# Patient Record
Sex: MALE | Race: BLACK OR AFRICAN AMERICAN | NOT HISPANIC OR LATINO | Employment: OTHER | URBAN - METROPOLITAN AREA
[De-identification: names, ages, dates, MRNs, and addresses within clinical notes are randomized per-mention and may not be internally consistent; named-entity substitution may affect disease eponyms.]

---

## 2020-02-14 PROBLEM — F32.A DEPRESSION: Status: ACTIVE | Noted: 2020-02-14

## 2021-04-19 ENCOUNTER — HISTORICAL (OUTPATIENT)
Dept: ADMINISTRATIVE | Facility: HOSPITAL | Age: 36
End: 2021-04-19

## 2021-04-19 LAB
ALBUMIN SERPL-MCNC: 4.3 GM/DL (ref 3.5–5)
ALBUMIN/GLOB SERPL: 1.2 RATIO (ref 1.1–2)
ALP SERPL-CCNC: 107 UNIT/L (ref 40–150)
ALT SERPL-CCNC: 22 UNIT/L (ref 0–55)
AST SERPL-CCNC: 22 UNIT/L (ref 5–34)
BILIRUB SERPL-MCNC: 0.4 MG/DL
BILIRUBIN DIRECT+TOT PNL SERPL-MCNC: 0.2 MG/DL (ref 0–0.5)
BILIRUBIN DIRECT+TOT PNL SERPL-MCNC: 0.2 MG/DL (ref 0–0.8)
BUN SERPL-MCNC: 8 MG/DL (ref 8.9–20.6)
CALCIUM SERPL-MCNC: 9.5 MG/DL (ref 8.4–10.2)
CHLORIDE SERPL-SCNC: 106 MMOL/L (ref 98–107)
CHOLEST SERPL-MCNC: 167 MG/DL
CHOLEST/HDLC SERPL: 4 {RATIO} (ref 0–5)
CO2 SERPL-SCNC: 26 MMOL/L (ref 22–29)
CREAT SERPL-MCNC: 0.88 MG/DL (ref 0.73–1.18)
ERYTHROCYTE [DISTWIDTH] IN BLOOD BY AUTOMATED COUNT: 14.8 % (ref 11.5–17)
GLOBULIN SER-MCNC: 3.5 GM/DL (ref 2.4–3.5)
GLUCOSE SERPL-MCNC: 89 MG/DL (ref 74–100)
HCT VFR BLD AUTO: 52.2 % (ref 42–52)
HDLC SERPL-MCNC: 45 MG/DL (ref 35–60)
HGB BLD-MCNC: 16.3 GM/DL (ref 14–18)
LDLC SERPL CALC-MCNC: 105 MG/DL (ref 50–140)
MCH RBC QN AUTO: 28 PG (ref 27–31)
MCHC RBC AUTO-ENTMCNC: 31.2 GM/DL (ref 33–36)
MCV RBC AUTO: 89.7 FL (ref 80–94)
PLATELET # BLD AUTO: 285 X10(3)/MCL (ref 130–400)
PMV BLD AUTO: 10.9 FL (ref 9.4–12.4)
POTASSIUM SERPL-SCNC: 4 MMOL/L (ref 3.5–5.1)
PROT SERPL-MCNC: 7.8 GM/DL (ref 6.4–8.3)
RBC # BLD AUTO: 5.82 X10(6)/MCL (ref 4.7–6.1)
SODIUM SERPL-SCNC: 144 MMOL/L (ref 136–145)
TRIGL SERPL-MCNC: 84 MG/DL (ref 34–140)
TSH SERPL-ACNC: 1.17 UIU/ML (ref 0.35–4.94)
VLDLC SERPL CALC-MCNC: 17 MG/DL
WBC # SPEC AUTO: 8.6 X10(3)/MCL (ref 4.5–11.5)

## 2021-10-04 ENCOUNTER — HISTORICAL (OUTPATIENT)
Dept: ADMINISTRATIVE | Facility: HOSPITAL | Age: 36
End: 2021-10-04

## 2021-10-04 LAB
AMPHET UR QL SCN: NEGATIVE
APPEARANCE, UA: CLEAR
BACTERIA SPEC CULT: ABNORMAL
BARBITURATE SCN PRESENT UR: NEGATIVE
BENZODIAZ UR QL SCN: NEGATIVE
BILIRUB UR QL STRIP: NEGATIVE
CANNABINOIDS UR QL SCN: NEGATIVE
COCAINE UR QL SCN: NEGATIVE
COLOR UR: YELLOW
GLUCOSE (UA): NEGATIVE
HGB UR QL STRIP: NEGATIVE
KETONES UR QL STRIP: ABNORMAL
LEUKOCYTE ESTERASE UR QL STRIP: NEGATIVE
NITRITE UR QL STRIP: NEGATIVE
OPIATES UR QL SCN: NEGATIVE
PCP UR QL: NEGATIVE
PH UR STRIP.AUTO: 7 [PH] (ref 5–7.5)
PH UR STRIP: 7 [PH] (ref 5–9)
PROT UR QL STRIP: ABNORMAL
RBC #/AREA URNS HPF: ABNORMAL /[HPF]
SP GR FLD REFRACTOMETRY: 1.02 (ref 1–1.03)
SP GR UR STRIP: 1.02 (ref 1–1.03)
SQUAMOUS EPITHELIAL, UA: ABNORMAL
UROBILINOGEN UR STRIP-ACNC: 0.2
WBC #/AREA URNS HPF: ABNORMAL /[HPF]

## 2021-11-12 ENCOUNTER — HISTORICAL (OUTPATIENT)
Dept: ADMINISTRATIVE | Facility: HOSPITAL | Age: 36
End: 2021-11-12

## 2021-11-12 LAB
CHOLEST SERPL-MCNC: 162 MG/DL
CHOLEST/HDLC SERPL: 3 {RATIO} (ref 0–5)
HDLC SERPL-MCNC: 60 MG/DL (ref 35–60)
LDLC SERPL CALC-MCNC: 87 MG/DL (ref 50–140)
TRIGL SERPL-MCNC: 73 MG/DL (ref 34–140)
VLDLC SERPL CALC-MCNC: 15 MG/DL

## 2021-11-17 ENCOUNTER — HISTORICAL (OUTPATIENT)
Dept: ADMINISTRATIVE | Facility: HOSPITAL | Age: 36
End: 2021-11-17

## 2021-11-17 LAB — VALPROATE SERPL-MCNC: 42.9 UG/ML (ref 50–100)

## 2021-11-22 ENCOUNTER — HISTORICAL (OUTPATIENT)
Dept: ADMINISTRATIVE | Facility: HOSPITAL | Age: 36
End: 2021-11-22

## 2021-11-22 LAB — VALPROATE SERPL-MCNC: 54 UG/ML (ref 50–100)

## 2022-06-07 ENCOUNTER — HOSPITAL ENCOUNTER (EMERGENCY)
Facility: HOSPITAL | Age: 37
Discharge: HOME | End: 2022-06-07
Attending: FAMILY MEDICINE
Payer: COMMERCIAL

## 2022-06-07 VITALS
RESPIRATION RATE: 18 BRPM | DIASTOLIC BLOOD PRESSURE: 77 MMHG | TEMPERATURE: 98 F | SYSTOLIC BLOOD PRESSURE: 120 MMHG | OXYGEN SATURATION: 100 % | BODY MASS INDEX: 21.47 KG/M2 | WEIGHT: 150 LBS | HEIGHT: 70 IN | HEART RATE: 92 BPM

## 2022-06-07 DIAGNOSIS — S39.012A LUMBAR STRAIN, INITIAL ENCOUNTER: Primary | ICD-10-CM

## 2022-06-07 PROCEDURE — 63600175 PHARM REV CODE 636 W HCPCS: Performed by: FAMILY MEDICINE

## 2022-06-07 PROCEDURE — 96372 THER/PROPH/DIAG INJ SC/IM: CPT | Performed by: FAMILY MEDICINE

## 2022-06-07 PROCEDURE — 25000003 PHARM REV CODE 250: Performed by: FAMILY MEDICINE

## 2022-06-07 PROCEDURE — 99284 EMERGENCY DEPT VISIT MOD MDM: CPT | Mod: 25

## 2022-06-07 RX ORDER — KETOROLAC TROMETHAMINE 30 MG/ML
60 INJECTION, SOLUTION INTRAMUSCULAR; INTRAVENOUS
Status: COMPLETED | OUTPATIENT
Start: 2022-06-07 | End: 2022-06-07

## 2022-06-07 RX ORDER — METHOCARBAMOL 500 MG/1
1000 TABLET, FILM COATED ORAL 3 TIMES DAILY
Qty: 30 TABLET | Refills: 0 | Status: ON HOLD | OUTPATIENT
Start: 2022-06-07 | End: 2022-06-28 | Stop reason: HOSPADM

## 2022-06-07 RX ORDER — NAPROXEN 500 MG/1
500 TABLET ORAL 2 TIMES DAILY PRN
Qty: 20 TABLET | Refills: 0 | Status: ON HOLD | OUTPATIENT
Start: 2022-06-07 | End: 2022-06-28 | Stop reason: HOSPADM

## 2022-06-07 RX ORDER — METHOCARBAMOL 500 MG/1
1000 TABLET, FILM COATED ORAL
Status: COMPLETED | OUTPATIENT
Start: 2022-06-07 | End: 2022-06-07

## 2022-06-07 RX ADMIN — METHOCARBAMOL 1000 MG: 500 TABLET ORAL at 03:06

## 2022-06-07 RX ADMIN — KETOROLAC TROMETHAMINE 60 MG: 30 INJECTION, SOLUTION INTRAMUSCULAR at 03:06

## 2022-06-07 NOTE — ED PROVIDER NOTES
"Encounter Date: 6/7/2022       History     Chief Complaint   Patient presents with    Back Pain     Pt states hurt his back at approx. 8 pm last night while trying to lift niece. Pt states cannot walk. Pt denies pertinent medical Hx.      36-year-old gentleman arrives by ambulance with complaints of lower back pain.  This occurred acutely after lifting up on his knees this afternoon.  Patient reports he felt a pop and is now concerned that his back is out of place.  Patient reports trouble walking and moving since this occurring.  No urinary incontinence.  No perineal numbness or tingling.  No bowel incontinence.  Patient reports symptoms are moderate.  Better with lying down, worse with movement.        Review of patient's allergies indicates:   Allergen Reactions    Haloperidol Swelling     Muscle stiffness  Has muscle spasms      Paroxetine Swelling, Other (See Comments) and Rash     "i don't remember"  "i don't remember"      Pineapple      Face swells up  Face swells up      Ziprasidone Other (See Comments)     Muscle twitching     Past Medical History:   Diagnosis Date    Anxiety     Depression     Hallucination     History of psychiatric hospitalization     Hx of psychiatric care     Psychiatric problem     Psychosis     Schizoaffective disorder     Sleep difficulties     Suicide attempt     Therapy      History reviewed. No pertinent surgical history.  History reviewed. No pertinent family history.  Social History     Tobacco Use    Smoking status: Current Every Day Smoker     Packs/day: 1.00   Substance Use Topics    Alcohol use: Yes     Alcohol/week: 4.0 standard drinks     Types: 4 Cans of beer per week    Drug use: Never     Review of Systems   Constitutional: Negative for chills, fatigue and fever.   HENT: Negative for ear pain, rhinorrhea and sore throat.    Eyes: Negative for photophobia and pain.   Respiratory: Negative for cough, shortness of breath and wheezing.  "   Cardiovascular: Negative for chest pain.   Gastrointestinal: Negative for abdominal pain, diarrhea, nausea and vomiting.   Genitourinary: Negative for dysuria.   Neurological: Negative for dizziness, weakness and headaches.   All other systems reviewed and are negative.      Physical Exam     Initial Vitals [06/07/22 0130]   BP Pulse Resp Temp SpO2   123/72 97 18 98.2 °F (36.8 °C) 100 %      MAP       --         Physical Exam    Nursing note and vitals reviewed.  Constitutional: He appears well-developed and well-nourished.   HENT:   Head: Normocephalic and atraumatic.   Eyes: EOM are normal. Pupils are equal, round, and reactive to light.   Neck: Neck supple.   Normal range of motion.  Cardiovascular: Normal rate, regular rhythm and normal heart sounds. Exam reveals no gallop and no friction rub.    No murmur heard.  Pulmonary/Chest: Breath sounds normal. No respiratory distress.   Abdominal: Abdomen is soft. Bowel sounds are normal. He exhibits no distension. There is no abdominal tenderness.   Musculoskeletal:         General: Normal range of motion.      Cervical back: Normal range of motion and neck supple.      Comments: Mild paralumbar tenderness to palpation.  No tenderness to palpation over the lumbar spines.  No sacral tenderness to palpation.  Negative straight leg raise bilaterally.  2+ patellar reflexes bilaterally.  2+ Achilles reflexes bilaterally.  No clonus appreciated in lower extremities.  When patient lying on his back, able to lift up each leg individually without difficulty.     Neurological: He is alert and oriented to person, place, and time. He has normal strength.   Skin: Skin is warm and dry.   Psychiatric: He has a normal mood and affect. His behavior is normal. Judgment and thought content normal.         ED Course   Procedures  Labs Reviewed - No data to display       Imaging Results          X-Ray Lumbar Spine Ap And Lateral (Preliminary result)  Result time 06/07/22 03:17:52    ED  Interpretation by Shahbaz Gooden MD (06/07/22 03:17:52, [facility], Emergency Medicine)    No acute abnormality                               Medications   ketorolac injection 60 mg (60 mg Intramuscular Given 6/7/22 0301)   methocarbamoL tablet 1,000 mg (1,000 mg Oral Given 6/7/22 0301)                 ED Course as of 06/07/22 0324 Tue Jun 07, 2022 0322 X-ray of the lumbar spine does not show any acute abnormality.  After patient has received a Toradol injection, about 5 minutes later, reports significant improvement of the pain where he feels he is able to move.  Reassurance given to the patient.  ER precautions for any acute worsening. [MW]      ED Course User Index  [MW] Shahbaz Gooden MD             Clinical Impression:   Final diagnoses:  [S39.012A] Lumbar strain, initial encounter (Primary)          ED Disposition Condition    Discharge Stable        ED Prescriptions     Medication Sig Dispense Start Date End Date Auth. Provider    naproxen (NAPROSYN) 500 MG tablet Take 1 tablet (500 mg total) by mouth 2 (two) times daily as needed (pain). 20 tablet 6/7/2022 6/17/2022 Shahbaz Gooden MD    methocarbamoL (ROBAXIN) 500 MG Tab Take 2 tablets (1,000 mg total) by mouth 3 (three) times daily. for 5 days 30 tablet 6/7/2022 6/12/2022 Shahbaz Gooden MD        Follow-up Information     Follow up With Specialties Details Why Contact Info    [facility] Emergency Medicine  As needed, If symptoms worsen [address] [address] [phone]    Primary Care Physician  In 5 days             Shahbaz Gooden MD  06/07/22 6994

## 2022-06-12 ENCOUNTER — HOSPITAL ENCOUNTER (INPATIENT)
Facility: HOSPITAL | Age: 37
LOS: 15 days | Discharge: HOME HEALTH | DRG: 558 | End: 2022-06-28
Attending: FAMILY MEDICINE | Admitting: INTERNAL MEDICINE
Payer: COMMERCIAL

## 2022-06-12 DIAGNOSIS — F20.9 SCHIZOPHRENIA, CHRONIC CONDITION WITH ACUTE EXACERBATION: ICD-10-CM

## 2022-06-12 DIAGNOSIS — M62.82 NON-TRAUMATIC RHABDOMYOLYSIS: Primary | ICD-10-CM

## 2022-06-12 DIAGNOSIS — R07.9 CHEST PAIN: ICD-10-CM

## 2022-06-12 DIAGNOSIS — R44.3 HALLUCINATION: ICD-10-CM

## 2022-06-12 DIAGNOSIS — R07.9 CHEST PAIN, UNSPECIFIED TYPE: ICD-10-CM

## 2022-06-12 DIAGNOSIS — R60.9 EDEMA: ICD-10-CM

## 2022-06-12 DIAGNOSIS — R22.32 LOCALIZED SWELLING OF LEFT UPPER EXTREMITY: ICD-10-CM

## 2022-06-12 LAB
ALBUMIN SERPL-MCNC: 3.9 GM/DL (ref 3.5–5)
ALBUMIN/GLOB SERPL: 1.1 RATIO (ref 1.1–2)
ALP SERPL-CCNC: 104 UNIT/L (ref 40–150)
ALT SERPL-CCNC: 104 UNIT/L (ref 0–55)
AMPHET UR QL SCN: NEGATIVE
APPEARANCE UR: CLEAR
AST SERPL-CCNC: 400 UNIT/L (ref 5–34)
BACTERIA #/AREA URNS AUTO: ABNORMAL /HPF
BARBITURATE SCN PRESENT UR: NEGATIVE
BASOPHILS # BLD AUTO: 0.05 X10(3)/MCL (ref 0–0.2)
BASOPHILS NFR BLD AUTO: 0.4 %
BENZODIAZ UR QL SCN: NEGATIVE
BILIRUB UR QL STRIP.AUTO: NEGATIVE MG/DL
BILIRUBIN DIRECT+TOT PNL SERPL-MCNC: 0.4 MG/DL
BUN SERPL-MCNC: 8 MG/DL (ref 8.9–20.6)
CALCIUM SERPL-MCNC: 9.6 MG/DL (ref 8.4–10.2)
CANNABINOIDS UR QL SCN: POSITIVE
CHLORIDE SERPL-SCNC: 103 MMOL/L (ref 98–107)
CK SERPL-CCNC: ABNORMAL U/L (ref 30–200)
CO2 SERPL-SCNC: 28 MMOL/L (ref 22–29)
COCAINE UR QL SCN: NEGATIVE
COLOR UR AUTO: YELLOW
CREAT SERPL-MCNC: 0.97 MG/DL (ref 0.73–1.18)
EOSINOPHIL # BLD AUTO: 0.31 X10(3)/MCL (ref 0–0.9)
EOSINOPHIL NFR BLD AUTO: 2.5 %
ERYTHROCYTE [DISTWIDTH] IN BLOOD BY AUTOMATED COUNT: 14.4 % (ref 11.5–17)
ETHANOL SERPL-MCNC: <10 MG/DL
FENTANYL UR QL SCN: NEGATIVE
FLUAV AG UPPER RESP QL IA.RAPID: NOT DETECTED
FLUBV AG UPPER RESP QL IA.RAPID: NOT DETECTED
GLOBULIN SER-MCNC: 3.6 GM/DL (ref 2.4–3.5)
GLUCOSE SERPL-MCNC: 59 MG/DL (ref 74–100)
GLUCOSE UR QL STRIP.AUTO: NORMAL MG/DL
HCT VFR BLD AUTO: 43.9 % (ref 42–52)
HGB BLD-MCNC: 15 GM/DL (ref 14–18)
HYALINE CASTS #/AREA URNS LPF: ABNORMAL /LPF
IMM GRANULOCYTES # BLD AUTO: 0.04 X10(3)/MCL (ref 0–0.02)
IMM GRANULOCYTES NFR BLD AUTO: 0.3 % (ref 0–0.43)
KETONES UR QL STRIP.AUTO: ABNORMAL MG/DL
LEUKOCYTE ESTERASE UR QL STRIP.AUTO: 25 UNIT/L
LYMPHOCYTES # BLD AUTO: 2.59 X10(3)/MCL (ref 0.6–4.6)
LYMPHOCYTES NFR BLD AUTO: 20.8 %
MCH RBC QN AUTO: 29 PG (ref 27–31)
MCHC RBC AUTO-ENTMCNC: 34.2 MG/DL (ref 33–36)
MCV RBC AUTO: 84.7 FL (ref 80–94)
MDMA UR QL SCN: NEGATIVE
MONOCYTES # BLD AUTO: 0.51 X10(3)/MCL (ref 0.1–1.3)
MONOCYTES NFR BLD AUTO: 4.1 %
MUCOUS THREADS URNS QL MICRO: ABNORMAL /LPF
NEUTROPHILS # BLD AUTO: 9 X10(3)/MCL (ref 2.1–9.2)
NEUTROPHILS NFR BLD AUTO: 71.9 %
NITRITE UR QL STRIP.AUTO: NEGATIVE
NRBC BLD AUTO-RTO: 0 %
OPIATES UR QL SCN: NEGATIVE
PCP UR QL: NEGATIVE
PH UR STRIP.AUTO: 7 [PH]
PH UR: 7 [PH] (ref 3–11)
PLATELET # BLD AUTO: 270 X10(3)/MCL (ref 130–400)
PMV BLD AUTO: 10.7 FL (ref 9.4–12.4)
POTASSIUM SERPL-SCNC: 4 MMOL/L (ref 3.5–5.1)
PROT SERPL-MCNC: 7.5 GM/DL (ref 6.4–8.3)
PROT UR QL STRIP.AUTO: ABNORMAL MG/DL
RBC # BLD AUTO: 5.18 X10(6)/MCL (ref 4.7–6.1)
RBC #/AREA URNS AUTO: ABNORMAL /HPF
RBC UR QL AUTO: NEGATIVE UNIT/L
SARS-COV-2 RNA RESP QL NAA+PROBE: NOT DETECTED
SODIUM SERPL-SCNC: 142 MMOL/L (ref 136–145)
SP GR UR STRIP.AUTO: 1.03
SPECIFIC GRAVITY, URINE AUTO (.000) (OHS): 1.03 (ref 1–1.03)
SQUAMOUS #/AREA URNS LPF: ABNORMAL /HPF
UROBILINOGEN UR STRIP-ACNC: ABNORMAL MG/DL
WBC # SPEC AUTO: 12.5 X10(3)/MCL (ref 4.5–11.5)
WBC #/AREA URNS AUTO: ABNORMAL /HPF

## 2022-06-12 PROCEDURE — 87636 SARSCOV2 & INF A&B AMP PRB: CPT | Performed by: FAMILY MEDICINE

## 2022-06-12 PROCEDURE — 96361 HYDRATE IV INFUSION ADD-ON: CPT

## 2022-06-12 PROCEDURE — 82962 GLUCOSE BLOOD TEST: CPT

## 2022-06-12 PROCEDURE — 82077 ASSAY SPEC XCP UR&BREATH IA: CPT | Performed by: FAMILY MEDICINE

## 2022-06-12 PROCEDURE — 36415 COLL VENOUS BLD VENIPUNCTURE: CPT | Performed by: FAMILY MEDICINE

## 2022-06-12 PROCEDURE — 80053 COMPREHEN METABOLIC PANEL: CPT | Performed by: FAMILY MEDICINE

## 2022-06-12 PROCEDURE — 80307 DRUG TEST PRSMV CHEM ANLYZR: CPT | Performed by: FAMILY MEDICINE

## 2022-06-12 PROCEDURE — 82550 ASSAY OF CK (CPK): CPT | Performed by: FAMILY MEDICINE

## 2022-06-12 PROCEDURE — 85025 COMPLETE CBC W/AUTO DIFF WBC: CPT | Performed by: FAMILY MEDICINE

## 2022-06-12 PROCEDURE — 93005 ELECTROCARDIOGRAM TRACING: CPT

## 2022-06-12 PROCEDURE — 81001 URINALYSIS AUTO W/SCOPE: CPT | Performed by: FAMILY MEDICINE

## 2022-06-12 PROCEDURE — 96360 HYDRATION IV INFUSION INIT: CPT

## 2022-06-12 PROCEDURE — 25000003 PHARM REV CODE 250: Performed by: FAMILY MEDICINE

## 2022-06-12 PROCEDURE — 99291 CRITICAL CARE FIRST HOUR: CPT | Mod: 25

## 2022-06-12 RX ADMIN — SODIUM CHLORIDE 1000 ML: 9 INJECTION, SOLUTION INTRAVENOUS at 11:06

## 2022-06-12 RX ADMIN — SODIUM CHLORIDE 1000 ML: 9 INJECTION, SOLUTION INTRAVENOUS at 07:06

## 2022-06-12 NOTE — ED PROVIDER NOTES
Name: Elvis Puentes   Age: 36 y.o.  Sex: male    Chief complaint:   Chief Complaint   Patient presents with    Psychiatric Evaluation     Patient reports hallucinations, reports hearing voices, denies SI/HI, patient calm and cooperative. Left arm with swelling/warmth      Patient arrived with: EMS  History obtained from: Patient    Subjective:   36-year-old male with a past medical history of anxiety, depression, schizoaffective disorder, loosen a shins that presents to the emergency department with auditory hallucinations.  Patient says she has been hearing voices surgery tongue and mean things about himself.  Voices are not telling him to hurt himself or hurt other people.  Patient denies visual hallucinations.  Denies suicidal homicidal ideation.  Patient said he regularly follows up with his psychiatrist and is compliant with all his medications.  Will like to be admitted to the psychiatric hospital for further management.  Denies any other complaints.  I noticed that his left arm was swelling.  Patient said he exercised yesterday and only did bicep curls with his left arm, noticed swelling this morning.  Denies numbness/tingling/weakness.  Denies chest pain or shortness of breath.    Past Medical History:   Diagnosis Date    Anxiety     Depression     Hallucination     History of psychiatric hospitalization     Hx of psychiatric care     Psychiatric problem     Psychosis     Schizoaffective disorder     Sleep difficulties     Suicide attempt     Therapy      No past surgical history on file.  Social History     Socioeconomic History    Marital status: Single    Number of children: 0   Tobacco Use    Smoking status: Current Every Day Smoker     Packs/day: 1.00   Substance and Sexual Activity    Alcohol use: Yes     Alcohol/week: 4.0 standard drinks     Types: 4 Cans of beer per week    Drug use: Never    Sexual activity: Not Currently     Partners: Female     Birth control/protection:  "Abstinence   Other Topics Concern    Patient feels they ought to cut down on drinking/drug use No    Patient annoyed by others criticizing their drinking/drug use No    Patient has felt bad or guilty about drinking/drug use No    Patient has had a drink/used drugs as an eye opener in the AM No     Review of patient's allergies indicates:   Allergen Reactions    Haloperidol Swelling     Muscle stiffness  Has muscle spasms      Paroxetine Swelling, Other (See Comments) and Rash     "i don't remember"  "i don't remember"      Pineapple      Face swells up  Face swells up      Ziprasidone Other (See Comments)     Muscle twitching        Review of Systems   Constitutional: Negative for diaphoresis and fever.   HENT: Negative for congestion and sore throat.    Eyes: Negative for pain and discharge.   Respiratory: Negative for cough and shortness of breath.    Cardiovascular: Negative for chest pain and palpitations.   Gastrointestinal: Negative for diarrhea and vomiting.   Genitourinary: Negative for dysuria and hematuria.   Musculoskeletal: Negative for back pain and myalgias.   Skin: Negative for itching and rash.   Neurological: Negative for weakness and headaches.   Psychiatric/Behavioral: Positive for hallucinations. Negative for suicidal ideas.          Objective:     Vitals:    06/12/22 1718   BP: 123/87   Pulse: 104   Resp: 16   Temp: 99 °F (37.2 °C)        Physical Exam  Constitutional:       Appearance: He is not toxic-appearing.   HENT:      Head: Normocephalic and atraumatic.   Cardiovascular:      Rate and Rhythm: Regular rhythm.      Pulses: Normal pulses.   Pulmonary:      Effort: Pulmonary effort is normal.      Breath sounds: Normal breath sounds.   Abdominal:      General: Abdomen is flat. Bowel sounds are normal.      Palpations: Abdomen is soft.   Musculoskeletal:         General: No deformity. Normal range of motion.      Right upper arm: No edema.      Left upper arm: Edema present.      Left " forearm: Edema present.      Cervical back: Normal range of motion and neck supple.   Skin:     General: Skin is warm and dry.   Neurological:      General: No focal deficit present.      Mental Status: He is alert and oriented to person, place, and time.   Psychiatric:         Attention and Perception: He perceives auditory hallucinations. He does not perceive visual hallucinations.         Mood and Affect: Mood normal.         Speech: Speech normal.         Behavior: Behavior normal.         Thought Content: Thought content does not include homicidal or suicidal ideation.         Judgment: Judgment normal.          Records:  Nursing records and triage records reviewed  Prior records reviewed    Medical decision making:   Presents to the emergency department with auditory hallucinations with voices that are telling him mean things about himself.  Patient denies suicidal homicidal ideation.  Compliant with medications.  Otherwise patient is nontoxic appearing.  Has some swelling of his left upper extremity within normal neurovascular exam.  Patient will be pec due to psychosis.  Will get psychiatric labs for evaluation and a left upper extremity Doppler to rule out DVT.  Patient is comfortable and does not need symptomatic management at this time.    ED Course as of 06/12/22 2247   Sun Jun 12, 2022   1844 My interpretation of labs.  CBC has a mild leukocytosis with WBC of 12.5.  No anemia or thrombocytopenia.  No severe electrolyte abnormality.  No hyperglycemia or PK.  Liver enzymes within normal limits.  ETOH negative. [RK]   1851 Patient is hypoglycemic, alert and oriented.  Will receive food. [RK]   1900 CK approximately 70,000. Concerning for rhabdomyolysis.  Will be started on IV fluids. [RK]   2245 UA shows no signs of urinary tract infection.  U tox negative.    Left upper extremity duplex shows no signs of DVT. [RK]      ED Course User Index  [RK] Tomasz Hoffman MD          EKG:  ECG Results           EKG 12-lead (Preliminary result)  Result time 06/12/22 18:00:54    ED Interpretation by Tomasz Hoffman MD (06/12/22 18:00:54, [facility], Emergency Medicine)    Normal sinus rhythm at a rate of 88, no signs of ST elevation or depression, normal axis, normal intervals with a QTC of 416                  In process by Interface, Lab In [facility] (06/12/22 17:41:45)                 Narrative:    Test Reason : R44.3,    Vent. Rate : 088 BPM     Atrial Rate : 088 BPM     P-R Int : 138 ms          QRS Dur : 090 ms      QT Int : 344 ms       P-R-T Axes : 074 015 039 degrees     QTc Int : 416 ms    Normal sinus rhythm  Voltage criteria for left ventricular hypertrophy  Abnormal ECG  No previous ECGs available    Referred By: [email or web address]   SELF           Confirmed By:                                Critical Care    Date/Time: 6/12/2022 10:47 PM  Performed by: Tomasz Hoffman MD  Authorized by: Tomasz Hoffman MD   Total critical care time (exclusive of procedural time) : 35 minutes  Comments: Upon my evaluation, this patient had a high probability of imminent or life-threatening deterioration due to rhabdomyolysis, which required my direct attention, intervention, and personal management.    I have personally provided 35 minutes of critical care time exclusive of time spent on separately billed procedures. Time includes review of chart, history and physical exam of the patient, review of laboratory data, review of radiology results, discussion with consultants, and monitoring for potential decompensation. Interventions were performed as documented above.                Diagnosis:  Final diagnoses:  [R44.3] Hallucination  [R60.9] Edema  [M62.82] Non-traumatic rhabdomyolysis (Primary)          Tomasz Hoffman M.D.  Emergency Medicine Physician     (Please note that this chart was completed via voice to text dictation. There may be typographical errors or substitutions that are  unintentional, or uncorrected. Every attempt was made to proofread the chart prior to completion. If there are any questions, please contact the physician for final clarification).       Tomasz Hoffman MD  06/12/22 4926

## 2022-06-13 PROBLEM — F20.9 SCHIZOPHRENIA, CHRONIC CONDITION WITH ACUTE EXACERBATION: Status: ACTIVE | Noted: 2022-06-13

## 2022-06-13 PROBLEM — M62.82 NON-TRAUMATIC RHABDOMYOLYSIS: Status: ACTIVE | Noted: 2022-06-13

## 2022-06-13 LAB
ALBUMIN SERPL-MCNC: 3 GM/DL (ref 3.5–5)
ALBUMIN SERPL-MCNC: 3.2 GM/DL (ref 3.5–5)
ALBUMIN/GLOB SERPL: 1.1 RATIO (ref 1.1–2)
ALBUMIN/GLOB SERPL: 1.1 RATIO (ref 1.1–2)
ALP SERPL-CCNC: 80 UNIT/L (ref 40–150)
ALP SERPL-CCNC: 83 UNIT/L (ref 40–150)
ALT SERPL-CCNC: 90 UNIT/L (ref 0–55)
ALT SERPL-CCNC: 92 UNIT/L (ref 0–55)
AST SERPL-CCNC: 324 UNIT/L (ref 5–34)
AST SERPL-CCNC: 327 UNIT/L (ref 5–34)
BASOPHILS # BLD AUTO: 0.05 X10(3)/MCL (ref 0–0.2)
BASOPHILS NFR BLD AUTO: 0.6 %
BILIRUBIN DIRECT+TOT PNL SERPL-MCNC: 0.4 MG/DL
BILIRUBIN DIRECT+TOT PNL SERPL-MCNC: 0.5 MG/DL
BUN SERPL-MCNC: 6.2 MG/DL (ref 8.9–20.6)
BUN SERPL-MCNC: 7.1 MG/DL (ref 8.9–20.6)
CALCIUM SERPL-MCNC: 8.9 MG/DL (ref 8.4–10.2)
CALCIUM SERPL-MCNC: 9.1 MG/DL (ref 8.4–10.2)
CHLORIDE SERPL-SCNC: 108 MMOL/L (ref 98–107)
CHLORIDE SERPL-SCNC: 109 MMOL/L (ref 98–107)
CK SERPL-CCNC: ABNORMAL U/L (ref 30–200)
CO2 SERPL-SCNC: 28 MMOL/L (ref 22–29)
CO2 SERPL-SCNC: 30 MMOL/L (ref 22–29)
CREAT SERPL-MCNC: 0.81 MG/DL (ref 0.73–1.18)
CREAT SERPL-MCNC: 0.83 MG/DL (ref 0.73–1.18)
EOSINOPHIL # BLD AUTO: 0.53 X10(3)/MCL (ref 0–0.9)
EOSINOPHIL NFR BLD AUTO: 6.4 %
ERYTHROCYTE [DISTWIDTH] IN BLOOD BY AUTOMATED COUNT: 14.8 % (ref 11.5–17)
GLOBULIN SER-MCNC: 2.7 GM/DL (ref 2.4–3.5)
GLOBULIN SER-MCNC: 2.9 GM/DL (ref 2.4–3.5)
GLUCOSE SERPL-MCNC: 72 MG/DL (ref 74–100)
GLUCOSE SERPL-MCNC: 92 MG/DL (ref 74–100)
HCT VFR BLD AUTO: 42 % (ref 42–52)
HGB BLD-MCNC: 13.5 GM/DL (ref 14–18)
IMM GRANULOCYTES # BLD AUTO: 0.02 X10(3)/MCL (ref 0–0.02)
IMM GRANULOCYTES NFR BLD AUTO: 0.2 % (ref 0–0.43)
LACTATE SERPL-SCNC: 0.6 MMOL/L (ref 0.5–2.2)
LYMPHOCYTES # BLD AUTO: 3.3 X10(3)/MCL (ref 0.6–4.6)
LYMPHOCYTES NFR BLD AUTO: 39.8 %
MCH RBC QN AUTO: 28.3 PG (ref 27–31)
MCHC RBC AUTO-ENTMCNC: 32.1 MG/DL (ref 33–36)
MCV RBC AUTO: 88.1 FL (ref 80–94)
MONOCYTES # BLD AUTO: 0.4 X10(3)/MCL (ref 0.1–1.3)
MONOCYTES NFR BLD AUTO: 4.8 %
NEUTROPHILS # BLD AUTO: 4 X10(3)/MCL (ref 2.1–9.2)
NEUTROPHILS NFR BLD AUTO: 48.2 %
NRBC BLD AUTO-RTO: 0 %
PLATELET # BLD AUTO: 254 X10(3)/MCL (ref 130–400)
PMV BLD AUTO: 10.9 FL (ref 9.4–12.4)
POCT GLUCOSE: 86 MG/DL (ref 70–110)
POTASSIUM SERPL-SCNC: 4.4 MMOL/L (ref 3.5–5.1)
POTASSIUM SERPL-SCNC: 4.6 MMOL/L (ref 3.5–5.1)
PROT SERPL-MCNC: 5.7 GM/DL (ref 6.4–8.3)
PROT SERPL-MCNC: 6.1 GM/DL (ref 6.4–8.3)
RBC # BLD AUTO: 4.77 X10(6)/MCL (ref 4.7–6.1)
SODIUM SERPL-SCNC: 142 MMOL/L (ref 136–145)
SODIUM SERPL-SCNC: 142 MMOL/L (ref 136–145)
WBC # SPEC AUTO: 8.3 X10(3)/MCL (ref 4.5–11.5)

## 2022-06-13 PROCEDURE — 80053 COMPREHEN METABOLIC PANEL: CPT | Performed by: STUDENT IN AN ORGANIZED HEALTH CARE EDUCATION/TRAINING PROGRAM

## 2022-06-13 PROCEDURE — 93005 ELECTROCARDIOGRAM TRACING: CPT

## 2022-06-13 PROCEDURE — 99222 PR INITIAL HOSPITAL CARE,LEVL II: ICD-10-PCS | Mod: ,,, | Performed by: STUDENT IN AN ORGANIZED HEALTH CARE EDUCATION/TRAINING PROGRAM

## 2022-06-13 PROCEDURE — 63600175 PHARM REV CODE 636 W HCPCS: Performed by: STUDENT IN AN ORGANIZED HEALTH CARE EDUCATION/TRAINING PROGRAM

## 2022-06-13 PROCEDURE — 25000003 PHARM REV CODE 250: Performed by: STUDENT IN AN ORGANIZED HEALTH CARE EDUCATION/TRAINING PROGRAM

## 2022-06-13 PROCEDURE — 82550 ASSAY OF CK (CPK): CPT | Performed by: STUDENT IN AN ORGANIZED HEALTH CARE EDUCATION/TRAINING PROGRAM

## 2022-06-13 PROCEDURE — S4991 NICOTINE PATCH NONLEGEND: HCPCS | Performed by: STUDENT IN AN ORGANIZED HEALTH CARE EDUCATION/TRAINING PROGRAM

## 2022-06-13 PROCEDURE — 36415 COLL VENOUS BLD VENIPUNCTURE: CPT | Performed by: STUDENT IN AN ORGANIZED HEALTH CARE EDUCATION/TRAINING PROGRAM

## 2022-06-13 PROCEDURE — 83605 ASSAY OF LACTIC ACID: CPT | Performed by: STUDENT IN AN ORGANIZED HEALTH CARE EDUCATION/TRAINING PROGRAM

## 2022-06-13 PROCEDURE — 11000001 HC ACUTE MED/SURG PRIVATE ROOM

## 2022-06-13 PROCEDURE — 85025 COMPLETE CBC W/AUTO DIFF WBC: CPT | Performed by: STUDENT IN AN ORGANIZED HEALTH CARE EDUCATION/TRAINING PROGRAM

## 2022-06-13 PROCEDURE — 25000003 PHARM REV CODE 250: Performed by: FAMILY MEDICINE

## 2022-06-13 PROCEDURE — 99222 1ST HOSP IP/OBS MODERATE 55: CPT | Mod: ,,, | Performed by: STUDENT IN AN ORGANIZED HEALTH CARE EDUCATION/TRAINING PROGRAM

## 2022-06-13 RX ORDER — OLANZAPINE 5 MG/1
5 TABLET ORAL EVERY 8 HOURS
Status: DISCONTINUED | OUTPATIENT
Start: 2022-06-13 | End: 2022-06-13

## 2022-06-13 RX ORDER — RISPERIDONE 1 MG/1
1 TABLET ORAL 2 TIMES DAILY
Status: DISCONTINUED | OUTPATIENT
Start: 2022-06-13 | End: 2022-06-14

## 2022-06-13 RX ORDER — IBUPROFEN 200 MG
1 TABLET ORAL DAILY
Status: DISCONTINUED | OUTPATIENT
Start: 2022-06-13 | End: 2022-06-28 | Stop reason: HOSPADM

## 2022-06-13 RX ORDER — TRAZODONE HYDROCHLORIDE 50 MG/1
50 TABLET ORAL NIGHTLY PRN
Status: DISCONTINUED | OUTPATIENT
Start: 2022-06-13 | End: 2022-06-17

## 2022-06-13 RX ORDER — DIVALPROEX SODIUM 500 MG/1
500 TABLET, FILM COATED, EXTENDED RELEASE ORAL 2 TIMES DAILY
Status: ON HOLD | COMMUNITY
Start: 2022-02-28 | End: 2022-07-08

## 2022-06-13 RX ORDER — SERTRALINE HYDROCHLORIDE 50 MG/1
50 TABLET, FILM COATED ORAL DAILY
Status: DISCONTINUED | OUTPATIENT
Start: 2022-06-13 | End: 2022-06-13

## 2022-06-13 RX ORDER — CITALOPRAM 20 MG/1
20 TABLET, FILM COATED ORAL DAILY
Status: ON HOLD | COMMUNITY
Start: 2022-02-28 | End: 2022-07-08

## 2022-06-13 RX ORDER — OLANZAPINE 10 MG/2ML
5 INJECTION, POWDER, FOR SOLUTION INTRAMUSCULAR ONCE AS NEEDED
Status: DISCONTINUED | OUTPATIENT
Start: 2022-06-13 | End: 2022-06-13

## 2022-06-13 RX ORDER — GLUCAGON 1 MG
1 KIT INJECTION
Status: DISCONTINUED | OUTPATIENT
Start: 2022-06-13 | End: 2022-06-28 | Stop reason: HOSPADM

## 2022-06-13 RX ORDER — NALOXONE HCL 0.4 MG/ML
0.02 VIAL (ML) INJECTION
Status: DISCONTINUED | OUTPATIENT
Start: 2022-06-13 | End: 2022-06-28 | Stop reason: HOSPADM

## 2022-06-13 RX ORDER — RISPERIDONE 1 MG/1
1 TABLET ORAL EVERY 8 HOURS PRN
Status: DISCONTINUED | OUTPATIENT
Start: 2022-06-13 | End: 2022-06-17

## 2022-06-13 RX ORDER — OLANZAPINE 10 MG/2ML
5 INJECTION, POWDER, FOR SOLUTION INTRAMUSCULAR EVERY 8 HOURS PRN
Status: DISCONTINUED | OUTPATIENT
Start: 2022-06-13 | End: 2022-06-17

## 2022-06-13 RX ORDER — IBUPROFEN 200 MG
24 TABLET ORAL
Status: DISCONTINUED | OUTPATIENT
Start: 2022-06-13 | End: 2022-06-28 | Stop reason: HOSPADM

## 2022-06-13 RX ORDER — ENOXAPARIN SODIUM 100 MG/ML
40 INJECTION SUBCUTANEOUS EVERY 24 HOURS
Status: DISCONTINUED | OUTPATIENT
Start: 2022-06-13 | End: 2022-06-28 | Stop reason: HOSPADM

## 2022-06-13 RX ORDER — VENLAFAXINE HYDROCHLORIDE 75 MG/1
150 CAPSULE, EXTENDED RELEASE ORAL DAILY
Status: DISCONTINUED | OUTPATIENT
Start: 2022-06-13 | End: 2022-06-28 | Stop reason: HOSPADM

## 2022-06-13 RX ORDER — SODIUM CHLORIDE, SODIUM LACTATE, POTASSIUM CHLORIDE, CALCIUM CHLORIDE 600; 310; 30; 20 MG/100ML; MG/100ML; MG/100ML; MG/100ML
INJECTION, SOLUTION INTRAVENOUS CONTINUOUS
Status: DISCONTINUED | OUTPATIENT
Start: 2022-06-13 | End: 2022-06-26

## 2022-06-13 RX ORDER — VENLAFAXINE HYDROCHLORIDE 150 MG/1
150 CAPSULE, EXTENDED RELEASE ORAL DAILY
COMMUNITY
Start: 2022-05-04 | End: 2022-07-08

## 2022-06-13 RX ORDER — OXCARBAZEPINE 300 MG/1
300 TABLET, FILM COATED ORAL 2 TIMES DAILY
Status: DISCONTINUED | OUTPATIENT
Start: 2022-06-13 | End: 2022-06-28 | Stop reason: HOSPADM

## 2022-06-13 RX ORDER — LORAZEPAM 2 MG/ML
1 INJECTION INTRAMUSCULAR ONCE
Status: DISCONTINUED | OUTPATIENT
Start: 2022-06-13 | End: 2022-06-13

## 2022-06-13 RX ORDER — OLANZAPINE 20 MG/1
20 TABLET ORAL NIGHTLY
Status: ON HOLD | COMMUNITY
Start: 2022-05-04 | End: 2022-07-08

## 2022-06-13 RX ORDER — OXCARBAZEPINE 150 MG/1
300 TABLET, FILM COATED ORAL 2 TIMES DAILY
Status: ON HOLD | COMMUNITY
Start: 2022-05-04 | End: 2022-07-08

## 2022-06-13 RX ORDER — IBUPROFEN 200 MG
16 TABLET ORAL
Status: DISCONTINUED | OUTPATIENT
Start: 2022-06-13 | End: 2022-06-28 | Stop reason: HOSPADM

## 2022-06-13 RX ADMIN — SODIUM CHLORIDE, POTASSIUM CHLORIDE, SODIUM LACTATE AND CALCIUM CHLORIDE 1000 ML: 600; 310; 30; 20 INJECTION, SOLUTION INTRAVENOUS at 02:06

## 2022-06-13 RX ADMIN — SODIUM CHLORIDE, POTASSIUM CHLORIDE, SODIUM LACTATE AND CALCIUM CHLORIDE 1000 ML: 600; 310; 30; 20 INJECTION, SOLUTION INTRAVENOUS at 03:06

## 2022-06-13 RX ADMIN — SODIUM CHLORIDE, POTASSIUM CHLORIDE, SODIUM LACTATE AND CALCIUM CHLORIDE: 600; 310; 30; 20 INJECTION, SOLUTION INTRAVENOUS at 05:06

## 2022-06-13 RX ADMIN — SODIUM CHLORIDE, POTASSIUM CHLORIDE, SODIUM LACTATE AND CALCIUM CHLORIDE: 600; 310; 30; 20 INJECTION, SOLUTION INTRAVENOUS at 04:06

## 2022-06-13 RX ADMIN — OXCARBAZEPINE 300 MG: 300 TABLET, FILM COATED ORAL at 12:06

## 2022-06-13 RX ADMIN — VENLAFAXINE HYDROCHLORIDE 150 MG: 75 CAPSULE, EXTENDED RELEASE ORAL at 12:06

## 2022-06-13 RX ADMIN — SODIUM CHLORIDE, POTASSIUM CHLORIDE, SODIUM LACTATE AND CALCIUM CHLORIDE: 600; 310; 30; 20 INJECTION, SOLUTION INTRAVENOUS at 11:06

## 2022-06-13 RX ADMIN — SERTRALINE HYDROCHLORIDE 50 MG: 50 TABLET ORAL at 09:06

## 2022-06-13 RX ADMIN — ENOXAPARIN SODIUM 40 MG: 40 INJECTION SUBCUTANEOUS at 05:06

## 2022-06-13 RX ADMIN — OLANZAPINE 5 MG: 5 TABLET, FILM COATED ORAL at 05:06

## 2022-06-13 RX ADMIN — NICOTINE 1 PATCH: 14 PATCH, EXTENDED RELEASE TRANSDERMAL at 02:06

## 2022-06-13 RX ADMIN — SODIUM ZIRCONIUM CYCLOSILICATE 5 G: 5 POWDER, FOR SUSPENSION ORAL at 09:06

## 2022-06-13 RX ADMIN — OXCARBAZEPINE 300 MG: 300 TABLET, FILM COATED ORAL at 09:06

## 2022-06-13 RX ADMIN — RISPERIDONE 1 MG: 1 TABLET ORAL at 09:06

## 2022-06-13 RX ADMIN — SODIUM CHLORIDE, POTASSIUM CHLORIDE, SODIUM LACTATE AND CALCIUM CHLORIDE: 600; 310; 30; 20 INJECTION, SOLUTION INTRAVENOUS at 09:06

## 2022-06-13 RX ADMIN — SODIUM CHLORIDE, POTASSIUM CHLORIDE, SODIUM LACTATE AND CALCIUM CHLORIDE 1000 ML: 600; 310; 30; 20 INJECTION, SOLUTION INTRAVENOUS at 01:06

## 2022-06-13 RX ADMIN — SODIUM ZIRCONIUM CYCLOSILICATE 5 G: 5 POWDER, FOR SUSPENSION ORAL at 05:06

## 2022-06-13 NOTE — SUBJECTIVE & OBJECTIVE
Patient History               Medical as of 6/13/2022       Past Medical History       Diagnosis Date Comments Source    Anxiety -- -- Provider    Depression -- -- Provider    Hallucination -- -- Provider    History of psychiatric hospitalization -- -- Provider    Hx of psychiatric care -- -- Provider    Psychiatric problem -- -- Provider    Psychosis -- -- Provider    Schizoaffective disorder -- -- Provider    Sleep difficulties -- -- Provider    Suicide attempt -- -- Provider    Therapy -- -- Provider              Pertinent Negatives       Diagnosis Date Noted Comments Source    Addiction to drug 02/14/2020 -- Provider    ADHD (attention deficit hyperactivity disorder) 02/14/2020 -- Provider    Adjustment disorder 02/14/2020 -- Provider    Alcohol abuse 02/14/2020 -- Provider    Anorexia nervosa 02/14/2020 -- Provider    Bipolar disorder 02/14/2020 -- Provider    Borderline personality disorder 02/14/2020 -- Provider    Bulimia nervosa 02/14/2020 -- Provider    CHF (congestive heart failure) 02/14/2020 -- Provider    COPD (chronic obstructive pulmonary disease) 02/14/2020 -- Provider    CVA (cerebral vascular accident) 02/14/2020 -- Provider    Dementia 02/14/2020 -- Provider    Dependence on renal dialysis 02/14/2020 -- Provider    Diabetes mellitus 02/14/2020 -- Provider    Fatigue 02/14/2020 -- Provider    Headache 02/14/2020 -- Provider    HIV infection 02/14/2020 -- Provider    Hypertension 02/14/2020 -- Provider    Liver disease 02/14/2020 -- Provider    May 02/14/2020 -- Provider    Neuropathy 02/14/2020 -- Provider    Obsessive-compulsive disorder 02/14/2020 -- Provider    Oppositional defiant disorder 02/14/2020 -- Provider    Panic disorder 02/14/2020 -- Provider    Psychiatric exam requested by authority 02/14/2020 -- Provider    PTSD (post-traumatic stress disorder) 02/14/2020 -- Provider    Renal disorder 02/14/2020 -- Provider    Seizures 02/14/2020 -- Provider    Substance abuse 02/14/2020 --  Provider    Thyroid disease 02/14/2020 -- Provider    Withdrawal symptoms, alcohol 02/14/2020 -- Provider    Withdrawal symptoms, drug or narcotic 02/14/2020 -- Provider                          Surgical as of 6/13/2022    Past Surgical History: Patient provided no pertinent surgical history.               Family as of 6/13/2022    None               Tobacco Use as of 6/13/2022       Smoking Status Smoking Start Date Smoking Quit Date Packs/Day Years Used    Current Every Day Smoker -- -- 1.00 --      Types Comments Smokeless Tobacco Status Smokeless Tobacco Quit Date Source    -- -- Never Used -- Provider                  Alcohol Use as of 6/13/2022       Alcohol Use Drinks/Week Alcohol/Week Comments Source    Yes 4 Cans of beer 4.0 standard drinks -- Provider                  Drug Use as of 6/13/2022       Drug Use Types Frequency Comments Source    Never -- -- -- Provider                  Sexual Activity as of 6/13/2022       Sexually Active Birth Control Partners Comments Source    Not Currently Abstinence Female -- Provider                  Activities of Daily Living as of 6/13/2022       Activities of Daily Living Question Response Comments Source    Patient feels they ought to cut down on drinking/drug use No -- Provider    Patient annoyed by others criticizing their drinking/drug use No -- Provider    Patient has felt bad or guilty about drinking/drug use No -- Provider    Patient has had a drink/used drugs as an eye opener in the AM No -- Provider                  Social Documentation as of 6/13/2022    None               Occupational as of 6/13/2022    None               Socioeconomic as of 6/13/2022       Marital Status Spouse Name Number of Children Years Education Education Level Preferred Language Ethnicity Race Source    Single -- 0 -- -- English Not  or /a Black or  Provider                  Pertinent History       Question Response Comments    Lives with family --     "Place in Birth Order 2nd --    Lives in homeless --    Number of Siblings 4 --    Raised by biological parents --    Legal Involvement civil and criminal litigation --    Childhood Trauma uneventful --    Criminal History of arrest and incarceration --    Financial Status disabled --    Highest Level of Education high school graduation --    Does patient have access to a firearm? No --     Service No --    Primary Leisure Activity time with family --    Spirituality non-practicing --          Past Medical History:   Diagnosis Date    Anxiety     Depression     Hallucination     History of psychiatric hospitalization     Hx of psychiatric care     Psychiatric problem     Psychosis     Schizoaffective disorder     Sleep difficulties     Suicide attempt     Therapy      History reviewed. No pertinent surgical history.  Family History    None       Tobacco Use    Smoking status: Current Every Day Smoker     Packs/day: 1.00    Smokeless tobacco: Never Used   Substance and Sexual Activity    Alcohol use: Yes     Alcohol/week: 4.0 standard drinks     Types: 4 Cans of beer per week    Drug use: Never    Sexual activity: Not Currently     Partners: Female     Birth control/protection: Abstinence     Review of patient's allergies indicates:   Allergen Reactions    Haloperidol Swelling     Muscle stiffness  Has muscle spasms      Paroxetine Swelling, Other (See Comments) and Rash     "i don't remember"  "i don't remember"      Pineapple      Face swells up  Face swells up      Ziprasidone Other (See Comments)     Muscle twitching       No current facility-administered medications on file prior to encounter.     Current Outpatient Medications on File Prior to Encounter   Medication Sig    citalopram (CELEXA) 20 MG tablet Take 20 mg by mouth once daily.    divalproex ER (DEPAKOTE) 500 MG Tb24 Take 500 mg by mouth 2 (two) times daily.    [] methocarbamoL (ROBAXIN) 500 MG Tab Take 2 tablets (1,000 mg total) by mouth " "3 (three) times daily. for 5 days    naproxen (NAPROSYN) 500 MG tablet Take 1 tablet (500 mg total) by mouth 2 (two) times daily as needed (pain).    OLANZapine (ZYPREXA) 20 MG tablet Take 20 mg by mouth nightly.    OXcarbazepine (TRILEPTAL) 150 MG Tab Take 300 mg by mouth 2 (two) times daily.    risperiDONE (RISPERDAL) 1 MG tablet Take 1 tablet (1 mg total) by mouth 2 (two) times daily.    sertraline (ZOLOFT) 50 MG tablet Take 1 tablet (50 mg total) by mouth once daily.    traZODone (DESYREL) 50 MG tablet Take 1 tablet (50 mg total) by mouth nightly as needed for Insomnia.    venlafaxine (EFFEXOR-XR) 150 MG Cp24 Take 150 mg by mouth once daily.     Psychotherapeutics (From admission, onward)                Start     Stop Route Frequency Ordered    06/13/22 1633  OLANZapine injection 5 mg         11/09 0733 IM Once as needed 06/13/22 1536    06/13/22 1545  OLANZapine tablet 5 mg         -- Oral Every 8 hours 06/13/22 1536    06/13/22 1245  venlafaxine 24 hr capsule 150 mg         -- Oral Daily 06/13/22 1136    06/13/22 1233  traZODone tablet 50 mg         -- Oral Nightly PRN 06/13/22 1136    06/13/22 0900  risperiDONE tablet 1 mg         -- Oral 2 times daily 06/13/22 0508          Review of Systems  Strengths and Liabilities: Strength: Patient is expressive/articulate., Strength: Patient is intelligent., Strength: Patient is motivated for change., Strength: Patient has positive support network., Liability: Patient lacks coping skills.    Objective:     Vital Signs (Most Recent):  Temp: 97.6 °F (36.4 °C) (06/13/22 1309)  Pulse: 67 (06/13/22 1309)  Resp: (!) 21 (06/13/22 1309)  BP: 123/73 (06/13/22 1309)  SpO2: 100 % (06/13/22 1309)   Vital Signs (24h Range):  Temp:  [97.5 °F (36.4 °C)-97.6 °F (36.4 °C)] 97.6 °F (36.4 °C)  Pulse:  [54-75] 67  Resp:  [18-21] 21  SpO2:  [99 %-100 %] 100 %  BP: ()/(57-77) 123/73     Height: 5' 11" (180.3 cm)  Weight: 64.4 kg (142 lb)  Body mass index is 19.8 " "kg/m².      Intake/Output Summary (Last 24 hours) at 6/13/2022 1809  Last data filed at 6/13/2022 0432  Gross per 24 hour   Intake --   Output 1001 ml   Net -1001 ml       Physical Exam     Appearance: unremarkable, age appropriate, dressed in hospital gown  Level of Consciousness: awake, alert  Behavior/Cooperation: friendly and cooperative  Psychomotor: unremarkable   Speech: normal tone, normal rate, normal pitch, normal volume  Language: english, fluid  Orientation: grossly intact, person, place, day of week, month of year, year  Attention Span/Concentration: intact to interview and spells "HOUSE" forwards and backwards without error  Memory: Registers 3/3 objects, recalls 2/3 objects at 5 minutes without cuing  Mood: "depressed"  Affect: inappropriately bright at times, mostly euthymic  Thought Process: perseverative  Associations: Logical and appropriate  Thought Content: denies SI/HI/paranoia, no delusional ideation volunteered, denies plan or desire for self harm or harm to others  Fund of Knowledge: Aware of current events  Abstraction: concrete  Insight: fair  Judgment: fair    Significant Labs: All pertinent labs within the past 24 hours have been reviewed.    Significant Imaging: I have reviewed all pertinent imaging results/findings within the past 24 hours.  "

## 2022-06-13 NOTE — ASSESSMENT & PLAN NOTE
ASSESSMENT     Elvis Puentes is a 36 y.o. male with a past psychiatric history of schizophrenia, currently presenting with rhabdomyolysis.Psychiatry was originally consulted to address the patient's symptoms of hallucinations and depression.    IMPRESSION  Schizophrenia, chronic with acute exacerbation  Depression, unspecified  Cannabis use disorder, mild  R/o intellectual impairment    RECOMMENDATION(S)      1. Scheduled Medication(s):  Continue trileptal 300mg bid  Continue effexor XR 150mg daily  Continue risperidone 1mg bid today, uptitrate to 2mg bid tomorrow  Discontinue zyprexa for now, pt voices that risperidone has been more effective for him than zyprexa in the past    2. PRN Medication(s):  Recommend risperidone 1mg PO or zyprexa 5mg IM q8 hrs PRN non redirectable agitation associated with breakthrough psychosis or miguel  Do not given zyprexa IM within 1 hr of ativan  Do not exceed 30mg zyprexa total daily from all sources    3.  Monitor:  EKG 6/13/2022 w/ qtc 423    4. Legal Status/Precaution(s):  Continue PEC, please ensure that 's office is contacted regarding need for CEC evaluation  Continue 1:1 observation    5. Disposition  Recommend to seek IP psychiatric hospitalization when medically cleared

## 2022-06-13 NOTE — CONSULTS
"[facility]  Psychiatry  Consult Note    Patient Name: Elvis Puentes  MRN: [id number]   Code Status: Full Code  Admission Date: 6/12/2022  Hospital Length of Stay: 0 days  Attending Physician: Lexi Veliz MD  Primary Care Provider: Primary Doctor No    Current Legal Status: PEC    Patient information was obtained from patient, past medical records and ER records.   Inpatient consult to Psychiatry  Consult performed by: Sai Charles MD  Consult ordered by: Dilia Cartagena MD        Subjective:     Principal Problem:<principal problem not specified>    Chief Complaint:  "my arm is hurting"     HPI:   Per Primary MD:  "Elvis Puentes is a 36 y.o.  male who with a history of schizophrenia presented to the ED via ambulance on 6/12/2022  From a psych facility in [place]. Pt stated he wanted to check himself in the unit because of feeling lonely and having hallucinations but they noticed his L arm being swollen and sent to ED for further evaluation. Pt states he was repeatedly lifting a 25 lbs weight with his L arm but denies any injuries. He also denies using any IV drugs. He denies pain to the arm, only reports swollen sensation.      In the ED, venous US done, negative for DVT. X-ray also done of the L arm, negative for any osseus abnormalities. Initial labs significant for CK of 70,000, which is now 52,409 after 5 L of bolus in the ED and being on  cc/hr. AST, ALT is 400 and 104 respectively. K+ on admission 4, it is now 4.6 with mild T wave peaks on EKG. Ca2+ 9.6. Cr 0.97. UDS only positive for cannaboids.      Ortho was consulted, as per Dr. Blakely, no surgical intervention needed at this time as there is no suspicion for compartment syndrome or tendon rupture.    "    Per Psychiatry MD:   Elvis Puentes is a 36 y.o. male with a past psychiatric history of schizophrenia, currently presenting with non traumatic rhabdomyolysis.  Psychiatry was consulted to " address the patient's symptoms of hallucinations.     Patient calm and cooperative with interview.  Patient reports that he wanted to check himself into a psychiatric hospital for depression and hallucinations.  It has history schizophrenia.  Reports that he was admitted to medical unit for injury to his arm.  Says that he was working out because I wanted to keep people from dying.  Says he wanted to overcome.  Says I can not take the thought money dies.  Says if I had a gun I would use it, denies that he is in possession a firearm.  Endorses remote suicidal attempt when he was a teenager.  Notes that he has been having worsening hallucinations.  Says that he hear voices say demeaning things.  Says that his mood has been depressed.  Notes some difficulty with sleep and energy.  Notes some hopeless thinking.  Voices that he is not seeing a psychiatrist.  Says that he is taking several different medications to help with his mental health.  Patient cannot remember these medications.  Patient reports that a family member helps him to remember to take his medications.  Patient says his medications are working very well.  Patient reports that he wanted to check himself into a psychiatric hospital to get his medications adjusted.  Voices motivation to continue his treatment after his hospitalization.    SUBJECTIVE   Currently, the patient is endorsing the following:    Medical Review Of Systems:  Constitutional: denies fevers, denies chills, denies recent weight change  Eyes: denies pain in eyes or loss of vision  Ears: denies tinnitis, denies loss of hearing  Mouth/throat: denies difficulty with speaking, denies difficulty with swallowing  Respiratory: denies SOB, denies cough  Gastrointestinal: + abdominal pain, denies nausea/vomiting, denies constipation/diarrhea  Genitourinary: denies urinary frequency, denies burning on urination  Dermatologic: denies rash, denies erythema  Musculoskeletal: + myalgias, +  arthralgias  Hematologic: denies easy bleeding/bruising, denies enlarged lymph nodes  Neurologic: denies seizures, denies headaches, denies loss of sensation, denies weakness  Psychiatric: see HPI    Psychiatric Review Of Systems:  Please see HPI above    Past Medical/Surgical History:  Past Medical History:   Diagnosis Date    Anxiety     Depression     Hallucination     History of psychiatric hospitalization     Hx of psychiatric care     Psychiatric problem     Psychosis     Schizoaffective disorder     Sleep difficulties     Suicide attempt     Therapy       has a past medical history of Anxiety, Depression, Hallucination, History of psychiatric hospitalization, psychiatric care, Psychiatric problem, Psychosis, Schizoaffective disorder, Sleep difficulties, Suicide attempt, and Therapy.  History reviewed. No pertinent surgical history.    Past Psychiatric History:  Previous Medication Trials: yes, currently taking zyprexa, trileptal, and effexor  Previous Psychiatric Hospitalizations: yes, numerous  Previous Suicide Attempts: yes, once as a teenager  History of Violence: denies  Outpatient Psychiatrist: not currently  Outpatient Psychotherapist: not current    Social History:  Marital Status: single  Children: none   Employment Status/Info: not working, SSI  Education: HS graduate  Housing/Lives with: apartment with mother and brother  History of phys/sexual abuse: denies  Access to gun: denies    Substance Abuse History:  Recreational Drugs: cannabis frequently  Use of Alcohol: denies  Rehab History:denies   Tobacco Use:denies    Legal History:  Past Charges/Incarcerations:denies  Pending charges:denies     Family Psychiatric History:   Mother- schizophrenia        Hospital Course: See HPI above.            Patient History               Medical as of 6/13/2022       Past Medical History       Diagnosis Date Comments Source    Anxiety -- -- Provider    Depression -- -- Provider    Hallucination -- --  Provider    History of psychiatric hospitalization -- -- Provider    Hx of psychiatric care -- -- Provider    Psychiatric problem -- -- Provider    Psychosis -- -- Provider    Schizoaffective disorder -- -- Provider    Sleep difficulties -- -- Provider    Suicide attempt -- -- Provider    Therapy -- -- Provider              Pertinent Negatives       Diagnosis Date Noted Comments Source    Addiction to drug 02/14/2020 -- Provider    ADHD (attention deficit hyperactivity disorder) 02/14/2020 -- Provider    Adjustment disorder 02/14/2020 -- Provider    Alcohol abuse 02/14/2020 -- Provider    Anorexia nervosa 02/14/2020 -- Provider    Bipolar disorder 02/14/2020 -- Provider    Borderline personality disorder 02/14/2020 -- Provider    Bulimia nervosa 02/14/2020 -- Provider    CHF (congestive heart failure) 02/14/2020 -- Provider    COPD (chronic obstructive pulmonary disease) 02/14/2020 -- Provider    CVA (cerebral vascular accident) 02/14/2020 -- Provider    Dementia 02/14/2020 -- Provider    Dependence on renal dialysis 02/14/2020 -- Provider    Diabetes mellitus 02/14/2020 -- Provider    Fatigue 02/14/2020 -- Provider    Headache 02/14/2020 -- Provider    HIV infection 02/14/2020 -- Provider    Hypertension 02/14/2020 -- Provider    Liver disease 02/14/2020 -- Provider    May 02/14/2020 -- Provider    Neuropathy 02/14/2020 -- Provider    Obsessive-compulsive disorder 02/14/2020 -- Provider    Oppositional defiant disorder 02/14/2020 -- Provider    Panic disorder 02/14/2020 -- Provider    Psychiatric exam requested by authority 02/14/2020 -- Provider    PTSD (post-traumatic stress disorder) 02/14/2020 -- Provider    Renal disorder 02/14/2020 -- Provider    Seizures 02/14/2020 -- Provider    Substance abuse 02/14/2020 -- Provider    Thyroid disease 02/14/2020 -- Provider    Withdrawal symptoms, alcohol 02/14/2020 -- Provider    Withdrawal symptoms, drug or narcotic 02/14/2020 -- Provider                           Surgical as of 6/13/2022    Past Surgical History: Patient provided no pertinent surgical history.               Family as of 6/13/2022    None               Tobacco Use as of 6/13/2022       Smoking Status Smoking Start Date Smoking Quit Date Packs/Day Years Used    Current Every Day Smoker -- -- 1.00 --      Types Comments Smokeless Tobacco Status Smokeless Tobacco Quit Date Source    -- -- Never Used -- Provider                  Alcohol Use as of 6/13/2022       Alcohol Use Drinks/Week Alcohol/Week Comments Source    Yes 4 Cans of beer 4.0 standard drinks -- Provider                  Drug Use as of 6/13/2022       Drug Use Types Frequency Comments Source    Never -- -- -- Provider                  Sexual Activity as of 6/13/2022       Sexually Active Birth Control Partners Comments Source    Not Currently Abstinence Female -- Provider                  Activities of Daily Living as of 6/13/2022       Activities of Daily Living Question Response Comments Source    Patient feels they ought to cut down on drinking/drug use No -- Provider    Patient annoyed by others criticizing their drinking/drug use No -- Provider    Patient has felt bad or guilty about drinking/drug use No -- Provider    Patient has had a drink/used drugs as an eye opener in the AM No -- Provider                  Social Documentation as of 6/13/2022    None               Occupational as of 6/13/2022    None               Socioeconomic as of 6/13/2022       Marital Status Spouse Name Number of Children Years Education Education Level Preferred Language Ethnicity Race Source    Single -- 0 -- -- English Not  or /a Black or  Provider                  Pertinent History       Question Response Comments    Lives with family --    Place in Birth Order 2nd --    Lives in homeless --    Number of Siblings 4 --    Raised by biological parents --    Legal Involvement civil and criminal litigation --    Childhood Trauma  "uneventful --    Criminal History of arrest and incarceration --    Financial Status disabled --    Highest Level of Education high school graduation --    Does patient have access to a firearm? No --     Service No --    Primary Leisure Activity time with family --    Spirituality non-practicing --          Past Medical History:   Diagnosis Date    Anxiety     Depression     Hallucination     History of psychiatric hospitalization     Hx of psychiatric care     Psychiatric problem     Psychosis     Schizoaffective disorder     Sleep difficulties     Suicide attempt     Therapy      History reviewed. No pertinent surgical history.  Family History    None       Tobacco Use    Smoking status: Current Every Day Smoker     Packs/day: 1.00    Smokeless tobacco: Never Used   Substance and Sexual Activity    Alcohol use: Yes     Alcohol/week: 4.0 standard drinks     Types: 4 Cans of beer per week    Drug use: Never    Sexual activity: Not Currently     Partners: Female     Birth control/protection: Abstinence     Review of patient's allergies indicates:   Allergen Reactions    Haloperidol Swelling     Muscle stiffness  Has muscle spasms      Paroxetine Swelling, Other (See Comments) and Rash     "i don't remember"  "i don't remember"      Pineapple      Face swells up  Face swells up      Ziprasidone Other (See Comments)     Muscle twitching       No current facility-administered medications on file prior to encounter.     Current Outpatient Medications on File Prior to Encounter   Medication Sig    citalopram (CELEXA) 20 MG tablet Take 20 mg by mouth once daily.    divalproex ER (DEPAKOTE) 500 MG Tb24 Take 500 mg by mouth 2 (two) times daily.    [] methocarbamoL (ROBAXIN) 500 MG Tab Take 2 tablets (1,000 mg total) by mouth 3 (three) times daily. for 5 days    naproxen (NAPROSYN) 500 MG tablet Take 1 tablet (500 mg total) by mouth 2 (two) times daily as needed (pain).    OLANZapine " "(ZYPREXA) 20 MG tablet Take 20 mg by mouth nightly.    OXcarbazepine (TRILEPTAL) 150 MG Tab Take 300 mg by mouth 2 (two) times daily.    risperiDONE (RISPERDAL) 1 MG tablet Take 1 tablet (1 mg total) by mouth 2 (two) times daily.    sertraline (ZOLOFT) 50 MG tablet Take 1 tablet (50 mg total) by mouth once daily.    traZODone (DESYREL) 50 MG tablet Take 1 tablet (50 mg total) by mouth nightly as needed for Insomnia.    venlafaxine (EFFEXOR-XR) 150 MG Cp24 Take 150 mg by mouth once daily.     Psychotherapeutics (From admission, onward)                Start     Stop Route Frequency Ordered    06/13/22 1633  OLANZapine injection 5 mg         11/09 0733 IM Once as needed 06/13/22 1536    06/13/22 1545  OLANZapine tablet 5 mg         -- Oral Every 8 hours 06/13/22 1536    06/13/22 1245  venlafaxine 24 hr capsule 150 mg         -- Oral Daily 06/13/22 1136    06/13/22 1233  traZODone tablet 50 mg         -- Oral Nightly PRN 06/13/22 1136    06/13/22 0900  risperiDONE tablet 1 mg         -- Oral 2 times daily 06/13/22 0508          Review of Systems  Strengths and Liabilities: Strength: Patient is expressive/articulate., Strength: Patient is intelligent., Strength: Patient is motivated for change., Strength: Patient has positive support network., Liability: Patient lacks coping skills.    Objective:     Vital Signs (Most Recent):  Temp: 97.6 °F (36.4 °C) (06/13/22 1309)  Pulse: 67 (06/13/22 1309)  Resp: (!) 21 (06/13/22 1309)  BP: 123/73 (06/13/22 1309)  SpO2: 100 % (06/13/22 1309)   Vital Signs (24h Range):  Temp:  [97.5 °F (36.4 °C)-97.6 °F (36.4 °C)] 97.6 °F (36.4 °C)  Pulse:  [54-75] 67  Resp:  [18-21] 21  SpO2:  [99 %-100 %] 100 %  BP: ()/(57-77) 123/73     Height: 5' 11" (180.3 cm)  Weight: 64.4 kg (142 lb)  Body mass index is 19.8 kg/m².      Intake/Output Summary (Last 24 hours) at 6/13/2022 1807  Last data filed at 6/13/2022 0432  Gross per 24 hour   Intake --   Output 1001 ml   Net -1001 ml " "      Physical Exam     Appearance: unremarkable, age appropriate, dressed in hospital gown  Level of Consciousness: awake, alert  Behavior/Cooperation: friendly and cooperative  Psychomotor: unremarkable   Speech: normal tone, normal rate, normal pitch, normal volume  Language: english, fluid  Orientation: grossly intact, person, place, day of week, month of year, year  Attention Span/Concentration: intact to interview and spells "HOUSE" forwards and backwards without error  Memory: Registers 3/3 objects, recalls 2/3 objects at 5 minutes without cuing  Mood: "depressed"  Affect: inappropriately bright at times, mostly euthymic  Thought Process: perseverative  Associations: Logical and appropriate  Thought Content: denies SI/HI/paranoia, no delusional ideation volunteered, denies plan or desire for self harm or harm to others  Fund of Knowledge: Aware of current events  Abstraction: concrete  Insight: fair  Judgment: fair    Significant Labs: All pertinent labs within the past 24 hours have been reviewed.    Significant Imaging: I have reviewed all pertinent imaging results/findings within the past 24 hours.    Assessment/Plan:     Schizophrenia, chronic condition with acute exacerbation  ASSESSMENT     Elvis Puentes is a 36 y.o. male with a past psychiatric history of schizophrenia, currently presenting with rhabdomyolysis.Psychiatry was originally consulted to address the patient's symptoms of hallucinations and depression.    IMPRESSION  Schizophrenia, chronic with acute exacerbation  Depression, unspecified  Cannabis use disorder, mild  R/o intellectual impairment    RECOMMENDATION(S)      1. Scheduled Medication(s):  Continue trileptal 300mg bid  Continue effexor XR 150mg daily  Continue risperidone 1mg bid today, uptitrate to 2mg bid tomorrow  Discontinue zyprexa for now, pt voices that risperidone has been more effective for him than zyprexa in the past    2. PRN Medication(s):  Recommend risperidone 1mg " PO or zyprexa 5mg IM q8 hrs PRN non redirectable agitation associated with breakthrough psychosis or miguel  Do not given zyprexa IM within 1 hr of ativan  Do not exceed 30mg zyprexa total daily from all sources    3.  Monitor:  EKG 6/13/2022 w/ qtc 423    4. Legal Status/Precaution(s):  Continue PEC, please ensure that 's office is contacted regarding need for CEC evaluation  Continue 1:1 observation    5. Disposition  Recommend to seek IP psychiatric hospitalization when medically cleared       Total Time:  60 minutes      Sai Charles MD   Psychiatry  [facility]

## 2022-06-13 NOTE — PROVIDER PROGRESS NOTES - EMERGENCY DEPT.
Encounter Date: 6/12/2022    ED Physician Progress Notes        Physician Note:   At this time pt stable, bedside US performed by reveal no fluid collection for drainage among affected area, adequate blood flow and perfusion. Pt without pain, CRF < 2 sec, Full AROM of hand with limited AROM of elbow due to swelling among antecubital area. Official doppler US without DVT.  Lactic acid normal and CK decreasing. Despite some degree of tension among anterior arm compartment this is less likely compartment syndrome. Will continue observation and will consult orthopedic service for assessment. Will F/U

## 2022-06-13 NOTE — PLAN OF CARE
"HO2 History and Physical Addendum     Subjective: 36M with hx of schizophrenia presented to a local psych facility seeking care. Noted to have swollen left upper extremity. Transported to [facility] ED for medical clearance. Patient reports upper extremity swelling over last 2 days. Denies injury or hx of similar. Reports marijunana use, denies other drug including injectables. In ED, vitals were stable. Labs notable for CPK 94647, UDS + cannabis, BUN/ Cr 8.0/0.97, , , EtOH negative, Urine pH 7.0. [facility] Orthopedics consulted for concern of compartment syndrome of upper extremity, low likelihood for compartment syndrome. [facility] IM consulted for rhabdomyolysis.     Blood pressure 120/76, pulse 70, temperature 97.5 °F (36.4 °C), temperature source Oral, resp. rate 18, height 5' 11" (1.803 m), weight 64.4 kg (142 lb), SpO2 100 %.      Physical Exam  Vitals reviewed.   Constitutional:       Appearance: Normal appearance.   HENT:      Head: Normocephalic.      Mouth/Throat:      Mouth: Mucous membranes are moist.      Pharynx: Oropharynx is clear.   Eyes:      Extraocular Movements: Extraocular movements intact.      Pupils: Pupils are equal, round, and reactive to light.   Cardiovascular:      Rate and Rhythm: Normal rate and regular rhythm.      Heart sounds: Normal heart sounds.   Pulmonary:      Effort: Pulmonary effort is normal. No respiratory distress.      Breath sounds: Normal breath sounds. No stridor. No wheezing or rhonchi.   Abdominal:      General: Abdomen is flat. There is no distension.   Musculoskeletal:      Right upper arm: Normal.      Left upper arm: Swelling present. No deformity, lacerations or tenderness.      Left elbow: Swelling present.      Left forearm: Swelling present.      Cervical back: Normal range of motion.      Comments: Left upper extremity with circumferential 2+ nonpitting edema from shoulder to wrist, no involvement of hand. 4/5 strength in shoulder and elbow. Pulses 1+ in " wrist. Cap refill <2 seconds. No erythema or warmth    Neurological:      Mental Status: He is alert.   Psychiatric:         Speech: Speech is tangential.         Behavior: Behavior is cooperative.         Cognition and Memory: Cognition and memory normal.       Labs as above    Imaging Results          X-Ray Humerus 2 View Left (Final result)  Result time 06/13/22 08:55:07    Final result by Beto Cam MD (06/13/22 08:55:07)                 Impression:      No acute osseous abnormality.      Electronically signed by: Beto Cam  Date:    06/13/2022  Time:    08:55             Narrative:    EXAMINATION:  XR HUMERUS 2 VIEW LEFT    CLINICAL HISTORY:  trauma;Rhabdomyolysis    COMPARISON:  None.    FINDINGS:  No acute displaced fractures or dislocations.    Joint spaces preserved.    No blastic or lytic lesions.    Soft tissues within normal limits.                            US LUE: no evidence of DVT       Assessment and Plan  Rhabdomyolysis   LUE Swelling   Schizophrenia    Admit to med surg   PEC in place, pending CEC   Zyprexa held due to side effects potentially causing rhabdo, continue Effexor and oxcarbazepine   cc/hr  Lovenox dvt ppx     Dilia Cartagena MD  [facility] HO-2

## 2022-06-13 NOTE — HPI
"Per Primary MD:  "Elvis Puentes is a 36 y.o.  male who with a history of schizophrenia presented to the ED via ambulance on 6/12/2022  From a psych facility in [place]. Pt stated he wanted to check himself in the unit because of feeling lonely and having hallucinations but they noticed his L arm being swollen and sent to ED for further evaluation. Pt states he was repeatedly lifting a 25 lbs weight with his L arm but denies any injuries. He also denies using any IV drugs. He denies pain to the arm, only reports swollen sensation.      In the ED, venous US done, negative for DVT. X-ray also done of the L arm, negative for any osseus abnormalities. Initial labs significant for CK of 70,000, which is now 52,409 after 5 L of bolus in the ED and being on  cc/hr. AST, ALT is 400 and 104 respectively. K+ on admission 4, it is now 4.6 with mild T wave peaks on EKG. Ca2+ 9.6. Cr 0.97. UDS only positive for cannaboids.      Ortho was consulted, as per Dr. Blakely, no surgical intervention needed at this time as there is no suspicion for compartment syndrome or tendon rupture.    "    Per Psychiatry MD:   Elvis Puentes is a 36 y.o. male with a past psychiatric history of schizophrenia, currently presenting with non traumatic rhabdomyolysis.  Psychiatry was consulted to address the patient's symptoms of hallucinations.     Patient calm and cooperative with interview.  Patient reports that he wanted to check himself into a psychiatric hospital for depression and hallucinations.  It has history schizophrenia.  Reports that he was admitted to medical unit for injury to his arm.  Says that he was working out because I wanted to keep people from dying.  Says he wanted to overcome.  Says I can not take the thought money dies.  Says if I had a gun I would use it, denies that he is in possession a firearm.  Endorses remote suicidal attempt when he was a teenager.  Notes that he has been having worsening " hallucinations.  Says that he hear voices say demeaning things.  Says that his mood has been depressed.  Notes some difficulty with sleep and energy.  Notes some hopeless thinking.  Voices that he is not seeing a psychiatrist.  Says that he is taking several different medications to help with his mental health.  Patient cannot remember these medications.  Patient reports that a family member helps him to remember to take his medications.  Patient says his medications are working very well.  Patient reports that he wanted to check himself into a psychiatric hospital to get his medications adjusted.  Voices motivation to continue his treatment after his hospitalization.    SUBJECTIVE   Currently, the patient is endorsing the following:    Medical Review Of Systems:  Constitutional: denies fevers, denies chills, denies recent weight change  Eyes: denies pain in eyes or loss of vision  Ears: denies tinnitis, denies loss of hearing  Mouth/throat: denies difficulty with speaking, denies difficulty with swallowing  Respiratory: denies SOB, denies cough  Gastrointestinal: + abdominal pain, denies nausea/vomiting, denies constipation/diarrhea  Genitourinary: denies urinary frequency, denies burning on urination  Dermatologic: denies rash, denies erythema  Musculoskeletal: + myalgias, + arthralgias  Hematologic: denies easy bleeding/bruising, denies enlarged lymph nodes  Neurologic: denies seizures, denies headaches, denies loss of sensation, denies weakness  Psychiatric: see HPI    Psychiatric Review Of Systems:  Please see HPI above    Past Medical/Surgical History:  Past Medical History:   Diagnosis Date    Anxiety     Depression     Hallucination     History of psychiatric hospitalization     Hx of psychiatric care     Psychiatric problem     Psychosis     Schizoaffective disorder     Sleep difficulties     Suicide attempt     Therapy       has a past medical history of Anxiety, Depression, Hallucination, History of  psychiatric hospitalization, psychiatric care, Psychiatric problem, Psychosis, Schizoaffective disorder, Sleep difficulties, Suicide attempt, and Therapy.  History reviewed. No pertinent surgical history.    Past Psychiatric History:  Previous Medication Trials: yes, currently taking zyprexa, trileptal, and effexor  Previous Psychiatric Hospitalizations: yes, numerous  Previous Suicide Attempts: yes, once as a teenager  History of Violence: denies  Outpatient Psychiatrist: not currently  Outpatient Psychotherapist: not current    Social History:  Marital Status: single  Children: none   Employment Status/Info: not working, SSI  Education:  graduate  Housing/Lives with: apartment with mother and brother  History of phys/sexual abuse: denies  Access to gun: denies    Substance Abuse History:  Recreational Drugs: cannabis frequently  Use of Alcohol: denies  Rehab History:denies   Tobacco Use:denies    Legal History:  Past Charges/Incarcerations:denies  Pending charges:denies     Family Psychiatric History:   Mother- schizophrenia

## 2022-06-13 NOTE — PROGRESS NOTES
Mr Puentes is a pleasant 35 yo AAM with PMH of depression, schizophrenia vs schizoaffective disorder presented to the ER today with complaints of auditory visual hallucinations.  Stated that he would want to be checked into a psychiatric facility. He denies SI or HI. On examination, vitals stable.  Lab significant for a CK of 70,000.  He was PEC'd and internal medicine was consulted. Physical exam significant for tightness of LUE. He states he was doing a lot of bicep curls the previous day.  Pulses are palpated, denies any tenderness and full range of motion.  He received 2 L of normal saline.  Discussed findings with Dr. Pérez recommended to give IVF boluese and check repeat CK and lactic acid.  Will monitor in the ER while this is being done and if no improvement in findings, elevation of lactic acid , CK, will likely have to transfer out for orthopedic services.    Fly Arvizu MD  Internal Medicine, PGY-III  [facility]

## 2022-06-13 NOTE — CONSULTS
[facility]  Established Patient Office Visit  06/13/2022       Patient ID: Elvis Puentes  YOB: 1985  MRN: [id number]    Diagnosis:    Left upper extremity swelling.    Chief Complaint: Psychiatric Evaluation (Patient reports hallucinations, reports hearing voices, denies SI/HI, patient calm and cooperative. Left arm with swelling/warmth)      Elvis Puentes is a 36 y.o. male who presents as a new patient for treatment of the above mentioned diagnosis.  Patient reports that he came to the emergency room last night complaining of auditory and visual hallucinations.  The emergency room staff noticed that his left upper extremity was swollen and tense during their interview/physical exam any orthopedic service was consulted to review.    Patient reports that when he 1st started noticing his hallucinations he began lifting weights (biceps) aggressively as a sort of treatment.  He reports that the hallucinations progressed and he came to the emergency room before switching to his right upper extremity.      The patient is not complaining of any pain in the left arm.  He tells me he has a full range of motion and function of the left arm without any restrictions.  His only concern is that the anterior aspect of his arm over his biceps is quite tense.    Hand dominance: right handed    Past Medical History:    Past Medical History:   Diagnosis Date    Anxiety     Depression     Hallucination     History of psychiatric hospitalization     Hx of psychiatric care     Psychiatric problem     Psychosis     Schizoaffective disorder     Sleep difficulties     Suicide attempt     Therapy      No past surgical history on file.  No family history on file.  Social History     Socioeconomic History    Marital status: Single    Number of children: 0   Tobacco Use    Smoking status: Current Every Day Smoker     Packs/day: 1.00   Substance and Sexual Activity    Alcohol use:  "Yes     Alcohol/week: 4.0 standard drinks     Types: 4 Cans of beer per week    Drug use: Never    Sexual activity: Not Currently     Partners: Female     Birth control/protection: Abstinence   Other Topics Concern    Patient feels they ought to cut down on drinking/drug use No    Patient annoyed by others criticizing their drinking/drug use No    Patient has felt bad or guilty about drinking/drug use No    Patient has had a drink/used drugs as an eye opener in the AM No     Medication List with Changes/Refills   Current Medications    NAPROXEN (NAPROSYN) 500 MG TABLET    Take 1 tablet (500 mg total) by mouth 2 (two) times daily as needed (pain).    RISPERIDONE (RISPERDAL) 1 MG TABLET    Take 1 tablet (1 mg total) by mouth 2 (two) times daily.    SERTRALINE (ZOLOFT) 50 MG TABLET    Take 1 tablet (50 mg total) by mouth once daily.    TRAZODONE (DESYREL) 50 MG TABLET    Take 1 tablet (50 mg total) by mouth nightly as needed for Insomnia.     Review of patient's allergies indicates:   Allergen Reactions    Haloperidol Swelling     Muscle stiffness  Has muscle spasms      Paroxetine Swelling, Other (See Comments) and Rash     "i don't remember"  "i don't remember"      Pineapple      Face swells up  Face swells up      Ziprasidone Other (See Comments)     Muscle twitching       ROS:    Body mass index is 19.8 kg/m².  GENERAL: Well appearing, appropriate for stated age, no acute distress.  CARDIOVASCULAR: Pulses regular by peripheral palpation.  PULMONARY: Respirations are even and non-labored.  NEURO: Awake, alert, and oriented x 3.  PSYCH: Mood & affect are appropriate.  HEENT: Head is normocephalic and atraumatic.    Physical Exam:    Left arm:  The patient is neurovascularly intact distally in the left arm.  He has a full and painless range of motion of the left shoulder, elbow wrist and hand.  The anterior compartment of his left arm over his biceps tendon feels somewhat tense.  There is no tenderness with " palpation    Imaging:    No image results found.     Xray pending    Ultrasound of the left upper extremity performed by Dr. Pérez in the emergency room did not reveal any abscess/soft tissue collections.    Assessment and Plan:    Elvis Puentes is a 36 y.o. male seen in the office today for The primary encounter diagnosis was Non-traumatic rhabdomyolysis. Diagnoses of Hallucination and Edema were also pertinent to this visit..      No signs of compartment syndrome of the left upper extremity.  No role for surgical intervention at this time.    I have ordered an x-ray of patient's left humerus to rule out any underlying osseous abnormalities.  If swelling does not resolve consider advanced imaging in the form of CT or MRI to rule out a soft tissue collection.  We will continue to follow the patient for this issue while he is admitted to the hospital.      Orders Placed This Encounter    Critical Care    Drug Screen, Urine    COVID/FLU A&B PCR    Ethanol    Urinalysis    Comprehensive Metabolic Panel    CBC Auto Differential    CK    CBC with Differential    EXTRA TUBES    Light Blue Top Hold    Red Top Hold    Gold Top Hold    CK    Lactic Acid, Plasma    CK    CBC Auto Differential    Comprehensive Metabolic Panel    CK    CBC with Differential    Inpatient consult to Hospitalist    CV Ultrasound doppler venous arm left    EKG 12-lead    POCT glucose    sodium chloride 0.9% bolus 1,000 mL    sodium chloride 0.9% bolus 1,000 mL    lactated ringers bolus 1,000 mL    nicotine 14 mg/24 hr 1 patch    lactated ringers infusion    risperiDONE tablet 1 mg    sertraline tablet 50 mg    PEC/Psych Hold - Physicians Emergency Certificate / 72 Hour Psych Hold

## 2022-06-13 NOTE — H&P
[facility]ards History & Physical Note     Resident Team: [facility] Medicine List 1  Attending Physician: Lexi Veliz MD  Resident: Dr. Cartagena  Intern: Dr. Moreno     Date of Admit: 6/12/2022    Chief Complaint     Psychiatric Evaluation (Patient reports hallucinations, reports hearing voices, denies SI/HI, patient calm and cooperative. Left arm with swelling/warmth)      Subjective:      History of Present Illness:  Elvis Puentes is a 36 y.o.  male who with a history of schizophrenia presented to the ED via ambulance on 6/12/2022  From a psych facility in [place]. Pt stated he wanted to check himself in the unit because of feeling lonely and having hallucinations but they noticed his L arm being swollen and sent to ED for further evaluation. Pt states he was repeatedly lifting a 25 lbs weight with his L arm but denies any injuries. He also denies using any IV drugs. He denies pain to the arm, only reports swollen sensation.     In the ED, venous US done, negative for DVT. X-ray also done of the L arm, negative for any osseus abnormalities. Initial labs significant for CK of 70,000, which is now 52,409 after 5 L of bolus in the ED and being on  cc/hr. AST, ALT is 400 and 104 respectively. K+ on admission 4, it is now 4.6 with mild T wave peaks on EKG. Ca2+ 9.6. Cr 0.97. UDS only positive for cannaboids.     Ortho was consulted, as per Dr. Blakely, no surgical intervention needed at this time as there is no suspicion for compartment syndrome or tendon rupture.     IM service consulted for admission for rhabdomyolysis.       Past Medical History:  Past Medical History:   Diagnosis Date    Anxiety     Depression     Hallucination     History of psychiatric hospitalization     Hx of psychiatric care     Psychiatric problem     Psychosis     Schizoaffective disorder     Sleep difficulties     Suicide attempt     Therapy        Past Surgical History:  History reviewed. No pertinent  "surgical history.    Family History:  History reviewed. No pertinent family history.    Social History:  Social History     Tobacco Use    Smoking status: Current Every Day Smoker     Packs/day: 1.00    Smokeless tobacco: Never Used   Substance Use Topics    Alcohol use: Yes     Alcohol/week: 4.0 standard drinks     Types: 4 Cans of beer per week    Drug use: Never       Allergies:  Review of patient's allergies indicates:   Allergen Reactions    Haloperidol Swelling     Muscle stiffness  Has muscle spasms      Paroxetine Swelling, Other (See Comments) and Rash     "i don't remember"  "i don't remember"      Pineapple      Face swells up  Face swells up      Ziprasidone Other (See Comments)     Muscle twitching       Home Medications:  Prior to Admission medications    Medication Sig Start Date End Date Taking? Authorizing Provider   citalopram (CELEXA) 20 MG tablet Take 20 mg by mouth once daily. 2/28/22   Historical Provider   divalproex ER (DEPAKOTE) 500 MG Tb24 Take 500 mg by mouth 2 (two) times daily. 2/28/22   Historical Provider   methocarbamoL (ROBAXIN) 500 MG Tab Take 2 tablets (1,000 mg total) by mouth 3 (three) times daily. for 5 days 6/7/22 6/12/22  Shahbaz Gooden MD   naproxen (NAPROSYN) 500 MG tablet Take 1 tablet (500 mg total) by mouth 2 (two) times daily as needed (pain). 6/7/22 6/17/22  Shahbaz Gooden MD   OLANZapine (ZYPREXA) 20 MG tablet Take 20 mg by mouth nightly. 5/4/22   Historical Provider   OXcarbazepine (TRILEPTAL) 150 MG Tab Take 300 mg by mouth 2 (two) times daily. 5/4/22   Historical Provider   risperiDONE (RISPERDAL) 1 MG tablet Take 1 tablet (1 mg total) by mouth 2 (two) times daily. 2/18/20 2/17/21  Cruzito Carolina NP   sertraline (ZOLOFT) 50 MG tablet Take 1 tablet (50 mg total) by mouth once daily. 2/19/20 2/18/21  Cruzito Carolina NP   traZODone (DESYREL) 50 MG tablet Take 1 tablet (50 mg total) by mouth nightly as needed for Insomnia. 2/18/20 2/17/21  " "Cruzito Carolina, NP   venlafaxine (EFFEXOR-XR) 150 MG Cp24 Take 150 mg by mouth once daily. 22   Historical Provider         Review of Systems:  Review of Systems   Constitutional: Negative for chills and fever.   Respiratory: Negative for shortness of breath.    Cardiovascular: Negative for chest pain and palpitations.   Gastrointestinal: Negative for diarrhea, nausea and vomiting.   Musculoskeletal:        + swelling of L arm without pain   Skin: Negative for rash.   Psychiatric/Behavioral: Positive for hallucinations ( not recently). Negative for substance abuse and suicidal ideas.          Objective:   Last 24 Hour Vital Signs:  BP  Min: 95/57  Max: 123/73  Temp  Av °F (36.7 °C)  Min: 97.5 °F (36.4 °C)  Max: 99 °F (37.2 °C)  Pulse  Av.3  Min: 54  Max: 104  Resp  Av.2  Min: 16  Max: 21  SpO2  Av.5 %  Min: 98 %  Max: 100 %  Height  Av' 11" (180.3 cm)  Min: 5' 11" (180.3 cm)  Max: 5' 11" (180.3 cm)  Weight  Av.4 kg (142 lb)  Min: 64.4 kg (142 lb)  Max: 64.4 kg (142 lb)  Body mass index is 19.8 kg/m².  I/O last 3 completed shifts:  In: -   Out: 1001 [Urine:1000; Stool:1]    Physical Examination:  Physical Exam  Vitals reviewed.   Constitutional:       General: He is not in acute distress.  Cardiovascular:      Rate and Rhythm: Normal rate and regular rhythm.      Heart sounds: Normal heart sounds.   Pulmonary:      Effort: Pulmonary effort is normal.      Breath sounds: Normal breath sounds.   Musculoskeletal:      Right upper arm: Normal.      Left upper arm: Swelling present. No lacerations or tenderness.      Comments: Swelling involves the entire L arm from shoulder to wrist with associated tension in both forearm and upper arm  ROM is not limited  Strength is decreased at elbow flexion and shoulder flexion to 3/5 when compared to the right arm  Sensation intact throughout the L arm   Neurological:      Mental Status: He is alert.           Laboratory:  Most Recent Data:  CBC: "   Lab Results   Component Value Date    WBC 8.3 06/13/2022    HGB 13.5 (L) 06/13/2022    HCT 42.0 06/13/2022     06/13/2022    MCV 88.1 06/13/2022    RDW 14.8 06/13/2022     WBC Differential:   Recent Labs   Lab 06/12/22  1737 06/13/22  0823   WBC 12.5* 8.3   HGB 15.0 13.5*   HCT 43.9 42.0    254   MCV 84.7 88.1     BMP:   Lab Results   Component Value Date     06/13/2022    K 4.6 06/13/2022    CO2 30 (H) 06/13/2022    BUN 6.2 (L) 06/13/2022    CREATININE 0.83 06/13/2022    CALCIUM 9.1 06/13/2022     LFTs:   Lab Results   Component Value Date    ALBUMIN 3.2 (L) 06/13/2022    BILITOT 0.5 06/13/2022     (H) 06/13/2022    ALKPHOS 83 06/13/2022    ALT 92 (H) 06/13/2022     Coags: No results found for: INR, PROTIME, PTT  FLP:   Lab Results   Component Value Date    CHOL 162 11/12/2021    HDL 60 11/12/2021    TRIG 73 11/12/2021     DM:   Lab Results   Component Value Date    CREATININE 0.83 06/13/2022     Thyroid:   Lab Results   Component Value Date    TSH 1.0932 11/11/2021      Anemia: No results found for: IRON, TIBC, FERRITIN, SATURATEDIRO  No results found for: [id number]  No results found for: FOLATE     Cardiac: No results found for: TROPONINI, CKTOTAL, CKMB, BNP  Urinalysis:   Lab Results   Component Value Date    COLORU YELLOW 02/16/2022    PHUA 7.0 06/12/2022    NITRITE Negative 02/16/2022    KETONESU Negative 02/16/2022    UROBILINOGEN 2+ (A) 06/12/2022    WBCUA 0-5 06/12/2022       Trended Lab Data:  Recent Labs   Lab 06/12/22  1737 06/13/22  0823 06/13/22  1348   WBC 12.5* 8.3  --    HGB 15.0 13.5*  --    HCT 43.9 42.0  --     254  --    MCV 84.7 88.1  --    RDW 14.4 14.8  --     142 142   K 4.0 4.4 4.6   CO2 28 28 30*   BUN 8.0* 7.1* 6.2*   CREATININE 0.97 0.81 0.83   ALBUMIN 3.9 3.0* 3.2*   BILITOT 0.4 0.4 0.5   * 324* 327*   ALKPHOS 104 80 83   * 90* 92*       Trended Cardiac Data:  No results for input(s): TROPONINI, CKTOTAL, CKMB, BNP in the last 168  hours.    Microbiology Data:  Microbiology Results (last 7 days)     ** No results found for the last 168 hours. **           Other Results:  EKG (my interpretation): NSR with 88 bpm, LVH, slightly peaked T waves    Radiology:  Imaging Results          X-Ray Humerus 2 View Left (Final result)  Result time 06/13/22 08:55:07    Final result by Beto Cam MD (06/13/22 08:55:07)                 Impression:      No acute osseous abnormality.      Electronically signed by: Beto Cam  Date:    06/13/2022  Time:    08:55             Narrative:    EXAMINATION:  XR HUMERUS 2 VIEW LEFT    CLINICAL HISTORY:  trauma;Rhabdomyolysis    COMPARISON:  None.    FINDINGS:  No acute displaced fractures or dislocations.    Joint spaces preserved.    No blastic or lytic lesions.    Soft tissues within normal limits.                                   Assessment & Plan:     1. Non-traumatic Rhabdomyolysis  L arm swelling  -likely secondary to repetitive weight lifting prior to presentation, leading to muscle injury  -Initial CK at 70,000; now 52,409 after 5 L of bolus in the ED and being on  cc/hr  -US negative for DVT  -X-ray  negative for any osseus abnormalities  -ortho consulted, no surgical intervention at this time  -will obtain CT of the L arm  -continue IVF with LR at 200 cc/hr  -repeat CK in the a.m.  -K+ 4 -> 4.6, will give Lokelma 5mg TID today and will continue to monitor  -Ca2+ wnl at 9.6  -no evidence of renal involvement, Cr at 0.97    2. Transaminitis  -AST, ALT is 400 and 104 respectively  -likely related to rhabdo  -repeat LFT  327, 92-improving    3. Schizophrenia  -Restart home meds :  -Risperidone 1mg BID, Oxcarbazepine 300 BID and Venlafaxine 150 daily  -Consulted Psych Dr. Charles, who recommended pt can take his Olanzapine but at a lower dose given he is in Rhabdo. We'll give 5mg IV TID, and then 5mg IM PRN for agitation-nurse to call MD prior to giving  -avoid Benzo  -Pt is currently PEC'd      CODE  STATUS: FULL  Access: IV  Antibiotics: none  Diet: regular  DVT Prophylaxis: Lovenox 40  GI Prophylaxis: none  Fluids:  cc/hr      Disposition: Admit to [facility] for aggressive fluid resuscitation, trend CPK, continue to monitor K+ and renal indecies.           Dayanna Moreno MD  [facility] HO-1

## 2022-06-13 NOTE — HOSPITAL COURSE
"6/15/2022  Pt calm and cooperative with interview, sleeping upon entering room.  Reports he was given a "shot" earlier in the afternoon, unsure of why the medication was given.  Says "I was thinking they were laughing at me" (referring to staff and other patients).  Continues to express desire to fight "death."  Not currently experiencing hallucinations, notes hallucinations recently (predominantly AH).  Reports poor sleep, denies difficulty with appetite.  Mood "good."  Denies SI/HI, but says "I'd kill myself if I thought my mom was gonna die, I just can't live like that."  Says "my mom is fine, healthy as a horse."  Feels that he is improving medically, no current complaints.     Per MAR:   Received olanzapine 5mg IM at 1454    6/20/2022  Pt more animated today during interview.  Continues to voice hyperreligious ideation.  Paranoid regarding staff members.  Feels back to baseline physically.  Denies any current hallucinations but notes recent AH.  Denies problems with mood.  Pt denies benefit from uptitration of risperidone or additional seroquel prn for insomnia.  Pt agreeable to continued medication changes, voices frustration with continued hospitalization and wants to receive treatment in IP psychiatric facility.      6/24/2022  Pt calm and cooperative with interview.  Pt highly distractible during interview.  Continues to express hyperreligious ideation.  Remains focused on "defeating death."  Quotes verses from the bible that confirm his intentions.  Suspects that hospital staff are listening to him using devices in his room.  Denies hallucinations currently, endorses recent hallucinations about [employer] figures.  Mood "better."  Sleep improved.  Denies SI/HI.  Continues to voice interest in psych hospitalization.  Of note, pt chewing on nicotine patch during interview.  Immediately counseled pt about not engaging in such behavior in the future, discussed appropriate use of transdermal medications (pt " "voiced understanding).  Per chart review, continues to receive prn olanzapine for agitation.     2022  Pt calm and cooperative with interview.  More easily redirected.  Less intrusive.  Denies recent hallucinations.  Made suicidal comment this morning after pulling out midline.  On my interview, said, "I'd kill myself if my mother ."  Denies any suicidal plan or intention today.  Has no plans for self harm or harm to others after discharge.  Continues to voice hyperreligious ideation during interview, but is easily redirected.  Wants to be discharged to his mother's residence.  Shares an apartment with his mother and his mother's boyfriend.  Wants to follow up with OP MH provider after discharge.  Wants to continue his current medication regimen on discharge.  Feels that he is ready for discharge.  Denies somatic complaints today.   "

## 2022-06-14 LAB
ALBUMIN SERPL-MCNC: 2.8 GM/DL (ref 3.5–5)
ALBUMIN/GLOB SERPL: 1.1 RATIO (ref 1.1–2)
ALP SERPL-CCNC: 70 UNIT/L (ref 40–150)
ALT SERPL-CCNC: 79 UNIT/L (ref 0–55)
AST SERPL-CCNC: 243 UNIT/L (ref 5–34)
BILIRUBIN DIRECT+TOT PNL SERPL-MCNC: 0.5 MG/DL
BUN SERPL-MCNC: 5.3 MG/DL (ref 8.9–20.6)
CALCIUM SERPL-MCNC: 8.6 MG/DL (ref 8.4–10.2)
CHLORIDE SERPL-SCNC: 106 MMOL/L (ref 98–107)
CK SERPL-CCNC: ABNORMAL U/L (ref 30–200)
CO2 SERPL-SCNC: 26 MMOL/L (ref 22–29)
CREAT SERPL-MCNC: 0.75 MG/DL (ref 0.73–1.18)
GLOBULIN SER-MCNC: 2.5 GM/DL (ref 2.4–3.5)
GLUCOSE SERPL-MCNC: 80 MG/DL (ref 74–100)
POTASSIUM SERPL-SCNC: 3.7 MMOL/L (ref 3.5–5.1)
PROT SERPL-MCNC: 5.3 GM/DL (ref 6.4–8.3)
SODIUM SERPL-SCNC: 141 MMOL/L (ref 136–145)

## 2022-06-14 PROCEDURE — 11000001 HC ACUTE MED/SURG PRIVATE ROOM

## 2022-06-14 PROCEDURE — 63600175 PHARM REV CODE 636 W HCPCS: Performed by: STUDENT IN AN ORGANIZED HEALTH CARE EDUCATION/TRAINING PROGRAM

## 2022-06-14 PROCEDURE — 25000003 PHARM REV CODE 250: Performed by: STUDENT IN AN ORGANIZED HEALTH CARE EDUCATION/TRAINING PROGRAM

## 2022-06-14 PROCEDURE — 36415 COLL VENOUS BLD VENIPUNCTURE: CPT | Performed by: STUDENT IN AN ORGANIZED HEALTH CARE EDUCATION/TRAINING PROGRAM

## 2022-06-14 PROCEDURE — S4991 NICOTINE PATCH NONLEGEND: HCPCS | Performed by: STUDENT IN AN ORGANIZED HEALTH CARE EDUCATION/TRAINING PROGRAM

## 2022-06-14 PROCEDURE — 25000003 PHARM REV CODE 250: Performed by: INTERNAL MEDICINE

## 2022-06-14 PROCEDURE — 82550 ASSAY OF CK (CPK): CPT | Performed by: STUDENT IN AN ORGANIZED HEALTH CARE EDUCATION/TRAINING PROGRAM

## 2022-06-14 PROCEDURE — 80053 COMPREHEN METABOLIC PANEL: CPT | Performed by: STUDENT IN AN ORGANIZED HEALTH CARE EDUCATION/TRAINING PROGRAM

## 2022-06-14 RX ORDER — RISPERIDONE 1 MG/1
2 TABLET ORAL 2 TIMES DAILY
Status: DISCONTINUED | OUTPATIENT
Start: 2022-06-14 | End: 2022-06-15

## 2022-06-14 RX ADMIN — OXCARBAZEPINE 300 MG: 300 TABLET, FILM COATED ORAL at 08:06

## 2022-06-14 RX ADMIN — SODIUM CHLORIDE, POTASSIUM CHLORIDE, SODIUM LACTATE AND CALCIUM CHLORIDE: 600; 310; 30; 20 INJECTION, SOLUTION INTRAVENOUS at 03:06

## 2022-06-14 RX ADMIN — NICOTINE 1 PATCH: 14 PATCH, EXTENDED RELEASE TRANSDERMAL at 08:06

## 2022-06-14 RX ADMIN — RISPERIDONE 1 MG: 1 TABLET ORAL at 04:06

## 2022-06-14 RX ADMIN — SODIUM CHLORIDE, POTASSIUM CHLORIDE, SODIUM LACTATE AND CALCIUM CHLORIDE: 600; 310; 30; 20 INJECTION, SOLUTION INTRAVENOUS at 12:06

## 2022-06-14 RX ADMIN — ENOXAPARIN SODIUM 40 MG: 40 INJECTION SUBCUTANEOUS at 05:06

## 2022-06-14 RX ADMIN — VENLAFAXINE HYDROCHLORIDE 150 MG: 75 CAPSULE, EXTENDED RELEASE ORAL at 10:06

## 2022-06-14 RX ADMIN — SODIUM CHLORIDE, POTASSIUM CHLORIDE, SODIUM LACTATE AND CALCIUM CHLORIDE: 600; 310; 30; 20 INJECTION, SOLUTION INTRAVENOUS at 08:06

## 2022-06-14 RX ADMIN — SODIUM ZIRCONIUM CYCLOSILICATE 5 G: 5 POWDER, FOR SUSPENSION ORAL at 08:06

## 2022-06-14 RX ADMIN — RISPERIDONE 1 MG: 1 TABLET ORAL at 08:06

## 2022-06-14 RX ADMIN — RISPERIDONE 2 MG: 1 TABLET ORAL at 08:06

## 2022-06-14 NOTE — PROGRESS NOTES
"[facility] Medicine Wards Progress Note     Resident Team: [facility] Medicine List 1  Attending Physician: Lexi Veliz MD  Resident: Dr. Cartagena  Intern: Dr. Moreno       Subjective:      Brief HPI:  36 y.o.  male who with a history of schizophrenia presented to the ED via ambulance on 2022 from a psych facility in [place]. Pt stated he wanted to check himself in the unit because of feeling lonely and having hallucinations but they noticed his L arm being swollen and sent to ED for further evaluation. Pt states he was repeatedly lifting a 25 lbs weight with his L arm but denies any injuries. He also denies using any IV drugs. He denies pain to the arm, only reports swollen sensation.     In ED labs significant for CK of 70,000 with  and .    Admitted to IM services for management of rhabdo.     Interval History: NAEON. Pt states his arm feels better. Nurse states his arm had to be wrapped because he kept scratching the area. Reports he is voiding well.       Review of Systems:  ROS completed and negative except as indicated above.     Objective:     Last 24 Hour Vital Signs:  BP  Min: 122/70  Max: 124/71  Temp  Av.1 °F (36.7 °C)  Min: 97.6 °F (36.4 °C)  Max: 98.2 °F (36.8 °C)  Pulse  Av  Min: 67  Max: 72  Resp  Av.5  Min: 20  Max: 21  SpO2  Av %  Min: 100 %  Max: 100 %  Height  Av' 10.98" (180.3 cm)  Min: 5' 10.98" (180.3 cm)  Max: 5' 10.98" (180.3 cm)  Weight  Av.4 kg (142 lb)  Min: 64.4 kg (142 lb)  Max: 64.4 kg (142 lb)  I/O last 3 completed shifts:  In: 2620 [P.O.:420; I.V.:2200]  Out: 1001 [Urine:1000; Stool:1]    Physical Examination:  Physical Exam  Vitals reviewed.   Constitutional:       General: He is not in acute distress.  Cardiovascular:      Rate and Rhythm: Normal rate and regular rhythm.      Heart sounds: Normal heart sounds.   Pulmonary:      Effort: Pulmonary effort is normal.      Breath sounds: Normal breath sounds.   Musculoskeletal:      Right " upper arm: Normal.      Left upper arm: Swelling present. No lacerations or tenderness.      Comments: Swelling involves the entire L arm from shoulder to wrist with associated tension in both forearm and upper arm  ROM is not limited  Strength is decreased at elbow flexion and shoulder flexion to 3/5 when compared to the right arm  Sensation intact throughout the L arm   Neurological:      Mental Status: He is alert.     Laboratory:  Most Recent Data:  CBC:   Lab Results   Component Value Date    WBC 8.3 06/13/2022    HGB 13.5 (L) 06/13/2022    HCT 42.0 06/13/2022     06/13/2022    MCV 88.1 06/13/2022    RDW 14.8 06/13/2022     WBC Differential:   Recent Labs   Lab 06/12/22  1737 06/13/22  0823   WBC 12.5* 8.3   HGB 15.0 13.5*   HCT 43.9 42.0    254   MCV 84.7 88.1     BMP:   Lab Results   Component Value Date     06/14/2022    K 3.7 06/14/2022    CO2 26 06/14/2022    BUN 5.3 (L) 06/14/2022    CREATININE 0.75 06/14/2022    CALCIUM 8.6 06/14/2022     LFTs:   Lab Results   Component Value Date    ALBUMIN 2.8 (L) 06/14/2022    BILITOT 0.5 06/14/2022     (H) 06/14/2022    ALKPHOS 70 06/14/2022    ALT 79 (H) 06/14/2022     Coags: No results found for: INR, PROTIME, PTT  FLP:   Lab Results   Component Value Date    CHOL 162 11/12/2021    HDL 60 11/12/2021    TRIG 73 11/12/2021     DM:   Lab Results   Component Value Date    CREATININE 0.75 06/14/2022     Thyroid:   Lab Results   Component Value Date    TSH 1.0932 11/11/2021      Anemia: No results found for: IRON, TIBC, FERRITIN, SATURATEDIRO  No results found for: [id number]  No results found for: FOLATE     Cardiac: No results found for: TROPONINI, CKTOTAL, CKMB, BNP      Microbiology Data:  Microbiology Results (last 7 days)     ** No results found for the last 168 hours. **             Radiology:  Imaging Results          CT Forearm (Radius/Ulna) Without Contrast Left (Final result)  Result time 06/13/22 17:08:33    Final result by Zachariah CARPENTER  MD Edwin (06/13/22 17:08:33)                 Impression:      1. Motion degraded scan.  2. No fracture or dislocation.  3. Nonspecific subcutaneous edema.      Electronically signed by: Zachariah Pineda  Date:    06/13/2022  Time:    17:08             Narrative:    EXAMINATION:  CT FOREARM (RADIUS/ULNA) WITHOUT CONTRAST LEFT; CT ARM (HUMERUS) WITHOUT CONTRAST LEFT    CLINICAL HISTORY:  singificant arm swelling;; significant arm swelling;    TECHNIQUE:  Helical acquisition through the left upper extremity from the shoulder through the proximal phalanges.  Three plane reconstructions were provided for review.  mGycm. Automatic exposure control, adjustment of mA/kV or iterative reconstruction technique was used to reduce radiation.    COMPARISON:  Radiographs same day    FINDINGS:  Motion degraded scan.    Allowing for motion no fracture or dislocation is evident.    There is limited evaluation of the soft tissues without contrast.  There is some nonspecific subcutaneous edema.  No focal drainable fluid collection is seen.  No free air.                               CT Arm (Humerus) Without Contrast Left (Final result)  Result time 06/13/22 17:08:33    Final result by Zachariah Pineda MD (06/13/22 17:08:33)                 Impression:      1. Motion degraded scan.  2. No fracture or dislocation.  3. Nonspecific subcutaneous edema.      Electronically signed by: Zachariah Pineda  Date:    06/13/2022  Time:    17:08             Narrative:    EXAMINATION:  CT FOREARM (RADIUS/ULNA) WITHOUT CONTRAST LEFT; CT ARM (HUMERUS) WITHOUT CONTRAST LEFT    CLINICAL HISTORY:  singificant arm swelling;; significant arm swelling;    TECHNIQUE:  Helical acquisition through the left upper extremity from the shoulder through the proximal phalanges.  Three plane reconstructions were provided for review.  mGycm. Automatic exposure control, adjustment of mA/kV or iterative reconstruction technique was used to reduce  radiation.    COMPARISON:  Radiographs same day    FINDINGS:  Motion degraded scan.    Allowing for motion no fracture or dislocation is evident.    There is limited evaluation of the soft tissues without contrast.  There is some nonspecific subcutaneous edema.  No focal drainable fluid collection is seen.  No free air.                               X-Ray Humerus 2 View Left (Final result)  Result time 06/13/22 08:55:07    Final result by Beto Cam MD (06/13/22 08:55:07)                 Impression:      No acute osseous abnormality.      Electronically signed by: Beto Cam  Date:    06/13/2022  Time:    08:55             Narrative:    EXAMINATION:  XR HUMERUS 2 VIEW LEFT    CLINICAL HISTORY:  trauma;Rhabdomyolysis    COMPARISON:  None.    FINDINGS:  No acute displaced fractures or dislocations.    Joint spaces preserved.    No blastic or lytic lesions.    Soft tissues within normal limits.                                Current Medications:     Infusions:   lactated ringers 250 mL/hr at 06/14/22 1203        Scheduled:   enoxaparin  40 mg Subcutaneous Daily    nicotine  1 patch Transdermal Daily    OXcarbazepine  300 mg Oral BID    risperiDONE  2 mg Oral BID    venlafaxine  150 mg Oral Daily        PRN:  dextrose 10%, dextrose 10%, dextrose 50%, dextrose 50%, glucagon (human recombinant), glucose, glucose, naloxone, OLANZapine, risperiDONE, traZODone      Assessment & Plan:     1. Non-traumatic Rhabdomyolysis-improving  L arm swelling  -likely secondary to repetitive weight lifting prior to presentation, leading to muscle injury  -Initial CK at 70,000;   -received 5L bolus in ED  -CPK this morning is 35,458-downtranding  -was on  cc/hr, will increase to 250 cc/hr today.   -US negative for DVT  -X-ray  negative for any osseus abnormalities  -CT forearm and arm limited by motion but negative for any drainable fluid collection  -ortho consulted, no surgical intervention at this time, reported no  concern for compartment syndrome at this time  -repeat CK q6h  -K+ 4 -> 4.6, pt is s/p  Lokelma 5mg X3, last dose this morning  -K+ this morning is 3.7  -Ca2+ wnl at 9.6  -no evidence of renal involvement, Cr at 0.75 this morning     2. Transaminitis  -AST, ALT is 400, 104 on admission , this morning downtrending  -likely related to rhabdo   -repeat LFT  327, 92-improving     3. Schizophrenia  -Per Dr. Charles recommendations:  -Trileptal 300mg BID, Venlafaxine  daily and uptitrate Risperidone to 2mg BID today  -avoid Zyprexa as scheduled, but can use 5mg IM q8h PRN for agitation  -Pt is currently PEC'd        CODE STATUS: FULL  Access: IV  Antibiotics: none  Diet: regular  DVT Prophylaxis: Lovenox 40 daily  GI Prophylaxis: none  Fluids:  cc/hr      Disposition: Continue with aggressive fluid resuscitation, trend CPK, continue to monitor K+ and renal indecies.            Dayanna Moreno MD  [facility] HO-1

## 2022-06-15 PROBLEM — R22.32 LOCALIZED SWELLING OF LEFT UPPER EXTREMITY: Status: ACTIVE | Noted: 2022-06-15

## 2022-06-15 LAB
ALBUMIN SERPL-MCNC: 2.9 GM/DL (ref 3.5–5)
ALBUMIN/GLOB SERPL: 1.1 RATIO (ref 1.1–2)
ALP SERPL-CCNC: 73 UNIT/L (ref 40–150)
ALT SERPL-CCNC: 78 UNIT/L (ref 0–55)
AST SERPL-CCNC: 205 UNIT/L (ref 5–34)
BASOPHILS # BLD AUTO: 0.02 X10(3)/MCL (ref 0–0.2)
BASOPHILS NFR BLD AUTO: 0.3 %
BILIRUBIN DIRECT+TOT PNL SERPL-MCNC: 0.3 MG/DL
BUN SERPL-MCNC: 6 MG/DL (ref 8.9–20.6)
CALCIUM SERPL-MCNC: 9 MG/DL (ref 8.4–10.2)
CHLORIDE SERPL-SCNC: 108 MMOL/L (ref 98–107)
CK SERPL-CCNC: ABNORMAL U/L (ref 30–200)
CK SERPL-CCNC: ABNORMAL U/L (ref 30–200)
CO2 SERPL-SCNC: 26 MMOL/L (ref 22–29)
CREAT SERPL-MCNC: 0.78 MG/DL (ref 0.73–1.18)
EOSINOPHIL # BLD AUTO: 0.28 X10(3)/MCL (ref 0–0.9)
EOSINOPHIL NFR BLD AUTO: 3.8 %
ERYTHROCYTE [DISTWIDTH] IN BLOOD BY AUTOMATED COUNT: 13.7 % (ref 11.5–17)
GLOBULIN SER-MCNC: 2.7 GM/DL (ref 2.4–3.5)
GLUCOSE SERPL-MCNC: 81 MG/DL (ref 74–100)
HCT VFR BLD AUTO: 36 % (ref 42–52)
HGB BLD-MCNC: 12 GM/DL (ref 14–18)
IMM GRANULOCYTES # BLD AUTO: 0.03 X10(3)/MCL (ref 0–0.02)
IMM GRANULOCYTES NFR BLD AUTO: 0.4 % (ref 0–0.43)
LYMPHOCYTES # BLD AUTO: 2.22 X10(3)/MCL (ref 0.6–4.6)
LYMPHOCYTES NFR BLD AUTO: 29.8 %
MCH RBC QN AUTO: 28.4 PG (ref 27–31)
MCHC RBC AUTO-ENTMCNC: 33.3 MG/DL (ref 33–36)
MCV RBC AUTO: 85.1 FL (ref 80–94)
MONOCYTES # BLD AUTO: 0.36 X10(3)/MCL (ref 0.1–1.3)
MONOCYTES NFR BLD AUTO: 4.8 %
NEUTROPHILS # BLD AUTO: 4.5 X10(3)/MCL (ref 2.1–9.2)
NEUTROPHILS NFR BLD AUTO: 60.9 %
NRBC BLD AUTO-RTO: 0 %
PLATELET # BLD AUTO: 216 X10(3)/MCL (ref 130–400)
PMV BLD AUTO: 11.2 FL (ref 9.4–12.4)
POTASSIUM SERPL-SCNC: 3.6 MMOL/L (ref 3.5–5.1)
PROT SERPL-MCNC: 5.6 GM/DL (ref 6.4–8.3)
RBC # BLD AUTO: 4.23 X10(6)/MCL (ref 4.7–6.1)
SODIUM SERPL-SCNC: 142 MMOL/L (ref 136–145)
WBC # SPEC AUTO: 7.5 X10(3)/MCL (ref 4.5–11.5)

## 2022-06-15 PROCEDURE — 25000003 PHARM REV CODE 250: Performed by: STUDENT IN AN ORGANIZED HEALTH CARE EDUCATION/TRAINING PROGRAM

## 2022-06-15 PROCEDURE — S0166 INJ OLANZAPINE 2.5MG: HCPCS | Performed by: STUDENT IN AN ORGANIZED HEALTH CARE EDUCATION/TRAINING PROGRAM

## 2022-06-15 PROCEDURE — 25000003 PHARM REV CODE 250: Performed by: INTERNAL MEDICINE

## 2022-06-15 PROCEDURE — 99231 PR SUBSEQUENT HOSPITAL CARE,LEVL I: ICD-10-PCS | Mod: ,,, | Performed by: STUDENT IN AN ORGANIZED HEALTH CARE EDUCATION/TRAINING PROGRAM

## 2022-06-15 PROCEDURE — 11000001 HC ACUTE MED/SURG PRIVATE ROOM

## 2022-06-15 PROCEDURE — 82550 ASSAY OF CK (CPK): CPT | Performed by: STUDENT IN AN ORGANIZED HEALTH CARE EDUCATION/TRAINING PROGRAM

## 2022-06-15 PROCEDURE — 63600175 PHARM REV CODE 636 W HCPCS: Performed by: STUDENT IN AN ORGANIZED HEALTH CARE EDUCATION/TRAINING PROGRAM

## 2022-06-15 PROCEDURE — 36415 COLL VENOUS BLD VENIPUNCTURE: CPT | Performed by: STUDENT IN AN ORGANIZED HEALTH CARE EDUCATION/TRAINING PROGRAM

## 2022-06-15 PROCEDURE — 80053 COMPREHEN METABOLIC PANEL: CPT | Performed by: STUDENT IN AN ORGANIZED HEALTH CARE EDUCATION/TRAINING PROGRAM

## 2022-06-15 PROCEDURE — 85025 COMPLETE CBC W/AUTO DIFF WBC: CPT | Performed by: STUDENT IN AN ORGANIZED HEALTH CARE EDUCATION/TRAINING PROGRAM

## 2022-06-15 PROCEDURE — 99231 SBSQ HOSP IP/OBS SF/LOW 25: CPT | Mod: ,,, | Performed by: STUDENT IN AN ORGANIZED HEALTH CARE EDUCATION/TRAINING PROGRAM

## 2022-06-15 RX ORDER — RISPERIDONE 1 MG/1
3 TABLET ORAL 2 TIMES DAILY
Status: DISCONTINUED | OUTPATIENT
Start: 2022-06-15 | End: 2022-06-15

## 2022-06-15 RX ORDER — RISPERIDONE 1 MG/1
2 TABLET ORAL 2 TIMES DAILY
Status: DISCONTINUED | OUTPATIENT
Start: 2022-06-15 | End: 2022-06-17

## 2022-06-15 RX ORDER — LABETALOL HCL 20 MG/4 ML
20 SYRINGE (ML) INTRAVENOUS EVERY 4 HOURS PRN
Status: DISCONTINUED | OUTPATIENT
Start: 2022-06-15 | End: 2022-06-28 | Stop reason: HOSPADM

## 2022-06-15 RX ADMIN — TRAZODONE HYDROCHLORIDE 50 MG: 50 TABLET ORAL at 12:06

## 2022-06-15 RX ADMIN — SODIUM CHLORIDE, POTASSIUM CHLORIDE, SODIUM LACTATE AND CALCIUM CHLORIDE: 600; 310; 30; 20 INJECTION, SOLUTION INTRAVENOUS at 04:06

## 2022-06-15 RX ADMIN — SODIUM CHLORIDE, POTASSIUM CHLORIDE, SODIUM LACTATE AND CALCIUM CHLORIDE: 600; 310; 30; 20 INJECTION, SOLUTION INTRAVENOUS at 08:06

## 2022-06-15 RX ADMIN — RISPERIDONE 2 MG: 1 TABLET ORAL at 08:06

## 2022-06-15 RX ADMIN — ENOXAPARIN SODIUM 40 MG: 40 INJECTION SUBCUTANEOUS at 05:06

## 2022-06-15 RX ADMIN — OLANZAPINE 5 MG: 10 INJECTION, POWDER, LYOPHILIZED, FOR SOLUTION INTRAMUSCULAR at 10:06

## 2022-06-15 RX ADMIN — VENLAFAXINE HYDROCHLORIDE 150 MG: 75 CAPSULE, EXTENDED RELEASE ORAL at 08:06

## 2022-06-15 RX ADMIN — OLANZAPINE 5 MG: 10 INJECTION, POWDER, LYOPHILIZED, FOR SOLUTION INTRAMUSCULAR at 02:06

## 2022-06-15 RX ADMIN — SODIUM CHLORIDE, POTASSIUM CHLORIDE, SODIUM LACTATE AND CALCIUM CHLORIDE: 600; 310; 30; 20 INJECTION, SOLUTION INTRAVENOUS at 12:06

## 2022-06-15 RX ADMIN — OXCARBAZEPINE 300 MG: 300 TABLET, FILM COATED ORAL at 08:06

## 2022-06-15 NOTE — PROGRESS NOTES
[facility] Medicine Wards Progress Note     Resident Team: [facility] Medicine List 1  Attending Physician: Lexi Veliz MD  Resident: Dr. Cartagena  Intern: Dr. Moreno       Subjective:      Brief HPI:  36 y.o.  male who with a history of schizophrenia presented to the ED via ambulance on 2022 from a psych facility in [place]. Pt stated he wanted to check himself in the unit because of feeling lonely and having hallucinations but they noticed his L arm being swollen and sent to ED for further evaluation. Pt states he was repeatedly lifting a 25 lbs weight with his L arm but denies any injuries. He also denies using any IV drugs. He denies pain to the arm, only reports swollen sensation.     In ED labs significant for CK of 70,000 with  and .    Admitted to IM services for management of rhabdo.     Interval History: NAEON. Pt states his arm feels better but that he was tired of getting stuck so many times.       Review of Systems:  ROS completed and negative except as indicated above.     Objective:     Last 24 Hour Vital Signs:  BP  Min: 113/73  Max: 175/72  Temp  Av.2 °F (36.8 °C)  Min: 97.9 °F (36.6 °C)  Max: 99 °F (37.2 °C)  Pulse  Av.3  Min: 61  Max: 70  Resp  Av  Min: 20  Max: 20  SpO2  Av %  Min: 96 %  Max: 100 %  I/O last 3 completed shifts:  In: 1410 [P.O.:210; I.V.:1200]  Out: -     Physical Examination:  Physical Exam  Vitals reviewed.   Constitutional:       General: He is not in acute distress.  Cardiovascular:      Rate and Rhythm: Normal rate and regular rhythm.      Heart sounds: Normal heart sounds.   Pulmonary:      Effort: Pulmonary effort is normal.      Breath sounds: Normal breath sounds.   Musculoskeletal:      Right upper arm: Normal.      Left upper arm: Swelling present. No lacerations or tenderness.      Comments: Swelling involves the entire L arm from shoulder to wrist with associated tension in both forearm and upper arm  ROM is not limited  Strength  is decreased at elbow flexion and shoulder flexion to 3/5 when compared to the right arm  Sensation intact throughout the L arm   Neurological:      Mental Status: He is alert.     Laboratory:  Most Recent Data:  CBC:   Lab Results   Component Value Date    WBC 7.5 06/15/2022    HGB 12.0 (L) 06/15/2022    HCT 36.0 (L) 06/15/2022     06/15/2022    MCV 85.1 06/15/2022    RDW 13.7 06/15/2022     WBC Differential:   Recent Labs   Lab 06/12/22  1737 06/13/22  0823 06/15/22  0444   WBC 12.5* 8.3 7.5   HGB 15.0 13.5* 12.0*   HCT 43.9 42.0 36.0*    254 216   MCV 84.7 88.1 85.1     BMP:   Lab Results   Component Value Date     06/15/2022    K 3.6 06/15/2022    CO2 26 06/15/2022    BUN 6.0 (L) 06/15/2022    CREATININE 0.78 06/15/2022    CALCIUM 9.0 06/15/2022     LFTs:   Lab Results   Component Value Date    ALBUMIN 2.9 (L) 06/15/2022    BILITOT 0.3 06/15/2022     (H) 06/15/2022    ALKPHOS 73 06/15/2022    ALT 78 (H) 06/15/2022     Coags: No results found for: INR, PROTIME, PTT  FLP:   Lab Results   Component Value Date    CHOL 162 11/12/2021    HDL 60 11/12/2021    TRIG 73 11/12/2021     DM:   Lab Results   Component Value Date    CREATININE 0.78 06/15/2022     Thyroid:   Lab Results   Component Value Date    TSH 1.0932 11/11/2021      Anemia: No results found for: IRON, TIBC, FERRITIN, SATURATEDIRO  No results found for: [id number]  No results found for: FOLATE     Cardiac: No results found for: TROPONINI, CKTOTAL, CKMB, BNP      Microbiology Data:  Microbiology Results (last 7 days)     ** No results found for the last 168 hours. **             Radiology:  Imaging Results          CT Forearm (Radius/Ulna) Without Contrast Left (Final result)  Result time 06/13/22 17:08:33    Final result by Zachariah Pineda MD (06/13/22 17:08:33)                 Impression:      1. Motion degraded scan.  2. No fracture or dislocation.  3. Nonspecific subcutaneous edema.      Electronically signed by: Zachariah  Edwin  Date:    06/13/2022  Time:    17:08             Narrative:    EXAMINATION:  CT FOREARM (RADIUS/ULNA) WITHOUT CONTRAST LEFT; CT ARM (HUMERUS) WITHOUT CONTRAST LEFT    CLINICAL HISTORY:  singificant arm swelling;; significant arm swelling;    TECHNIQUE:  Helical acquisition through the left upper extremity from the shoulder through the proximal phalanges.  Three plane reconstructions were provided for review.  mGycm. Automatic exposure control, adjustment of mA/kV or iterative reconstruction technique was used to reduce radiation.    COMPARISON:  Radiographs same day    FINDINGS:  Motion degraded scan.    Allowing for motion no fracture or dislocation is evident.    There is limited evaluation of the soft tissues without contrast.  There is some nonspecific subcutaneous edema.  No focal drainable fluid collection is seen.  No free air.                               CT Arm (Humerus) Without Contrast Left (Final result)  Result time 06/13/22 17:08:33    Final result by Zachariah Pineda MD (06/13/22 17:08:33)                 Impression:      1. Motion degraded scan.  2. No fracture or dislocation.  3. Nonspecific subcutaneous edema.      Electronically signed by: Zachariah Pineda  Date:    06/13/2022  Time:    17:08             Narrative:    EXAMINATION:  CT FOREARM (RADIUS/ULNA) WITHOUT CONTRAST LEFT; CT ARM (HUMERUS) WITHOUT CONTRAST LEFT    CLINICAL HISTORY:  singificant arm swelling;; significant arm swelling;    TECHNIQUE:  Helical acquisition through the left upper extremity from the shoulder through the proximal phalanges.  Three plane reconstructions were provided for review.  mGycm. Automatic exposure control, adjustment of mA/kV or iterative reconstruction technique was used to reduce radiation.    COMPARISON:  Radiographs same day    FINDINGS:  Motion degraded scan.    Allowing for motion no fracture or dislocation is evident.    There is limited evaluation of the soft tissues without  contrast.  There is some nonspecific subcutaneous edema.  No focal drainable fluid collection is seen.  No free air.                               X-Ray Humerus 2 View Left (Final result)  Result time 06/13/22 08:55:07    Final result by Beto Cam MD (06/13/22 08:55:07)                 Impression:      No acute osseous abnormality.      Electronically signed by: Beto Cam  Date:    06/13/2022  Time:    08:55             Narrative:    EXAMINATION:  XR HUMERUS 2 VIEW LEFT    CLINICAL HISTORY:  trauma;Rhabdomyolysis    COMPARISON:  None.    FINDINGS:  No acute displaced fractures or dislocations.    Joint spaces preserved.    No blastic or lytic lesions.    Soft tissues within normal limits.                                Current Medications:     Infusions:   lactated ringers 200 mL/hr at 06/15/22 1442        Scheduled:   enoxaparin  40 mg Subcutaneous Daily    nicotine  1 patch Transdermal Daily    OXcarbazepine  300 mg Oral BID    risperiDONE  2 mg Oral BID    venlafaxine  150 mg Oral Daily        PRN:  dextrose 10%, dextrose 10%, dextrose 50%, dextrose 50%, glucagon (human recombinant), glucose, glucose, labetalol, naloxone, OLANZapine, risperiDONE, traZODone      Assessment & Plan:     1. Non-traumatic Rhabdomyolysis-improving  L arm swelling  -likely secondary to repetitive weight lifting prior to presentation, leading to muscle injury  -Initial CK at 70,000;   -received 5L bolus in ED  -CPK this morning is [id number]  -continue  cc/hr  -US negative for DVT  -X-ray  negative for any osseus abnormalities  -CT forearm and arm limited by motion but negative for any drainable fluid collection  -ortho consulted, no surgical intervention at this time, reported no concern for compartment syndrome at this time  -repeat CK q6h, however pt is refusing blood draws  -K+ 4 -> 4.6, pt is s/p  Lokelma 5mg X3  -K 3.6 this morning  -Ca2+ wnl at 9.0  -no evidence of renal involvement     2.  Transaminitis-improving  -AST, ALT is 400, 104 on admission , this morning downtrending  -likely related to rhabdo   -repeat LFT  327, 92-improving     3. Schizophrenia  -Per Dr. Charles recommendations:  -Trileptal 300mg BID, Venlafaxine  daily and uptitrate Risperidone to 2mg BID today  -avoid Zyprexa as scheduled, but can use 5mg IM q8h PRN for agitation  -Pt is currently CEC'd, continue 1:1 observation  -psych is following        CODE STATUS: FULL  Access: IV  Antibiotics: none  Diet: regular  DVT Prophylaxis: Lovenox 40 daily  GI Prophylaxis: none  Fluids:  cc/hr      Disposition: Continue with aggressive fluid resuscitation, trend CPK, continue to monitor K+ and renal indecies.            Dayanna Moreno MD  [facility] HO-1

## 2022-06-15 NOTE — NURSING
Pt is requesting to see doctor. Did advise that I called and he can not leave AMA. Pt is currently PEC. PT stated that he is tired of getting fluids. He will cut the pump off after bag runs dry. I did advise him of why he is on fluids and that we have to draw his labs to see if his CK levels is better.  He said he doesn't want to get stick anymore and refused blood draw.

## 2022-06-15 NOTE — ASSESSMENT & PLAN NOTE
ASSESSMENT     Elvis Puentes is a 36 y.o. male with a past psychiatric history of schizophrenia, currently presenting with rhabdomyolysis.Psychiatry was originally consulted to address the patient's symptoms of hallucinations and depression.    IMPRESSION  Schizophrenia, chronic with acute exacerbation  Depression, unspecified  Cannabis use disorder, mild  R/o intellectual impairment    RECOMMENDATION(S)      1. Scheduled Medication(s):  Continue trileptal 300mg bid  Continue effexor XR 150mg daily  Increase risperidone to 3mg bid  Discontinue zyprexa for now, pt voices that risperidone has been more effective for him than zyprexa in the past    2. PRN Medication(s):  Recommend risperidone 1mg PO or zyprexa 5mg IM q8 hrs PRN non redirectable agitation associated with breakthrough psychosis or miguel  Do not given zyprexa IM within 1 hr of ativan  Do not exceed 30mg zyprexa total daily from all sources  Agree with trazodone prn sleep    3.  Monitor:  EKG 6/13/2022 w/ qtc 423    4. Legal Status/Precaution(s):  Continue CEC  Continue 1:1 observation    5. Disposition  Recommend to seek IP psychiatric hospitalization when medically cleared

## 2022-06-15 NOTE — SUBJECTIVE & OBJECTIVE
"Interval History: see hospital course.     Family History    None       Tobacco Use    Smoking status: Current Every Day Smoker     Packs/day: 1.00    Smokeless tobacco: Never Used   Substance and Sexual Activity    Alcohol use: Yes     Alcohol/week: 4.0 standard drinks     Types: 4 Cans of beer per week    Drug use: Never    Sexual activity: Not Currently     Partners: Female     Birth control/protection: Abstinence     Psychotherapeutics (From admission, onward)                Start     Stop Route Frequency Ordered    06/15/22 2100  risperiDONE tablet 2 mg         -- Oral 2 times daily 06/15/22 1137 06/13/22 1918  OLANZapine injection 5 mg         -- IM Every 8 hours PRN 06/13/22 1818 06/13/22 1917  risperiDONE tablet 1 mg         -- Oral Every 8 hours PRN 06/13/22 1818 06/13/22 1245  venlafaxine 24 hr capsule 150 mg         -- Oral Daily 06/13/22 1136    06/13/22 1233  traZODone tablet 50 mg         -- Oral Nightly PRN 06/13/22 1136             Review of Systems  All systems reviewed and are negative except as above mentioned in HPI/hospital course.    Objective:     Vital Signs (Most Recent):  Temp: 97.9 °F (36.6 °C) (06/15/22 1059)  Pulse: 61 (06/15/22 1059)  Resp: 20 (06/15/22 1059)  BP: 129/83 (06/15/22 1059)  SpO2: 100 % (06/15/22 1059) Vital Signs (24h Range):  Temp:  [97.9 °F (36.6 °C)-99 °F (37.2 °C)] 97.9 °F (36.6 °C)  Pulse:  [61-70] 61  Resp:  [20] 20  SpO2:  [96 %-100 %] 100 %  BP: (113-175)/(72-88) 129/83     Height: 5' 10.98" (180.3 cm)  Weight: 64.4 kg (142 lb)  Body mass index is 19.81 kg/m².      Intake/Output Summary (Last 24 hours) at 6/15/2022 1700  Last data filed at 6/15/2022 1300  Gross per 24 hour   Intake 370 ml   Output 1 ml   Net 369 ml       Physical Exam     Appearance: unremarkable, age appropriate, dressed in hospital gown  Level of Consciousness: awake, alert  Behavior/Cooperation: calm and cooperative  Psychomotor: unremarkable   Speech: normal tone, normal rate, normal " "pitch, normal volume  Language: english, fluid  Orientation: person, place, situation  Attention Span/Concentration: intact to interview  Memory: Grossly intact  Mood: "good"  Affect: constricted  Thought Process: perseverative  Associations: mostly logical  Thought Content: +delusions of thoughts being perceived by others, +hyperreligious at times, denies SI/HI, no plan or desire for self harm or harm to others  Fund of Knowledge: Intact  Abstraction: concrete  Insight: fair  Judgment: fair    Significant Labs: All pertinent labs within the past 24 hours have been reviewed.    Significant Imaging: I have reviewed all pertinent imaging results/findings within the past 24 hours.  "

## 2022-06-15 NOTE — PROGRESS NOTES
"[facility]  Psychiatry  Progress Note    Patient Name: Elvis Puentes  MRN: [id number]   Code Status: Full Code  Admission Date: 6/12/2022  Hospital Length of Stay: 2 days  Expected Discharge Date:   Attending Physician: Lexi Veliz MD  Primary Care Provider: Primary Doctor No    Current Legal Status: 's Emergency Certificate (CEC)    Patient information was obtained from patient, past medical records and ER records.       Subjective:     Patient is a 36 y.o., male, presents with:    Principal Problem:<principal problem not specified>    Chief Complaint: schizophrenia exacerbation, unclear time frame    HPI:   Per Primary MD:  "Elvis Puentes is a 36 y.o.  male who with a history of schizophrenia presented to the ED via ambulance on 6/12/2022  From a psych facility in [place]. Pt stated he wanted to check himself in the unit because of feeling lonely and having hallucinations but they noticed his L arm being swollen and sent to ED for further evaluation. Pt states he was repeatedly lifting a 25 lbs weight with his L arm but denies any injuries. He also denies using any IV drugs. He denies pain to the arm, only reports swollen sensation.      In the ED, venous US done, negative for DVT. X-ray also done of the L arm, negative for any osseus abnormalities. Initial labs significant for CK of 70,000, which is now 52,409 after 5 L of bolus in the ED and being on  cc/hr. AST, ALT is 400 and 104 respectively. K+ on admission 4, it is now 4.6 with mild T wave peaks on EKG. Ca2+ 9.6. Cr 0.97. UDS only positive for cannaboids.      Ortho was consulted, as per Dr. Blakely, no surgical intervention needed at this time as there is no suspicion for compartment syndrome or tendon rupture.    "    Per Psychiatry MD:   Elvis Puentes is a 36 y.o. male with a past psychiatric history of schizophrenia, currently presenting with non traumatic rhabdomyolysis.  Psychiatry was " consulted to address the patient's symptoms of hallucinations.     Patient calm and cooperative with interview.  Patient reports that he wanted to check himself into a psychiatric hospital for depression and hallucinations.  It has history schizophrenia.  Reports that he was admitted to medical unit for injury to his arm.  Says that he was working out because I wanted to keep people from dying.  Says he wanted to overcome.  Says I can not take the thought money dies.  Says if I had a gun I would use it, denies that he is in possession a firearm.  Endorses remote suicidal attempt when he was a teenager.  Notes that he has been having worsening hallucinations.  Says that he hear voices say demeaning things.  Says that his mood has been depressed.  Notes some difficulty with sleep and energy.  Notes some hopeless thinking.  Voices that he is not seeing a psychiatrist.  Says that he is taking several different medications to help with his mental health.  Patient cannot remember these medications.  Patient reports that a family member helps him to remember to take his medications.  Patient says his medications are working very well.  Patient reports that he wanted to check himself into a psychiatric hospital to get his medications adjusted.  Voices motivation to continue his treatment after his hospitalization.    SUBJECTIVE   Currently, the patient is endorsing the following:    Medical Review Of Systems:  Constitutional: denies fevers, denies chills, denies recent weight change  Eyes: denies pain in eyes or loss of vision  Ears: denies tinnitis, denies loss of hearing  Mouth/throat: denies difficulty with speaking, denies difficulty with swallowing  Respiratory: denies SOB, denies cough  Gastrointestinal: + abdominal pain, denies nausea/vomiting, denies constipation/diarrhea  Genitourinary: denies urinary frequency, denies burning on urination  Dermatologic: denies rash, denies erythema  Musculoskeletal: +  "myalgias, + arthralgias  Hematologic: denies easy bleeding/bruising, denies enlarged lymph nodes  Neurologic: denies seizures, denies headaches, denies loss of sensation, denies weakness  Psychiatric: see HPI    Psychiatric Review Of Systems:  Please see HPI above    Past Medical/Surgical History:  Past Medical History:   Diagnosis Date    Anxiety     Depression     Hallucination     History of psychiatric hospitalization     Hx of psychiatric care     Psychiatric problem     Psychosis     Schizoaffective disorder     Sleep difficulties     Suicide attempt     Therapy       has a past medical history of Anxiety, Depression, Hallucination, History of psychiatric hospitalization, psychiatric care, Psychiatric problem, Psychosis, Schizoaffective disorder, Sleep difficulties, Suicide attempt, and Therapy.  History reviewed. No pertinent surgical history.    Past Psychiatric History:  Previous Medication Trials: yes, currently taking zyprexa, trileptal, and effexor  Previous Psychiatric Hospitalizations: yes, numerous  Previous Suicide Attempts: yes, once as a teenager  History of Violence: denies  Outpatient Psychiatrist: not currently  Outpatient Psychotherapist: not current    Social History:  Marital Status: single  Children: none   Employment Status/Info: not working, SSI  Education:  graduate  Housing/Lives with: apartment with mother and brother  History of phys/sexual abuse: denies  Access to gun: denies    Substance Abuse History:  Recreational Drugs: cannabis frequently  Use of Alcohol: denies  Rehab History:denies   Tobacco Use:denies    Legal History:  Past Charges/Incarcerations:denies  Pending charges:denies     Family Psychiatric History:   Mother- schizophrenia        Hospital Course: 6/15/2022  Pt calm and cooperative with interview, sleeping upon entering room.  Reports he was given a "shot" earlier in the afternoon, unsure of why the medication was given.  Says "I was thinking they were " "laughing at me" (referring to staff and other patients).  Continues to express desire to fight "death."  Not currently experiencing hallucinations, notes hallucinations recently (predominantly AH).  Reports poor sleep, denies difficulty with appetite.  Mood "good."  Denies SI/HI, but says "I'd kill myself if I thought my mom was gonna die, I just can't live like that."  Says "my mom is fine, healthy as a horse."  Feels that he is improving medically, no current complaints.     Per MAR:   Received olanzapine 5mg IM at 1454      Interval History: see hospital course.     Family History    None       Tobacco Use    Smoking status: Current Every Day Smoker     Packs/day: 1.00    Smokeless tobacco: Never Used   Substance and Sexual Activity    Alcohol use: Yes     Alcohol/week: 4.0 standard drinks     Types: 4 Cans of beer per week    Drug use: Never    Sexual activity: Not Currently     Partners: Female     Birth control/protection: Abstinence     Psychotherapeutics (From admission, onward)                Start     Stop Route Frequency Ordered    06/15/22 2100  risperiDONE tablet 2 mg         -- Oral 2 times daily 06/15/22 1137    06/13/22 1918  OLANZapine injection 5 mg         -- IM Every 8 hours PRN 06/13/22 1818    06/13/22 1917  risperiDONE tablet 1 mg         -- Oral Every 8 hours PRN 06/13/22 1818    06/13/22 1245  venlafaxine 24 hr capsule 150 mg         -- Oral Daily 06/13/22 1136    06/13/22 1233  traZODone tablet 50 mg         -- Oral Nightly PRN 06/13/22 1136             Review of Systems  All systems reviewed and are negative except as above mentioned in HPI/hospital course.    Objective:     Vital Signs (Most Recent):  Temp: 97.9 °F (36.6 °C) (06/15/22 1059)  Pulse: 61 (06/15/22 1059)  Resp: 20 (06/15/22 1059)  BP: 129/83 (06/15/22 1059)  SpO2: 100 % (06/15/22 1059) Vital Signs (24h Range):  Temp:  [97.9 °F (36.6 °C)-99 °F (37.2 °C)] 97.9 °F (36.6 °C)  Pulse:  [61-70] 61  Resp:  [20] 20  SpO2:  [96 " "%-100 %] 100 %  BP: (113-175)/(72-88) 129/83     Height: 5' 10.98" (180.3 cm)  Weight: 64.4 kg (142 lb)  Body mass index is 19.81 kg/m².      Intake/Output Summary (Last 24 hours) at 6/15/2022 1700  Last data filed at 6/15/2022 1300  Gross per 24 hour   Intake 370 ml   Output 1 ml   Net 369 ml       Physical Exam     Appearance: unremarkable, age appropriate, dressed in hospital gown  Level of Consciousness: awake, alert  Behavior/Cooperation: calm and cooperative  Psychomotor: unremarkable   Speech: normal tone, normal rate, normal pitch, normal volume  Language: english, fluid  Orientation: person, place, situation  Attention Span/Concentration: intact to interview  Memory: Grossly intact  Mood: "good"  Affect: constricted  Thought Process: perseverative  Associations: mostly logical  Thought Content: +delusions of thoughts being perceived by others, +hyperreligious at times, denies SI/HI, no plan or desire for self harm or harm to others  Fund of Knowledge: Intact  Abstraction: concrete  Insight: fair  Judgment: fair    Significant Labs: All pertinent labs within the past 24 hours have been reviewed.    Significant Imaging: I have reviewed all pertinent imaging results/findings within the past 24 hours.       Scheduled Medications:   enoxaparin  40 mg Subcutaneous Daily    nicotine  1 patch Transdermal Daily    OXcarbazepine  300 mg Oral BID    risperiDONE  2 mg Oral BID    venlafaxine  150 mg Oral Daily       PRN Medications:  dextrose 10%, dextrose 10%, dextrose 50%, dextrose 50%, glucagon (human recombinant), glucose, glucose, labetalol, naloxone, OLANZapine, risperiDONE, traZODone    Review of patient's allergies indicates:   Allergen Reactions    Haloperidol Swelling     Muscle stiffness  Has muscle spasms      Paroxetine Swelling, Other (See Comments) and Rash     "i don't remember"  "i don't remember"      Pineapple      Face swells up  Face swells up      Ziprasidone Other (See Comments)     " Muscle twitching       Assessment/Plan:     Schizophrenia, chronic condition with acute exacerbation  ASSESSMENT     Elvis Puentes is a 36 y.o. male with a past psychiatric history of schizophrenia, currently presenting with rhabdomyolysis.Psychiatry was originally consulted to address the patient's symptoms of hallucinations and depression.    IMPRESSION  Schizophrenia, chronic with acute exacerbation  Depression, unspecified  Cannabis use disorder, mild  R/o intellectual impairment    RECOMMENDATION(S)      1. Scheduled Medication(s):  Continue trileptal 300mg bid  Continue effexor XR 150mg daily  Increase risperidone to 3mg bid  Discontinue zyprexa for now, pt voices that risperidone has been more effective for him than zyprexa in the past    2. PRN Medication(s):  Recommend risperidone 1mg PO or zyprexa 5mg IM q8 hrs PRN non redirectable agitation associated with breakthrough psychosis or miguel  Do not given zyprexa IM within 1 hr of ativan  Do not exceed 30mg zyprexa total daily from all sources  Agree with trazodone prn sleep    3.  Monitor:  EKG 6/13/2022 w/ qtc 423    4. Legal Status/Precaution(s):  Continue CEC  Continue 1:1 observation    5. Disposition  Recommend to seek IP psychiatric hospitalization when medically cleared       Need for Continued Hospitalization:  Requires continued medical stabilization, anticipate future IP psychiatric hospitalization    Anticipated Disposition:  Psychiatric Hospital    Total time:  15 with greater than 50% of this time spent in counseling and/or coordination of care.     Sai Charles MD   Psychiatry  [facility]

## 2022-06-16 LAB
ANION GAP SERPL CALC-SCNC: 9 MEQ/L
BASOPHILS # BLD AUTO: 0.01 X10(3)/MCL (ref 0–0.2)
BASOPHILS NFR BLD AUTO: 0.1 %
BUN SERPL-MCNC: 5.7 MG/DL (ref 8.9–20.6)
CALCIUM SERPL-MCNC: 9.6 MG/DL (ref 8.4–10.2)
CHLORIDE SERPL-SCNC: 105 MMOL/L (ref 98–107)
CK SERPL-CCNC: ABNORMAL U/L (ref 30–200)
CK SERPL-CCNC: ABNORMAL U/L (ref 30–200)
CO2 SERPL-SCNC: 26 MMOL/L (ref 22–29)
CREAT SERPL-MCNC: 0.77 MG/DL (ref 0.73–1.18)
CREAT/UREA NIT SERPL: 7
EOSINOPHIL # BLD AUTO: 0.35 X10(3)/MCL (ref 0–0.9)
EOSINOPHIL NFR BLD AUTO: 4.4 %
ERYTHROCYTE [DISTWIDTH] IN BLOOD BY AUTOMATED COUNT: 14.1 % (ref 11.5–17)
GLUCOSE SERPL-MCNC: 73 MG/DL (ref 74–100)
HCT VFR BLD AUTO: 37.1 % (ref 42–52)
HGB BLD-MCNC: 12.1 GM/DL (ref 14–18)
IMM GRANULOCYTES # BLD AUTO: 0.03 X10(3)/MCL (ref 0–0.02)
IMM GRANULOCYTES NFR BLD AUTO: 0.4 % (ref 0–0.43)
LYMPHOCYTES # BLD AUTO: 2.4 X10(3)/MCL (ref 0.6–4.6)
LYMPHOCYTES NFR BLD AUTO: 30.3 %
MCH RBC QN AUTO: 28.2 PG (ref 27–31)
MCHC RBC AUTO-ENTMCNC: 32.6 MG/DL (ref 33–36)
MCV RBC AUTO: 86.5 FL (ref 80–94)
MONOCYTES # BLD AUTO: 0.5 X10(3)/MCL (ref 0.1–1.3)
MONOCYTES NFR BLD AUTO: 6.3 %
NEUTROPHILS # BLD AUTO: 4.6 X10(3)/MCL (ref 2.1–9.2)
NEUTROPHILS NFR BLD AUTO: 58.5 %
NRBC BLD AUTO-RTO: 0 %
PLATELET # BLD AUTO: 203 X10(3)/MCL (ref 130–400)
PMV BLD AUTO: 10.9 FL (ref 9.4–12.4)
POTASSIUM SERPL-SCNC: 3.5 MMOL/L (ref 3.5–5.1)
RBC # BLD AUTO: 4.29 X10(6)/MCL (ref 4.7–6.1)
SODIUM SERPL-SCNC: 140 MMOL/L (ref 136–145)
WBC # SPEC AUTO: 7.9 X10(3)/MCL (ref 4.5–11.5)

## 2022-06-16 PROCEDURE — 80048 BASIC METABOLIC PNL TOTAL CA: CPT | Performed by: STUDENT IN AN ORGANIZED HEALTH CARE EDUCATION/TRAINING PROGRAM

## 2022-06-16 PROCEDURE — 25000003 PHARM REV CODE 250: Performed by: STUDENT IN AN ORGANIZED HEALTH CARE EDUCATION/TRAINING PROGRAM

## 2022-06-16 PROCEDURE — S0166 INJ OLANZAPINE 2.5MG: HCPCS | Performed by: STUDENT IN AN ORGANIZED HEALTH CARE EDUCATION/TRAINING PROGRAM

## 2022-06-16 PROCEDURE — 36415 COLL VENOUS BLD VENIPUNCTURE: CPT | Performed by: STUDENT IN AN ORGANIZED HEALTH CARE EDUCATION/TRAINING PROGRAM

## 2022-06-16 PROCEDURE — S4991 NICOTINE PATCH NONLEGEND: HCPCS | Performed by: STUDENT IN AN ORGANIZED HEALTH CARE EDUCATION/TRAINING PROGRAM

## 2022-06-16 PROCEDURE — 63600175 PHARM REV CODE 636 W HCPCS: Performed by: STUDENT IN AN ORGANIZED HEALTH CARE EDUCATION/TRAINING PROGRAM

## 2022-06-16 PROCEDURE — 85025 COMPLETE CBC W/AUTO DIFF WBC: CPT | Performed by: STUDENT IN AN ORGANIZED HEALTH CARE EDUCATION/TRAINING PROGRAM

## 2022-06-16 PROCEDURE — 82550 ASSAY OF CK (CPK): CPT | Performed by: STUDENT IN AN ORGANIZED HEALTH CARE EDUCATION/TRAINING PROGRAM

## 2022-06-16 PROCEDURE — 11000001 HC ACUTE MED/SURG PRIVATE ROOM

## 2022-06-16 PROCEDURE — 25000003 PHARM REV CODE 250: Performed by: INTERNAL MEDICINE

## 2022-06-16 RX ADMIN — OXCARBAZEPINE 300 MG: 300 TABLET, FILM COATED ORAL at 09:06

## 2022-06-16 RX ADMIN — TRAZODONE HYDROCHLORIDE 50 MG: 50 TABLET ORAL at 08:06

## 2022-06-16 RX ADMIN — SODIUM CHLORIDE, POTASSIUM CHLORIDE, SODIUM LACTATE AND CALCIUM CHLORIDE: 600; 310; 30; 20 INJECTION, SOLUTION INTRAVENOUS at 06:06

## 2022-06-16 RX ADMIN — OXCARBAZEPINE 300 MG: 300 TABLET, FILM COATED ORAL at 08:06

## 2022-06-16 RX ADMIN — NICOTINE 1 PATCH: 14 PATCH, EXTENDED RELEASE TRANSDERMAL at 09:06

## 2022-06-16 RX ADMIN — ENOXAPARIN SODIUM 40 MG: 40 INJECTION SUBCUTANEOUS at 06:06

## 2022-06-16 RX ADMIN — SODIUM CHLORIDE, POTASSIUM CHLORIDE, SODIUM LACTATE AND CALCIUM CHLORIDE: 600; 310; 30; 20 INJECTION, SOLUTION INTRAVENOUS at 09:06

## 2022-06-16 RX ADMIN — VENLAFAXINE HYDROCHLORIDE 150 MG: 75 CAPSULE, EXTENDED RELEASE ORAL at 09:06

## 2022-06-16 RX ADMIN — RISPERIDONE 2 MG: 1 TABLET ORAL at 09:06

## 2022-06-16 RX ADMIN — OLANZAPINE 5 MG: 10 INJECTION, POWDER, LYOPHILIZED, FOR SOLUTION INTRAMUSCULAR at 05:06

## 2022-06-16 RX ADMIN — SODIUM CHLORIDE, POTASSIUM CHLORIDE, SODIUM LACTATE AND CALCIUM CHLORIDE: 600; 310; 30; 20 INJECTION, SOLUTION INTRAVENOUS at 01:06

## 2022-06-16 RX ADMIN — RISPERIDONE 2 MG: 1 TABLET ORAL at 08:06

## 2022-06-16 NOTE — PROGRESS NOTES
[facility] Medicine Wards Progress Note     Resident Team: [facility] Medicine List 1  Attending Physician: Lexi Veliz MD  Resident: Dr. Cartagena  Intern: Dr. Moreno       Subjective:      Brief HPI:  36 y.o.  male who with a history of schizophrenia presented to the ED via ambulance on 2022 from a psych facility in [place]. Pt stated he wanted to check himself in the unit because of feeling lonely and having hallucinations but they noticed his L arm being swollen and sent to ED for further evaluation. Pt states he was repeatedly lifting a 25 lbs weight with his L arm but denies any injuries. He also denies using any IV drugs. He denies pain to the arm, only reports swollen sensation.     In ED labs significant for CK of 70,000 with  and .    Admitted to IM services for management of rhabdo.     Interval History: NAEON. Pt states his arm feels better but that he was tired of getting stuck so many times. Pt wanted to leave AMA yesterday, but he is still under CEC order.       Review of Systems:  ROS completed and negative except as indicated above.     Objective:     Last 24 Hour Vital Signs:  BP  Min: 127/78  Max: 154/88  Temp  Av.3 °F (36.3 °C)  Min: 96.8 °F (36 °C)  Max: 97.9 °F (36.6 °C)  Pulse  Av.2  Min: 57  Max: 66  Resp  Av.5  Min: 18  Max: 20  SpO2  Av.8 %  Min: 99 %  Max: 100 %  I/O last 3 completed shifts:  In: 850 [P.O.:850]  Out: 1 [Urine:1]    Physical Examination:  Physical Exam  Vitals reviewed.   Constitutional:       General: He is not in acute distress.  Cardiovascular:      Rate and Rhythm: Normal rate and regular rhythm.      Heart sounds: Normal heart sounds.   Pulmonary:      Effort: Pulmonary effort is normal.      Breath sounds: Normal breath sounds.   Musculoskeletal:      Right upper arm: Normal.      Left upper arm: Swelling present. No lacerations or tenderness.      Comments: Swelling has improved, especially at the proximal aspect of arm. There is  associated tension in both forearm and upper arm with some skin cracks in the antecubital fossa  ROM is not limited  Sensation and motor function remains intact throughout the L arm   Neurological:      Mental Status: He is alert.     Laboratory:  Most Recent Data:  CBC:   Lab Results   Component Value Date    WBC 7.9 06/16/2022    HGB 12.1 (L) 06/16/2022    HCT 37.1 (L) 06/16/2022     06/16/2022    MCV 86.5 06/16/2022    RDW 14.1 06/16/2022     WBC Differential:   Recent Labs   Lab 06/12/22  1737 06/13/22  0823 06/15/22  0444 06/16/22  0121   WBC 12.5* 8.3 7.5 7.9   HGB 15.0 13.5* 12.0* 12.1*   HCT 43.9 42.0 36.0* 37.1*    254 216 203   MCV 84.7 88.1 85.1 86.5     BMP:   Lab Results   Component Value Date     06/15/2022    K 3.6 06/15/2022    CO2 26 06/15/2022    BUN 6.0 (L) 06/15/2022    CREATININE 0.78 06/15/2022    CALCIUM 9.0 06/15/2022     LFTs:   Lab Results   Component Value Date    ALBUMIN 2.9 (L) 06/15/2022    BILITOT 0.3 06/15/2022     (H) 06/15/2022    ALKPHOS 73 06/15/2022    ALT 78 (H) 06/15/2022     Coags: No results found for: INR, PROTIME, PTT  FLP:   Lab Results   Component Value Date    CHOL 162 11/12/2021    HDL 60 11/12/2021    TRIG 73 11/12/2021     DM:   Lab Results   Component Value Date    CREATININE 0.78 06/15/2022     Thyroid:   Lab Results   Component Value Date    TSH 1.0932 11/11/2021      Anemia: No results found for: IRON, TIBC, FERRITIN, SATURATEDIRO  No results found for: BESBEWTD96  No results found for: FOLATE     Cardiac: No results found for: TROPONINI, CKTOTAL, CKMB, BNP      Microbiology Data:  Microbiology Results (last 7 days)     ** No results found for the last 168 hours. **             Radiology:  Imaging Results          CT Forearm (Radius/Ulna) Without Contrast Left (Final result)  Result time 06/13/22 17:08:33    Final result by Zachariah Pineda MD (06/13/22 17:08:33)                 Impression:      1. Motion degraded scan.  2. No fracture or  dislocation.  3. Nonspecific subcutaneous edema.      Electronically signed by: Zachariah Pineda  Date:    06/13/2022  Time:    17:08             Narrative:    EXAMINATION:  CT FOREARM (RADIUS/ULNA) WITHOUT CONTRAST LEFT; CT ARM (HUMERUS) WITHOUT CONTRAST LEFT    CLINICAL HISTORY:  singificant arm swelling;; significant arm swelling;    TECHNIQUE:  Helical acquisition through the left upper extremity from the shoulder through the proximal phalanges.  Three plane reconstructions were provided for review.  mGycm. Automatic exposure control, adjustment of mA/kV or iterative reconstruction technique was used to reduce radiation.    COMPARISON:  Radiographs same day    FINDINGS:  Motion degraded scan.    Allowing for motion no fracture or dislocation is evident.    There is limited evaluation of the soft tissues without contrast.  There is some nonspecific subcutaneous edema.  No focal drainable fluid collection is seen.  No free air.                               CT Arm (Humerus) Without Contrast Left (Final result)  Result time 06/13/22 17:08:33    Final result by Zachariah Pineda MD (06/13/22 17:08:33)                 Impression:      1. Motion degraded scan.  2. No fracture or dislocation.  3. Nonspecific subcutaneous edema.      Electronically signed by: Zachariah Pineda  Date:    06/13/2022  Time:    17:08             Narrative:    EXAMINATION:  CT FOREARM (RADIUS/ULNA) WITHOUT CONTRAST LEFT; CT ARM (HUMERUS) WITHOUT CONTRAST LEFT    CLINICAL HISTORY:  singificant arm swelling;; significant arm swelling;    TECHNIQUE:  Helical acquisition through the left upper extremity from the shoulder through the proximal phalanges.  Three plane reconstructions were provided for review.  mGycm. Automatic exposure control, adjustment of mA/kV or iterative reconstruction technique was used to reduce radiation.    COMPARISON:  Radiographs same day    FINDINGS:  Motion degraded scan.    Allowing for motion no fracture or  dislocation is evident.    There is limited evaluation of the soft tissues without contrast.  There is some nonspecific subcutaneous edema.  No focal drainable fluid collection is seen.  No free air.                               X-Ray Humerus 2 View Left (Final result)  Result time 06/13/22 08:55:07    Final result by Beto Cam MD (06/13/22 08:55:07)                 Impression:      No acute osseous abnormality.      Electronically signed by: Beto Cam  Date:    06/13/2022  Time:    08:55             Narrative:    EXAMINATION:  XR HUMERUS 2 VIEW LEFT    CLINICAL HISTORY:  trauma;Rhabdomyolysis    COMPARISON:  None.    FINDINGS:  No acute displaced fractures or dislocations.    Joint spaces preserved.    No blastic or lytic lesions.    Soft tissues within normal limits.                                Current Medications:     Infusions:   lactated ringers 200 mL/hr at 06/16/22 0621        Scheduled:   enoxaparin  40 mg Subcutaneous Daily    nicotine  1 patch Transdermal Daily    OXcarbazepine  300 mg Oral BID    risperiDONE  2 mg Oral BID    venlafaxine  150 mg Oral Daily        PRN:  dextrose 10%, dextrose 10%, dextrose 50%, dextrose 50%, glucagon (human recombinant), glucose, glucose, labetalol, naloxone, OLANZapine, risperiDONE, traZODone      Assessment & Plan:     1. Non-traumatic Rhabdomyolysis-improving  L arm swelling  -likely secondary to repetitive weight lifting prior to presentation, leading to muscle injury  -Initial CK at 70,000;   -received 5L bolus in ED  -CPK this morning is [id number]  -continue  cc/hr  -US negative for DVT  -X-ray  negative for any osseus abnormalities  -CT forearm and arm limited by motion but negative for any drainable fluid collection  -ortho consulted, no surgical intervention at this time, reported no concern for compartment syndrome at this time  -stop trending CPK, next check tomorrow a.m.  -no evidence of renal involvement, no electrolyte  disturbances     2. Transaminitis-improving  -AST, ALT is 400, 104 on admission , this morning downtrending  -likely related to rhabdo   -repeat LFT  205, 78-improving     3. Schizophrenia  -Per Dr. Charles recommendations:  -Trileptal 300mg BID, Venlafaxine  daily and Risperidone to 2mg BID  -avoid Zyprexa as scheduled, but can use 5mg IM q8h PRN for agitation  -Pt is currently CEC'd, continue 1:1 observation  -psych is following        CODE STATUS: FULL  Access: IV  Antibiotics: none  Diet: regular  DVT Prophylaxis: Lovenox 40 daily  GI Prophylaxis: none  Fluids:  cc/hr      Disposition: Continue with fluid resuscitation, CPK check daily only, continue to monitor K+ and renal indecies.            Dayanna Moreno MD  [facility] HO-1

## 2022-06-17 LAB
ALBUMIN SERPL-MCNC: 3.5 GM/DL (ref 3.5–5)
ALBUMIN/GLOB SERPL: 1.1 RATIO (ref 1.1–2)
ALP SERPL-CCNC: 75 UNIT/L (ref 40–150)
ALT SERPL-CCNC: 87 UNIT/L (ref 0–55)
AST SERPL-CCNC: 117 UNIT/L (ref 5–34)
BILIRUBIN DIRECT+TOT PNL SERPL-MCNC: 0.3 MG/DL
BUN SERPL-MCNC: 5.6 MG/DL (ref 8.9–20.6)
CALCIUM SERPL-MCNC: 9.6 MG/DL (ref 8.4–10.2)
CHLORIDE SERPL-SCNC: 107 MMOL/L (ref 98–107)
CK SERPL-CCNC: ABNORMAL U/L (ref 30–200)
CO2 SERPL-SCNC: 22 MMOL/L (ref 22–29)
CREAT SERPL-MCNC: 0.77 MG/DL (ref 0.73–1.18)
GLOBULIN SER-MCNC: 3.1 GM/DL (ref 2.4–3.5)
GLUCOSE SERPL-MCNC: 75 MG/DL (ref 74–100)
POTASSIUM SERPL-SCNC: 3.3 MMOL/L (ref 3.5–5.1)
PROT SERPL-MCNC: 6.6 GM/DL (ref 6.4–8.3)
SODIUM SERPL-SCNC: 142 MMOL/L (ref 136–145)

## 2022-06-17 PROCEDURE — S0166 INJ OLANZAPINE 2.5MG: HCPCS | Performed by: STUDENT IN AN ORGANIZED HEALTH CARE EDUCATION/TRAINING PROGRAM

## 2022-06-17 PROCEDURE — 82550 ASSAY OF CK (CPK): CPT | Performed by: STUDENT IN AN ORGANIZED HEALTH CARE EDUCATION/TRAINING PROGRAM

## 2022-06-17 PROCEDURE — 25000003 PHARM REV CODE 250: Performed by: STUDENT IN AN ORGANIZED HEALTH CARE EDUCATION/TRAINING PROGRAM

## 2022-06-17 PROCEDURE — 93005 ELECTROCARDIOGRAM TRACING: CPT

## 2022-06-17 PROCEDURE — 25000003 PHARM REV CODE 250: Performed by: INTERNAL MEDICINE

## 2022-06-17 PROCEDURE — 80053 COMPREHEN METABOLIC PANEL: CPT | Performed by: STUDENT IN AN ORGANIZED HEALTH CARE EDUCATION/TRAINING PROGRAM

## 2022-06-17 PROCEDURE — 63600175 PHARM REV CODE 636 W HCPCS: Performed by: STUDENT IN AN ORGANIZED HEALTH CARE EDUCATION/TRAINING PROGRAM

## 2022-06-17 PROCEDURE — S4991 NICOTINE PATCH NONLEGEND: HCPCS | Performed by: STUDENT IN AN ORGANIZED HEALTH CARE EDUCATION/TRAINING PROGRAM

## 2022-06-17 PROCEDURE — 36415 COLL VENOUS BLD VENIPUNCTURE: CPT | Performed by: STUDENT IN AN ORGANIZED HEALTH CARE EDUCATION/TRAINING PROGRAM

## 2022-06-17 PROCEDURE — 11000001 HC ACUTE MED/SURG PRIVATE ROOM

## 2022-06-17 RX ORDER — OLANZAPINE 10 MG/2ML
10 INJECTION, POWDER, FOR SOLUTION INTRAMUSCULAR EVERY 8 HOURS PRN
Status: DISCONTINUED | OUTPATIENT
Start: 2022-06-17 | End: 2022-06-25

## 2022-06-17 RX ORDER — RISPERIDONE 1 MG/1
3 TABLET ORAL 2 TIMES DAILY
Status: DISCONTINUED | OUTPATIENT
Start: 2022-06-17 | End: 2022-06-21

## 2022-06-17 RX ORDER — QUETIAPINE FUMARATE 100 MG/1
100 TABLET, FILM COATED ORAL NIGHTLY PRN
Status: DISCONTINUED | OUTPATIENT
Start: 2022-06-17 | End: 2022-06-18

## 2022-06-17 RX ORDER — POTASSIUM CHLORIDE 20 MEQ/1
40 TABLET, EXTENDED RELEASE ORAL ONCE
Status: COMPLETED | OUTPATIENT
Start: 2022-06-17 | End: 2022-06-17

## 2022-06-17 RX ADMIN — SODIUM CHLORIDE, POTASSIUM CHLORIDE, SODIUM LACTATE AND CALCIUM CHLORIDE: 600; 310; 30; 20 INJECTION, SOLUTION INTRAVENOUS at 07:06

## 2022-06-17 RX ADMIN — SODIUM CHLORIDE, POTASSIUM CHLORIDE, SODIUM LACTATE AND CALCIUM CHLORIDE: 600; 310; 30; 20 INJECTION, SOLUTION INTRAVENOUS at 12:06

## 2022-06-17 RX ADMIN — NICOTINE 1 PATCH: 14 PATCH, EXTENDED RELEASE TRANSDERMAL at 08:06

## 2022-06-17 RX ADMIN — OXCARBAZEPINE 300 MG: 300 TABLET, FILM COATED ORAL at 08:06

## 2022-06-17 RX ADMIN — OLANZAPINE 10 MG: 10 INJECTION, POWDER, LYOPHILIZED, FOR SOLUTION INTRAMUSCULAR at 11:06

## 2022-06-17 RX ADMIN — OLANZAPINE 5 MG: 10 INJECTION, POWDER, LYOPHILIZED, FOR SOLUTION INTRAMUSCULAR at 02:06

## 2022-06-17 RX ADMIN — POTASSIUM CHLORIDE 40 MEQ: 1500 TABLET, EXTENDED RELEASE ORAL at 10:06

## 2022-06-17 RX ADMIN — SODIUM CHLORIDE, POTASSIUM CHLORIDE, SODIUM LACTATE AND CALCIUM CHLORIDE: 600; 310; 30; 20 INJECTION, SOLUTION INTRAVENOUS at 04:06

## 2022-06-17 RX ADMIN — RISPERIDONE 1 MG: 1 TABLET ORAL at 05:06

## 2022-06-17 RX ADMIN — ENOXAPARIN SODIUM 40 MG: 40 INJECTION SUBCUTANEOUS at 04:06

## 2022-06-17 RX ADMIN — SODIUM CHLORIDE, POTASSIUM CHLORIDE, SODIUM LACTATE AND CALCIUM CHLORIDE: 600; 310; 30; 20 INJECTION, SOLUTION INTRAVENOUS at 02:06

## 2022-06-17 RX ADMIN — QUETIAPINE FUMARATE 100 MG: 100 TABLET ORAL at 08:06

## 2022-06-17 RX ADMIN — RISPERIDONE 2 MG: 1 TABLET ORAL at 08:06

## 2022-06-17 RX ADMIN — VENLAFAXINE HYDROCHLORIDE 150 MG: 75 CAPSULE, EXTENDED RELEASE ORAL at 08:06

## 2022-06-17 RX ADMIN — RISPERIDONE 3 MG: 1 TABLET ORAL at 04:06

## 2022-06-17 NOTE — NURSING
Was advised by Dr. Howell to give early dose of risperidone due to patient's auditory hallucinations.

## 2022-06-17 NOTE — PROGRESS NOTES
"[facility] Medicine Wards Progress Note     Resident Team: [facility] Medicine List 1  Attending Physician: Lexi Veliz MD  Resident: Dr. Cartagena  Intern: Dr. Moreno       Subjective:      Brief HPI:  36 y.o.  male who with a history of schizophrenia presented to the ED via ambulance on 2022 from a psych facility in [place]. Pt stated he wanted to check himself in the unit because of feeling lonely and having hallucinations but they noticed his L arm being swollen and sent to ED for further evaluation. Pt states he was repeatedly lifting a 25 lbs weight with his L arm but denies any injuries. He also denies using any IV drugs. He denies pain to the arm, only reports swollen sensation.     In ED labs significant for CK of 70,000 with  and .    Admitted to IM services for management of rhabdo.     Interval History: NAEON. Pt states his arm feels better , much less swollen.       Review of Systems:  ROS completed and negative except as indicated above.     Objective:     Last 24 Hour Vital Signs:  BP  Min: 127/70  Max: 158/87  Temp  Av.8 °F (36.6 °C)  Min: 97.6 °F (36.4 °C)  Max: 98.1 °F (36.7 °C)  Pulse  Av.2  Min: 57  Max: 63  Resp  Av.8  Min: 18  Max: 22  SpO2  Av %  Min: 97 %  Max: 100 %  Height  Av' 10.98" (180.3 cm)  Min: 5' 10.98" (180.3 cm)  Max: 5' 10.98" (180.3 cm)  Weight  Av.7 kg (153 lb 10.6 oz)  Min: 69.7 kg (153 lb 10.6 oz)  Max: 69.7 kg (153 lb 10.6 oz)  I/O last 3 completed shifts:  In:  [P.O.:]  Out:  [Urine:; Stool:1]    Physical Examination:  Physical Exam  Vitals reviewed.   Constitutional:       General: He is not in acute distress.  Cardiovascular:      Rate and Rhythm: Normal rate and regular rhythm.      Heart sounds: Normal heart sounds.   Pulmonary:      Effort: Pulmonary effort is normal.      Breath sounds: Normal breath sounds.   Musculoskeletal:      Right upper arm: Normal.      Left upper arm: Swelling present. No " lacerations or tenderness.      Comments: Swelling has improved significantly  ROM is not limited  Sensation and motor function remains intact throughout the L arm   Neurological:      Mental Status: He is alert.     Laboratory:  Most Recent Data:  CBC:   Lab Results   Component Value Date    WBC 7.9 06/16/2022    HGB 12.1 (L) 06/16/2022    HCT 37.1 (L) 06/16/2022     06/16/2022    MCV 86.5 06/16/2022    RDW 14.1 06/16/2022     WBC Differential:   Recent Labs   Lab 06/12/22  1737 06/13/22  0823 06/15/22  0444 06/16/22  0121   WBC 12.5* 8.3 7.5 7.9   HGB 15.0 13.5* 12.0* 12.1*   HCT 43.9 42.0 36.0* 37.1*    254 216 203   MCV 84.7 88.1 85.1 86.5     BMP:   Lab Results   Component Value Date     06/17/2022    K 3.3 (L) 06/17/2022    CO2 22 06/17/2022    BUN 5.6 (L) 06/17/2022    CREATININE 0.77 06/17/2022    CALCIUM 9.6 06/17/2022     LFTs:   Lab Results   Component Value Date    ALBUMIN 3.5 06/17/2022    BILITOT 0.3 06/17/2022     (H) 06/17/2022    ALKPHOS 75 06/17/2022    ALT 87 (H) 06/17/2022     Coags: No results found for: INR, PROTIME, PTT  FLP:   Lab Results   Component Value Date    CHOL 162 11/12/2021    HDL 60 11/12/2021    TRIG 73 11/12/2021     DM:   Lab Results   Component Value Date    CREATININE 0.77 06/17/2022     Thyroid:   Lab Results   Component Value Date    TSH 1.0932 11/11/2021      Anemia: No results found for: IRON, TIBC, FERRITIN, SATURATEDIRO  No results found for: [id number]  No results found for: FOLATE     Cardiac: No results found for: TROPONINI, CKTOTAL, CKMB, BNP      Microbiology Data:  Microbiology Results (last 7 days)     ** No results found for the last 168 hours. **             Radiology:  Imaging Results          CT Forearm (Radius/Ulna) Without Contrast Left (Final result)  Result time 06/13/22 17:08:33    Final result by Zachariah Pineda MD (06/13/22 17:08:33)                 Impression:      1. Motion degraded scan.  2. No fracture or dislocation.  3.  Nonspecific subcutaneous edema.      Electronically signed by: Zachariah Pineda  Date:    06/13/2022  Time:    17:08             Narrative:    EXAMINATION:  CT FOREARM (RADIUS/ULNA) WITHOUT CONTRAST LEFT; CT ARM (HUMERUS) WITHOUT CONTRAST LEFT    CLINICAL HISTORY:  singificant arm swelling;; significant arm swelling;    TECHNIQUE:  Helical acquisition through the left upper extremity from the shoulder through the proximal phalanges.  Three plane reconstructions were provided for review.  mGycm. Automatic exposure control, adjustment of mA/kV or iterative reconstruction technique was used to reduce radiation.    COMPARISON:  Radiographs same day    FINDINGS:  Motion degraded scan.    Allowing for motion no fracture or dislocation is evident.    There is limited evaluation of the soft tissues without contrast.  There is some nonspecific subcutaneous edema.  No focal drainable fluid collection is seen.  No free air.                               CT Arm (Humerus) Without Contrast Left (Final result)  Result time 06/13/22 17:08:33    Final result by Zachariah Pineda MD (06/13/22 17:08:33)                 Impression:      1. Motion degraded scan.  2. No fracture or dislocation.  3. Nonspecific subcutaneous edema.      Electronically signed by: Zachariah Pineda  Date:    06/13/2022  Time:    17:08             Narrative:    EXAMINATION:  CT FOREARM (RADIUS/ULNA) WITHOUT CONTRAST LEFT; CT ARM (HUMERUS) WITHOUT CONTRAST LEFT    CLINICAL HISTORY:  singificant arm swelling;; significant arm swelling;    TECHNIQUE:  Helical acquisition through the left upper extremity from the shoulder through the proximal phalanges.  Three plane reconstructions were provided for review.  mGycm. Automatic exposure control, adjustment of mA/kV or iterative reconstruction technique was used to reduce radiation.    COMPARISON:  Radiographs same day    FINDINGS:  Motion degraded scan.    Allowing for motion no fracture or dislocation is  evident.    There is limited evaluation of the soft tissues without contrast.  There is some nonspecific subcutaneous edema.  No focal drainable fluid collection is seen.  No free air.                               X-Ray Humerus 2 View Left (Final result)  Result time 06/13/22 08:55:07    Final result by Beto Cam MD (06/13/22 08:55:07)                 Impression:      No acute osseous abnormality.      Electronically signed by: Beto Cam  Date:    06/13/2022  Time:    08:55             Narrative:    EXAMINATION:  XR HUMERUS 2 VIEW LEFT    CLINICAL HISTORY:  trauma;Rhabdomyolysis    COMPARISON:  None.    FINDINGS:  No acute displaced fractures or dislocations.    Joint spaces preserved.    No blastic or lytic lesions.    Soft tissues within normal limits.                                Current Medications:     Infusions:   lactated ringers 200 mL/hr at 06/17/22 0734        Scheduled:   enoxaparin  40 mg Subcutaneous Daily    nicotine  1 patch Transdermal Daily    OXcarbazepine  300 mg Oral BID    risperiDONE  2 mg Oral BID    venlafaxine  150 mg Oral Daily        PRN:  dextrose 10%, dextrose 10%, dextrose 50%, dextrose 50%, glucagon (human recombinant), glucose, glucose, labetalol, naloxone, OLANZapine, risperiDONE, traZODone      Assessment & Plan:     1. Non-traumatic Rhabdomyolysis-improving  L arm swelling  -likely secondary to repetitive weight lifting prior to presentation, leading to muscle injury  -Initial CK at 70,000;   -received 5L bolus in ED  -CPK this morning is [id number]. Next cpk check on 6/19 a.m.   -continue  cc/hr  -US negative for DVT  -X-ray  negative for any osseus abnormalities  -CT forearm and arm limited by motion but negative for any drainable fluid collection  -ortho consulted, no surgical intervention at this time, reported no concern for compartment syndrome at this time  -no evidence of renal involvement, no electrolyte disturbances     2.  Transaminitis-improving  -AST, ALT is 400, 104 on admission , this morning downtrending  -likely related to rhabdo   -repeat LFT  205, 78-improving     3. Schizophrenia  -Per Dr. Charles recommendations:  -Trileptal 300mg BID, Venlafaxine  daily and Risperidone to 2mg BID  -avoid Zyprexa as scheduled, but can use 5mg IM q8h PRN for agitation  -Pt is currently CEC'd, continue 1:1 observation  -psych is following        CODE STATUS: FULL  Access: IV  Antibiotics: none  Diet: regular  DVT Prophylaxis: Lovenox 40 daily  GI Prophylaxis: none  Fluids:  cc/hr      Disposition: Continue with fluid resuscitation, next lab check 6/19. Likely d/c 6/20         Dayanna Moreno MD  [facility] HO-1

## 2022-06-17 NOTE — PLAN OF CARE
Psychiatry Plan of Care Note:     Spoke to pt's nurse outside of pt room.  She reported that pt continues to have difficulty sleep, prior to this afternoon had not slept in 24 hrs despite administration of trazodone prn.  Pt has been intermittently agitated and exhibiting psychotic thought process.  Pt has been medication adherent (mostly).  Per MAR, pt received zyprexa IM x2 overnight/this AM (1759, 0220) and risperidone po x1 overnight/this AM (0500).  Pt currently sleeping, elected against waking pt up this afternoon.  Updated recommendations as below:    Recommendations:   - Continue CEC, continue with plan for IP psychiatry when medically cleared   -Continue 1:1 observation  - Continue Effexor XR 150mg daily  - Continue trileptal 300mg bid  - Increase risperidone to 3mg bid; if pt does not demonstrate improvement over the weekend on increased risperidone, will consider switching back to zyprexa vs change to alternative agent  - Recommend seroquel 100mg nightly prn sleep, discontinue PRN trazodone, can uptitrate seroquel PRN as needed/tolerated   - Recommend zyprexa 10mg PO or IM q8 hrs PRN nonredirectable agitation associated with breakthrough psychosis or miguel  - QTc 6/12 416  - CK downtrending- 11.4k this morning, BUN/Cr at presumed baseline  - AST/ALT elevated but downtrending overall  - Defer nonpsychiatric treatment to primary team    Please call or re-consult if questions arise regarding the above recommendations or if additional assistance is needed.  Psychiatry will continue to follow.    Sai Charles MD  [facility]  [facility]  6/17/2022, 1:21 PM

## 2022-06-17 NOTE — PROGRESS NOTES
Patient stated he hear voices telling him to kill himself and asked if there was any meds he could take. Spoke with Dr. Howell and he stated he will check the chart and call back.

## 2022-06-18 PROCEDURE — 25000003 PHARM REV CODE 250: Performed by: STUDENT IN AN ORGANIZED HEALTH CARE EDUCATION/TRAINING PROGRAM

## 2022-06-18 PROCEDURE — 11000001 HC ACUTE MED/SURG PRIVATE ROOM

## 2022-06-18 PROCEDURE — 63600175 PHARM REV CODE 636 W HCPCS: Performed by: STUDENT IN AN ORGANIZED HEALTH CARE EDUCATION/TRAINING PROGRAM

## 2022-06-18 PROCEDURE — 25000003 PHARM REV CODE 250: Performed by: INTERNAL MEDICINE

## 2022-06-18 PROCEDURE — S4991 NICOTINE PATCH NONLEGEND: HCPCS | Performed by: STUDENT IN AN ORGANIZED HEALTH CARE EDUCATION/TRAINING PROGRAM

## 2022-06-18 RX ORDER — QUETIAPINE FUMARATE 100 MG/1
200 TABLET, FILM COATED ORAL NIGHTLY PRN
Status: DISCONTINUED | OUTPATIENT
Start: 2022-06-18 | End: 2022-06-28 | Stop reason: HOSPADM

## 2022-06-18 RX ADMIN — SODIUM CHLORIDE, POTASSIUM CHLORIDE, SODIUM LACTATE AND CALCIUM CHLORIDE: 600; 310; 30; 20 INJECTION, SOLUTION INTRAVENOUS at 10:06

## 2022-06-18 RX ADMIN — RISPERIDONE 3 MG: 1 TABLET ORAL at 08:06

## 2022-06-18 RX ADMIN — VENLAFAXINE HYDROCHLORIDE 150 MG: 75 CAPSULE, EXTENDED RELEASE ORAL at 08:06

## 2022-06-18 RX ADMIN — ENOXAPARIN SODIUM 40 MG: 40 INJECTION SUBCUTANEOUS at 04:06

## 2022-06-18 RX ADMIN — SODIUM CHLORIDE, POTASSIUM CHLORIDE, SODIUM LACTATE AND CALCIUM CHLORIDE: 600; 310; 30; 20 INJECTION, SOLUTION INTRAVENOUS at 12:06

## 2022-06-18 RX ADMIN — OXCARBAZEPINE 300 MG: 300 TABLET, FILM COATED ORAL at 08:06

## 2022-06-18 RX ADMIN — SODIUM CHLORIDE, POTASSIUM CHLORIDE, SODIUM LACTATE AND CALCIUM CHLORIDE: 600; 310; 30; 20 INJECTION, SOLUTION INTRAVENOUS at 08:06

## 2022-06-18 RX ADMIN — NICOTINE 1 PATCH: 14 PATCH, EXTENDED RELEASE TRANSDERMAL at 09:06

## 2022-06-18 RX ADMIN — QUETIAPINE FUMARATE 200 MG: 100 TABLET ORAL at 09:06

## 2022-06-18 RX ADMIN — SODIUM CHLORIDE, POTASSIUM CHLORIDE, SODIUM LACTATE AND CALCIUM CHLORIDE: 600; 310; 30; 20 INJECTION, SOLUTION INTRAVENOUS at 04:06

## 2022-06-18 NOTE — NURSING
Patient ripped his IV out, stating that we were trying to kill him by giving him toilet water. I attempted to give his Zyprexa IM, injected him with the needle, he jerked, and said he wasn't taking it. Causing me to almost stick myself with the needle. He started to get combative so security was called. Was able to give Zyprexa and start another IV.

## 2022-06-18 NOTE — PROGRESS NOTES
[facility] Medicine Wards Progress Note     Resident Team: [facility] Medicine List 1  Attending Physician: Lexi Veliz MD  Resident: Dr. Cartagena  Intern: Dr. Moreno, Dr. Mcclellan covering on 22      Subjective:      Brief HPI:  36 y.o.  male who with a history of schizophrenia presented to the ED via ambulance on 2022 from a psych facility in [place]. Pt stated he wanted to check himself in the unit because of feeling lonely and having hallucinations but they noticed his L arm being swollen and sent to ED for further evaluation. Pt states he was repeatedly lifting a 25 lbs weight with his L arm but denies any injuries. He also denies using any IV drugs. He denies pain to the arm, only reports swollen sensation.     In ED labs significant for CK of 70,392 with  and .    Admitted to IM services for management of rhabdomyolysis.     Interval History: Yesterday evening patient had auditory hallucinations and hearing voices telling patient to kill himself and asked if there is any medications he can take to help. He was given his dose of risperidone earlier. Later on patient start to ripped his IV out and state we were trying to kill him by giving him toilet water. Nurse attempted to give his Zyprexa IM, injected him with the needle, and he jerked, and said he wasn't taking it. Causing nurse to almost stick herself with the needle. He started to get combative so security was called. Nurse eventually was able to give Zyprexa and start another IV.   Patient was also given Seroquel however did not sleep. Patient worries if he fall asleep, then someone will kill him in his sleep.     This morning patient was cooperative. Denies any acute concerns.     Review of Systems:  See HPI     Objective:     Last 24 Hour Vital Signs:  BP  Min: 145/74  Max: 158/89  Temp  Av.3 °F (36.8 °C)  Min: 97.8 °F (36.6 °C)  Max: 98.8 °F (37.1 °C)  Pulse  Av  Min: 54  Max: 89  Resp  Av  Min: 20  Max: 20  SpO2  Avg:  99.4 %  Min: 98 %  Max: 100 %  I/O last 3 completed shifts:  In: 3960 [P.O.:1560; I.V.:2400]  Out: -     Physical Examination:  Physical Exam  Vitals reviewed.   Constitutional:       General: He is not in acute distress.  Cardiovascular:      Rate and Rhythm: Normal rate and regular rhythm.      Heart sounds: Normal heart sounds.   Pulmonary:      Effort: Pulmonary effort is normal.      Breath sounds: Normal breath sounds.   Musculoskeletal:      Right upper arm: Normal.      Left upper arm: Swelling present. No lacerations or tenderness.      Comments: Swelling has improved significantly  ROM is not limited  Sensation and motor function remains intact throughout the L arm   Neurological:      Mental Status: He is alert.     Laboratory:  Most Recent Data:  CBC:   Lab Results   Component Value Date    WBC 7.9 06/16/2022    HGB 12.1 (L) 06/16/2022    HCT 37.1 (L) 06/16/2022     06/16/2022    MCV 86.5 06/16/2022    RDW 14.1 06/16/2022     WBC Differential:   Recent Labs   Lab 06/12/22  1737 06/13/22  0823 06/15/22  0444 06/16/22  0121   WBC 12.5* 8.3 7.5 7.9   HGB 15.0 13.5* 12.0* 12.1*   HCT 43.9 42.0 36.0* 37.1*    254 216 203   MCV 84.7 88.1 85.1 86.5     BMP:   Lab Results   Component Value Date     06/17/2022    K 3.3 (L) 06/17/2022    CO2 22 06/17/2022    BUN 5.6 (L) 06/17/2022    CREATININE 0.77 06/17/2022    CALCIUM 9.6 06/17/2022     LFTs:   Lab Results   Component Value Date    ALBUMIN 3.5 06/17/2022    BILITOT 0.3 06/17/2022     (H) 06/17/2022    ALKPHOS 75 06/17/2022    ALT 87 (H) 06/17/2022     Coags: No results found for: INR, PROTIME, PTT  FLP:   Lab Results   Component Value Date    CHOL 162 11/12/2021    HDL 60 11/12/2021    TRIG 73 11/12/2021     DM:   Lab Results   Component Value Date    CREATININE 0.77 06/17/2022     Thyroid:   Lab Results   Component Value Date    TSH 1.0932 11/11/2021      Anemia: No results found for: IRON, TIBC, FERRITIN, SATURATEDIRO  No results  found for: [id number]  No results found for: FOLATE     Cardiac: No results found for: TROPONINI, CKTOTAL, CKMB, BNP      Microbiology Data:  Microbiology Results (last 7 days)     ** No results found for the last 168 hours. **             Radiology:  Imaging Results          CT Forearm (Radius/Ulna) Without Contrast Left (Final result)  Result time 06/13/22 17:08:33    Final result by Zachariah Pineda MD (06/13/22 17:08:33)                 Impression:      1. Motion degraded scan.  2. No fracture or dislocation.  3. Nonspecific subcutaneous edema.      Electronically signed by: Zachariah Pineda  Date:    06/13/2022  Time:    17:08             Narrative:    EXAMINATION:  CT FOREARM (RADIUS/ULNA) WITHOUT CONTRAST LEFT; CT ARM (HUMERUS) WITHOUT CONTRAST LEFT    CLINICAL HISTORY:  singificant arm swelling;; significant arm swelling;    TECHNIQUE:  Helical acquisition through the left upper extremity from the shoulder through the proximal phalanges.  Three plane reconstructions were provided for review.  mGycm. Automatic exposure control, adjustment of mA/kV or iterative reconstruction technique was used to reduce radiation.    COMPARISON:  Radiographs same day    FINDINGS:  Motion degraded scan.    Allowing for motion no fracture or dislocation is evident.    There is limited evaluation of the soft tissues without contrast.  There is some nonspecific subcutaneous edema.  No focal drainable fluid collection is seen.  No free air.                               CT Arm (Humerus) Without Contrast Left (Final result)  Result time 06/13/22 17:08:33    Final result by Zachariah Pineda MD (06/13/22 17:08:33)                 Impression:      1. Motion degraded scan.  2. No fracture or dislocation.  3. Nonspecific subcutaneous edema.      Electronically signed by: Zachariah Pineda  Date:    06/13/2022  Time:    17:08             Narrative:    EXAMINATION:  CT FOREARM (RADIUS/ULNA) WITHOUT CONTRAST LEFT; CT ARM (HUMERUS) WITHOUT  CONTRAST LEFT    CLINICAL HISTORY:  singificant arm swelling;; significant arm swelling;    TECHNIQUE:  Helical acquisition through the left upper extremity from the shoulder through the proximal phalanges.  Three plane reconstructions were provided for review.  mGycm. Automatic exposure control, adjustment of mA/kV or iterative reconstruction technique was used to reduce radiation.    COMPARISON:  Radiographs same day    FINDINGS:  Motion degraded scan.    Allowing for motion no fracture or dislocation is evident.    There is limited evaluation of the soft tissues without contrast.  There is some nonspecific subcutaneous edema.  No focal drainable fluid collection is seen.  No free air.                               X-Ray Humerus 2 View Left (Final result)  Result time 06/13/22 08:55:07    Final result by Beto Cam MD (06/13/22 08:55:07)                 Impression:      No acute osseous abnormality.      Electronically signed by: Beto Cam  Date:    06/13/2022  Time:    08:55             Narrative:    EXAMINATION:  XR HUMERUS 2 VIEW LEFT    CLINICAL HISTORY:  trauma;Rhabdomyolysis    COMPARISON:  None.    FINDINGS:  No acute displaced fractures or dislocations.    Joint spaces preserved.    No blastic or lytic lesions.    Soft tissues within normal limits.                                Current Medications:     Infusions:   lactated ringers 200 mL/hr at 06/18/22 0825        Scheduled:   enoxaparin  40 mg Subcutaneous Daily    nicotine  1 patch Transdermal Daily    OXcarbazepine  300 mg Oral BID    risperiDONE  3 mg Oral BID    venlafaxine  150 mg Oral Daily        PRN:  dextrose 10%, dextrose 10%, dextrose 50%, dextrose 50%, glucagon (human recombinant), glucose, glucose, labetalol, naloxone, OLANZapine, QUEtiapine      Assessment & Plan:     1. Non-traumatic Rhabdomyolysis-improving  L arm swelling  -likely secondary to repetitive weight lifting prior to presentation, leading to muscle  injury  -Initial CK at 70,392  -received 5L bolus in ED  -CPK downtrending. Next cpk check on 6/19 a.m.   -continue  cc/hr  -US negative for DVT  -X-ray negative for any osseus abnormalities  -CT forearm and arm limited by motion but negative for any drainable fluid collection  -ortho consulted, no surgical intervention at this time, reported no concern for compartment syndrome at this time  -no evidence of renal involvement, no electrolyte disturbances     2. Transaminitis-improving  -AST, ALT is 400, 104 on admission , downtrending  -likely related to rhabdo      3. Schizophrenia  -Per Dr. Charles recommendations:  -Trileptal 300mg BID, Venlafaxine  daily and Risperidone 3 mg BID  -avoid Zyprexa as scheduled, but can use 10 mg IM q8h PRN for non-redirectable agitation   -Seroquel 100 mg nightly PRN insomnia - can uptitrate as needed/tolerated   -increasing Seroquel to 200 mg nightly PRN insomnia - as yesterday's dose was not effective and patient hasn't slept much    -Pt is currently CEC'd, continue 1:1 observation  -psychiatrist is following        CODE STATUS: FULL  Access: IV  Antibiotics: none  Diet: regular  DVT Prophylaxis: Lovenox 40 daily  GI Prophylaxis: none  Fluids:  cc/hr      Disposition: Continue with fluid resuscitation, next lab check 6/19. Likely d/c 6/20         Adelia Mcclellan MD  [facility] HO-3

## 2022-06-19 LAB
ALBUMIN SERPL-MCNC: 4 GM/DL (ref 3.5–5)
ALBUMIN/GLOB SERPL: 1.3 RATIO (ref 1.1–2)
ALP SERPL-CCNC: 77 UNIT/L (ref 40–150)
ALT SERPL-CCNC: 74 UNIT/L (ref 0–55)
AST SERPL-CCNC: 54 UNIT/L (ref 5–34)
BASOPHILS # BLD AUTO: 0.04 X10(3)/MCL (ref 0–0.2)
BASOPHILS NFR BLD AUTO: 0.5 %
BILIRUBIN DIRECT+TOT PNL SERPL-MCNC: 0.5 MG/DL
BUN SERPL-MCNC: 7.4 MG/DL (ref 8.9–20.6)
CALCIUM SERPL-MCNC: 9.9 MG/DL (ref 8.4–10.2)
CHLORIDE SERPL-SCNC: 106 MMOL/L (ref 98–107)
CK SERPL-CCNC: 3046 U/L (ref 30–200)
CO2 SERPL-SCNC: 23 MMOL/L (ref 22–29)
CREAT SERPL-MCNC: 0.87 MG/DL (ref 0.73–1.18)
EOSINOPHIL # BLD AUTO: 0.38 X10(3)/MCL (ref 0–0.9)
EOSINOPHIL NFR BLD AUTO: 4.6 %
ERYTHROCYTE [DISTWIDTH] IN BLOOD BY AUTOMATED COUNT: 14 % (ref 11.5–17)
GLOBULIN SER-MCNC: 3.2 GM/DL (ref 2.4–3.5)
GLUCOSE SERPL-MCNC: 73 MG/DL (ref 74–100)
HCT VFR BLD AUTO: 42.7 % (ref 42–52)
HGB BLD-MCNC: 13.8 GM/DL (ref 14–18)
IMM GRANULOCYTES # BLD AUTO: 0.02 X10(3)/MCL (ref 0–0.02)
IMM GRANULOCYTES NFR BLD AUTO: 0.2 % (ref 0–0.43)
LYMPHOCYTES # BLD AUTO: 2.62 X10(3)/MCL (ref 0.6–4.6)
LYMPHOCYTES NFR BLD AUTO: 32 %
MAGNESIUM SERPL-MCNC: 2 MG/DL (ref 1.6–2.6)
MCH RBC QN AUTO: 27.9 PG (ref 27–31)
MCHC RBC AUTO-ENTMCNC: 32.3 MG/DL (ref 33–36)
MCV RBC AUTO: 86.3 FL (ref 80–94)
MONOCYTES # BLD AUTO: 0.68 X10(3)/MCL (ref 0.1–1.3)
MONOCYTES NFR BLD AUTO: 8.3 %
NEUTROPHILS # BLD AUTO: 4.4 X10(3)/MCL (ref 2.1–9.2)
NEUTROPHILS NFR BLD AUTO: 54.4 %
NRBC BLD AUTO-RTO: 0 %
PLATELET # BLD AUTO: 283 X10(3)/MCL (ref 130–400)
PMV BLD AUTO: 11.3 FL (ref 9.4–12.4)
POTASSIUM SERPL-SCNC: 4 MMOL/L (ref 3.5–5.1)
PROT SERPL-MCNC: 7.2 GM/DL (ref 6.4–8.3)
RBC # BLD AUTO: 4.95 X10(6)/MCL (ref 4.7–6.1)
SODIUM SERPL-SCNC: 142 MMOL/L (ref 136–145)
WBC # SPEC AUTO: 8.2 X10(3)/MCL (ref 4.5–11.5)

## 2022-06-19 PROCEDURE — S4991 NICOTINE PATCH NONLEGEND: HCPCS | Performed by: STUDENT IN AN ORGANIZED HEALTH CARE EDUCATION/TRAINING PROGRAM

## 2022-06-19 PROCEDURE — 85025 COMPLETE CBC W/AUTO DIFF WBC: CPT | Performed by: STUDENT IN AN ORGANIZED HEALTH CARE EDUCATION/TRAINING PROGRAM

## 2022-06-19 PROCEDURE — 36415 COLL VENOUS BLD VENIPUNCTURE: CPT | Performed by: STUDENT IN AN ORGANIZED HEALTH CARE EDUCATION/TRAINING PROGRAM

## 2022-06-19 PROCEDURE — 80053 COMPREHEN METABOLIC PANEL: CPT | Performed by: STUDENT IN AN ORGANIZED HEALTH CARE EDUCATION/TRAINING PROGRAM

## 2022-06-19 PROCEDURE — 63600175 PHARM REV CODE 636 W HCPCS: Performed by: STUDENT IN AN ORGANIZED HEALTH CARE EDUCATION/TRAINING PROGRAM

## 2022-06-19 PROCEDURE — 25000003 PHARM REV CODE 250: Performed by: STUDENT IN AN ORGANIZED HEALTH CARE EDUCATION/TRAINING PROGRAM

## 2022-06-19 PROCEDURE — 11000001 HC ACUTE MED/SURG PRIVATE ROOM

## 2022-06-19 PROCEDURE — 82550 ASSAY OF CK (CPK): CPT | Performed by: STUDENT IN AN ORGANIZED HEALTH CARE EDUCATION/TRAINING PROGRAM

## 2022-06-19 PROCEDURE — 25000003 PHARM REV CODE 250: Performed by: INTERNAL MEDICINE

## 2022-06-19 PROCEDURE — 83735 ASSAY OF MAGNESIUM: CPT | Performed by: STUDENT IN AN ORGANIZED HEALTH CARE EDUCATION/TRAINING PROGRAM

## 2022-06-19 PROCEDURE — S0166 INJ OLANZAPINE 2.5MG: HCPCS | Performed by: STUDENT IN AN ORGANIZED HEALTH CARE EDUCATION/TRAINING PROGRAM

## 2022-06-19 RX ORDER — DIPHENHYDRAMINE HYDROCHLORIDE 50 MG/ML
25 INJECTION INTRAMUSCULAR; INTRAVENOUS ONCE
Status: COMPLETED | OUTPATIENT
Start: 2022-06-19 | End: 2022-06-19

## 2022-06-19 RX ADMIN — ENOXAPARIN SODIUM 40 MG: 40 INJECTION SUBCUTANEOUS at 04:06

## 2022-06-19 RX ADMIN — NICOTINE 1 PATCH: 14 PATCH, EXTENDED RELEASE TRANSDERMAL at 08:06

## 2022-06-19 RX ADMIN — RISPERIDONE 3 MG: 1 TABLET ORAL at 08:06

## 2022-06-19 RX ADMIN — DIPHENHYDRAMINE HYDROCHLORIDE 25 MG: 50 INJECTION, SOLUTION INTRAMUSCULAR; INTRAVENOUS at 12:06

## 2022-06-19 RX ADMIN — RISPERIDONE 3 MG: 1 TABLET ORAL at 09:06

## 2022-06-19 RX ADMIN — SODIUM CHLORIDE, POTASSIUM CHLORIDE, SODIUM LACTATE AND CALCIUM CHLORIDE: 600; 310; 30; 20 INJECTION, SOLUTION INTRAVENOUS at 03:06

## 2022-06-19 RX ADMIN — SODIUM CHLORIDE, POTASSIUM CHLORIDE, SODIUM LACTATE AND CALCIUM CHLORIDE: 600; 310; 30; 20 INJECTION, SOLUTION INTRAVENOUS at 12:06

## 2022-06-19 RX ADMIN — QUETIAPINE FUMARATE 200 MG: 100 TABLET ORAL at 09:06

## 2022-06-19 RX ADMIN — OXCARBAZEPINE 300 MG: 300 TABLET, FILM COATED ORAL at 09:06

## 2022-06-19 RX ADMIN — SODIUM CHLORIDE, POTASSIUM CHLORIDE, SODIUM LACTATE AND CALCIUM CHLORIDE: 600; 310; 30; 20 INJECTION, SOLUTION INTRAVENOUS at 04:06

## 2022-06-19 RX ADMIN — OXCARBAZEPINE 300 MG: 300 TABLET, FILM COATED ORAL at 08:06

## 2022-06-19 RX ADMIN — OLANZAPINE 10 MG: 10 INJECTION, POWDER, LYOPHILIZED, FOR SOLUTION INTRAMUSCULAR at 03:06

## 2022-06-19 RX ADMIN — SODIUM CHLORIDE, POTASSIUM CHLORIDE, SODIUM LACTATE AND CALCIUM CHLORIDE: 600; 310; 30; 20 INJECTION, SOLUTION INTRAVENOUS at 08:06

## 2022-06-19 RX ADMIN — OLANZAPINE 10 MG: 10 INJECTION, POWDER, LYOPHILIZED, FOR SOLUTION INTRAMUSCULAR at 11:06

## 2022-06-19 RX ADMIN — VENLAFAXINE HYDROCHLORIDE 150 MG: 75 CAPSULE, EXTENDED RELEASE ORAL at 08:06

## 2022-06-19 RX ADMIN — SODIUM CHLORIDE, POTASSIUM CHLORIDE, SODIUM LACTATE AND CALCIUM CHLORIDE: 600; 310; 30; 20 INJECTION, SOLUTION INTRAVENOUS at 10:06

## 2022-06-19 NOTE — NURSING
Pt seems agitated ,pt trying to chew on electrical cord of vitals machine, and trying to chew on wall board, pt is redirected easily and is cooperative, but has  slept very little this shift. He did have Benadryl @ 0038 , is it ol to give zyprexa as ordered PRN? Dr Castellon states its ok to give .

## 2022-06-19 NOTE — NURSING
Pt remains awake , talking about the bible and cannot stay focused , does not complete sentences, and begins another about something completely different, talkative but cooperative, encouraged to try and get some rest

## 2022-06-19 NOTE — PROGRESS NOTES
[facility] Medicine Wards Progress Note     Resident Team: [facility] Medicine List 1  Attending Physician: Lexi Veliz MD  Resident: Dr. Cartagena  Intern: Dr. Moreno, Dr. Ramos covering on 22      Subjective:      Brief HPI:  36 y.o.  male who with a history of schizophrenia presented to the ED via ambulance on 2022 from a psych facility in [place]. Pt stated he wanted to check himself in the unit because of feeling lonely and having hallucinations but they noticed his L arm being swollen and sent to ED for further evaluation. Pt states he was repeatedly lifting a 25 lbs weight with his L arm but denies any injuries. He also denies using any IV drugs. He denies pain to the arm, only reports swollen sensation.     In ED labs significant for CK of 70,392 with  and .    Admitted to IM services for management of rhabdomyolysis.     Interval History:  No acute events noted overnight per nursing staff.  Patient continues to have decreased sleep.  He was given a dose of quetiapine and Benadryl.  Quetiapine patient can be redirected easily.  Denies of any suicidal or homicidal symptoms.      Review of Systems:  See HPI     Objective:     Last 24 Hour Vital Signs:  BP  Min: 138/81  Max: 160/85  Temp  Av.3 °F (36.8 °C)  Min: 97.3 °F (36.3 °C)  Max: 98.8 °F (37.1 °C)  Pulse  Av.7  Min: 62  Max: 78  Resp  Av  Min: 18  Max: 18  SpO2  Av.7 %  Min: 99 %  Max: 100 %  I/O last 3 completed shifts:  In: 4020 [P.O.:1620; I.V.:2400]  Out: 700 [Urine:700]    Physical Examination:  Physical Exam  Vitals reviewed.   Constitutional:       General: He is not in acute distress.  Cardiovascular:      Rate and Rhythm: Normal rate and regular rhythm.      Heart sounds: Normal heart sounds.   Pulmonary:      Effort: Pulmonary effort is normal.      Breath sounds: Normal breath sounds.   Musculoskeletal:      Right upper arm: Normal.      Left upper arm: Swelling present. No lacerations or  tenderness.      Comments: Swelling has improved significantly  ROM is not limited  Sensation and motor function remains intact throughout the L arm   Neurological:      Mental Status: He is alert.     Laboratory:  Most Recent Data:  CBC:   Lab Results   Component Value Date    WBC 8.2 06/19/2022    HGB 13.8 (L) 06/19/2022    HCT 42.7 06/19/2022     06/19/2022    MCV 86.3 06/19/2022    RDW 14.0 06/19/2022     WBC Differential:   Recent Labs   Lab 06/12/22  1737 06/13/22  0823 06/15/22  0444 06/16/22  0121 06/19/22  0615   WBC 12.5* 8.3 7.5 7.9 8.2   HGB 15.0 13.5* 12.0* 12.1* 13.8*   HCT 43.9 42.0 36.0* 37.1* 42.7    254 216 203 283   MCV 84.7 88.1 85.1 86.5 86.3     BMP:   Lab Results   Component Value Date     06/19/2022    K 4.0 06/19/2022    CO2 23 06/19/2022    BUN 7.4 (L) 06/19/2022    CREATININE 0.87 06/19/2022    CALCIUM 9.9 06/19/2022    MG 2.00 06/19/2022     LFTs:   Lab Results   Component Value Date    ALBUMIN 4.0 06/19/2022    BILITOT 0.5 06/19/2022    AST 54 (H) 06/19/2022    ALKPHOS 77 06/19/2022    ALT 74 (H) 06/19/2022     Coags: No results found for: INR, PROTIME, PTT  FLP:   Lab Results   Component Value Date    CHOL 162 11/12/2021    HDL 60 11/12/2021    TRIG 73 11/12/2021     DM:   Lab Results   Component Value Date    CREATININE 0.87 06/19/2022     Thyroid:   Lab Results   Component Value Date    TSH 1.0932 11/11/2021      Anemia: No results found for: IRON, TIBC, FERRITIN, SATURATEDIRO  No results found for: EJCZORRE22  No results found for: FOLATE     Cardiac: No results found for: TROPONINI, CKTOTAL, CKMB, BNP      Microbiology Data:  Microbiology Results (last 7 days)     ** No results found for the last 168 hours. **             Radiology:  Imaging Results          CT Forearm (Radius/Ulna) Without Contrast Left (Final result)  Result time 06/13/22 17:08:33    Final result by Zachariah Pineda MD (06/13/22 17:08:33)                 Impression:      1. Motion degraded  scan.  2. No fracture or dislocation.  3. Nonspecific subcutaneous edema.      Electronically signed by: Zachariah Pineda  Date:    06/13/2022  Time:    17:08             Narrative:    EXAMINATION:  CT FOREARM (RADIUS/ULNA) WITHOUT CONTRAST LEFT; CT ARM (HUMERUS) WITHOUT CONTRAST LEFT    CLINICAL HISTORY:  singificant arm swelling;; significant arm swelling;    TECHNIQUE:  Helical acquisition through the left upper extremity from the shoulder through the proximal phalanges.  Three plane reconstructions were provided for review.  mGycm. Automatic exposure control, adjustment of mA/kV or iterative reconstruction technique was used to reduce radiation.    COMPARISON:  Radiographs same day    FINDINGS:  Motion degraded scan.    Allowing for motion no fracture or dislocation is evident.    There is limited evaluation of the soft tissues without contrast.  There is some nonspecific subcutaneous edema.  No focal drainable fluid collection is seen.  No free air.                               CT Arm (Humerus) Without Contrast Left (Final result)  Result time 06/13/22 17:08:33    Final result by Zachariah Pineda MD (06/13/22 17:08:33)                 Impression:      1. Motion degraded scan.  2. No fracture or dislocation.  3. Nonspecific subcutaneous edema.      Electronically signed by: Zachariah Pineda  Date:    06/13/2022  Time:    17:08             Narrative:    EXAMINATION:  CT FOREARM (RADIUS/ULNA) WITHOUT CONTRAST LEFT; CT ARM (HUMERUS) WITHOUT CONTRAST LEFT    CLINICAL HISTORY:  singificant arm swelling;; significant arm swelling;    TECHNIQUE:  Helical acquisition through the left upper extremity from the shoulder through the proximal phalanges.  Three plane reconstructions were provided for review.  mGycm. Automatic exposure control, adjustment of mA/kV or iterative reconstruction technique was used to reduce radiation.    COMPARISON:  Radiographs same day    FINDINGS:  Motion degraded scan.    Allowing for  motion no fracture or dislocation is evident.    There is limited evaluation of the soft tissues without contrast.  There is some nonspecific subcutaneous edema.  No focal drainable fluid collection is seen.  No free air.                               X-Ray Humerus 2 View Left (Final result)  Result time 06/13/22 08:55:07    Final result by Beto Cam MD (06/13/22 08:55:07)                 Impression:      No acute osseous abnormality.      Electronically signed by: Beto Cam  Date:    06/13/2022  Time:    08:55             Narrative:    EXAMINATION:  XR HUMERUS 2 VIEW LEFT    CLINICAL HISTORY:  trauma;Rhabdomyolysis    COMPARISON:  None.    FINDINGS:  No acute displaced fractures or dislocations.    Joint spaces preserved.    No blastic or lytic lesions.    Soft tissues within normal limits.                                Current Medications:     Infusions:   lactated ringers 200 mL/hr at 06/19/22 0808        Scheduled:   enoxaparin  40 mg Subcutaneous Daily    nicotine  1 patch Transdermal Daily    OXcarbazepine  300 mg Oral BID    risperiDONE  3 mg Oral BID    venlafaxine  150 mg Oral Daily        PRN:  dextrose 10%, dextrose 10%, dextrose 50%, dextrose 50%, glucagon (human recombinant), glucose, glucose, labetalol, naloxone, OLANZapine, QUEtiapine      Assessment & Plan:     1. Non-traumatic Rhabdomyolysis-improving  L arm swelling- decreased  -likely secondary to repetitive weight lifting prior to presentation, leading to muscle injury  -Initial CK at 70,392--> 3000 approx today  -continue  cc/hr  -US negative for DVT  -X-ray negative for any osseus abnormalities  -CT forearm and arm limited by motion but negative for any drainable fluid collection  -ortho consulted, no surgical intervention at this time, reported no concern for compartment syndrome at this time  -no evidence of renal involvement, no electrolyte disturbances     2. Transaminitis-improving  -AST, ALT is 400, 104 on  admission , downtrending  -likely related to rhabdo      3. Schizophrenia  -Per Dr. Charles recommendations:  -Trileptal 300mg BID, Venlafaxine  daily and Risperidone 3 mg BID  -avoid Zyprexa as scheduled, but can use 10 mg IM q8h PRN for non-redirectable agitation   -Seroquel 200 mg nightly PRN insomnia   -Pt is currently CEC'd, continue 1:1 observation  -psychiatrist is following        CODE STATUS: FULL  Access: IV  Antibiotics: none  Diet: regular  DVT Prophylaxis: Lovenox 40 daily  GI Prophylaxis: none  Fluids:  cc/hr      Disposition: Continue with fluid resuscitation, next lab check 6/20 for CK. Orders not in, will let morning team because of the lab errors.       Mena Ramos MD  [facility] Medicine -3

## 2022-06-20 LAB
ALBUMIN SERPL-MCNC: 3.7 GM/DL (ref 3.5–5)
ALBUMIN/GLOB SERPL: 1.3 RATIO (ref 1.1–2)
ALP SERPL-CCNC: 68 UNIT/L (ref 40–150)
ALT SERPL-CCNC: 57 UNIT/L (ref 0–55)
AST SERPL-CCNC: 35 UNIT/L (ref 5–34)
BASOPHILS # BLD AUTO: 0.04 X10(3)/MCL (ref 0–0.2)
BASOPHILS NFR BLD AUTO: 0.5 %
BILIRUBIN DIRECT+TOT PNL SERPL-MCNC: 0.4 MG/DL
BUN SERPL-MCNC: 8.3 MG/DL (ref 8.9–20.6)
CALCIUM SERPL-MCNC: 9.6 MG/DL (ref 8.4–10.2)
CHLORIDE SERPL-SCNC: 109 MMOL/L (ref 98–107)
CK SERPL-CCNC: 1529 U/L (ref 30–200)
CO2 SERPL-SCNC: 22 MMOL/L (ref 22–29)
CREAT SERPL-MCNC: 0.84 MG/DL (ref 0.73–1.18)
EOSINOPHIL # BLD AUTO: 0.53 X10(3)/MCL (ref 0–0.9)
EOSINOPHIL NFR BLD AUTO: 6.5 %
ERYTHROCYTE [DISTWIDTH] IN BLOOD BY AUTOMATED COUNT: 13.9 % (ref 11.5–17)
GLOBULIN SER-MCNC: 2.9 GM/DL (ref 2.4–3.5)
GLUCOSE SERPL-MCNC: 84 MG/DL (ref 74–100)
HCT VFR BLD AUTO: 39.9 % (ref 42–52)
HGB BLD-MCNC: 13.2 GM/DL (ref 14–18)
IMM GRANULOCYTES # BLD AUTO: 0.02 X10(3)/MCL (ref 0–0.02)
IMM GRANULOCYTES NFR BLD AUTO: 0.2 % (ref 0–0.43)
LYMPHOCYTES # BLD AUTO: 2.95 X10(3)/MCL (ref 0.6–4.6)
LYMPHOCYTES NFR BLD AUTO: 36 %
MCH RBC QN AUTO: 28.4 PG (ref 27–31)
MCHC RBC AUTO-ENTMCNC: 33.1 MG/DL (ref 33–36)
MCV RBC AUTO: 85.8 FL (ref 80–94)
MONOCYTES # BLD AUTO: 0.66 X10(3)/MCL (ref 0.1–1.3)
MONOCYTES NFR BLD AUTO: 8.1 %
NEUTROPHILS # BLD AUTO: 4 X10(3)/MCL (ref 2.1–9.2)
NEUTROPHILS NFR BLD AUTO: 48.7 %
NRBC BLD AUTO-RTO: 0 %
PLATELET # BLD AUTO: 285 X10(3)/MCL (ref 130–400)
PMV BLD AUTO: 11.2 FL (ref 9.4–12.4)
POTASSIUM SERPL-SCNC: 3.9 MMOL/L (ref 3.5–5.1)
PROT SERPL-MCNC: 6.6 GM/DL (ref 6.4–8.3)
RBC # BLD AUTO: 4.65 X10(6)/MCL (ref 4.7–6.1)
SODIUM SERPL-SCNC: 144 MMOL/L (ref 136–145)
WBC # SPEC AUTO: 8.2 X10(3)/MCL (ref 4.5–11.5)

## 2022-06-20 PROCEDURE — S0166 INJ OLANZAPINE 2.5MG: HCPCS | Performed by: STUDENT IN AN ORGANIZED HEALTH CARE EDUCATION/TRAINING PROGRAM

## 2022-06-20 PROCEDURE — 63600175 PHARM REV CODE 636 W HCPCS: Performed by: STUDENT IN AN ORGANIZED HEALTH CARE EDUCATION/TRAINING PROGRAM

## 2022-06-20 PROCEDURE — 25000003 PHARM REV CODE 250: Performed by: STUDENT IN AN ORGANIZED HEALTH CARE EDUCATION/TRAINING PROGRAM

## 2022-06-20 PROCEDURE — 36415 COLL VENOUS BLD VENIPUNCTURE: CPT | Performed by: STUDENT IN AN ORGANIZED HEALTH CARE EDUCATION/TRAINING PROGRAM

## 2022-06-20 PROCEDURE — S4991 NICOTINE PATCH NONLEGEND: HCPCS | Performed by: STUDENT IN AN ORGANIZED HEALTH CARE EDUCATION/TRAINING PROGRAM

## 2022-06-20 PROCEDURE — 25000003 PHARM REV CODE 250: Performed by: INTERNAL MEDICINE

## 2022-06-20 PROCEDURE — 85025 COMPLETE CBC W/AUTO DIFF WBC: CPT | Performed by: STUDENT IN AN ORGANIZED HEALTH CARE EDUCATION/TRAINING PROGRAM

## 2022-06-20 PROCEDURE — 82550 ASSAY OF CK (CPK): CPT | Performed by: STUDENT IN AN ORGANIZED HEALTH CARE EDUCATION/TRAINING PROGRAM

## 2022-06-20 PROCEDURE — 80053 COMPREHEN METABOLIC PANEL: CPT | Performed by: STUDENT IN AN ORGANIZED HEALTH CARE EDUCATION/TRAINING PROGRAM

## 2022-06-20 PROCEDURE — 11000001 HC ACUTE MED/SURG PRIVATE ROOM

## 2022-06-20 PROCEDURE — 99231 SBSQ HOSP IP/OBS SF/LOW 25: CPT | Mod: ,,, | Performed by: STUDENT IN AN ORGANIZED HEALTH CARE EDUCATION/TRAINING PROGRAM

## 2022-06-20 PROCEDURE — 99231 PR SUBSEQUENT HOSPITAL CARE,LEVL I: ICD-10-PCS | Mod: ,,, | Performed by: STUDENT IN AN ORGANIZED HEALTH CARE EDUCATION/TRAINING PROGRAM

## 2022-06-20 RX ADMIN — RISPERIDONE 3 MG: 1 TABLET ORAL at 08:06

## 2022-06-20 RX ADMIN — QUETIAPINE FUMARATE 200 MG: 100 TABLET ORAL at 11:06

## 2022-06-20 RX ADMIN — OXCARBAZEPINE 300 MG: 300 TABLET, FILM COATED ORAL at 08:06

## 2022-06-20 RX ADMIN — NICOTINE 1 PATCH: 14 PATCH, EXTENDED RELEASE TRANSDERMAL at 08:06

## 2022-06-20 RX ADMIN — OLANZAPINE 10 MG: 10 INJECTION, POWDER, LYOPHILIZED, FOR SOLUTION INTRAMUSCULAR at 11:06

## 2022-06-20 RX ADMIN — SODIUM CHLORIDE, POTASSIUM CHLORIDE, SODIUM LACTATE AND CALCIUM CHLORIDE: 600; 310; 30; 20 INJECTION, SOLUTION INTRAVENOUS at 03:06

## 2022-06-20 RX ADMIN — ENOXAPARIN SODIUM 40 MG: 40 INJECTION SUBCUTANEOUS at 05:06

## 2022-06-20 RX ADMIN — SODIUM CHLORIDE, POTASSIUM CHLORIDE, SODIUM LACTATE AND CALCIUM CHLORIDE: 600; 310; 30; 20 INJECTION, SOLUTION INTRAVENOUS at 08:06

## 2022-06-20 RX ADMIN — OLANZAPINE 10 MG: 10 INJECTION, POWDER, LYOPHILIZED, FOR SOLUTION INTRAMUSCULAR at 08:06

## 2022-06-20 RX ADMIN — VENLAFAXINE HYDROCHLORIDE 150 MG: 75 CAPSULE, EXTENDED RELEASE ORAL at 08:06

## 2022-06-20 NOTE — PROGRESS NOTES
[facility] Medicine Wards Progress Note     Resident Team: [facility] Medicine List 1  Attending Physician: Lexi Veliz MD  Resident: Dr. Stovall      Subjective:      Brief HPI:  36 y.o.  male who with a history of schizophrenia presented to the ED via ambulance on 2022 from a psych facility in [place]. Pt stated he wanted to check himself in the unit because of feeling lonely and having hallucinations but they noticed his L arm being swollen and sent to ED for further evaluation. Pt states he was repeatedly lifting a 25 lbs weight with his L arm but denies any injuries. He also denies using any IV drugs. He denies pain to the arm, only reports swollen sensation.     In ED labs significant for CK of 70,392 with  and .  Admitted to IM services for management of rhabdomyolysis.     Interval History:  NAEO.  Continues to be easily redirectable.  Denies of any suicidal or homicidal symptoms.      Review of Systems:  See HPI     Objective:     Last 24 Hour Vital Signs:  BP  Min: 134/90  Max: 153/77  Temp  Av.5 °F (28.1 °C)  Min: 36.7 °F (2.6 °C)  Max: 98.2 °F (36.8 °C)  Pulse  Av  Min: 78  Max: 95  Resp  Av  Min: 18  Max: 18  SpO2  Av.8 %  Min: 99 %  Max: 100 %  I/O last 3 completed shifts:  In: 6720 [P.O.:1920; I.V.:4800]  Out: 300 [Urine:300]    Physical Examination:  General: appears well, in no acute distress   Eye: no scleral icterus   Respiratory: clear to auscultation bilaterally, nonlabored respirations   Cardiovascular: regular rate and rhythm without murmurs or gallops, no edema in bilateral lower extremities   Gastrointestinal: soft, non-tender, non-distended, bowel sounds present   Genitourinary: no suprapubic tenderness   Musculoskeletal: no gross deformities observed     Laboratory:  Most Recent Data:  CBC:   Lab Results   Component Value Date    WBC 8.2 2022    HGB 13.2 (L) 2022    HCT 39.9 (L) 2022     2022    MCV 85.8 2022     RDW 13.9 06/20/2022     WBC Differential:   Recent Labs   Lab 06/15/22  0444 06/16/22  0121 06/19/22  0615 06/20/22  0445   WBC 7.5 7.9 8.2 8.2   HGB 12.0* 12.1* 13.8* 13.2*   HCT 36.0* 37.1* 42.7 39.9*    203 283 285   MCV 85.1 86.5 86.3 85.8     BMP:   Lab Results   Component Value Date     06/20/2022    K 3.9 06/20/2022    CO2 22 06/20/2022    BUN 8.3 (L) 06/20/2022    CREATININE 0.84 06/20/2022    CALCIUM 9.6 06/20/2022    MG 2.00 06/19/2022     LFTs:   Lab Results   Component Value Date    ALBUMIN 3.7 06/20/2022    BILITOT 0.4 06/20/2022    AST 35 (H) 06/20/2022    ALKPHOS 68 06/20/2022    ALT 57 (H) 06/20/2022       Radiology:  Imaging Results          CT Forearm (Radius/Ulna) Without Contrast Left (Final result)  Result time 06/13/22 17:08:33    Final result by Zachariah Pineda MD (06/13/22 17:08:33)                 Impression:      1. Motion degraded scan.  2. No fracture or dislocation.  3. Nonspecific subcutaneous edema.      Electronically signed by: Zachariah Pineda  Date:    06/13/2022  Time:    17:08             Narrative:    EXAMINATION:  CT FOREARM (RADIUS/ULNA) WITHOUT CONTRAST LEFT; CT ARM (HUMERUS) WITHOUT CONTRAST LEFT    CLINICAL HISTORY:  singificant arm swelling;; significant arm swelling;    TECHNIQUE:  Helical acquisition through the left upper extremity from the shoulder through the proximal phalanges.  Three plane reconstructions were provided for review.  mGycm. Automatic exposure control, adjustment of mA/kV or iterative reconstruction technique was used to reduce radiation.    COMPARISON:  Radiographs same day    FINDINGS:  Motion degraded scan.    Allowing for motion no fracture or dislocation is evident.    There is limited evaluation of the soft tissues without contrast.  There is some nonspecific subcutaneous edema.  No focal drainable fluid collection is seen.  No free air.                               CT Arm (Humerus) Without Contrast Left (Final result)  Result  time 06/13/22 17:08:33    Final result by Zachariah Pineda MD (06/13/22 17:08:33)                 Impression:      1. Motion degraded scan.  2. No fracture or dislocation.  3. Nonspecific subcutaneous edema.      Electronically signed by: Zachariah Pineda  Date:    06/13/2022  Time:    17:08             Narrative:    EXAMINATION:  CT FOREARM (RADIUS/ULNA) WITHOUT CONTRAST LEFT; CT ARM (HUMERUS) WITHOUT CONTRAST LEFT    CLINICAL HISTORY:  singificant arm swelling;; significant arm swelling;    TECHNIQUE:  Helical acquisition through the left upper extremity from the shoulder through the proximal phalanges.  Three plane reconstructions were provided for review.  mGycm. Automatic exposure control, adjustment of mA/kV or iterative reconstruction technique was used to reduce radiation.    COMPARISON:  Radiographs same day    FINDINGS:  Motion degraded scan.    Allowing for motion no fracture or dislocation is evident.    There is limited evaluation of the soft tissues without contrast.  There is some nonspecific subcutaneous edema.  No focal drainable fluid collection is seen.  No free air.                               X-Ray Humerus 2 View Left (Final result)  Result time 06/13/22 08:55:07    Final result by Beto Cam MD (06/13/22 08:55:07)                 Impression:      No acute osseous abnormality.      Electronically signed by: Beto Cam  Date:    06/13/2022  Time:    08:55             Narrative:    EXAMINATION:  XR HUMERUS 2 VIEW LEFT    CLINICAL HISTORY:  trauma;Rhabdomyolysis    COMPARISON:  None.    FINDINGS:  No acute displaced fractures or dislocations.    Joint spaces preserved.    No blastic or lytic lesions.    Soft tissues within normal limits.                                Current Medications:     Infusions:   lactated ringers 250 mL/hr at 06/20/22 1233        Scheduled:   enoxaparin  40 mg Subcutaneous Daily    nicotine  1 patch Transdermal Daily    OXcarbazepine  300 mg Oral BID     risperiDONE  3 mg Oral BID    venlafaxine  150 mg Oral Daily        PRN:  dextrose 10%, dextrose 10%, dextrose 50%, dextrose 50%, glucagon (human recombinant), glucose, glucose, labetalol, naloxone, OLANZapine, QUEtiapine      Assessment & Plan:     Non-traumatic Rhabdomyolysis-improving  L arm swelling- resolved  -likely secondary to repetitive weight lifting prior to presentation, leading to muscle injury  -Initial CK at 70,392--> 1100 approx today  -will give bolus of 2L LR  -continue maintenance IV  cc/hr  -US negative for DVT  -X-ray negative for any osseus abnormalities  -CT forearm and arm limited by motion but negative for any drainable fluid collection  -ortho consulted, no surgical intervention at this time, reported no concern for compartment syndrome at this time  -no evidence of renal involvement, no electrolyte disturbances     Transaminitis - improving  -AST, ALT is 400, 104 on admission, downtrending  -likely related to rhabdo      Schizophrenia  -Per Dr. Charles recommendations:  -Trileptal 300mg BID, Venlafaxine  daily and Risperidone 3 mg BID  -avoid Zyprexa as scheduled, but can use 10 mg IM q8h PRN for non-redirectable agitation   -Seroquel 200 mg nightly PRN insomnia   -Pt is currently CEC'd, continue 1:1 observation  -psychiatrist is following        CODE STATUS: FULL  Access: IV  Antibiotics: none  Diet: regular  DVT Prophylaxis: Lovenox 40 daily  GI Prophylaxis: none  Fluids:  cc/hr      Disposition: Continue with fluid resuscitation,  Repeat CPK in AM.     Whitney Stovall MD  [facility] HO-2

## 2022-06-20 NOTE — SUBJECTIVE & OBJECTIVE
"Interval History: see hospital course.     Family History    None       Tobacco Use    Smoking status: Current Every Day Smoker     Packs/day: 1.00    Smokeless tobacco: Never Used   Substance and Sexual Activity    Alcohol use: Yes     Alcohol/week: 4.0 standard drinks     Types: 4 Cans of beer per week    Drug use: Never    Sexual activity: Not Currently     Partners: Female     Birth control/protection: Abstinence     Psychotherapeutics (From admission, onward)                Start     Stop Route Frequency Ordered    06/18/22 0912  QUEtiapine tablet 200 mg         -- Oral Nightly PRN 06/18/22 0912    06/17/22 2100  risperiDONE tablet 3 mg         -- Oral 2 times daily 06/17/22 1207    06/17/22 1342  OLANZapine injection 10 mg         -- IM Every 8 hours PRN 06/17/22 1342    06/13/22 1245  venlafaxine 24 hr capsule 150 mg         -- Oral Daily 06/13/22 1136             Review of Systems  All systems reviewed and are negative except as above mentioned in HPI/hospital course.    Objective:     Vital Signs (Most Recent):  Temp: 97.7 °F (36.5 °C) (06/20/22 1100)  Pulse: 95 (06/20/22 1100)  Resp: 18 (06/20/22 1100)  BP: (!) 134/90 (06/20/22 1100)  SpO2: 100 % (06/20/22 1100) Vital Signs (24h Range):  Temp:  [36.7 °F (2.6 °C)-98.2 °F (36.8 °C)] 97.7 °F (36.5 °C)  Pulse:  [78-95] 95  Resp:  [18] 18  SpO2:  [99 %-100 %] 100 %  BP: (134-153)/(72-90) 134/90     Height: 5' 10.98" (180.3 cm)  Weight: 69.7 kg (153 lb 10.6 oz)  Body mass index is 21.44 kg/m².      Intake/Output Summary (Last 24 hours) at 6/20/2022 1429  Last data filed at 6/20/2022 0900  Gross per 24 hour   Intake 3365 ml   Output --   Net 3365 ml       Physical Exam     Appearance: unremarkable, age appropriate, dressed in hospital gown  Level of Consciousness: awake, alert  Behavior/Cooperation: calm and cooperative  Psychomotor: psychomotor activated   Speech: normal tone, normal rate, normal pitch, normal volume, spontaeous  Language: english, " "fluid  Orientation: person, place, situation  Attention Span/Concentration: intact to interview  Memory: Grossly intact  Mood: "fine"  Affect: constricted  Thought Process: perseverative  Associations: mostly logical  Thought Content: +paranoia, +hyperreligious, denies SI/HI, no plan or desire for self harm or harm to others  Fund of Knowledge: Intact  Abstraction: concrete  Insight: limited  Judgment: fair    Significant Labs: All pertinent labs within the past 24 hours have been reviewed.    Significant Imaging: I have reviewed all pertinent imaging results/findings within the past 24 hours.  "

## 2022-06-20 NOTE — ASSESSMENT & PLAN NOTE
ASSESSMENT     Elvis Puentes is a 36 y.o. male with a past psychiatric history of schizophrenia, currently presenting with rhabdomyolysis.Psychiatry was originally consulted to address the patient's symptoms of hallucinations and depression.  Symptoms not responding to risperidone, will plan cross titration to zyprexa.      IMPRESSION  Schizophrenia, chronic with acute exacerbation  Depression, unspecified  Cannabis use disorder, mild  R/o intellectual impairment    RECOMMENDATION(S)      1. Scheduled Medication(s):  Continue trileptal 300mg bid  Continue effexor XR 150mg daily  Decrease risperidone to 2mg bid, start zyprexa 10mg nightly    2. PRN Medication(s):  Recommend zyprexa 10mg PO or IM q8 hrs PRN non redirectable agitation associated with breakthrough psychosis or miguel  Do not given zyprexa IM within 1 hr of ativan  Do not exceed 30mg zyprexa total daily from all sources  Continue seroquel prn sleep    3.  Monitor:  EKG 6/17/2022 w/ qtc <400    4. Legal Status/Precaution(s):  Continue CEC  Continue 1:1 observation    5. Disposition  Recommend to seek IP psychiatric hospitalization when medically cleared

## 2022-06-20 NOTE — PROGRESS NOTES
"[facility]  Psychiatry  Progress Note    Patient Name: Elvis Puentes  MRN: [id number]   Code Status: Full Code  Admission Date: 6/12/2022  Hospital Length of Stay: 7 days  Expected Discharge Date:   Attending Physician: Lexi Veliz MD  Primary Care Provider: Primary Doctor No    Current Legal Status: [facility]    Patient information was obtained from patient, past medical records and ER records.     Subjective:     Patient is a 36 y.o., male, presents with:    Principal Problem:Non-traumatic rhabdomyolysis    Chief Complaint: "I got to get ready to fight the devil"    HPI:   Per Primary MD:  "Elvis Puentes is a 36 y.o.  male who with a history of schizophrenia presented to the ED via ambulance on 6/12/2022  From a psych facility in [place]. Pt stated he wanted to check himself in the unit because of feeling lonely and having hallucinations but they noticed his L arm being swollen and sent to ED for further evaluation. Pt states he was repeatedly lifting a 25 lbs weight with his L arm but denies any injuries. He also denies using any IV drugs. He denies pain to the arm, only reports swollen sensation.      In the ED, venous US done, negative for DVT. X-ray also done of the L arm, negative for any osseus abnormalities. Initial labs significant for CK of 70,000, which is now 52,409 after 5 L of bolus in the ED and being on  cc/hr. AST, ALT is 400 and 104 respectively. K+ on admission 4, it is now 4.6 with mild T wave peaks on EKG. Ca2+ 9.6. Cr 0.97. UDS only positive for cannaboids.      Ortho was consulted, as per Dr. Blakely, no surgical intervention needed at this time as there is no suspicion for compartment syndrome or tendon rupture.    "    Per Psychiatry MD:   Elvis Puentes is a 36 y.o. male with a past psychiatric history of schizophrenia, currently presenting with non traumatic rhabdomyolysis.  Psychiatry was consulted to address the patient's symptoms of " hallucinations.     Patient calm and cooperative with interview.  Patient reports that he wanted to check himself into a psychiatric hospital for depression and hallucinations.  It has history schizophrenia.  Reports that he was admitted to medical unit for injury to his arm.  Says that he was working out because I wanted to keep people from dying.  Says he wanted to overcome.  Says I can not take the thought money dies.  Says if I had a gun I would use it, denies that he is in possession a firearm.  Endorses remote suicidal attempt when he was a teenager.  Notes that he has been having worsening hallucinations.  Says that he hear voices say demeaning things.  Says that his mood has been depressed.  Notes some difficulty with sleep and energy.  Notes some hopeless thinking.  Voices that he is not seeing a psychiatrist.  Says that he is taking several different medications to help with his mental health.  Patient cannot remember these medications.  Patient reports that a family member helps him to remember to take his medications.  Patient says his medications are working very well.  Patient reports that he wanted to check himself into a psychiatric hospital to get his medications adjusted.  Voices motivation to continue his treatment after his hospitalization.    SUBJECTIVE   Currently, the patient is endorsing the following:    Medical Review Of Systems:  Constitutional: denies fevers, denies chills, denies recent weight change  Eyes: denies pain in eyes or loss of vision  Ears: denies tinnitis, denies loss of hearing  Mouth/throat: denies difficulty with speaking, denies difficulty with swallowing  Respiratory: denies SOB, denies cough  Gastrointestinal: + abdominal pain, denies nausea/vomiting, denies constipation/diarrhea  Genitourinary: denies urinary frequency, denies burning on urination  Dermatologic: denies rash, denies erythema  Musculoskeletal: + myalgias, + arthralgias  Hematologic: denies easy  "bleeding/bruising, denies enlarged lymph nodes  Neurologic: denies seizures, denies headaches, denies loss of sensation, denies weakness  Psychiatric: see HPI    Psychiatric Review Of Systems:  Please see HPI above    Past Medical/Surgical History:  Past Medical History:   Diagnosis Date    Anxiety     Depression     Hallucination     History of psychiatric hospitalization     Hx of psychiatric care     Psychiatric problem     Psychosis     Schizoaffective disorder     Sleep difficulties     Suicide attempt     Therapy       has a past medical history of Anxiety, Depression, Hallucination, History of psychiatric hospitalization, psychiatric care, Psychiatric problem, Psychosis, Schizoaffective disorder, Sleep difficulties, Suicide attempt, and Therapy.  History reviewed. No pertinent surgical history.    Past Psychiatric History:  Previous Medication Trials: yes, currently taking zyprexa, trileptal, and effexor  Previous Psychiatric Hospitalizations: yes, numerous  Previous Suicide Attempts: yes, once as a teenager  History of Violence: denies  Outpatient Psychiatrist: not currently  Outpatient Psychotherapist: not current    Social History:  Marital Status: single  Children: none   Employment Status/Info: not working, SSI  Education: HS graduate  Housing/Lives with: apartment with mother and brother  History of phys/sexual abuse: denies  Access to gun: denies    Substance Abuse History:  Recreational Drugs: cannabis frequently  Use of Alcohol: denies  Rehab History:denies   Tobacco Use:denies    Legal History:  Past Charges/Incarcerations:denies  Pending charges:denies     Family Psychiatric History:   Mother- schizophrenia        Hospital Course: 6/15/2022  Pt calm and cooperative with interview, sleeping upon entering room.  Reports he was given a "shot" earlier in the afternoon, unsure of why the medication was given.  Says "I was thinking they were laughing at me" (referring to staff and other " "patients).  Continues to express desire to fight "death."  Not currently experiencing hallucinations, notes hallucinations recently (predominantly AH).  Reports poor sleep, denies difficulty with appetite.  Mood "good."  Denies SI/HI, but says "I'd kill myself if I thought my mom was gonna die, I just can't live like that."  Says "my mom is fine, healthy as a horse."  Feels that he is improving medically, no current complaints.     Per MAR:   Received olanzapine 5mg IM at 1454    6/20/2022  Pt more animated today during interview.  Continues to voice hyperreligious ideation.  Paranoid regarding staff members.  Feels back to baseline physically.  Denies any current hallucinations but notes recent AH.  Denies problems with mood.  Pt denies benefit from uptitration of risperidone or additional seroquel prn for insomnia.  Pt agreeable to continued medication changes, voices frustration with continued hospitalization and wants to receive treatment in IP psychiatric facility.        Interval History: see hospital course.     Family History    None       Tobacco Use    Smoking status: Current Every Day Smoker     Packs/day: 1.00    Smokeless tobacco: Never Used   Substance and Sexual Activity    Alcohol use: Yes     Alcohol/week: 4.0 standard drinks     Types: 4 Cans of beer per week    Drug use: Never    Sexual activity: Not Currently     Partners: Female     Birth control/protection: Abstinence     Psychotherapeutics (From admission, onward)                Start     Stop Route Frequency Ordered    06/18/22 0912  QUEtiapine tablet 200 mg         -- Oral Nightly PRN 06/18/22 0912    06/17/22 2100  risperiDONE tablet 3 mg         -- Oral 2 times daily 06/17/22 1207    06/17/22 1342  OLANZapine injection 10 mg         -- IM Every 8 hours PRN 06/17/22 1342    06/13/22 1245  venlafaxine 24 hr capsule 150 mg         -- Oral Daily 06/13/22 1136             Review of Systems  All systems reviewed and are negative except as " "above mentioned in HPI/hospital course.    Objective:     Vital Signs (Most Recent):  Temp: 97.7 °F (36.5 °C) (06/20/22 1100)  Pulse: 95 (06/20/22 1100)  Resp: 18 (06/20/22 1100)  BP: (!) 134/90 (06/20/22 1100)  SpO2: 100 % (06/20/22 1100) Vital Signs (24h Range):  Temp:  [36.7 °F (2.6 °C)-98.2 °F (36.8 °C)] 97.7 °F (36.5 °C)  Pulse:  [78-95] 95  Resp:  [18] 18  SpO2:  [99 %-100 %] 100 %  BP: (134-153)/(72-90) 134/90     Height: 5' 10.98" (180.3 cm)  Weight: 69.7 kg (153 lb 10.6 oz)  Body mass index is 21.44 kg/m².      Intake/Output Summary (Last 24 hours) at 6/20/2022 1429  Last data filed at 6/20/2022 0900  Gross per 24 hour   Intake 3365 ml   Output --   Net 3365 ml       Physical Exam     Appearance: unremarkable, age appropriate, dressed in hospital gown  Level of Consciousness: awake, alert  Behavior/Cooperation: calm and cooperative  Psychomotor: psychomotor activated   Speech: normal tone, normal rate, normal pitch, normal volume, spontaeous  Language: english, fluid  Orientation: person, place, situation  Attention Span/Concentration: intact to interview  Memory: Grossly intact  Mood: "fine"  Affect: constricted  Thought Process: perseverative  Associations: mostly logical  Thought Content: +paranoia, +hyperreligious, denies SI/HI, no plan or desire for self harm or harm to others  Fund of Knowledge: Intact  Abstraction: concrete  Insight: limited  Judgment: fair    Significant Labs: All pertinent labs within the past 24 hours have been reviewed.    Significant Imaging: I have reviewed all pertinent imaging results/findings within the past 24 hours.       Scheduled Medications:   enoxaparin  40 mg Subcutaneous Daily    nicotine  1 patch Transdermal Daily    OXcarbazepine  300 mg Oral BID    risperiDONE  3 mg Oral BID    venlafaxine  150 mg Oral Daily       PRN Medications:  dextrose 10%, dextrose 10%, dextrose 50%, dextrose 50%, glucagon (human recombinant), glucose, glucose, labetalol, naloxone, " "OLANZapine, QUEtiapine    Review of patient's allergies indicates:   Allergen Reactions    Haloperidol Swelling     Muscle stiffness  Has muscle spasms      Paroxetine Swelling, Other (See Comments) and Rash     "i don't remember"  "i don't remember"      Pineapple      Face swells up  Face swells up      Ziprasidone Other (See Comments)     Muscle twitching       Assessment/Plan:     Schizophrenia, chronic condition with acute exacerbation  ASSESSMENT     Elvis Puentes is a 36 y.o. male with a past psychiatric history of schizophrenia, currently presenting with rhabdomyolysis.Psychiatry was originally consulted to address the patient's symptoms of hallucinations and depression.  Symptoms not responding to risperidone, will plan cross titration to zyprexa.      IMPRESSION  Schizophrenia, chronic with acute exacerbation  Depression, unspecified  Cannabis use disorder, mild  R/o intellectual impairment    RECOMMENDATION(S)      1. Scheduled Medication(s):  Continue trileptal 300mg bid  Continue effexor XR 150mg daily  Decrease risperidone to 2mg bid, start zyprexa 10mg nightly    2. PRN Medication(s):  Recommend zyprexa 10mg PO or IM q8 hrs PRN non redirectable agitation associated with breakthrough psychosis or miguel  Do not given zyprexa IM within 1 hr of ativan  Do not exceed 30mg zyprexa total daily from all sources  Continue seroquel prn sleep    3.  Monitor:  EKG 6/17/2022 w/ qtc <400    4. Legal Status/Precaution(s):  Continue CEC  Continue 1:1 observation    5. Disposition  Recommend to seek IP psychiatric hospitalization when medically cleared      Need for Continued Hospitalization:  Awaiting medical clearance from primary team for psychiatric hospitalization    Anticipated Disposition:  Psychiatric Hospital    Total time:  15 with greater than 50% of this time spent in counseling and/or coordination of care.     Sai Charles MD   Psychiatry  [facility]  "

## 2022-06-21 LAB
ALBUMIN SERPL-MCNC: 3.9 GM/DL (ref 3.5–5)
ALBUMIN/GLOB SERPL: 1.4 RATIO (ref 1.1–2)
ALP SERPL-CCNC: 75 UNIT/L (ref 40–150)
ALT SERPL-CCNC: 51 UNIT/L (ref 0–55)
AST SERPL-CCNC: 32 UNIT/L (ref 5–34)
BASOPHILS # BLD AUTO: 0.07 X10(3)/MCL (ref 0–0.2)
BASOPHILS NFR BLD AUTO: 1 %
BILIRUBIN DIRECT+TOT PNL SERPL-MCNC: 0.4 MG/DL
BUN SERPL-MCNC: 6 MG/DL (ref 8.9–20.6)
CALCIUM SERPL-MCNC: 9.8 MG/DL (ref 8.4–10.2)
CHLORIDE SERPL-SCNC: 109 MMOL/L (ref 98–107)
CK SERPL-CCNC: 1105 U/L (ref 30–200)
CO2 SERPL-SCNC: 25 MMOL/L (ref 22–29)
CREAT SERPL-MCNC: 0.77 MG/DL (ref 0.73–1.18)
EOSINOPHIL # BLD AUTO: 0.24 X10(3)/MCL (ref 0–0.9)
EOSINOPHIL NFR BLD AUTO: 3.3 %
ERYTHROCYTE [DISTWIDTH] IN BLOOD BY AUTOMATED COUNT: 14.2 % (ref 11.5–17)
GLOBULIN SER-MCNC: 2.8 GM/DL (ref 2.4–3.5)
GLUCOSE SERPL-MCNC: 77 MG/DL (ref 74–100)
HCT VFR BLD AUTO: 38.7 % (ref 42–52)
HGB BLD-MCNC: 12.8 GM/DL (ref 14–18)
IMM GRANULOCYTES # BLD AUTO: 0.02 X10(3)/MCL (ref 0–0.02)
IMM GRANULOCYTES NFR BLD AUTO: 0.3 % (ref 0–0.43)
LYMPHOCYTES # BLD AUTO: 2.47 X10(3)/MCL (ref 0.6–4.6)
LYMPHOCYTES NFR BLD AUTO: 33.7 %
MCH RBC QN AUTO: 28.6 PG (ref 27–31)
MCHC RBC AUTO-ENTMCNC: 33.1 MG/DL (ref 33–36)
MCV RBC AUTO: 86.6 FL (ref 80–94)
MONOCYTES # BLD AUTO: 0.54 X10(3)/MCL (ref 0.1–1.3)
MONOCYTES NFR BLD AUTO: 7.4 %
NEUTROPHILS # BLD AUTO: 4 X10(3)/MCL (ref 2.1–9.2)
NEUTROPHILS NFR BLD AUTO: 54.3 %
NRBC BLD AUTO-RTO: 0 %
PLATELET # BLD AUTO: 268 X10(3)/MCL (ref 130–400)
PMV BLD AUTO: 11.5 FL (ref 9.4–12.4)
POTASSIUM SERPL-SCNC: 3.8 MMOL/L (ref 3.5–5.1)
PROT SERPL-MCNC: 6.7 GM/DL (ref 6.4–8.3)
RBC # BLD AUTO: 4.47 X10(6)/MCL (ref 4.7–6.1)
SODIUM SERPL-SCNC: 145 MMOL/L (ref 136–145)
WBC # SPEC AUTO: 7.3 X10(3)/MCL (ref 4.5–11.5)

## 2022-06-21 PROCEDURE — S4991 NICOTINE PATCH NONLEGEND: HCPCS | Performed by: STUDENT IN AN ORGANIZED HEALTH CARE EDUCATION/TRAINING PROGRAM

## 2022-06-21 PROCEDURE — 63600175 PHARM REV CODE 636 W HCPCS: Performed by: STUDENT IN AN ORGANIZED HEALTH CARE EDUCATION/TRAINING PROGRAM

## 2022-06-21 PROCEDURE — S0166 INJ OLANZAPINE 2.5MG: HCPCS | Performed by: STUDENT IN AN ORGANIZED HEALTH CARE EDUCATION/TRAINING PROGRAM

## 2022-06-21 PROCEDURE — 25000003 PHARM REV CODE 250: Performed by: INTERNAL MEDICINE

## 2022-06-21 PROCEDURE — 36415 COLL VENOUS BLD VENIPUNCTURE: CPT | Performed by: STUDENT IN AN ORGANIZED HEALTH CARE EDUCATION/TRAINING PROGRAM

## 2022-06-21 PROCEDURE — 85025 COMPLETE CBC W/AUTO DIFF WBC: CPT | Performed by: STUDENT IN AN ORGANIZED HEALTH CARE EDUCATION/TRAINING PROGRAM

## 2022-06-21 PROCEDURE — 25000003 PHARM REV CODE 250: Performed by: STUDENT IN AN ORGANIZED HEALTH CARE EDUCATION/TRAINING PROGRAM

## 2022-06-21 PROCEDURE — 82550 ASSAY OF CK (CPK): CPT | Performed by: STUDENT IN AN ORGANIZED HEALTH CARE EDUCATION/TRAINING PROGRAM

## 2022-06-21 PROCEDURE — 11000001 HC ACUTE MED/SURG PRIVATE ROOM

## 2022-06-21 PROCEDURE — 80053 COMPREHEN METABOLIC PANEL: CPT | Performed by: STUDENT IN AN ORGANIZED HEALTH CARE EDUCATION/TRAINING PROGRAM

## 2022-06-21 PROCEDURE — 63600175 PHARM REV CODE 636 W HCPCS

## 2022-06-21 RX ORDER — CHLORPROMAZINE HYDROCHLORIDE 25 MG/ML
25 INJECTION INTRAMUSCULAR EVERY 6 HOURS PRN
Status: DISCONTINUED | OUTPATIENT
Start: 2022-06-21 | End: 2022-06-25

## 2022-06-21 RX ORDER — OLANZAPINE 10 MG/1
10 TABLET ORAL NIGHTLY
Status: DISCONTINUED | OUTPATIENT
Start: 2022-06-21 | End: 2022-06-23

## 2022-06-21 RX ORDER — RISPERIDONE 1 MG/1
2 TABLET ORAL 2 TIMES DAILY
Status: DISCONTINUED | OUTPATIENT
Start: 2022-06-21 | End: 2022-06-23

## 2022-06-21 RX ADMIN — VENLAFAXINE HYDROCHLORIDE 150 MG: 75 CAPSULE, EXTENDED RELEASE ORAL at 08:06

## 2022-06-21 RX ADMIN — SODIUM CHLORIDE, POTASSIUM CHLORIDE, SODIUM LACTATE AND CALCIUM CHLORIDE 1000 ML: 600; 310; 30; 20 INJECTION, SOLUTION INTRAVENOUS at 01:06

## 2022-06-21 RX ADMIN — SODIUM CHLORIDE, POTASSIUM CHLORIDE, SODIUM LACTATE AND CALCIUM CHLORIDE: 600; 310; 30; 20 INJECTION, SOLUTION INTRAVENOUS at 03:06

## 2022-06-21 RX ADMIN — OXCARBAZEPINE 300 MG: 300 TABLET, FILM COATED ORAL at 08:06

## 2022-06-21 RX ADMIN — RISPERIDONE 2 MG: 1 TABLET ORAL at 09:06

## 2022-06-21 RX ADMIN — ENOXAPARIN SODIUM 40 MG: 40 INJECTION SUBCUTANEOUS at 05:06

## 2022-06-21 RX ADMIN — RISPERIDONE 2 MG: 1 TABLET ORAL at 08:06

## 2022-06-21 RX ADMIN — OLANZAPINE 10 MG: 10 TABLET, FILM COATED ORAL at 09:06

## 2022-06-21 RX ADMIN — QUETIAPINE FUMARATE 200 MG: 100 TABLET ORAL at 09:06

## 2022-06-21 RX ADMIN — CHLORPROMAZINE HYDROCHLORIDE 25 MG: 25 INJECTION INTRAMUSCULAR at 05:06

## 2022-06-21 RX ADMIN — OXCARBAZEPINE 300 MG: 300 TABLET, FILM COATED ORAL at 09:06

## 2022-06-21 RX ADMIN — SODIUM CHLORIDE, POTASSIUM CHLORIDE, SODIUM LACTATE AND CALCIUM CHLORIDE: 600; 310; 30; 20 INJECTION, SOLUTION INTRAVENOUS at 05:06

## 2022-06-21 RX ADMIN — OLANZAPINE 10 MG: 10 INJECTION, POWDER, LYOPHILIZED, FOR SOLUTION INTRAMUSCULAR at 03:06

## 2022-06-21 RX ADMIN — NICOTINE 1 PATCH: 14 PATCH, EXTENDED RELEASE TRANSDERMAL at 09:06

## 2022-06-21 RX ADMIN — OLANZAPINE 10 MG: 10 INJECTION, POWDER, LYOPHILIZED, FOR SOLUTION INTRAMUSCULAR at 07:06

## 2022-06-21 NOTE — PLAN OF CARE
Psychiatry plan of care note:     Received message from primary team member requesting assistance with PRN for behavioral control.  Pt demonstrating more psychotic symptoms recently.  Removed 3 IVs overnight, screaming and observed hallucinating.  Minimal benefit from zyprexa 10mg IM thus far.      Recommendations:   Continue zyprexa 10mg PO or IM q8 hrs PRN for nonredirectable agitation associated with breakthrough psychosis or miguel, use as first option, do not given ativan within 1 hr of administration, do not exceed 30mg total daily from all sources  Recommend chlorpromazine 25mg PO or IM q6hrs PRN for nonredirectable agitation associated with breakthrough psychosis or miguel, use as second option  Recommend to wait 30-60 minutes after administration of zyprexa to give thorazine (if needed)   Recommend to check EKG for qtc prolongation as pt has been receiving multiple PRNs  Recommend to continue working with CM to find placement in  psychiatric hospital  Continue CEC, continue 1:1 sitter    Psychiatry will continue to follow.    Sai Charles MD  [facility] Psychiatry  [facility]  6/21/2022, 12:07 PM

## 2022-06-22 LAB
ALBUMIN SERPL-MCNC: 4.3 GM/DL (ref 3.5–5)
ALBUMIN/GLOB SERPL: 1.2 RATIO (ref 1.1–2)
ALP SERPL-CCNC: 83 UNIT/L (ref 40–150)
ALT SERPL-CCNC: 45 UNIT/L (ref 0–55)
AST SERPL-CCNC: 40 UNIT/L (ref 5–34)
BASOPHILS # BLD AUTO: 0.07 X10(3)/MCL (ref 0–0.2)
BASOPHILS NFR BLD AUTO: 0.7 %
BILIRUBIN DIRECT+TOT PNL SERPL-MCNC: 0.7 MG/DL
BUN SERPL-MCNC: 6 MG/DL (ref 8.9–20.6)
CALCIUM SERPL-MCNC: 10 MG/DL (ref 8.4–10.2)
CHLORIDE SERPL-SCNC: 108 MMOL/L (ref 98–107)
CK SERPL-CCNC: 1391 U/L (ref 30–200)
CO2 SERPL-SCNC: 26 MMOL/L (ref 22–29)
CREAT SERPL-MCNC: 0.8 MG/DL (ref 0.73–1.18)
EOSINOPHIL # BLD AUTO: 0.56 X10(3)/MCL (ref 0–0.9)
EOSINOPHIL NFR BLD AUTO: 5.8 %
ERYTHROCYTE [DISTWIDTH] IN BLOOD BY AUTOMATED COUNT: 14.6 % (ref 11.5–17)
GLOBULIN SER-MCNC: 3.5 GM/DL (ref 2.4–3.5)
GLUCOSE SERPL-MCNC: 70 MG/DL (ref 74–100)
HCT VFR BLD AUTO: 47.8 % (ref 42–52)
HGB BLD-MCNC: 14.7 GM/DL (ref 14–18)
IMM GRANULOCYTES # BLD AUTO: 0.04 X10(3)/MCL (ref 0–0.02)
IMM GRANULOCYTES NFR BLD AUTO: 0.4 % (ref 0–0.43)
LYMPHOCYTES # BLD AUTO: 2.4 X10(3)/MCL (ref 0.6–4.6)
LYMPHOCYTES NFR BLD AUTO: 24.9 %
MCH RBC QN AUTO: 27.7 PG (ref 27–31)
MCHC RBC AUTO-ENTMCNC: 30.8 MG/DL (ref 33–36)
MCV RBC AUTO: 90 FL (ref 80–94)
MONOCYTES # BLD AUTO: 0.83 X10(3)/MCL (ref 0.1–1.3)
MONOCYTES NFR BLD AUTO: 8.6 %
NEUTROPHILS # BLD AUTO: 5.7 X10(3)/MCL (ref 2.1–9.2)
NEUTROPHILS NFR BLD AUTO: 59.6 %
NRBC BLD AUTO-RTO: 0 %
PLATELET # BLD AUTO: 265 X10(3)/MCL (ref 130–400)
PMV BLD AUTO: 11 FL (ref 9.4–12.4)
POTASSIUM SERPL-SCNC: 3.7 MMOL/L (ref 3.5–5.1)
PROT SERPL-MCNC: 7.8 GM/DL (ref 6.4–8.3)
RBC # BLD AUTO: 5.31 X10(6)/MCL (ref 4.7–6.1)
SODIUM SERPL-SCNC: 146 MMOL/L (ref 136–145)
WBC # SPEC AUTO: 9.6 X10(3)/MCL (ref 4.5–11.5)

## 2022-06-22 PROCEDURE — 25000003 PHARM REV CODE 250: Performed by: INTERNAL MEDICINE

## 2022-06-22 PROCEDURE — 63600175 PHARM REV CODE 636 W HCPCS

## 2022-06-22 PROCEDURE — 25000003 PHARM REV CODE 250: Performed by: STUDENT IN AN ORGANIZED HEALTH CARE EDUCATION/TRAINING PROGRAM

## 2022-06-22 PROCEDURE — 36410 VNPNXR 3YR/> PHY/QHP DX/THER: CPT

## 2022-06-22 PROCEDURE — 36415 COLL VENOUS BLD VENIPUNCTURE: CPT | Performed by: STUDENT IN AN ORGANIZED HEALTH CARE EDUCATION/TRAINING PROGRAM

## 2022-06-22 PROCEDURE — 82550 ASSAY OF CK (CPK): CPT | Performed by: STUDENT IN AN ORGANIZED HEALTH CARE EDUCATION/TRAINING PROGRAM

## 2022-06-22 PROCEDURE — C1751 CATH, INF, PER/CENT/MIDLINE: HCPCS

## 2022-06-22 PROCEDURE — 11000001 HC ACUTE MED/SURG PRIVATE ROOM

## 2022-06-22 PROCEDURE — 85025 COMPLETE CBC W/AUTO DIFF WBC: CPT | Performed by: STUDENT IN AN ORGANIZED HEALTH CARE EDUCATION/TRAINING PROGRAM

## 2022-06-22 PROCEDURE — S4991 NICOTINE PATCH NONLEGEND: HCPCS | Performed by: STUDENT IN AN ORGANIZED HEALTH CARE EDUCATION/TRAINING PROGRAM

## 2022-06-22 PROCEDURE — 80053 COMPREHEN METABOLIC PANEL: CPT | Performed by: STUDENT IN AN ORGANIZED HEALTH CARE EDUCATION/TRAINING PROGRAM

## 2022-06-22 PROCEDURE — 63600175 PHARM REV CODE 636 W HCPCS: Performed by: STUDENT IN AN ORGANIZED HEALTH CARE EDUCATION/TRAINING PROGRAM

## 2022-06-22 RX ORDER — SODIUM CHLORIDE 0.9 % (FLUSH) 0.9 %
10 SYRINGE (ML) INJECTION EVERY 6 HOURS
Status: DISCONTINUED | OUTPATIENT
Start: 2022-06-23 | End: 2022-06-28 | Stop reason: HOSPADM

## 2022-06-22 RX ORDER — SODIUM CHLORIDE 0.9 % (FLUSH) 0.9 %
10 SYRINGE (ML) INJECTION
Status: DISCONTINUED | OUTPATIENT
Start: 2022-06-23 | End: 2022-06-28 | Stop reason: HOSPADM

## 2022-06-22 RX ADMIN — SODIUM CHLORIDE, POTASSIUM CHLORIDE, SODIUM LACTATE AND CALCIUM CHLORIDE: 600; 310; 30; 20 INJECTION, SOLUTION INTRAVENOUS at 01:06

## 2022-06-22 RX ADMIN — SODIUM CHLORIDE, POTASSIUM CHLORIDE, SODIUM LACTATE AND CALCIUM CHLORIDE: 600; 310; 30; 20 INJECTION, SOLUTION INTRAVENOUS at 08:06

## 2022-06-22 RX ADMIN — NICOTINE 1 PATCH: 14 PATCH, EXTENDED RELEASE TRANSDERMAL at 09:06

## 2022-06-22 RX ADMIN — VENLAFAXINE HYDROCHLORIDE 150 MG: 75 CAPSULE, EXTENDED RELEASE ORAL at 09:06

## 2022-06-22 RX ADMIN — OXCARBAZEPINE 300 MG: 300 TABLET, FILM COATED ORAL at 08:06

## 2022-06-22 RX ADMIN — OLANZAPINE 10 MG: 10 TABLET, FILM COATED ORAL at 08:06

## 2022-06-22 RX ADMIN — RISPERIDONE 2 MG: 1 TABLET ORAL at 08:06

## 2022-06-22 RX ADMIN — RISPERIDONE 2 MG: 1 TABLET ORAL at 09:06

## 2022-06-22 RX ADMIN — ENOXAPARIN SODIUM 40 MG: 40 INJECTION SUBCUTANEOUS at 05:06

## 2022-06-22 RX ADMIN — CHLORPROMAZINE HYDROCHLORIDE 25 MG: 25 INJECTION INTRAMUSCULAR at 02:06

## 2022-06-22 RX ADMIN — OXCARBAZEPINE 300 MG: 300 TABLET, FILM COATED ORAL at 09:06

## 2022-06-22 NOTE — PLAN OF CARE
Psychiatry plan of care note:    Went to pt's room for evaluation.  Pt asleep in room.  Sitter outside of pt's room reported that pt did not sleep overnight and fell asleep shortly prior to my arrival.  Elected to not awaken patient.  Recommend continued adjustments to medication regimen (see below).  Continue with plan to seek IP psychiatric hospital placement when/if medically cleared.      Recommendations:     Scheduled medications:   Decrease risperidone to 1mg bid  Change zyprexa 10mg nightly to zyprexa 5mg qam + 20mg nightly  Continue trileptal 300mg bid  Continue venlafaxine 150mg daily    PRN medications:  Continue zyprexa 10mg PO or IM q8 hrs PRN for nonredirectable agitation associated with breakthrough psychosis or miguel, use as first option, do not given ativan within 1 hr of administration, do not exceed 30mg total daily from all sources  Recommend chlorpromazine 25mg PO or IM q6hrs PRN for nonredirectable agitation associated with breakthrough psychosis or miguel, use as second option  Recommend to wait 30-60 minutes after administration of zyprexa to give thorazine (if needed)     Monitoring:   Recommend to check EKG for qtc prolongation as pt has been receiving multiple PRNs    Legal/disposition:  Recommend to continue working with CM to find placement in IP psychiatric hospital  Continue CEC, continue 1:1 sitter     Psychiatry will continue to follow.  Thank you for allowing me to participate in the care of this patient.      Sai Charles MD  [facility] Psychiatry  [facility]  6/22/2022, 4:50 PM

## 2022-06-23 LAB
ALBUMIN SERPL-MCNC: 3.8 GM/DL (ref 3.5–5)
ALBUMIN/GLOB SERPL: 1.3 RATIO (ref 1.1–2)
ALP SERPL-CCNC: 80 UNIT/L (ref 40–150)
ALT SERPL-CCNC: 36 UNIT/L (ref 0–55)
AST SERPL-CCNC: 29 UNIT/L (ref 5–34)
BASOPHILS # BLD AUTO: 0.05 X10(3)/MCL (ref 0–0.2)
BASOPHILS NFR BLD AUTO: 0.5 %
BILIRUBIN DIRECT+TOT PNL SERPL-MCNC: 0.5 MG/DL
BUN SERPL-MCNC: 4.9 MG/DL (ref 8.9–20.6)
CALCIUM SERPL-MCNC: 9.5 MG/DL (ref 8.4–10.2)
CHLORIDE SERPL-SCNC: 108 MMOL/L (ref 98–107)
CK SERPL-CCNC: 934 U/L (ref 30–200)
CO2 SERPL-SCNC: 28 MMOL/L (ref 22–29)
CREAT SERPL-MCNC: 0.81 MG/DL (ref 0.73–1.18)
EOSINOPHIL # BLD AUTO: 0.5 X10(3)/MCL (ref 0–0.9)
EOSINOPHIL NFR BLD AUTO: 5.1 %
ERYTHROCYTE [DISTWIDTH] IN BLOOD BY AUTOMATED COUNT: 14.2 % (ref 11.5–17)
GLOBULIN SER-MCNC: 3 GM/DL (ref 2.4–3.5)
GLUCOSE SERPL-MCNC: 69 MG/DL (ref 74–100)
HCT VFR BLD AUTO: 40.4 % (ref 42–52)
HGB BLD-MCNC: 13.1 GM/DL (ref 14–18)
IMM GRANULOCYTES # BLD AUTO: 0.03 X10(3)/MCL (ref 0–0.02)
IMM GRANULOCYTES NFR BLD AUTO: 0.3 % (ref 0–0.43)
LYMPHOCYTES # BLD AUTO: 2.5 X10(3)/MCL (ref 0.6–4.6)
LYMPHOCYTES NFR BLD AUTO: 25.6 %
MAGNESIUM SERPL-MCNC: 2 MG/DL (ref 1.6–2.6)
MCH RBC QN AUTO: 27.9 PG (ref 27–31)
MCHC RBC AUTO-ENTMCNC: 32.4 MG/DL (ref 33–36)
MCV RBC AUTO: 86.1 FL (ref 80–94)
MONOCYTES # BLD AUTO: 0.9 X10(3)/MCL (ref 0.1–1.3)
MONOCYTES NFR BLD AUTO: 9.2 %
NEUTROPHILS # BLD AUTO: 5.8 X10(3)/MCL (ref 2.1–9.2)
NEUTROPHILS NFR BLD AUTO: 59.3 %
NRBC BLD AUTO-RTO: 0 %
PLATELET # BLD AUTO: 263 X10(3)/MCL (ref 130–400)
PMV BLD AUTO: 11.2 FL (ref 9.4–12.4)
POTASSIUM SERPL-SCNC: 3.4 MMOL/L (ref 3.5–5.1)
PROT SERPL-MCNC: 6.8 GM/DL (ref 6.4–8.3)
RBC # BLD AUTO: 4.69 X10(6)/MCL (ref 4.7–6.1)
SODIUM SERPL-SCNC: 143 MMOL/L (ref 136–145)
WBC # SPEC AUTO: 9.8 X10(3)/MCL (ref 4.5–11.5)

## 2022-06-23 PROCEDURE — 11000001 HC ACUTE MED/SURG PRIVATE ROOM

## 2022-06-23 PROCEDURE — 25000003 PHARM REV CODE 250: Performed by: STUDENT IN AN ORGANIZED HEALTH CARE EDUCATION/TRAINING PROGRAM

## 2022-06-23 PROCEDURE — S4991 NICOTINE PATCH NONLEGEND: HCPCS | Performed by: STUDENT IN AN ORGANIZED HEALTH CARE EDUCATION/TRAINING PROGRAM

## 2022-06-23 PROCEDURE — 63600175 PHARM REV CODE 636 W HCPCS: Performed by: STUDENT IN AN ORGANIZED HEALTH CARE EDUCATION/TRAINING PROGRAM

## 2022-06-23 PROCEDURE — 25000003 PHARM REV CODE 250: Performed by: INTERNAL MEDICINE

## 2022-06-23 PROCEDURE — 82550 ASSAY OF CK (CPK): CPT | Performed by: STUDENT IN AN ORGANIZED HEALTH CARE EDUCATION/TRAINING PROGRAM

## 2022-06-23 PROCEDURE — 80053 COMPREHEN METABOLIC PANEL: CPT | Performed by: STUDENT IN AN ORGANIZED HEALTH CARE EDUCATION/TRAINING PROGRAM

## 2022-06-23 PROCEDURE — 36415 COLL VENOUS BLD VENIPUNCTURE: CPT | Performed by: STUDENT IN AN ORGANIZED HEALTH CARE EDUCATION/TRAINING PROGRAM

## 2022-06-23 PROCEDURE — 63600175 PHARM REV CODE 636 W HCPCS

## 2022-06-23 PROCEDURE — S0166 INJ OLANZAPINE 2.5MG: HCPCS | Performed by: STUDENT IN AN ORGANIZED HEALTH CARE EDUCATION/TRAINING PROGRAM

## 2022-06-23 PROCEDURE — A4216 STERILE WATER/SALINE, 10 ML: HCPCS | Performed by: INTERNAL MEDICINE

## 2022-06-23 PROCEDURE — 83735 ASSAY OF MAGNESIUM: CPT | Performed by: STUDENT IN AN ORGANIZED HEALTH CARE EDUCATION/TRAINING PROGRAM

## 2022-06-23 PROCEDURE — 85025 COMPLETE CBC W/AUTO DIFF WBC: CPT | Performed by: STUDENT IN AN ORGANIZED HEALTH CARE EDUCATION/TRAINING PROGRAM

## 2022-06-23 RX ORDER — RISPERIDONE 1 MG/1
1 TABLET ORAL 2 TIMES DAILY
Status: DISCONTINUED | OUTPATIENT
Start: 2022-06-23 | End: 2022-06-25

## 2022-06-23 RX ORDER — OLANZAPINE 5 MG/1
5 TABLET ORAL EVERY MORNING
Status: DISCONTINUED | OUTPATIENT
Start: 2022-06-24 | End: 2022-06-25

## 2022-06-23 RX ORDER — OLANZAPINE 10 MG/1
20 TABLET ORAL NIGHTLY
Status: DISCONTINUED | OUTPATIENT
Start: 2022-06-23 | End: 2022-06-28 | Stop reason: HOSPADM

## 2022-06-23 RX ADMIN — SODIUM CHLORIDE, POTASSIUM CHLORIDE, SODIUM LACTATE AND CALCIUM CHLORIDE: 600; 310; 30; 20 INJECTION, SOLUTION INTRAVENOUS at 06:06

## 2022-06-23 RX ADMIN — CHLORPROMAZINE HYDROCHLORIDE: 25 INJECTION INTRAMUSCULAR at 04:06

## 2022-06-23 RX ADMIN — SODIUM CHLORIDE, PRESERVATIVE FREE 10 ML: 5 INJECTION INTRAVENOUS at 05:06

## 2022-06-23 RX ADMIN — OXCARBAZEPINE 300 MG: 300 TABLET, FILM COATED ORAL at 09:06

## 2022-06-23 RX ADMIN — VENLAFAXINE HYDROCHLORIDE 150 MG: 75 CAPSULE, EXTENDED RELEASE ORAL at 03:06

## 2022-06-23 RX ADMIN — OLANZAPINE 10 MG: 10 INJECTION, POWDER, LYOPHILIZED, FOR SOLUTION INTRAMUSCULAR at 03:06

## 2022-06-23 RX ADMIN — CHLORPROMAZINE HYDROCHLORIDE 25 MG: 25 INJECTION INTRAMUSCULAR at 01:06

## 2022-06-23 RX ADMIN — SODIUM CHLORIDE, PRESERVATIVE FREE 10 ML: 5 INJECTION INTRAVENOUS at 06:06

## 2022-06-23 RX ADMIN — SODIUM CHLORIDE, POTASSIUM CHLORIDE, SODIUM LACTATE AND CALCIUM CHLORIDE: 600; 310; 30; 20 INJECTION, SOLUTION INTRAVENOUS at 07:06

## 2022-06-23 RX ADMIN — OLANZAPINE 20 MG: 10 TABLET, FILM COATED ORAL at 09:06

## 2022-06-23 RX ADMIN — SODIUM CHLORIDE, PRESERVATIVE FREE 10 ML: 5 INJECTION INTRAVENOUS at 12:06

## 2022-06-23 RX ADMIN — OLANZAPINE 10 MG: 10 INJECTION, POWDER, LYOPHILIZED, FOR SOLUTION INTRAMUSCULAR at 05:06

## 2022-06-23 RX ADMIN — SODIUM CHLORIDE, POTASSIUM CHLORIDE, SODIUM LACTATE AND CALCIUM CHLORIDE: 600; 310; 30; 20 INJECTION, SOLUTION INTRAVENOUS at 10:06

## 2022-06-23 RX ADMIN — NICOTINE 1 PATCH: 14 PATCH, EXTENDED RELEASE TRANSDERMAL at 09:06

## 2022-06-23 RX ADMIN — SODIUM CHLORIDE, POTASSIUM CHLORIDE, SODIUM LACTATE AND CALCIUM CHLORIDE: 600; 310; 30; 20 INJECTION, SOLUTION INTRAVENOUS at 02:06

## 2022-06-23 RX ADMIN — RISPERIDONE 1 MG: 1 TABLET ORAL at 09:06

## 2022-06-23 NOTE — PROGRESS NOTES
"[facility]  Adult Nutrition  Progress Note    SUMMARY     Recommendations  1.  Continue Regular diet as ordered    2.  Monitor Weights Weekly        Reason for Assessment  Reason For Assessment: RD follow-up    Diagnosis: Non-Traumatic Rhabdo, Left arm swellling, Schizophrenia, Transaminitis    Relevant Medical History: Schizophrenia        Assessment and Plan    22: Pt reports good appetite, no difficulty eating; wts stable    22: Pt laying in bed, 1:1 sitter at door. Sitter reports pt with good po intake of meals. No GI complaints reported.      Nutrition Risk Screen  Nutrition Risk Screen: no indicators present    Nutrition/Diet History  Patient Reported Diet/Restrictions/Preferences: general  Typical Food/Fluid Intake: reports good oral intake PTA  Food Allergies:  (Pineapple)  Factors Affecting Nutritional Intake: None identified at this time      Anthropometrics  Temp: 97 °F (36.1 °C)  Height Method: Stated  Height: 5' 10.98" (180.3 cm)  Height (inches): 70.98 in  Weight Method: Bed Scale  Weight: 69.7 kg (153 lb 10.6 oz)  Weight (lb): 153.66 lb  Ideal Body Weight (IBW), Male: 171.88 lb  % Ideal Body Weight, Male (lb): 89.4 %  BMI (Calculated): 21.4  BMI Grade: 18.5-24.9 - normal  Usual Body Weight (UBW), k.18 kg  % Usual Body Weight: 102.44  % Weight Change From Usual Weight: 2.23 %     Wt Readings from Last 2 Encounters:   22 69.7 kg (153 lb 10.6 oz)   22 68 kg (150 lb)       Lab/Procedures/Meds  Pertinent Labs Comments:  -K 3.4, Cl 108, Gluc 69, BUN 4.9, GFR >60    Pertinent Medications Comments: LR @ 250 ml/hr      Nutrition Prescription Ordered  Current Diet Order: Regular  Tolerance: tolerating      Evaluation of Received Nutrient/Fluid Intake  % Intake of Estimated Energy Needs: 75 - 100 %  % Meal Intake: 75 - 100 %    Nutrition Risk  Level of Risk/Frequency of Follow-up: low     Monitor and Evaluation  Food and Nutrient Intake: food and " beverage intake  Food and Nutrient Adminstration: diet order  Anthropometric Measurements: weight change     Nutrition Follow-Up  RD Follow-up?: Yes      Karlie Chapa RD  06/23/2022

## 2022-06-23 NOTE — PROCEDURES
"Elvis Puentes is a 36 y.o. male patient.    Temp: 97.3 °F (36.3 °C) (06/22/22 1900)  Pulse: 104 (06/22/22 1900)  Resp: 18 (06/22/22 1900)  BP: (!) 140/93 (06/22/22 1900)  SpO2: 99 % (06/22/22 1900)  Weight: 69.7 kg (153 lb 10.6 oz) (06/16/22 1314)  Height: 5' 10.98" (180.3 cm) (06/16/22 1314)    PICC  Date/Time: 6/22/2022 10:45 PM  Performed by: Adi Fulton RN  Consent Done: Yes  Time out: Immediately prior to procedure a time out was called to verify the correct patient, procedure, equipment, support staff and site/side marked as required  Indications: med administration and vascular access  Anesthesia: local infiltration  Local anesthetic: lidocaine 1% without epinephrine  Anesthetic Total (mL): 5  Preparation: skin prepped with ChloraPrep  Skin prep agent dried: skin prep agent completely dried prior to procedure  Sterile barriers: all five maximum sterile barriers used - cap, mask, sterile gown, sterile gloves, and large sterile sheet  Hand hygiene: hand hygiene performed prior to central venous catheter insertion  Location details: right brachial  Catheter type: single lumen  Catheter size: 4 Fr  Catheter Length: 16cm    Ultrasound guidance: yes  Vessel Caliber: large and patent, compressibility normal  Needle advanced into vessel with real time Ultrasound guidance.  Guidewire confirmed in vessel.  Sterile sheath used.  Number of attempts: 1  Post-procedure: blood return through all ports, chlorhexidine patch and sterile dressing applied            Ad Fulton  6/22/2022  "

## 2022-06-23 NOTE — PROGRESS NOTES
[facility] Medicine Wards Progress Note     Resident Team: [facility] Medicine List 1  Attending Physician: Lexi Veliz MD  Resident: Dr. Stovall      Subjective:      Brief HPI:  36 y.o.  male who with a history of schizophrenia presented to the ED via ambulance on 2022 from a psych facility in [place]. Pt stated he wanted to check himself in the unit because of feeling lonely and having hallucinations but they noticed his L arm being swollen and sent to ED for further evaluation. Pt states he was repeatedly lifting a 25 lbs weight with his L arm but denies any injuries. He also denies using any IV drugs. He denies pain to the arm, only reports swollen sensation.     In ED labs significant for CK of 70,392 with  and .  Admitted to IM services for management of rhabdomyolysis.     Interval History:  NAEO. Psych continuing to make adjustments to medications  This AM patient asking to leave and he would like to talk to his mother. Denies muscle pain or cramping.    Review of Systems:  See HPI     Objective:     Last 24 Hour Vital Signs:  BP  Min: 140/93  Max: 149/86  Temp  Av.4 °F (36.3 °C)  Min: 97 °F (36.1 °C)  Max: 98 °F (36.7 °C)  Pulse  Av.5  Min: 88  Max: 104  Resp  Av  Min: 18  Max: 18  SpO2  Av.7 %  Min: 99 %  Max: 100 %  I/O last 3 completed shifts:  In: -   Out: 11 [Urine:10; Stool:1]    Physical Examination:  General: appears well, in no acute distress   Eye: no scleral icterus   Respiratory: clear to auscultation bilaterally, nonlabored respirations   Cardiovascular: regular rate and rhythm without murmurs or gallops, no edema in bilateral lower extremities   Gastrointestinal: soft, non-tender, non-distended, bowel sounds present   Genitourinary: no suprapubic tenderness   Musculoskeletal: no gross deformities observed     Laboratory:  Most Recent Data:  CBC:   Lab Results   Component Value Date    WBC 9.8 2022    HGB 13.1 (L) 2022    HCT 40.4 (L)  06/23/2022     06/23/2022    MCV 86.1 06/23/2022    RDW 14.2 06/23/2022     WBC Differential:   Recent Labs   Lab 06/19/22  0615 06/20/22  0445 06/21/22  0414 06/22/22  0800 06/23/22  0431   WBC 8.2 8.2 7.3 9.6 9.8   HGB 13.8* 13.2* 12.8* 14.7 13.1*   HCT 42.7 39.9* 38.7* 47.8 40.4*    285 268 265 263   MCV 86.3 85.8 86.6 90.0 86.1     BMP:   Lab Results   Component Value Date     06/23/2022    K 3.4 (L) 06/23/2022    CO2 28 06/23/2022    BUN 4.9 (L) 06/23/2022    CREATININE 0.81 06/23/2022    CALCIUM 9.5 06/23/2022    MG 2.00 06/23/2022     LFTs:   Lab Results   Component Value Date    ALBUMIN 3.8 06/23/2022    BILITOT 0.5 06/23/2022    AST 29 06/23/2022    ALKPHOS 80 06/23/2022    ALT 36 06/23/2022       Current Medications:     Infusions:   lactated ringers 250 mL/hr at 06/23/22 1056        Scheduled:   enoxaparin  40 mg Subcutaneous Daily    nicotine  1 patch Transdermal Daily    OLANZapine  20 mg Oral QHS    [START ON 6/24/2022] OLANZapine  5 mg Oral QAM    OXcarbazepine  300 mg Oral BID    risperiDONE  1 mg Oral BID    sodium chloride 0.9%  10 mL Intravenous Q6H    venlafaxine  150 mg Oral Daily        PRN:  chlorproMAZINE, dextrose 10%, dextrose 10%, dextrose 50%, dextrose 50%, glucagon (human recombinant), glucose, glucose, labetalol, naloxone, OLANZapine, QUEtiapine, Flushing PICC Protocol **AND** sodium chloride 0.9% **AND** sodium chloride 0.9%      Assessment & Plan:     Non-traumatic Rhabdomyolysis-improving  L arm swelling- resolved  -likely secondary to repetitive weight lifting prior to presentation, leading to muscle injury  -Initial CK at 70,392--> 934 approx today  -continue maintenance IV  cc/hr  -US negative for DVT  -X-ray negative for any osseus abnormalities  -CT forearm and arm limited by motion but negative for any drainable fluid collection  -ortho consulted, no surgical intervention at this time, reported no concern for compartment syndrome at this  time     Transaminitis - improving  -AST, ALT is 400, 104 on admission, downtrending  -likely related to rhabdo      Schizophrenia  -Per Dr. Charles recommendations:  -Trileptal 300mg BID, Venlafaxine  daily, Zyprexa 5mg qAM and 20mg qhs, and Risperidone 1 mg BID  -Seroquel 200 mg nightly PRN insomnia   -for non redirectable aggitation    -1st choice zyprexa 10mg PO or IM q8 hrs PRN for nonredirectable agitation associated with breakthrough psychosis or miguel, use as first option, do not given ativan within 1 hr of Zyprexa   -2nd choice chlorpromazine 25mg PO or IM q6hrs PRN for nonredirectable agitation associated with breakthrough psychosis or miguel  -Pt is currently CEC'd (signed 6/15/22), continue 1:1 observation  -psychiatrist is following        CODE STATUS: FULL  Access: IV  Antibiotics: none  Diet: regular  DVT Prophylaxis: Lovenox 40 daily  GI Prophylaxis: none  Fluids:  cc/hr      Disposition: Continue with fluid resuscitation,  Repeat CPK in AM.     Whitney Stovall MD  [facility] HO-2

## 2022-06-24 LAB
ALBUMIN SERPL-MCNC: 3.5 GM/DL (ref 3.5–5)
ALBUMIN/GLOB SERPL: 1.2 RATIO (ref 1.1–2)
ALP SERPL-CCNC: 74 UNIT/L (ref 40–150)
ALT SERPL-CCNC: 28 UNIT/L (ref 0–55)
AST SERPL-CCNC: 20 UNIT/L (ref 5–34)
BASOPHILS # BLD AUTO: 0.07 X10(3)/MCL (ref 0–0.2)
BASOPHILS NFR BLD AUTO: 0.7 %
BILIRUBIN DIRECT+TOT PNL SERPL-MCNC: 0.5 MG/DL
BUN SERPL-MCNC: 5.1 MG/DL (ref 8.9–20.6)
CALCIUM SERPL-MCNC: 9.3 MG/DL (ref 8.4–10.2)
CHLORIDE SERPL-SCNC: 109 MMOL/L (ref 98–107)
CK SERPL-CCNC: 477 U/L (ref 30–200)
CO2 SERPL-SCNC: 28 MMOL/L (ref 22–29)
CREAT SERPL-MCNC: 0.78 MG/DL (ref 0.73–1.18)
EOSINOPHIL # BLD AUTO: 0.56 X10(3)/MCL (ref 0–0.9)
EOSINOPHIL NFR BLD AUTO: 5.4 %
ERYTHROCYTE [DISTWIDTH] IN BLOOD BY AUTOMATED COUNT: 14.4 % (ref 11.5–17)
GLOBULIN SER-MCNC: 2.9 GM/DL (ref 2.4–3.5)
GLUCOSE SERPL-MCNC: 72 MG/DL (ref 74–100)
HAV IGM SERPL QL IA: NONREACTIVE
HBV CORE IGM SERPL QL IA: NONREACTIVE
HBV SURFACE AG SERPL QL IA: NONREACTIVE
HCT VFR BLD AUTO: 40.6 % (ref 42–52)
HCV AB SERPL QL IA: NONREACTIVE
HGB BLD-MCNC: 13.2 GM/DL (ref 14–18)
HIV 1+2 AB+HIV1 P24 AG SERPL QL IA: NONREACTIVE
IMM GRANULOCYTES # BLD AUTO: 0.02 X10(3)/MCL (ref 0–0.02)
IMM GRANULOCYTES NFR BLD AUTO: 0.2 % (ref 0–0.43)
LYMPHOCYTES # BLD AUTO: 2.19 X10(3)/MCL (ref 0.6–4.6)
LYMPHOCYTES NFR BLD AUTO: 21.3 %
MCH RBC QN AUTO: 28.2 PG (ref 27–31)
MCHC RBC AUTO-ENTMCNC: 32.5 MG/DL (ref 33–36)
MCV RBC AUTO: 86.8 FL (ref 80–94)
MONOCYTES # BLD AUTO: 0.83 X10(3)/MCL (ref 0.1–1.3)
MONOCYTES NFR BLD AUTO: 8.1 %
NEUTROPHILS # BLD AUTO: 6.6 X10(3)/MCL (ref 2.1–9.2)
NEUTROPHILS NFR BLD AUTO: 64.3 %
NRBC BLD AUTO-RTO: 0 %
PLATELET # BLD AUTO: 260 X10(3)/MCL (ref 130–400)
PMV BLD AUTO: 11.9 FL (ref 9.4–12.4)
POTASSIUM SERPL-SCNC: 3.4 MMOL/L (ref 3.5–5.1)
PROT SERPL-MCNC: 6.4 GM/DL (ref 6.4–8.3)
RBC # BLD AUTO: 4.68 X10(6)/MCL (ref 4.7–6.1)
SARS-COV-2 RDRP RESP QL NAA+PROBE: NEGATIVE
SODIUM SERPL-SCNC: 143 MMOL/L (ref 136–145)
WBC # SPEC AUTO: 10.3 X10(3)/MCL (ref 4.5–11.5)

## 2022-06-24 PROCEDURE — S0166 INJ OLANZAPINE 2.5MG: HCPCS | Performed by: STUDENT IN AN ORGANIZED HEALTH CARE EDUCATION/TRAINING PROGRAM

## 2022-06-24 PROCEDURE — 99231 PR SUBSEQUENT HOSPITAL CARE,LEVL I: ICD-10-PCS | Mod: ,,, | Performed by: STUDENT IN AN ORGANIZED HEALTH CARE EDUCATION/TRAINING PROGRAM

## 2022-06-24 PROCEDURE — 80074 ACUTE HEPATITIS PANEL: CPT | Performed by: STUDENT IN AN ORGANIZED HEALTH CARE EDUCATION/TRAINING PROGRAM

## 2022-06-24 PROCEDURE — A4216 STERILE WATER/SALINE, 10 ML: HCPCS | Performed by: INTERNAL MEDICINE

## 2022-06-24 PROCEDURE — 85025 COMPLETE CBC W/AUTO DIFF WBC: CPT | Performed by: STUDENT IN AN ORGANIZED HEALTH CARE EDUCATION/TRAINING PROGRAM

## 2022-06-24 PROCEDURE — 82550 ASSAY OF CK (CPK): CPT | Performed by: STUDENT IN AN ORGANIZED HEALTH CARE EDUCATION/TRAINING PROGRAM

## 2022-06-24 PROCEDURE — 36415 COLL VENOUS BLD VENIPUNCTURE: CPT | Performed by: STUDENT IN AN ORGANIZED HEALTH CARE EDUCATION/TRAINING PROGRAM

## 2022-06-24 PROCEDURE — 63600175 PHARM REV CODE 636 W HCPCS: Performed by: STUDENT IN AN ORGANIZED HEALTH CARE EDUCATION/TRAINING PROGRAM

## 2022-06-24 PROCEDURE — S4991 NICOTINE PATCH NONLEGEND: HCPCS | Performed by: STUDENT IN AN ORGANIZED HEALTH CARE EDUCATION/TRAINING PROGRAM

## 2022-06-24 PROCEDURE — 25000003 PHARM REV CODE 250: Performed by: INTERNAL MEDICINE

## 2022-06-24 PROCEDURE — 87635 SARS-COV-2 COVID-19 AMP PRB: CPT | Performed by: STUDENT IN AN ORGANIZED HEALTH CARE EDUCATION/TRAINING PROGRAM

## 2022-06-24 PROCEDURE — 25000003 PHARM REV CODE 250: Performed by: STUDENT IN AN ORGANIZED HEALTH CARE EDUCATION/TRAINING PROGRAM

## 2022-06-24 PROCEDURE — 11000001 HC ACUTE MED/SURG PRIVATE ROOM

## 2022-06-24 PROCEDURE — 87389 HIV-1 AG W/HIV-1&-2 AB AG IA: CPT | Performed by: STUDENT IN AN ORGANIZED HEALTH CARE EDUCATION/TRAINING PROGRAM

## 2022-06-24 PROCEDURE — 80053 COMPREHEN METABOLIC PANEL: CPT | Performed by: STUDENT IN AN ORGANIZED HEALTH CARE EDUCATION/TRAINING PROGRAM

## 2022-06-24 PROCEDURE — 99231 SBSQ HOSP IP/OBS SF/LOW 25: CPT | Mod: ,,, | Performed by: STUDENT IN AN ORGANIZED HEALTH CARE EDUCATION/TRAINING PROGRAM

## 2022-06-24 RX ORDER — IBUPROFEN 600 MG/1
600 TABLET ORAL ONCE
Status: COMPLETED | OUTPATIENT
Start: 2022-06-24 | End: 2022-06-24

## 2022-06-24 RX ORDER — POTASSIUM CHLORIDE 20 MEQ/1
40 TABLET, EXTENDED RELEASE ORAL ONCE
Status: COMPLETED | OUTPATIENT
Start: 2022-06-24 | End: 2022-06-24

## 2022-06-24 RX ORDER — IBUPROFEN 600 MG/1
600 TABLET ORAL EVERY 6 HOURS PRN
Status: DISCONTINUED | OUTPATIENT
Start: 2022-06-24 | End: 2022-06-28 | Stop reason: HOSPADM

## 2022-06-24 RX ADMIN — SODIUM CHLORIDE, POTASSIUM CHLORIDE, SODIUM LACTATE AND CALCIUM CHLORIDE: 600; 310; 30; 20 INJECTION, SOLUTION INTRAVENOUS at 02:06

## 2022-06-24 RX ADMIN — RISPERIDONE 1 MG: 1 TABLET ORAL at 08:06

## 2022-06-24 RX ADMIN — SODIUM CHLORIDE, PRESERVATIVE FREE 10 ML: 5 INJECTION INTRAVENOUS at 12:06

## 2022-06-24 RX ADMIN — OXCARBAZEPINE 300 MG: 300 TABLET, FILM COATED ORAL at 08:06

## 2022-06-24 RX ADMIN — ENOXAPARIN SODIUM 40 MG: 40 INJECTION SUBCUTANEOUS at 05:06

## 2022-06-24 RX ADMIN — VENLAFAXINE HYDROCHLORIDE 150 MG: 75 CAPSULE, EXTENDED RELEASE ORAL at 09:06

## 2022-06-24 RX ADMIN — IBUPROFEN 600 MG: 600 TABLET ORAL at 05:06

## 2022-06-24 RX ADMIN — SODIUM CHLORIDE, POTASSIUM CHLORIDE, SODIUM LACTATE AND CALCIUM CHLORIDE: 600; 310; 30; 20 INJECTION, SOLUTION INTRAVENOUS at 06:06

## 2022-06-24 RX ADMIN — SODIUM CHLORIDE, POTASSIUM CHLORIDE, SODIUM LACTATE AND CALCIUM CHLORIDE: 600; 310; 30; 20 INJECTION, SOLUTION INTRAVENOUS at 12:06

## 2022-06-24 RX ADMIN — OLANZAPINE 5 MG: 5 TABLET, FILM COATED ORAL at 09:06

## 2022-06-24 RX ADMIN — OLANZAPINE 10 MG: 10 INJECTION, POWDER, LYOPHILIZED, FOR SOLUTION INTRAMUSCULAR at 01:06

## 2022-06-24 RX ADMIN — OLANZAPINE 20 MG: 10 TABLET, FILM COATED ORAL at 08:06

## 2022-06-24 RX ADMIN — OXCARBAZEPINE 300 MG: 300 TABLET, FILM COATED ORAL at 09:06

## 2022-06-24 RX ADMIN — POTASSIUM CHLORIDE 40 MEQ: 1500 TABLET, EXTENDED RELEASE ORAL at 01:06

## 2022-06-24 RX ADMIN — SODIUM CHLORIDE, PRESERVATIVE FREE 10 ML: 5 INJECTION INTRAVENOUS at 05:06

## 2022-06-24 RX ADMIN — NICOTINE 1 PATCH: 14 PATCH, EXTENDED RELEASE TRANSDERMAL at 09:06

## 2022-06-24 RX ADMIN — SODIUM CHLORIDE, POTASSIUM CHLORIDE, SODIUM LACTATE AND CALCIUM CHLORIDE: 600; 310; 30; 20 INJECTION, SOLUTION INTRAVENOUS at 08:06

## 2022-06-24 RX ADMIN — RISPERIDONE 1 MG: 1 TABLET ORAL at 09:06

## 2022-06-24 NOTE — ASSESSMENT & PLAN NOTE
ASSESSMENT     Elvis Puentes is a 36 y.o. male with a past psychiatric history of schizophrenia, currently presenting with rhabdomyolysis.Psychiatry was originally consulted to address the patient's symptoms of hallucinations and depression.  Symptoms not responding to risperidone, will plan continue cross titration to zyprexa.      IMPRESSION  Schizophrenia, chronic with acute exacerbation  Depression, unspecified  Cannabis use disorder, mild  R/o intellectual impairment    RECOMMENDATION(S)      1. Scheduled Medication(s):  Continue trileptal 300mg bid  Continue effexor XR 150mg daily  Increase zyprexa to 10mg qam + 20mg qhs  Discontinue risperidone    2. PRN Medication(s):  Recommend zyprexa 10mg PO or IM q8 hrs PRN non redirectable agitation associated with breakthrough psychosis or miguel  Do not given zyprexa IM within 1 hr of ativan  Do not exceed 30mg zyprexa total daily from all sources  Continue seroquel prn sleep    3.  Monitor:  EKG 6/17/2022 w/ qtc <400    4. Legal Status/Precaution(s):  Continue CEC  Continue 1:1 observation    5. Disposition  Recommend to seek IP psychiatric hospitalization when medically cleared

## 2022-06-24 NOTE — PROGRESS NOTES
[facility] Medicine Wards Progress Note     Resident Team: [facility] Medicine List 1  Attending Physician: Lexi Veliz MD  Resident: Dr. Stovall      Subjective:      Brief HPI:  36 y.o.  male who with a history of schizophrenia presented to the ED via ambulance on 2022 from a psych facility in [place]. Pt stated he wanted to check himself in the unit because of feeling lonely and having hallucinations but they noticed his L arm being swollen and sent to ED for further evaluation. Pt states he was repeatedly lifting a 25 lbs weight with his L arm but denies any injuries. He also denies using any IV drugs. He denies pain to the arm, only reports swollen sensation.     In ED labs significant for CK of 70,392 with  and .  Admitted to IM services for management of rhabdomyolysis.     Interval History:  NAEO. Patient calmer this AM.  Laying in bed reading the bible.     Review of Systems:  See HPI     Objective:     Last 24 Hour Vital Signs:  BP  Min: 130/74  Max: 161/92  Temp  Av.2 °F (36.8 °C)  Min: 98.1 °F (36.7 °C)  Max: 98.2 °F (36.8 °C)  Pulse  Av  Min: 66  Max: 74  Resp  Av.7  Min: 16  Max: 18  SpO2  Av %  Min: 100 %  Max: 100 %  I/O last 3 completed shifts:  In: 3900 [P.O.:900; I.V.:3000]  Out: 363 [Urine:362; Stool:1]    Physical Examination:  General: appears well, in no acute distress   Eye: no scleral icterus   Respiratory: clear to auscultation bilaterally, nonlabored respirations   Cardiovascular: regular rate and rhythm without murmurs or gallops, no edema in bilateral lower extremities   Gastrointestinal: soft, non-tender, non-distended, bowel sounds present   Genitourinary: no suprapubic tenderness   Musculoskeletal: no gross deformities observed     Laboratory:  Most Recent Data:  CBC:   Lab Results   Component Value Date    WBC 9.8 2022    HGB 13.1 (L) 2022    HCT 40.4 (L) 2022     2022    MCV 86.1 2022    RDW 14.2 2022      WBC Differential:   Recent Labs   Lab 06/19/22  0615 06/20/22  0445 06/21/22  0414 06/22/22  0800 06/23/22  0431   WBC 8.2 8.2 7.3 9.6 9.8   HGB 13.8* 13.2* 12.8* 14.7 13.1*   HCT 42.7 39.9* 38.7* 47.8 40.4*    285 268 265 263   MCV 86.3 85.8 86.6 90.0 86.1     BMP:   Lab Results   Component Value Date     06/23/2022    K 3.4 (L) 06/23/2022    CO2 28 06/23/2022    BUN 4.9 (L) 06/23/2022    CREATININE 0.81 06/23/2022    CALCIUM 9.5 06/23/2022    MG 2.00 06/23/2022     LFTs:   Lab Results   Component Value Date    ALBUMIN 3.8 06/23/2022    BILITOT 0.5 06/23/2022    AST 29 06/23/2022    ALKPHOS 80 06/23/2022    ALT 36 06/23/2022       Current Medications:     Infusions:   lactated ringers 250 mL/hr at 06/24/22 0248        Scheduled:   enoxaparin  40 mg Subcutaneous Daily    nicotine  1 patch Transdermal Daily    OLANZapine  20 mg Oral QHS    OLANZapine  5 mg Oral QAM    OXcarbazepine  300 mg Oral BID    risperiDONE  1 mg Oral BID    sodium chloride 0.9%  10 mL Intravenous Q6H    venlafaxine  150 mg Oral Daily        PRN:  chlorproMAZINE, dextrose 10%, dextrose 10%, dextrose 50%, dextrose 50%, glucagon (human recombinant), glucose, glucose, labetalol, naloxone, OLANZapine, QUEtiapine, Flushing PICC Protocol **AND** sodium chloride 0.9% **AND** sodium chloride 0.9%      Assessment & Plan:     Non-traumatic Rhabdomyolysis- resolved  -likely secondary to repetitive weight lifting prior to presentation, leading to muscle injury  -Initial CK at 70,392--> 477 today  -decreased maintenance fluids IVF to 125 cc/h   -he is medically cleared for psych placement      Schizophrenia  -Per Dr. Charles recommendations:  -Trileptal 300mg BID, Venlafaxine  daily, Zyprexa 5mg qAM and 20mg qhs, and Risperidone 1 mg BID  -Seroquel 200 mg nightly PRN insomnia   -for non redirectable aggitation    -1st choice zyprexa 10mg PO or IM q8 hrs PRN for nonredirectable agitation associated with breakthrough psychosis or  miguel, use as first option, do not given ativan within 1 hr of Zyprexa   -2nd choice chlorpromazine 25mg PO or IM q6hrs PRN for nonredirectable agitation associated with breakthrough psychosis or miguel  -Pt is currently CEC'd (signed 6/15/22), continue 1:1 observation  -psychiatrist is following        CODE STATUS: FULL  Access: IV  Antibiotics: none  Diet: regular  DVT Prophylaxis: Lovenox 40 daily  GI Prophylaxis: none  Fluids:  cc/hr    Disposition: Rhabdo has resolved.  CK <500.  Patient is medically cleared for psych placement.      Whitney Stovall MD  [facility] HO-2

## 2022-06-24 NOTE — PROGRESS NOTES
"[facility]  Psychiatry  Progress Note    Patient Name: Elvis Puentes  MRN: [id number]   Code Status: Full Code  Admission Date: 6/12/2022  Hospital Length of Stay: 11 days  Expected Discharge Date:   Attending Physician: Lexi Veliz MD  Primary Care Provider: Primary Doctor No    [place] [facility]    Patient information was obtained from patient, past medical records and ER records.       Subjective:     Patient is a 36 y.o., male, presents with:    Principal Problem:Non-traumatic rhabdomyolysis    Chief Complaint: schizophrenia exacerbation    HPI:   Per Primary MD:  "Elvis Puentes is a 36 y.o.  male who with a history of schizophrenia presented to the ED via ambulance on 6/12/2022  From a psych facility in [place]. Pt stated he wanted to check himself in the unit because of feeling lonely and having hallucinations but they noticed his L arm being swollen and sent to ED for further evaluation. Pt states he was repeatedly lifting a 25 lbs weight with his L arm but denies any injuries. He also denies using any IV drugs. He denies pain to the arm, only reports swollen sensation.      In the ED, venous US done, negative for DVT. X-ray also done of the L arm, negative for any osseus abnormalities. Initial labs significant for CK of 70,000, which is now 52,409 after 5 L of bolus in the ED and being on  cc/hr. AST, ALT is 400 and 104 respectively. K+ on admission 4, it is now 4.6 with mild T wave peaks on EKG. Ca2+ 9.6. Cr 0.97. UDS only positive for cannaboids.      Ortho was consulted, as per Dr. Blakely, no surgical intervention needed at this time as there is no suspicion for compartment syndrome or tendon rupture.    "    Per Psychiatry MD:   Elvis Puentes is a 36 y.o. male with a past psychiatric history of schizophrenia, currently presenting with non traumatic rhabdomyolysis.  Psychiatry was consulted to address the patient's symptoms of " hallucinations.     Patient calm and cooperative with interview.  Patient reports that he wanted to check himself into a psychiatric hospital for depression and hallucinations.  It has history schizophrenia.  Reports that he was admitted to medical unit for injury to his arm.  Says that he was working out because I wanted to keep people from dying.  Says he wanted to overcome.  Says I can not take the thought money dies.  Says if I had a gun I would use it, denies that he is in possession a firearm.  Endorses remote suicidal attempt when he was a teenager.  Notes that he has been having worsening hallucinations.  Says that he hear voices say demeaning things.  Says that his mood has been depressed.  Notes some difficulty with sleep and energy.  Notes some hopeless thinking.  Voices that he is not seeing a psychiatrist.  Says that he is taking several different medications to help with his mental health.  Patient cannot remember these medications.  Patient reports that a family member helps him to remember to take his medications.  Patient says his medications are working very well.  Patient reports that he wanted to check himself into a psychiatric hospital to get his medications adjusted.  Voices motivation to continue his treatment after his hospitalization.    SUBJECTIVE   Currently, the patient is endorsing the following:    Medical Review Of Systems:  Constitutional: denies fevers, denies chills, denies recent weight change  Eyes: denies pain in eyes or loss of vision  Ears: denies tinnitis, denies loss of hearing  Mouth/throat: denies difficulty with speaking, denies difficulty with swallowing  Respiratory: denies SOB, denies cough  Gastrointestinal: + abdominal pain, denies nausea/vomiting, denies constipation/diarrhea  Genitourinary: denies urinary frequency, denies burning on urination  Dermatologic: denies rash, denies erythema  Musculoskeletal: + myalgias, + arthralgias  Hematologic: denies easy  "bleeding/bruising, denies enlarged lymph nodes  Neurologic: denies seizures, denies headaches, denies loss of sensation, denies weakness  Psychiatric: see HPI    Psychiatric Review Of Systems:  Please see HPI above    Past Medical/Surgical History:  Past Medical History:   Diagnosis Date    Anxiety     Depression     Hallucination     History of psychiatric hospitalization     Hx of psychiatric care     Psychiatric problem     Psychosis     Schizoaffective disorder     Sleep difficulties     Suicide attempt     Therapy       has a past medical history of Anxiety, Depression, Hallucination, History of psychiatric hospitalization, psychiatric care, Psychiatric problem, Psychosis, Schizoaffective disorder, Sleep difficulties, Suicide attempt, and Therapy.  History reviewed. No pertinent surgical history.    Past Psychiatric History:  Previous Medication Trials: yes, currently taking zyprexa, trileptal, and effexor  Previous Psychiatric Hospitalizations: yes, numerous  Previous Suicide Attempts: yes, once as a teenager  History of Violence: denies  Outpatient Psychiatrist: not currently  Outpatient Psychotherapist: not current    Social History:  Marital Status: single  Children: none   Employment Status/Info: not working, SSI  Education: HS graduate  Housing/Lives with: apartment with mother and brother  History of phys/sexual abuse: denies  Access to gun: denies    Substance Abuse History:  Recreational Drugs: cannabis frequently  Use of Alcohol: denies  Rehab History:denies   Tobacco Use:denies    Legal History:  Past Charges/Incarcerations:denies  Pending charges:denies     Family Psychiatric History:   Mother- schizophrenia        Hospital Course: 6/15/2022  Pt calm and cooperative with interview, sleeping upon entering room.  Reports he was given a "shot" earlier in the afternoon, unsure of why the medication was given.  Says "I was thinking they were laughing at me" (referring to staff and other " "patients).  Continues to express desire to fight "death."  Not currently experiencing hallucinations, notes hallucinations recently (predominantly AH).  Reports poor sleep, denies difficulty with appetite.  Mood "good."  Denies SI/HI, but says "I'd kill myself if I thought my mom was gonna die, I just can't live like that."  Says "my mom is fine, healthy as a horse."  Feels that he is improving medically, no current complaints.     Per MAR:   Received olanzapine 5mg IM at 1454    6/20/2022  Pt more animated today during interview.  Continues to voice hyperreligious ideation.  Paranoid regarding staff members.  Feels back to baseline physically.  Denies any current hallucinations but notes recent AH.  Denies problems with mood.  Pt denies benefit from uptitration of risperidone or additional seroquel prn for insomnia.  Pt agreeable to continued medication changes, voices frustration with continued hospitalization and wants to receive treatment in IP psychiatric facility.      6/24/2022  Pt calm and cooperative with interview.  Pt highly distractible during interview.  Continues to express hyperreligious ideation.  Remains focused on "defeating death."  Quotes verses from the bible that confirm his intentions.  Suspects that hospital staff are listening to him using devices in his room.  Denies hallucinations currently, endorses recent hallucinations about [employer] figures.  Mood "better."  Sleep improved.  Denies SI/HI.  Continues to voice interest in psych hospitalization.  Of note, pt chewing on nicotine patch during interview.  Immediately counseled pt about not engaging in such behavior in the future, discussed appropriate use of transdermal medications (pt voiced understanding).  Per chart review, continues to receive prn olanzapine for agitation.       Interval History: see hospital course.     Family History    None       Tobacco Use    Smoking status: Current Every Day Smoker     Packs/day: 1.00    " "Smokeless tobacco: Never Used   Substance and Sexual Activity    Alcohol use: Yes     Alcohol/week: 4.0 standard drinks     Types: 4 Cans of beer per week    Drug use: Never    Sexual activity: Not Currently     Partners: Female     Birth control/protection: Abstinence     Psychotherapeutics (From admission, onward)                Start     Stop Route Frequency Ordered    06/24/22 0700  OLANZapine tablet 5 mg         -- Oral Every morning 06/23/22 1242    06/23/22 2100  OLANZapine tablet 20 mg         -- Oral Nightly 06/23/22 1241    06/23/22 2100  risperiDONE tablet 1 mg         -- Oral 2 times daily 06/23/22 1243    06/21/22 1700  chlorproMAZINE injection 25 mg         -- IM Every 6 hours PRN 06/21/22 1653    06/18/22 0912  QUEtiapine tablet 200 mg         -- Oral Nightly PRN 06/18/22 0912    06/17/22 1342  OLANZapine injection 10 mg         -- IM Every 8 hours PRN 06/17/22 1342    06/13/22 1245  venlafaxine 24 hr capsule 150 mg         -- Oral Daily 06/13/22 1136             Review of Systems  All systems reviewed and are negative except as above mentioned in HPI/hospital course.    Objective:     Vital Signs (Most Recent):  Temp: 98.1 °F (36.7 °C) (06/24/22 0700)  Pulse: 64 (06/24/22 1149)  Resp: 18 (06/24/22 0700)  BP: 134/79 (06/24/22 1149)  SpO2: 100 % (06/24/22 1149) Vital Signs (24h Range):  Temp:  [98.1 °F (36.7 °C)-98.2 °F (36.8 °C)] 98.1 °F (36.7 °C)  Pulse:  [64-80] 64  Resp:  [16-18] 18  SpO2:  [100 %] 100 %  BP: (130-161)/(72-92) 134/79     Height: 5' 10.98" (180.3 cm)  Weight: 69.7 kg (153 lb 10.6 oz)  Body mass index is 21.44 kg/m².      Intake/Output Summary (Last 24 hours) at 6/24/2022 1427  Last data filed at 6/24/2022 0600  Gross per 24 hour   Intake 3360 ml   Output 352 ml   Net 3008 ml       Physical Exam     Appearance: unremarkable, age appropriate, dressed in hospital gown  Level of Consciousness: awake, alert  Behavior/Cooperation: calm and cooperative  Psychomotor: psychomotor " "activated   Speech: normal tone, normal rate, normal pitch, normal volume, spontaneous  Language: english, fluid  Orientation: person, place, situation  Attention Span/Concentration: distractible throughout  Memory: Grossly intact  Mood: "good"  Affect: constricted  Thought Process: perseverative  Associations: mostly logical  Thought Content: +paranoia, +hyperreligious, denies SI/HI, no plan or desire for self harm or harm to others  Fund of Knowledge: Intact  Abstraction: concrete  Insight: limited  Judgment: fair    Significant Labs: All pertinent labs within the past 24 hours have been reviewed.    Significant Imaging: I have reviewed all pertinent imaging results/findings within the past 24 hours.       Scheduled Medications:   enoxaparin  40 mg Subcutaneous Daily    nicotine  1 patch Transdermal Daily    OLANZapine  20 mg Oral QHS    OLANZapine  5 mg Oral QAM    OXcarbazepine  300 mg Oral BID    risperiDONE  1 mg Oral BID    sodium chloride 0.9%  10 mL Intravenous Q6H    venlafaxine  150 mg Oral Daily       PRN Medications:  chlorproMAZINE, dextrose 10%, dextrose 10%, dextrose 50%, dextrose 50%, glucagon (human recombinant), glucose, glucose, labetalol, naloxone, OLANZapine, QUEtiapine, Flushing PICC Protocol **AND** sodium chloride 0.9% **AND** sodium chloride 0.9%    Review of patient's allergies indicates:   Allergen Reactions    Haloperidol Swelling     Muscle stiffness  Has muscle spasms      Paroxetine Swelling, Other (See Comments) and Rash     "i don't remember"  "i don't remember"      Pineapple      Face swells up  Face swells up      Ziprasidone Other (See Comments)     Muscle twitching       Assessment/Plan:     Schizophrenia, chronic condition with acute exacerbation  ASSESSMENT     Elvis Puentes is a 36 y.o. male with a past psychiatric history of schizophrenia, currently presenting with rhabdomyolysis.Psychiatry was originally consulted to address the patient's symptoms of " hallucinations and depression.  Symptoms not responding to risperidone, will plan continue cross titration to zyprexa.      IMPRESSION  Schizophrenia, chronic with acute exacerbation  Depression, unspecified  Cannabis use disorder, mild  R/o intellectual impairment    RECOMMENDATION(S)      1. Scheduled Medication(s):  Continue trileptal 300mg bid  Continue effexor XR 150mg daily  Increase zyprexa to 10mg qam + 20mg qhs  Discontinue risperidone    2. PRN Medication(s):  Recommend zyprexa 10mg PO or IM q8 hrs PRN non redirectable agitation associated with breakthrough psychosis or miguel  Do not given zyprexa IM within 1 hr of ativan  Do not exceed 30mg zyprexa total daily from all sources  Continue seroquel prn sleep    3.  Monitor:  EKG 6/17/2022 w/ qtc <400    4. Legal Status/Precaution(s):  Continue CEC  Continue 1:1 observation    5. Disposition  Recommend to seek IP psychiatric hospitalization when medically cleared       Need for Continued Hospitalization:  Psychiatric illness continues to pose a potential threat to life or bodily function, of self or others, thereby requiring the need for continued inpatient hospitalization.    Anticipated Disposition:  Psychiatric Hospital    Total time:  15 with greater than 50% of this time spent in counseling and/or coordination of care.     Sai Charles MD   Psychiatry  [facility]

## 2022-06-24 NOTE — SUBJECTIVE & OBJECTIVE
"Interval History: see hospital course.     Family History    None       Tobacco Use    Smoking status: Current Every Day Smoker     Packs/day: 1.00    Smokeless tobacco: Never Used   Substance and Sexual Activity    Alcohol use: Yes     Alcohol/week: 4.0 standard drinks     Types: 4 Cans of beer per week    Drug use: Never    Sexual activity: Not Currently     Partners: Female     Birth control/protection: Abstinence     Psychotherapeutics (From admission, onward)                Start     Stop Route Frequency Ordered    06/24/22 0700  OLANZapine tablet 5 mg         -- Oral Every morning 06/23/22 1242    06/23/22 2100  OLANZapine tablet 20 mg         -- Oral Nightly 06/23/22 1241    06/23/22 2100  risperiDONE tablet 1 mg         -- Oral 2 times daily 06/23/22 1243    06/21/22 1700  chlorproMAZINE injection 25 mg         -- IM Every 6 hours PRN 06/21/22 1653    06/18/22 0912  QUEtiapine tablet 200 mg         -- Oral Nightly PRN 06/18/22 0912    06/17/22 1342  OLANZapine injection 10 mg         -- IM Every 8 hours PRN 06/17/22 1342    06/13/22 1245  venlafaxine 24 hr capsule 150 mg         -- Oral Daily 06/13/22 1136             Review of Systems  All systems reviewed and are negative except as above mentioned in HPI/hospital course.    Objective:     Vital Signs (Most Recent):  Temp: 98.1 °F (36.7 °C) (06/24/22 0700)  Pulse: 64 (06/24/22 1149)  Resp: 18 (06/24/22 0700)  BP: 134/79 (06/24/22 1149)  SpO2: 100 % (06/24/22 1149) Vital Signs (24h Range):  Temp:  [98.1 °F (36.7 °C)-98.2 °F (36.8 °C)] 98.1 °F (36.7 °C)  Pulse:  [64-80] 64  Resp:  [16-18] 18  SpO2:  [100 %] 100 %  BP: (130-161)/(72-92) 134/79     Height: 5' 10.98" (180.3 cm)  Weight: 69.7 kg (153 lb 10.6 oz)  Body mass index is 21.44 kg/m².      Intake/Output Summary (Last 24 hours) at 6/24/2022 1427  Last data filed at 6/24/2022 0600  Gross per 24 hour   Intake 3360 ml   Output 352 ml   Net 3008 ml       Physical Exam     Appearance: unremarkable, age " "appropriate, dressed in hospital gown  Level of Consciousness: awake, alert  Behavior/Cooperation: calm and cooperative  Psychomotor: psychomotor activated   Speech: normal tone, normal rate, normal pitch, normal volume, spontaneous  Language: english, fluid  Orientation: person, place, situation  Attention Span/Concentration: distractible throughout  Memory: Grossly intact  Mood: "good"  Affect: constricted  Thought Process: perseverative  Associations: mostly logical  Thought Content: +paranoia, +hyperreligious, denies SI/HI, no plan or desire for self harm or harm to others  Fund of Knowledge: Intact  Abstraction: concrete  Insight: limited  Judgment: fair    Significant Labs: All pertinent labs within the past 24 hours have been reviewed.    Significant Imaging: I have reviewed all pertinent imaging results/findings within the past 24 hours.  "

## 2022-06-25 LAB
ALBUMIN SERPL-MCNC: 3.5 GM/DL (ref 3.5–5)
ALBUMIN/GLOB SERPL: 1.1 RATIO (ref 1.1–2)
ALP SERPL-CCNC: 77 UNIT/L (ref 40–150)
ALT SERPL-CCNC: 24 UNIT/L (ref 0–55)
AST SERPL-CCNC: 17 UNIT/L (ref 5–34)
BASOPHILS # BLD AUTO: 0.06 X10(3)/MCL (ref 0–0.2)
BASOPHILS NFR BLD AUTO: 0.5 %
BILIRUBIN DIRECT+TOT PNL SERPL-MCNC: 0.4 MG/DL
BUN SERPL-MCNC: 6.4 MG/DL (ref 8.9–20.6)
CALCIUM SERPL-MCNC: 9.3 MG/DL (ref 8.4–10.2)
CHLORIDE SERPL-SCNC: 108 MMOL/L (ref 98–107)
CO2 SERPL-SCNC: 26 MMOL/L (ref 22–29)
CREAT SERPL-MCNC: 0.73 MG/DL (ref 0.73–1.18)
EOSINOPHIL # BLD AUTO: 0.6 X10(3)/MCL (ref 0–0.9)
EOSINOPHIL NFR BLD AUTO: 4.8 %
ERYTHROCYTE [DISTWIDTH] IN BLOOD BY AUTOMATED COUNT: 14.5 % (ref 11.5–17)
GLOBULIN SER-MCNC: 3.1 GM/DL (ref 2.4–3.5)
GLUCOSE SERPL-MCNC: 81 MG/DL (ref 74–100)
HCT VFR BLD AUTO: 41 % (ref 42–52)
HGB BLD-MCNC: 13 GM/DL (ref 14–18)
IMM GRANULOCYTES # BLD AUTO: 0.02 X10(3)/MCL (ref 0–0.02)
IMM GRANULOCYTES NFR BLD AUTO: 0.2 % (ref 0–0.43)
LYMPHOCYTES # BLD AUTO: 2.48 X10(3)/MCL (ref 0.6–4.6)
LYMPHOCYTES NFR BLD AUTO: 19.7 %
MCH RBC QN AUTO: 28 PG (ref 27–31)
MCHC RBC AUTO-ENTMCNC: 31.7 MG/DL (ref 33–36)
MCV RBC AUTO: 88.2 FL (ref 80–94)
MONOCYTES # BLD AUTO: 0.86 X10(3)/MCL (ref 0.1–1.3)
MONOCYTES NFR BLD AUTO: 6.8 %
NEUTROPHILS # BLD AUTO: 8.6 X10(3)/MCL (ref 2.1–9.2)
NEUTROPHILS NFR BLD AUTO: 68 %
NRBC BLD AUTO-RTO: 0 %
PLATELET # BLD AUTO: 240 X10(3)/MCL (ref 130–400)
PMV BLD AUTO: 12.1 FL (ref 9.4–12.4)
POTASSIUM SERPL-SCNC: 3.6 MMOL/L (ref 3.5–5.1)
PROT SERPL-MCNC: 6.6 GM/DL (ref 6.4–8.3)
RBC # BLD AUTO: 4.65 X10(6)/MCL (ref 4.7–6.1)
SODIUM SERPL-SCNC: 144 MMOL/L (ref 136–145)
WBC # SPEC AUTO: 12.6 X10(3)/MCL (ref 4.5–11.5)

## 2022-06-25 PROCEDURE — A4216 STERILE WATER/SALINE, 10 ML: HCPCS | Performed by: INTERNAL MEDICINE

## 2022-06-25 PROCEDURE — 63600175 PHARM REV CODE 636 W HCPCS

## 2022-06-25 PROCEDURE — 80053 COMPREHEN METABOLIC PANEL: CPT | Performed by: STUDENT IN AN ORGANIZED HEALTH CARE EDUCATION/TRAINING PROGRAM

## 2022-06-25 PROCEDURE — 36415 COLL VENOUS BLD VENIPUNCTURE: CPT | Performed by: STUDENT IN AN ORGANIZED HEALTH CARE EDUCATION/TRAINING PROGRAM

## 2022-06-25 PROCEDURE — 63600175 PHARM REV CODE 636 W HCPCS: Performed by: STUDENT IN AN ORGANIZED HEALTH CARE EDUCATION/TRAINING PROGRAM

## 2022-06-25 PROCEDURE — 25000003 PHARM REV CODE 250: Performed by: INTERNAL MEDICINE

## 2022-06-25 PROCEDURE — 11000001 HC ACUTE MED/SURG PRIVATE ROOM

## 2022-06-25 PROCEDURE — 25000003 PHARM REV CODE 250: Performed by: STUDENT IN AN ORGANIZED HEALTH CARE EDUCATION/TRAINING PROGRAM

## 2022-06-25 PROCEDURE — 85025 COMPLETE CBC W/AUTO DIFF WBC: CPT | Performed by: STUDENT IN AN ORGANIZED HEALTH CARE EDUCATION/TRAINING PROGRAM

## 2022-06-25 RX ORDER — OLANZAPINE 10 MG/1
10 TABLET ORAL EVERY MORNING
Status: DISCONTINUED | OUTPATIENT
Start: 2022-06-25 | End: 2022-06-28 | Stop reason: HOSPADM

## 2022-06-25 RX ADMIN — SODIUM CHLORIDE, POTASSIUM CHLORIDE, SODIUM LACTATE AND CALCIUM CHLORIDE: 600; 310; 30; 20 INJECTION, SOLUTION INTRAVENOUS at 08:06

## 2022-06-25 RX ADMIN — SODIUM CHLORIDE, POTASSIUM CHLORIDE, SODIUM LACTATE AND CALCIUM CHLORIDE: 600; 310; 30; 20 INJECTION, SOLUTION INTRAVENOUS at 01:06

## 2022-06-25 RX ADMIN — VENLAFAXINE HYDROCHLORIDE 150 MG: 75 CAPSULE, EXTENDED RELEASE ORAL at 08:06

## 2022-06-25 RX ADMIN — QUETIAPINE FUMARATE 200 MG: 100 TABLET ORAL at 10:06

## 2022-06-25 RX ADMIN — OXCARBAZEPINE 300 MG: 300 TABLET, FILM COATED ORAL at 08:06

## 2022-06-25 RX ADMIN — CHLORPROMAZINE HYDROCHLORIDE 25 MG: 25 INJECTION INTRAMUSCULAR at 01:06

## 2022-06-25 RX ADMIN — OLANZAPINE 20 MG: 10 TABLET, FILM COATED ORAL at 08:06

## 2022-06-25 RX ADMIN — SODIUM CHLORIDE, PRESERVATIVE FREE 10 ML: 5 INJECTION INTRAVENOUS at 06:06

## 2022-06-25 RX ADMIN — OLANZAPINE 10 MG: 10 TABLET, FILM COATED ORAL at 08:06

## 2022-06-25 RX ADMIN — SODIUM CHLORIDE, PRESERVATIVE FREE 10 ML: 5 INJECTION INTRAVENOUS at 12:06

## 2022-06-25 RX ADMIN — SODIUM CHLORIDE, POTASSIUM CHLORIDE, SODIUM LACTATE AND CALCIUM CHLORIDE: 600; 310; 30; 20 INJECTION, SOLUTION INTRAVENOUS at 05:06

## 2022-06-25 NOTE — PROGRESS NOTES
[facility] Medicine Wards Progress Note     Resident Team: [facility] Medicine List 1  Attending Physician: Lexi Veliz MD  Resident: Dr. Stovall      Subjective:      Brief HPI:  36 y.o.  male who with a history of schizophrenia presented to the ED via ambulance on 2022 from a psych facility in [place]. Pt stated he wanted to check himself in the unit because of feeling lonely and having hallucinations but they noticed his L arm being swollen and sent to ED for further evaluation. Pt states he was repeatedly lifting a 25 lbs weight with his L arm but denies any injuries. He also denies using any IV drugs. He denies pain to the arm, only reports swollen sensation.     In ED labs significant for CK of 70,392 with  and . Admitted to IM services for management of rhabdomyolysis.     Interval History:  NAEO.  Patient asking to leave.  He is calm and easily redirectable.     Review of Systems:  See HPI     Objective:     Last 24 Hour Vital Signs:  BP  Min: 100/97  Max: 150/79  Temp  Av.4 °F (36.9 °C)  Min: 97.7 °F (36.5 °C)  Max: 99.4 °F (37.4 °C)  Pulse  Av.8  Min: 60  Max: 76  Resp  Av  Min: 20  Max: 20  SpO2  Av.8 %  Min: 99 %  Max: 100 %  I/O last 3 completed shifts:  In: 6000 [I.V.:6000]  Out: -     Physical Examination:  General: appears well, in no acute distress   Eye: no scleral icterus   Respiratory: clear to auscultation bilaterally, nonlabored respirations   Cardiovascular: regular rate and rhythm without murmurs or gallops, no edema in bilateral lower extremities   Gastrointestinal: soft, non-tender, non-distended, bowel sounds present   Genitourinary: no suprapubic tenderness   Musculoskeletal: no gross deformities observed     Laboratory:  Most Recent Data:  CBC:   Lab Results   Component Value Date    WBC 12.6 (H) 2022    HGB 13.0 (L) 2022    HCT 41.0 (L) 2022     2022    MCV 88.2 2022    RDW 14.5 2022     WBC Differential:    Recent Labs   Lab 06/21/22  0414 06/22/22  0800 06/23/22  0431 06/24/22  0809 06/25/22  0522   WBC 7.3 9.6 9.8 10.3 12.6*   HGB 12.8* 14.7 13.1* 13.2* 13.0*   HCT 38.7* 47.8 40.4* 40.6* 41.0*    265 263 260 240   MCV 86.6 90.0 86.1 86.8 88.2     BMP:   Lab Results   Component Value Date     06/25/2022    K 3.6 06/25/2022    CO2 26 06/25/2022    BUN 6.4 (L) 06/25/2022    CREATININE 0.73 06/25/2022    CALCIUM 9.3 06/25/2022    MG 2.00 06/23/2022     LFTs:   Lab Results   Component Value Date    ALBUMIN 3.5 06/25/2022    BILITOT 0.4 06/25/2022    AST 17 06/25/2022    ALKPHOS 77 06/25/2022    ALT 24 06/25/2022       Current Medications:     Infusions:   lactated ringers 125 mL/hr at 06/25/22 0552        Scheduled:   enoxaparin  40 mg Subcutaneous Daily    nicotine  1 patch Transdermal Daily    OLANZapine  10 mg Oral QAM    OLANZapine  20 mg Oral QHS    OXcarbazepine  300 mg Oral BID    sodium chloride 0.9%  10 mL Intravenous Q6H    venlafaxine  150 mg Oral Daily        PRN:  dextrose 10%, dextrose 10%, dextrose 50%, dextrose 50%, glucagon (human recombinant), glucose, glucose, ibuprofen, labetalol, naloxone, QUEtiapine, Flushing PICC Protocol **AND** sodium chloride 0.9% **AND** sodium chloride 0.9%      Assessment & Plan:     Non-traumatic Rhabdomyolysis- resolved  -likely secondary to repetitive weight lifting prior to presentation, leading to muscle injury  -Initial CK at 70,392--> 477  -maintenance fluids IVF to 125 cc/h   -he is medically cleared for psych placement      Schizophrenia  -Per Dr. Charles recommendations:  -Trileptal 300mg BID, Venlafaxine  daily, Zyprexa 10mg qAM and 20mg qhs  -Seroquel 200 mg nightly PRN insomnia   -Pt is currently CEC'd (signed 6/15/22), continue 1:1 observation  -psychiatrist is following        CODE STATUS: FULL  Access: IV  Antibiotics: none  Diet: regular  DVT Prophylaxis: Lovenox 40 daily  GI Prophylaxis: none  Fluids:  cc/hr    Disposition:  Rhabdo has resolved.  CK <500.  Patient is medically cleared for psych placement.      Whitney Stovall MD  \[facility] [place] [facility]\"" Family Medicine HO-2     yes (specify)/[employer]

## 2022-06-26 LAB
ALBUMIN SERPL-MCNC: 4 GM/DL (ref 3.5–5)
ALBUMIN/GLOB SERPL: 1.1 RATIO (ref 1.1–2)
ALP SERPL-CCNC: 81 UNIT/L (ref 40–150)
ALT SERPL-CCNC: 24 UNIT/L (ref 0–55)
AST SERPL-CCNC: 18 UNIT/L (ref 5–34)
BASOPHILS # BLD AUTO: 0.1 X10(3)/MCL (ref 0–0.2)
BASOPHILS NFR BLD AUTO: 1.1 %
BILIRUBIN DIRECT+TOT PNL SERPL-MCNC: 0.3 MG/DL
BUN SERPL-MCNC: 4.9 MG/DL (ref 8.9–20.6)
CALCIUM SERPL-MCNC: 10.1 MG/DL (ref 8.4–10.2)
CHLORIDE SERPL-SCNC: 105 MMOL/L (ref 98–107)
CO2 SERPL-SCNC: 29 MMOL/L (ref 22–29)
CREAT SERPL-MCNC: 0.77 MG/DL (ref 0.73–1.18)
EOSINOPHIL # BLD AUTO: 0.64 X10(3)/MCL (ref 0–0.9)
EOSINOPHIL NFR BLD AUTO: 7 %
ERYTHROCYTE [DISTWIDTH] IN BLOOD BY AUTOMATED COUNT: 14.5 % (ref 11.5–17)
GLOBULIN SER-MCNC: 3.6 GM/DL (ref 2.4–3.5)
GLUCOSE SERPL-MCNC: 82 MG/DL (ref 74–100)
HCT VFR BLD AUTO: 42.9 % (ref 42–52)
HGB BLD-MCNC: 14 GM/DL (ref 14–18)
IMM GRANULOCYTES # BLD AUTO: 0.03 X10(3)/MCL (ref 0–0.02)
IMM GRANULOCYTES NFR BLD AUTO: 0.3 % (ref 0–0.43)
LYMPHOCYTES # BLD AUTO: 2.84 X10(3)/MCL (ref 0.6–4.6)
LYMPHOCYTES NFR BLD AUTO: 30.9 %
MCH RBC QN AUTO: 28 PG (ref 27–31)
MCHC RBC AUTO-ENTMCNC: 32.6 MG/DL (ref 33–36)
MCV RBC AUTO: 85.8 FL (ref 80–94)
MONOCYTES # BLD AUTO: 0.67 X10(3)/MCL (ref 0.1–1.3)
MONOCYTES NFR BLD AUTO: 7.3 %
NEUTROPHILS # BLD AUTO: 4.9 X10(3)/MCL (ref 2.1–9.2)
NEUTROPHILS NFR BLD AUTO: 53.4 %
NRBC BLD AUTO-RTO: 0 %
PLATELET # BLD AUTO: 265 X10(3)/MCL (ref 130–400)
PMV BLD AUTO: 11.5 FL (ref 9.4–12.4)
POTASSIUM SERPL-SCNC: 3.8 MMOL/L (ref 3.5–5.1)
PROT SERPL-MCNC: 7.6 GM/DL (ref 6.4–8.3)
RBC # BLD AUTO: 5 X10(6)/MCL (ref 4.7–6.1)
SODIUM SERPL-SCNC: 143 MMOL/L (ref 136–145)
WBC # SPEC AUTO: 9.2 X10(3)/MCL (ref 4.5–11.5)

## 2022-06-26 PROCEDURE — 25000003 PHARM REV CODE 250: Performed by: STUDENT IN AN ORGANIZED HEALTH CARE EDUCATION/TRAINING PROGRAM

## 2022-06-26 PROCEDURE — 25000003 PHARM REV CODE 250: Performed by: INTERNAL MEDICINE

## 2022-06-26 PROCEDURE — 80053 COMPREHEN METABOLIC PANEL: CPT | Performed by: STUDENT IN AN ORGANIZED HEALTH CARE EDUCATION/TRAINING PROGRAM

## 2022-06-26 PROCEDURE — 11000001 HC ACUTE MED/SURG PRIVATE ROOM

## 2022-06-26 PROCEDURE — S4991 NICOTINE PATCH NONLEGEND: HCPCS | Performed by: STUDENT IN AN ORGANIZED HEALTH CARE EDUCATION/TRAINING PROGRAM

## 2022-06-26 PROCEDURE — 85025 COMPLETE CBC W/AUTO DIFF WBC: CPT | Performed by: STUDENT IN AN ORGANIZED HEALTH CARE EDUCATION/TRAINING PROGRAM

## 2022-06-26 PROCEDURE — 36415 COLL VENOUS BLD VENIPUNCTURE: CPT | Performed by: STUDENT IN AN ORGANIZED HEALTH CARE EDUCATION/TRAINING PROGRAM

## 2022-06-26 PROCEDURE — A4216 STERILE WATER/SALINE, 10 ML: HCPCS | Performed by: INTERNAL MEDICINE

## 2022-06-26 PROCEDURE — 63600175 PHARM REV CODE 636 W HCPCS: Performed by: STUDENT IN AN ORGANIZED HEALTH CARE EDUCATION/TRAINING PROGRAM

## 2022-06-26 RX ADMIN — SODIUM CHLORIDE, POTASSIUM CHLORIDE, SODIUM LACTATE AND CALCIUM CHLORIDE: 600; 310; 30; 20 INJECTION, SOLUTION INTRAVENOUS at 04:06

## 2022-06-26 RX ADMIN — VENLAFAXINE HYDROCHLORIDE 150 MG: 75 CAPSULE, EXTENDED RELEASE ORAL at 08:06

## 2022-06-26 RX ADMIN — OLANZAPINE 10 MG: 10 TABLET, FILM COATED ORAL at 08:06

## 2022-06-26 RX ADMIN — OXCARBAZEPINE 300 MG: 300 TABLET, FILM COATED ORAL at 08:06

## 2022-06-26 RX ADMIN — QUETIAPINE FUMARATE 200 MG: 100 TABLET ORAL at 09:06

## 2022-06-26 RX ADMIN — SODIUM CHLORIDE, PRESERVATIVE FREE 10 ML: 5 INJECTION INTRAVENOUS at 12:06

## 2022-06-26 RX ADMIN — NICOTINE 1 PATCH: 14 PATCH, EXTENDED RELEASE TRANSDERMAL at 05:06

## 2022-06-26 RX ADMIN — OXCARBAZEPINE 300 MG: 300 TABLET, FILM COATED ORAL at 09:06

## 2022-06-26 RX ADMIN — SODIUM CHLORIDE, PRESERVATIVE FREE 10 ML: 5 INJECTION INTRAVENOUS at 05:06

## 2022-06-26 RX ADMIN — OLANZAPINE 20 MG: 10 TABLET, FILM COATED ORAL at 09:06

## 2022-06-27 LAB
ALBUMIN SERPL-MCNC: 4.4 GM/DL (ref 3.5–5)
ALBUMIN/GLOB SERPL: 1 RATIO (ref 1.1–2)
ALP SERPL-CCNC: 97 UNIT/L (ref 40–150)
ALT SERPL-CCNC: 30 UNIT/L (ref 0–55)
AST SERPL-CCNC: 19 UNIT/L (ref 5–34)
BASOPHILS # BLD AUTO: 0.08 X10(3)/MCL (ref 0–0.2)
BASOPHILS NFR BLD AUTO: 0.8 %
BILIRUBIN DIRECT+TOT PNL SERPL-MCNC: 0.5 MG/DL
BUN SERPL-MCNC: 5.9 MG/DL (ref 8.9–20.6)
CALCIUM SERPL-MCNC: 10.3 MG/DL (ref 8.4–10.2)
CHLORIDE SERPL-SCNC: 102 MMOL/L (ref 98–107)
CO2 SERPL-SCNC: 29 MMOL/L (ref 22–29)
CREAT SERPL-MCNC: 0.91 MG/DL (ref 0.73–1.18)
EOSINOPHIL # BLD AUTO: 0.71 X10(3)/MCL (ref 0–0.9)
EOSINOPHIL NFR BLD AUTO: 7.4 %
ERYTHROCYTE [DISTWIDTH] IN BLOOD BY AUTOMATED COUNT: 14.4 % (ref 11.5–17)
GLOBULIN SER-MCNC: 4.2 GM/DL (ref 2.4–3.5)
GLUCOSE SERPL-MCNC: 107 MG/DL (ref 74–100)
HCT VFR BLD AUTO: 49.6 % (ref 42–52)
HGB BLD-MCNC: 15.8 GM/DL (ref 14–18)
IMM GRANULOCYTES # BLD AUTO: 0.03 X10(3)/MCL (ref 0–0.02)
IMM GRANULOCYTES NFR BLD AUTO: 0.3 % (ref 0–0.43)
LYMPHOCYTES # BLD AUTO: 2.91 X10(3)/MCL (ref 0.6–4.6)
LYMPHOCYTES NFR BLD AUTO: 30.3 %
MCH RBC QN AUTO: 28 PG (ref 27–31)
MCHC RBC AUTO-ENTMCNC: 31.9 MG/DL (ref 33–36)
MCV RBC AUTO: 87.8 FL (ref 80–94)
MONOCYTES # BLD AUTO: 0.71 X10(3)/MCL (ref 0.1–1.3)
MONOCYTES NFR BLD AUTO: 7.4 %
NEUTROPHILS # BLD AUTO: 5.2 X10(3)/MCL (ref 2.1–9.2)
NEUTROPHILS NFR BLD AUTO: 53.8 %
NRBC BLD AUTO-RTO: 0 %
PATH REV: NORMAL
PLATELET # BLD AUTO: 316 X10(3)/MCL (ref 130–400)
PMV BLD AUTO: 11.6 FL (ref 9.4–12.4)
POTASSIUM SERPL-SCNC: 3.6 MMOL/L (ref 3.5–5.1)
PROT SERPL-MCNC: 8.6 GM/DL (ref 6.4–8.3)
RBC # BLD AUTO: 5.65 X10(6)/MCL (ref 4.7–6.1)
SODIUM SERPL-SCNC: 141 MMOL/L (ref 136–145)
WBC # SPEC AUTO: 9.6 X10(3)/MCL (ref 4.5–11.5)

## 2022-06-27 PROCEDURE — 25000003 PHARM REV CODE 250: Performed by: INTERNAL MEDICINE

## 2022-06-27 PROCEDURE — 25000003 PHARM REV CODE 250: Performed by: STUDENT IN AN ORGANIZED HEALTH CARE EDUCATION/TRAINING PROGRAM

## 2022-06-27 PROCEDURE — 99232 SBSQ HOSP IP/OBS MODERATE 35: CPT | Mod: ,,, | Performed by: STUDENT IN AN ORGANIZED HEALTH CARE EDUCATION/TRAINING PROGRAM

## 2022-06-27 PROCEDURE — A4216 STERILE WATER/SALINE, 10 ML: HCPCS | Performed by: INTERNAL MEDICINE

## 2022-06-27 PROCEDURE — 99232 PR SUBSEQUENT HOSPITAL CARE,LEVL II: ICD-10-PCS | Mod: ,,, | Performed by: STUDENT IN AN ORGANIZED HEALTH CARE EDUCATION/TRAINING PROGRAM

## 2022-06-27 PROCEDURE — 85025 COMPLETE CBC W/AUTO DIFF WBC: CPT | Performed by: STUDENT IN AN ORGANIZED HEALTH CARE EDUCATION/TRAINING PROGRAM

## 2022-06-27 PROCEDURE — 80053 COMPREHEN METABOLIC PANEL: CPT | Performed by: STUDENT IN AN ORGANIZED HEALTH CARE EDUCATION/TRAINING PROGRAM

## 2022-06-27 PROCEDURE — 36415 COLL VENOUS BLD VENIPUNCTURE: CPT | Performed by: STUDENT IN AN ORGANIZED HEALTH CARE EDUCATION/TRAINING PROGRAM

## 2022-06-27 PROCEDURE — 11000001 HC ACUTE MED/SURG PRIVATE ROOM

## 2022-06-27 RX ADMIN — SODIUM CHLORIDE, PRESERVATIVE FREE 10 ML: 5 INJECTION INTRAVENOUS at 12:06

## 2022-06-27 RX ADMIN — OXCARBAZEPINE 300 MG: 300 TABLET, FILM COATED ORAL at 08:06

## 2022-06-27 RX ADMIN — OLANZAPINE 20 MG: 10 TABLET, FILM COATED ORAL at 08:06

## 2022-06-27 RX ADMIN — OLANZAPINE 10 MG: 10 TABLET, FILM COATED ORAL at 06:06

## 2022-06-27 RX ADMIN — SODIUM CHLORIDE, PRESERVATIVE FREE 10 ML: 5 INJECTION INTRAVENOUS at 06:06

## 2022-06-27 RX ADMIN — QUETIAPINE FUMARATE 200 MG: 100 TABLET ORAL at 08:06

## 2022-06-27 RX ADMIN — VENLAFAXINE HYDROCHLORIDE 150 MG: 75 CAPSULE, EXTENDED RELEASE ORAL at 08:06

## 2022-06-27 RX ADMIN — IBUPROFEN 600 MG: 600 TABLET ORAL at 08:06

## 2022-06-27 NOTE — NURSING
"Patient removed midline himself this morning. Patient stated "I thought I was going to die when I pulled it out. I didn't want you to kill me when you removed it I wanted to kill myself"  "

## 2022-06-27 NOTE — PLAN OF CARE
Discussed pt disposition with nurse Shahida, Dr Charles, Dr Camarillo, and charge Chandni. We discussed pt DC home with OP appt @ [facility] either tomorrow or Wednesday, unit clerk would set up. Left note for unit clerk Katherine to set up. Will follow. Also offered to set pt up with psych HH.

## 2022-06-27 NOTE — PLAN OF CARE
Called [facility] to F/U on referral that was sent last week. Spoke with admissions and they asked me to resend because they lost it. Resent via Epic fax along with PEC/CEC and rapid covid result.

## 2022-06-27 NOTE — NURSING
Reviewed notes regarding discharge needs.     [phone]    Identified second home number for mother [phone].    Provided both numbers to FLORIAN Zavala for discharge planning.

## 2022-06-27 NOTE — CONSULTS
Patient denied acceptance to  psych facility stating that patient Denies SI/HI; also [facility] is expiring; notified team 1 of above and discussed plan; team to follow up with Dr. Charles to determine next steps.    Patient's nurse informed of above information.

## 2022-06-27 NOTE — SUBJECTIVE & OBJECTIVE
"Interval History: see hospital course.     Family History    None       Tobacco Use    Smoking status: Current Every Day Smoker     Packs/day: 1.00    Smokeless tobacco: Never Used   Substance and Sexual Activity    Alcohol use: Yes     Alcohol/week: 4.0 standard drinks     Types: 4 Cans of beer per week    Drug use: Never    Sexual activity: Not Currently     Partners: Female     Birth control/protection: Abstinence     Psychotherapeutics (From admission, onward)                Start     Stop Route Frequency Ordered    06/25/22 0700  OLANZapine tablet 10 mg         -- Oral Every morning 06/25/22 0550    06/23/22 2100  OLANZapine tablet 20 mg         -- Oral Nightly 06/23/22 1241    06/18/22 0912  QUEtiapine tablet 200 mg         -- Oral Nightly PRN 06/18/22 0912    06/13/22 1245  venlafaxine 24 hr capsule 150 mg         -- Oral Daily 06/13/22 1136             Review of Systems  All systems reviewed and are negative except as above mentioned in HPI/hospital course.      Objective:     Vital Signs (Most Recent):  Temp: 98.2 °F (36.8 °C) (06/27/22 1508)  Pulse: 94 (06/27/22 1508)  Resp: 18 (06/27/22 0800)  BP: 123/82 (06/27/22 1508)  SpO2: 99 % (06/27/22 1508) Vital Signs (24h Range):  Temp:  [97.8 °F (36.6 °C)-98.7 °F (37.1 °C)] 98.2 °F (36.8 °C)  Pulse:  [83-94] 94  Resp:  [18] 18  SpO2:  [98 %-100 %] 99 %  BP: (113-142)/(72-93) 123/82     Height: 5' 10.98" (180.3 cm)  Weight: 69.7 kg (153 lb 10.6 oz)  Body mass index is 21.44 kg/m².      Intake/Output Summary (Last 24 hours) at 6/27/2022 1659  Last data filed at 6/27/2022 1317  Gross per 24 hour   Intake 1330 ml   Output --   Net 1330 ml       Physical Exam     Appearance: unremarkable, age appropriate, dressed in hospital gown  Level of Consciousness: awake, alert  Behavior/Cooperation: calm and cooperative  Psychomotor: unremarkable  Speech: normal tone, normal rate, normal pitch, normal volume  Language: english, fluid  Orientation: person, place, " "situation  Attention Span/Concentration: better attention to interview  Memory: Grossly intact  Mood: "great"  Affect: constricted  Thought Process: perseverative  Associations: mostly logical  Thought Content: vague paranoia, +hyperreligious, denies SI/HI, no plan or desire for self harm or harm to others  Fund of Knowledge: Intact  Abstraction: concrete  Insight: limited  Judgment: fair    Significant Labs: All pertinent labs within the past 24 hours have been reviewed.    Significant Imaging: I have reviewed all pertinent imaging results/findings within the past 24 hours.  "

## 2022-06-27 NOTE — PROGRESS NOTES
Called patient's mother Holger Puentes at [phone] multiple times and left two voice messages without any response. I also called [facility] without any response. I called all the numbers that I could find in [place] for assistance. I even called the [facility] number for Behavioral Health to see if I could get some assistance, but was unable to get anyone on the telephone. Our unit clerk was also unsuccessful with attempting to reach anyone at [facility].  Will continue to call and will find if any other facilities are available for an urgent f/u appointment for our patient.     Alyce Camarillo MD

## 2022-06-27 NOTE — ASSESSMENT & PLAN NOTE
ASSESSMENT     Elvis Puentes is a 36 y.o. male with a past psychiatric history of schizophrenia, currently presenting with rhabdomyolysis.Psychiatry was originally consulted to address the patient's symptoms of hallucinations and depression.  Pt appears to be improving.  Symptoms responding to treatment.  Appears to be approaching presumed baseline.  Adherent to treatment, future oriented regarding outpatient follow up and agreeable to continue regimen on discharge.  Will work with primary team to arrange safe discharge plan.  Recommend to allow [facility] to , will not pursue judicial commitment.      IMPRESSION  Schizophrenia, chronic with acute exacerbation  Depression, unspecified  Cannabis use disorder, mild  R/o intellectual impairment    RECOMMENDATION(S)      1. Scheduled Medication(s):  Continue trileptal 300mg bid  Continue effexor XR 150mg daily  Continue zyprexa 10mg qam + 20mg qhs    2. PRN Medication(s):  Recommend zyprexa 10mg PO or IM q8 hrs PRN non redirectable agitation associated with breakthrough psychosis or miguel, discontinue on discharge  Do not given zyprexa IM within 1 hr of ativan  Do not exceed 30mg zyprexa total daily from all sources  Continue seroquel prn sleep, discontinue on discharge    3.  Monitor:  EKG 2022 w/ qtc <400    4. Legal Status/Precaution(s):  Allow [facility] to , recommend against pursuing judicial commitment  Continue 1:1 observation    5. Disposition  Recommend to pursue OP follow up at [facility], discussed with primary team  Will contact pt's mother to ensure that residence is safe discharge option  Will try to arrange for [facility] home health

## 2022-06-27 NOTE — PROGRESS NOTES
[facility] Medicine Wards Progress Note     Resident Team: [facility] Medicine List 1  Attending Physician: Dr. Lozano  Resident: Dr. Camarillo       Brief HPI:  36 y.o.  male who with a history of schizophrenia presented to the ED via ambulance on 2022 from a psych facility in [place]. Pt stated he wanted to check himself in the unit because of feeling lonely and having hallucinations but they noticed his L arm being swollen and sent to ED for further evaluation. Pt states he was repeatedly lifting a 25 lbs weight with his L arm but denies any injuries. He also denies using any IV drugs. He denies pain to the arm, only reports swollen sensation. In ED labs significant for CK of 70,392 with  and . Admitted to IM services for management of rhabdomyolysis.     Interval History (22): No acute events overnight. Per nurse, patient had a good day yesterday and did well overnight. Patient is calm and sleepy this AM. He is eating, voiding, and ambulating appropriately. He denies headache, chest pain abdominal pain, and N/V.       Review of Systems:  See HPI     Objective:     Last 24 Hour Vital Signs:  BP  Min: 113/72  Max: 145/89  Temp  Av.8 °F (36.6 °C)  Min: 97.5 °F (36.4 °C)  Max: 98.3 °F (36.8 °C)  Pulse  Av.6  Min: 72  Max: 92  Resp  Av  Min: 18  Max: 18  SpO2  Av.6 %  Min: 99 %  Max: 100 %  I/O last 3 completed shifts:  In: 2775 [P.O.:1275; I.V.:1500]  Out: -     Physical Examination:  General: appears well, in no acute distress   Eye: no scleral icterus, mild scleral icterus  Respiratory: clear to auscultation bilaterally, nonlabored respirations   Cardiovascular: regular rate and rhythm without murmurs or gallops, no edema in bilateral lower extremities   Gastrointestinal: soft, non-tender, non-distended, bowel sounds present   Musculoskeletal: no gross deformities observed     Laboratory:  Most Recent Data:  CBC:   Lab Results   Component Value Date    WBC 9.6 2022    HGB  15.8 06/27/2022    HCT 49.6 06/27/2022     06/27/2022    MCV 87.8 06/27/2022    RDW 14.4 06/27/2022     WBC Differential:   Recent Labs   Lab 06/23/22  0431 06/24/22  0809 06/25/22  0522 06/26/22  0623 06/27/22  0520   WBC 9.8 10.3 12.6* 9.2 9.6   HGB 13.1* 13.2* 13.0* 14.0 15.8   HCT 40.4* 40.6* 41.0* 42.9 49.6    260 240 265 316   MCV 86.1 86.8 88.2 85.8 87.8     BMP:   Lab Results   Component Value Date     06/27/2022    K 3.6 06/27/2022    CO2 29 06/27/2022    BUN 5.9 (L) 06/27/2022    CREATININE 0.91 06/27/2022    CALCIUM 10.3 (H) 06/27/2022    MG 2.00 06/23/2022     LFTs:   Lab Results   Component Value Date    ALBUMIN 4.4 06/27/2022    BILITOT 0.5 06/27/2022    AST 19 06/27/2022    ALKPHOS 97 06/27/2022    ALT 30 06/27/2022       Current Medications:     Infusions:       Scheduled:   enoxaparin  40 mg Subcutaneous Daily    nicotine  1 patch Transdermal Daily    OLANZapine  10 mg Oral QAM    OLANZapine  20 mg Oral QHS    OXcarbazepine  300 mg Oral BID    sodium chloride 0.9%  10 mL Intravenous Q6H    venlafaxine  150 mg Oral Daily        PRN:  dextrose 10%, dextrose 10%, dextrose 50%, dextrose 50%, glucagon (human recombinant), glucose, glucose, ibuprofen, labetalol, naloxone, QUEtiapine, Flushing PICC Protocol **AND** sodium chloride 0.9% **AND** sodium chloride 0.9%      Assessment & Plan:     Non-traumatic Rhabdomyolysis - resolved  -likely secondary to repetitive weight lifting prior to presentation, leading to muscle injury  -Initial CK at 70,392--> 477  -Discontinued maintenance IVF (6/26/22)   -He is medically cleared for psych placement    Schizophrenia  -Per Dr. Charles recommendations:  -Trileptal 300mg BID, Venlafaxine  daily, Zyprexa 10mg qAM and 20mg qhs  -Seroquel 200 mg nightly PRN insomnia   -Pt is currently CEC'd (signed 6/15/22), continue 1:1 observation  -psychiatrist is following     CODE STATUS: FULL  Access: IV  Antibiotics: none  Diet: regular  DVT  Prophylaxis: Lovenox 40 daily  GI Prophylaxis: none  Fluids: none    Disposition: Rhabdo has resolved.  CK <500.  Patient is medically cleared for psych placement.      Alyce SHETH-3, FM

## 2022-06-27 NOTE — PROGRESS NOTES
"[facility]  Psychiatry  Progress Note    Patient Name: Elvis Puentes  MRN: [id number]   Code Status: Full Code  Admission Date: 6/12/2022  Hospital Length of Stay: 14 days  Expected Discharge Date:   Attending Physician: Lexi Veliz MD  Primary Care Provider: Primary Doctor No    Current Legal Status: 's Emergency Certificate (CEC)    Patient information was obtained from patient, past medical records and ER records.     Subjective:     Patient is a 36 y.o., male, presents with:    Principal Problem:Non-traumatic rhabdomyolysis    Chief Complaint: "I'm feeling better now"    HPI:   Per Primary MD:  "Elvis Puentes is a 36 y.o.  male who with a history of schizophrenia presented to the ED via ambulance on 6/12/2022  From a psych facility in [place]. Pt stated he wanted to check himself in the unit because of feeling lonely and having hallucinations but they noticed his L arm being swollen and sent to ED for further evaluation. Pt states he was repeatedly lifting a 25 lbs weight with his L arm but denies any injuries. He also denies using any IV drugs. He denies pain to the arm, only reports swollen sensation.      In the ED, venous US done, negative for DVT. X-ray also done of the L arm, negative for any osseus abnormalities. Initial labs significant for CK of 70,000, which is now 52,409 after 5 L of bolus in the ED and being on  cc/hr. AST, ALT is 400 and 104 respectively. K+ on admission 4, it is now 4.6 with mild T wave peaks on EKG. Ca2+ 9.6. Cr 0.97. UDS only positive for cannaboids.      Ortho was consulted, as per Dr. Blakely, no surgical intervention needed at this time as there is no suspicion for compartment syndrome or tendon rupture.    "    Per Psychiatry MD:   Elvis Puentes is a 36 y.o. male with a past psychiatric history of schizophrenia, currently presenting with non traumatic rhabdomyolysis.  Psychiatry was consulted to address the " patient's symptoms of hallucinations.     Patient calm and cooperative with interview.  Patient reports that he wanted to check himself into a psychiatric hospital for depression and hallucinations.  It has history schizophrenia.  Reports that he was admitted to medical unit for injury to his arm.  Says that he was working out because I wanted to keep people from dying.  Says he wanted to overcome.  Says I can not take the thought money dies.  Says if I had a gun I would use it, denies that he is in possession a firearm.  Endorses remote suicidal attempt when he was a teenager.  Notes that he has been having worsening hallucinations.  Says that he hear voices say demeaning things.  Says that his mood has been depressed.  Notes some difficulty with sleep and energy.  Notes some hopeless thinking.  Voices that he is not seeing a psychiatrist.  Says that he is taking several different medications to help with his mental health.  Patient cannot remember these medications.  Patient reports that a family member helps him to remember to take his medications.  Patient says his medications are working very well.  Patient reports that he wanted to check himself into a psychiatric hospital to get his medications adjusted.  Voices motivation to continue his treatment after his hospitalization.    SUBJECTIVE   Currently, the patient is endorsing the following:    Medical Review Of Systems:  Constitutional: denies fevers, denies chills, denies recent weight change  Eyes: denies pain in eyes or loss of vision  Ears: denies tinnitis, denies loss of hearing  Mouth/throat: denies difficulty with speaking, denies difficulty with swallowing  Respiratory: denies SOB, denies cough  Gastrointestinal: + abdominal pain, denies nausea/vomiting, denies constipation/diarrhea  Genitourinary: denies urinary frequency, denies burning on urination  Dermatologic: denies rash, denies erythema  Musculoskeletal: + myalgias, +  "arthralgias  Hematologic: denies easy bleeding/bruising, denies enlarged lymph nodes  Neurologic: denies seizures, denies headaches, denies loss of sensation, denies weakness  Psychiatric: see HPI    Psychiatric Review Of Systems:  Please see HPI above    Past Medical/Surgical History:  Past Medical History:   Diagnosis Date    Anxiety     Depression     Hallucination     History of psychiatric hospitalization     Hx of psychiatric care     Psychiatric problem     Psychosis     Schizoaffective disorder     Sleep difficulties     Suicide attempt     Therapy       has a past medical history of Anxiety, Depression, Hallucination, History of psychiatric hospitalization, psychiatric care, Psychiatric problem, Psychosis, Schizoaffective disorder, Sleep difficulties, Suicide attempt, and Therapy.  History reviewed. No pertinent surgical history.    Past Psychiatric History:  Previous Medication Trials: yes, currently taking zyprexa, trileptal, and effexor  Previous Psychiatric Hospitalizations: yes, numerous  Previous Suicide Attempts: yes, once as a teenager  History of Violence: denies  Outpatient Psychiatrist: not currently  Outpatient Psychotherapist: not current    Social History:  Marital Status: single  Children: none   Employment Status/Info: not working, SSI  Education: HS graduate  Housing/Lives with: apartment with mother and brother  History of phys/sexual abuse: denies  Access to gun: denies    Substance Abuse History:  Recreational Drugs: cannabis frequently  Use of Alcohol: denies  Rehab History:denies   Tobacco Use:denies    Legal History:  Past Charges/Incarcerations:denies  Pending charges:denies     Family Psychiatric History:   Mother- schizophrenia        Hospital Course: 6/15/2022  Pt calm and cooperative with interview, sleeping upon entering room.  Reports he was given a "shot" earlier in the afternoon, unsure of why the medication was given.  Says "I was thinking they were laughing at " "me" (referring to staff and other patients).  Continues to express desire to fight "death."  Not currently experiencing hallucinations, notes hallucinations recently (predominantly AH).  Reports poor sleep, denies difficulty with appetite.  Mood "good."  Denies SI/HI, but says "I'd kill myself if I thought my mom was gonna die, I just can't live like that."  Says "my mom is fine, healthy as a horse."  Feels that he is improving medically, no current complaints.     Per MAR:   Received olanzapine 5mg IM at 1454    6/20/2022  Pt more animated today during interview.  Continues to voice hyperreligious ideation.  Paranoid regarding staff members.  Feels back to baseline physically.  Denies any current hallucinations but notes recent AH.  Denies problems with mood.  Pt denies benefit from uptitration of risperidone or additional seroquel prn for insomnia.  Pt agreeable to continued medication changes, voices frustration with continued hospitalization and wants to receive treatment in IP psychiatric facility.      6/24/2022  Pt calm and cooperative with interview.  Pt highly distractible during interview.  Continues to express hyperreligious ideation.  Remains focused on "defeating death."  Quotes verses from the bible that confirm his intentions.  Suspects that hospital staff are listening to him using devices in his room.  Denies hallucinations currently, endorses recent hallucinations about Bahai figures.  Mood "better."  Sleep improved.  Denies SI/HI.  Continues to voice interest in psych hospitalization.  Of note, pt chewing on nicotine patch during interview.  Immediately counseled pt about not engaging in such behavior in the future, discussed appropriate use of transdermal medications (pt voiced understanding).  Per chart review, continues to receive prn olanzapine for agitation.     6/27/2022  Pt calm and cooperative with interview.  More easily redirected.  Less intrusive.  Denies recent hallucinations.  " "Made suicidal comment this morning after pulling out midline.  On my interview, said, "I'd kill myself if my mother ."  Denies any suicidal plan or intention today.  Has no plans for self harm or harm to others after discharge.  Continues to voice hyperreligious ideation during interview, but is easily redirected.  Wants to be discharged to his mother's residence.  Shares an apartment with his mother and his mother's boyfriend.  Wants to follow up with OP MH provider after discharge.  Wants to continue his current medication regimen on discharge.  Feels that he is ready for discharge.  Denies somatic complaints today.     Interval History: see hospital course.     Family History    None       Tobacco Use    Smoking status: Current Every Day Smoker     Packs/day: 1.00    Smokeless tobacco: Never Used   Substance and Sexual Activity    Alcohol use: Yes     Alcohol/week: 4.0 standard drinks     Types: 4 Cans of beer per week    Drug use: Never    Sexual activity: Not Currently     Partners: Female     Birth control/protection: Abstinence     Psychotherapeutics (From admission, onward)                Start     Stop Route Frequency Ordered    22 0700  OLANZapine tablet 10 mg         -- Oral Every morning 22 0550    22 2100  OLANZapine tablet 20 mg         -- Oral Nightly 22 1241    22 0912  QUEtiapine tablet 200 mg         -- Oral Nightly PRN 22 0912    22 1245  venlafaxine 24 hr capsule 150 mg         -- Oral Daily 22 1136             Review of Systems  All systems reviewed and are negative except as above mentioned in HPI/hospital course.      Objective:     Vital Signs (Most Recent):  Temp: 98.2 °F (36.8 °C) (22 1508)  Pulse: 94 (22 1508)  Resp: 18 (22 0800)  BP: 123/82 (22 1508)  SpO2: 99 % (22 1508) Vital Signs (24h Range):  Temp:  [97.8 °F (36.6 °C)-98.7 °F (37.1 °C)] 98.2 °F (36.8 °C)  Pulse:  [83-94] 94  Resp:  [18] 18  SpO2:  " "[98 %-100 %] 99 %  BP: (113-142)/(72-93) 123/82     Height: 5' 10.98" (180.3 cm)  Weight: 69.7 kg (153 lb 10.6 oz)  Body mass index is 21.44 kg/m².      Intake/Output Summary (Last 24 hours) at 6/27/2022 1659  Last data filed at 6/27/2022 1317  Gross per 24 hour   Intake 1330 ml   Output --   Net 1330 ml       Physical Exam     Appearance: unremarkable, age appropriate, dressed in hospital gown  Level of Consciousness: awake, alert  Behavior/Cooperation: calm and cooperative  Psychomotor: unremarkable  Speech: normal tone, normal rate, normal pitch, normal volume  Language: english, fluid  Orientation: person, place, situation  Attention Span/Concentration: better attention to interview  Memory: Grossly intact  Mood: "great"  Affect: constricted  Thought Process: perseverative  Associations: mostly logical  Thought Content: vague paranoia, +hyperreligious, denies SI/HI, no plan or desire for self harm or harm to others  Fund of Knowledge: Intact  Abstraction: concrete  Insight: limited  Judgment: fair    Significant Labs: All pertinent labs within the past 24 hours have been reviewed.    Significant Imaging: I have reviewed all pertinent imaging results/findings within the past 24 hours.       Scheduled Medications:   enoxaparin  40 mg Subcutaneous Daily    nicotine  1 patch Transdermal Daily    OLANZapine  10 mg Oral QAM    OLANZapine  20 mg Oral QHS    OXcarbazepine  300 mg Oral BID    sodium chloride 0.9%  10 mL Intravenous Q6H    venlafaxine  150 mg Oral Daily       PRN Medications:  dextrose 10%, dextrose 10%, dextrose 50%, dextrose 50%, glucagon (human recombinant), glucose, glucose, ibuprofen, labetalol, naloxone, QUEtiapine, Flushing PICC Protocol **AND** sodium chloride 0.9% **AND** sodium chloride 0.9%    Review of patient's allergies indicates:   Allergen Reactions    Haloperidol Swelling     Muscle stiffness  Has muscle spasms      Paroxetine Swelling, Other (See Comments) and Rash     "i don't " "remember"  "i don't remember"      Pineapple      Face swells up  Face swells up      Ziprasidone Other (See Comments)     Muscle twitching       Assessment/Plan:     Schizophrenia, chronic condition with acute exacerbation  ASSESSMENT     Elvis Puentes is a 36 y.o. male with a past psychiatric history of schizophrenia, currently presenting with rhabdomyolysis.Psychiatry was originally consulted to address the patient's symptoms of hallucinations and depression.  Pt appears to be improving.  Symptoms responding to treatment.  Appears to be approaching presumed baseline.  Adherent to treatment, future oriented regarding outpatient follow up and agreeable to continue regimen on discharge.  Will work with primary team to arrange safe discharge plan.  Recommend to allow CEC to , will not pursue judicial commitment.      IMPRESSION  Schizophrenia, chronic with acute exacerbation  Depression, unspecified  Cannabis use disorder, mild  R/o intellectual impairment    RECOMMENDATION(S)      1. Scheduled Medication(s):  Continue trileptal 300mg bid  Continue effexor XR 150mg daily  Continue zyprexa 10mg qam + 20mg qhs    2. PRN Medication(s):  Recommend zyprexa 10mg PO or IM q8 hrs PRN non redirectable agitation associated with breakthrough psychosis or miguel, discontinue on discharge  Do not given zyprexa IM within 1 hr of ativan  Do not exceed 30mg zyprexa total daily from all sources  Continue seroquel prn sleep, discontinue on discharge    3.  Monitor:  EKG 2022 w/ qtc <400    4. Legal Status/Precaution(s):  Allow CEC to , recommend against pursuing judicial commitment  Continue 1:1 observation    5. Disposition  Recommend to pursue OP follow up at [facility], discussed with primary team  Will contact pt's mother to ensure that residence is safe discharge option  Will try to arrange for psych home health       Need for Continued Hospitalization:  Continue medical hospitalization while safe " discharge plan is being created/enacted    Anticipated Disposition:  Home or Self Care    Total time:  35 with greater than 50% of this time spent in counseling and/or coordination of care.     Sai Charles MD   Psychiatry  [facility]

## 2022-06-27 NOTE — PLAN OF CARE
Sent nurse Shahida's note to [facility] to see if they would reconsider and called to F/U. They mentioned how pt's CEC  today. They were unwilling to help. Notified Pura and she called team to discuss.

## 2022-06-27 NOTE — PLAN OF CARE
Sent updated packet and rapid covid result to [facility], as they are interested in pt. Will follow.

## 2022-06-27 NOTE — PLAN OF CARE
[facility] denied due to pt not meeting psych criteria because he denies SI/HI. Notified Pura.

## 2022-06-28 VITALS
HEART RATE: 103 BPM | OXYGEN SATURATION: 100 % | RESPIRATION RATE: 18 BRPM | WEIGHT: 153.69 LBS | SYSTOLIC BLOOD PRESSURE: 122 MMHG | BODY MASS INDEX: 21.52 KG/M2 | HEIGHT: 71 IN | TEMPERATURE: 98 F | DIASTOLIC BLOOD PRESSURE: 80 MMHG

## 2022-06-28 LAB
ALBUMIN SERPL-MCNC: 4.2 GM/DL (ref 3.5–5)
ALBUMIN/GLOB SERPL: 1.1 RATIO (ref 1.1–2)
ALP SERPL-CCNC: 89 UNIT/L (ref 40–150)
ALT SERPL-CCNC: 32 UNIT/L (ref 0–55)
AST SERPL-CCNC: 21 UNIT/L (ref 5–34)
BASOPHILS # BLD AUTO: 0.07 X10(3)/MCL (ref 0–0.2)
BASOPHILS NFR BLD AUTO: 1 %
BILIRUBIN DIRECT+TOT PNL SERPL-MCNC: 0.3 MG/DL
BUN SERPL-MCNC: 10.2 MG/DL (ref 8.9–20.6)
CALCIUM SERPL-MCNC: 10 MG/DL (ref 8.4–10.2)
CHLORIDE SERPL-SCNC: 103 MMOL/L (ref 98–107)
CO2 SERPL-SCNC: 29 MMOL/L (ref 22–29)
CREAT SERPL-MCNC: 0.82 MG/DL (ref 0.73–1.18)
EOSINOPHIL # BLD AUTO: 0.5 X10(3)/MCL (ref 0–0.9)
EOSINOPHIL NFR BLD AUTO: 7.3 %
ERYTHROCYTE [DISTWIDTH] IN BLOOD BY AUTOMATED COUNT: 14.3 % (ref 11.5–17)
GLOBULIN SER-MCNC: 4 GM/DL (ref 2.4–3.5)
GLUCOSE SERPL-MCNC: 89 MG/DL (ref 74–100)
HCT VFR BLD AUTO: 47 % (ref 42–52)
HGB BLD-MCNC: 15.6 GM/DL (ref 14–18)
IMM GRANULOCYTES # BLD AUTO: 0.03 X10(3)/MCL (ref 0–0.02)
IMM GRANULOCYTES NFR BLD AUTO: 0.4 % (ref 0–0.43)
LYMPHOCYTES # BLD AUTO: 2.06 X10(3)/MCL (ref 0.6–4.6)
LYMPHOCYTES NFR BLD AUTO: 30.2 %
MCH RBC QN AUTO: 28.3 PG (ref 27–31)
MCHC RBC AUTO-ENTMCNC: 33.2 MG/DL (ref 33–36)
MCV RBC AUTO: 85.1 FL (ref 80–94)
MONOCYTES # BLD AUTO: 0.55 X10(3)/MCL (ref 0.1–1.3)
MONOCYTES NFR BLD AUTO: 8.1 %
NEUTROPHILS # BLD AUTO: 3.6 X10(3)/MCL (ref 2.1–9.2)
NEUTROPHILS NFR BLD AUTO: 53 %
NRBC BLD AUTO-RTO: 0 %
PLATELET # BLD AUTO: 317 X10(3)/MCL (ref 130–400)
PMV BLD AUTO: 11 FL (ref 9.4–12.4)
POTASSIUM SERPL-SCNC: 4.2 MMOL/L (ref 3.5–5.1)
PROT SERPL-MCNC: 8.2 GM/DL (ref 6.4–8.3)
RBC # BLD AUTO: 5.52 X10(6)/MCL (ref 4.7–6.1)
SODIUM SERPL-SCNC: 140 MMOL/L (ref 136–145)
WBC # SPEC AUTO: 6.8 X10(3)/MCL (ref 4.5–11.5)

## 2022-06-28 PROCEDURE — 80053 COMPREHEN METABOLIC PANEL: CPT | Performed by: STUDENT IN AN ORGANIZED HEALTH CARE EDUCATION/TRAINING PROGRAM

## 2022-06-28 PROCEDURE — 25000003 PHARM REV CODE 250: Performed by: INTERNAL MEDICINE

## 2022-06-28 PROCEDURE — 25000003 PHARM REV CODE 250: Performed by: STUDENT IN AN ORGANIZED HEALTH CARE EDUCATION/TRAINING PROGRAM

## 2022-06-28 PROCEDURE — 85025 COMPLETE CBC W/AUTO DIFF WBC: CPT | Performed by: STUDENT IN AN ORGANIZED HEALTH CARE EDUCATION/TRAINING PROGRAM

## 2022-06-28 PROCEDURE — 36415 COLL VENOUS BLD VENIPUNCTURE: CPT | Performed by: STUDENT IN AN ORGANIZED HEALTH CARE EDUCATION/TRAINING PROGRAM

## 2022-06-28 PROCEDURE — S4991 NICOTINE PATCH NONLEGEND: HCPCS | Performed by: STUDENT IN AN ORGANIZED HEALTH CARE EDUCATION/TRAINING PROGRAM

## 2022-06-28 RX ORDER — SERTRALINE HYDROCHLORIDE 50 MG/1
50 TABLET, FILM COATED ORAL DAILY
Qty: 30 TABLET | Refills: 11 | Status: ON HOLD | OUTPATIENT
Start: 2022-06-28 | End: 2022-07-08 | Stop reason: SDUPTHER

## 2022-06-28 RX ORDER — TRAZODONE HYDROCHLORIDE 50 MG/1
50 TABLET ORAL NIGHTLY
Qty: 30 TABLET | Refills: 11 | Status: SHIPPED | OUTPATIENT
Start: 2022-06-28 | End: 2022-07-08

## 2022-06-28 RX ORDER — QUETIAPINE FUMARATE 200 MG/1
200 TABLET, FILM COATED ORAL DAILY
Qty: 30 TABLET | Refills: 11 | Status: ON HOLD | OUTPATIENT
Start: 2022-06-28 | End: 2022-07-08 | Stop reason: HOSPADM

## 2022-06-28 RX ORDER — OLANZAPINE 10 MG/1
10 TABLET ORAL DAILY
Qty: 30 TABLET | Refills: 11 | Status: SHIPPED | OUTPATIENT
Start: 2022-06-28 | End: 2022-07-02

## 2022-06-28 RX ORDER — OLANZAPINE 20 MG/1
20 TABLET ORAL NIGHTLY
Qty: 30 TABLET | Refills: 11 | Status: SHIPPED | OUTPATIENT
Start: 2022-06-28 | End: 2022-07-08

## 2022-06-28 RX ORDER — OXCARBAZEPINE 150 MG/1
150 TABLET, FILM COATED ORAL 2 TIMES DAILY
Qty: 60 TABLET | Refills: 11 | Status: SHIPPED | OUTPATIENT
Start: 2022-06-28 | End: 2022-07-08

## 2022-06-28 RX ORDER — RISPERIDONE 1 MG/1
1 TABLET ORAL 2 TIMES DAILY
Qty: 60 TABLET | Refills: 11 | Status: SHIPPED | OUTPATIENT
Start: 2022-06-28 | End: 2022-07-08

## 2022-06-28 RX ORDER — DIVALPROEX SODIUM 500 MG/1
500 TABLET, DELAYED RELEASE ORAL EVERY 12 HOURS
Qty: 60 TABLET | Refills: 11 | Status: SHIPPED | OUTPATIENT
Start: 2022-06-28 | End: 2022-07-08

## 2022-06-28 RX ORDER — CITALOPRAM 20 MG/1
20 TABLET, FILM COATED ORAL DAILY
Qty: 30 TABLET | Refills: 11 | Status: SHIPPED | OUTPATIENT
Start: 2022-06-28 | End: 2022-07-08

## 2022-06-28 RX ADMIN — OXCARBAZEPINE 300 MG: 300 TABLET, FILM COATED ORAL at 09:06

## 2022-06-28 RX ADMIN — VENLAFAXINE HYDROCHLORIDE 150 MG: 75 CAPSULE, EXTENDED RELEASE ORAL at 09:06

## 2022-06-28 RX ADMIN — NICOTINE 1 PATCH: 14 PATCH, EXTENDED RELEASE TRANSDERMAL at 09:06

## 2022-06-28 RX ADMIN — OLANZAPINE 10 MG: 10 TABLET, FILM COATED ORAL at 07:06

## 2022-06-28 RX ADMIN — IBUPROFEN 600 MG: 600 TABLET ORAL at 11:06

## 2022-06-28 NOTE — DISCHARGE SUMMARY
[facility]  Discharge Note        Admit DATE: 6/12/22    Discharge DATE: 6/28/22    FINAL DIAGNOSIS:  Non-traumatic rhabdomyolysis, Schizophrenia    FOLLOW UP: Has an appt scheduled with [facility] on 6/29/22    Hospital Admission: Elvis Puentes is a 36 y.o.  male who with a history of schizophrenia presented to the ED via ambulance on 6/12/2022  From a psych facility in [place]. Pt stated he wanted to check himself in the unit because of feeling lonely and having hallucinations but they noticed his L arm being swollen and sent to ED for further evaluation. Pt states he was repeatedly lifting a 25 lbs weight with his L arm but denies any injuries. He also denies using any IV drugs. He denies pain to the arm, only reports swollen sensation.     Hospital Course: In the ED, venous US done, negative for DVT. X-ray also done of the L arm, negative for any osseus abnormalities. Initial labs significant for CK of 70,000, which was brought down to 52,409 after 5 L of bolus in the ED and being on  cc/hr. AST, ALT was 400 and 104 respectively. K+ on admission 4, with mild T wave peaks on EKG. Ca2+ 9.6. Cr 0.97. UDS only positive for cannaboids. Ortho was consulted, as per Dr. Blakely, no surgical intervention needed as there is no suspicion for compartment syndrome or tendon rupture. IM service consulted for admission for rhabdomyolysis. Patient has hx of Schizophrenia, and home medications were initially restarted: Risperidone 1 mg BID, Oxcarbazepine 300 BID and Venlafaxine 150 daily. Consulted Psych Dr. Charles, who recommended pt can take his Olanzapine but at a lower dose given he was in [place];  Continue trileptal 300 mg bid, Continue Effexor  mg daily, and Increase risperidone to 3 mg bid. Recommend risperidone 1 mg PO or Zyprexa 5 mg IM q8 hrs PRN non redirectable agitation associated with breakthrough psychosis or miguel. Patient was [facility] on 6/15/22 at 6:45  pm and had 1:1 observation; CEC  on 22 at 6:45 pm. Patient's rhabdomyolysis corrected on IV LR at 250 cc/hr. On day of discharge patient was more calm and cooperative. Patient is agreeable to close f/u. He understands he has a f/u appt scheduled for 2022 at 2:30 pm at [facility] and agreeable to go to his appt.       PE:  General: appears well, in no acute distress   Eye: no scleral icterus, mild scleral icterus  Respiratory: clear to auscultation bilaterally, non labored respirations   Cardiovascular: regular rate and rhythm without murmurs or gallops, no edema in bilateral lower extremities   Gastrointestinal: soft, non-tender, non-distended, bowel sounds present   Musculoskeletal: no gross deformities observed    Discharge Disposition: Patient is medically cleared. He is hemodynamically stable. Vitals WNL. He felt better.    DISCHARGE INSTRUCTIONS:  Patient is discharged home. He has close f/u with [facility] scheduled for 2022 at 2:30 pm. He is agreeable to take all medications as instructed and to make all f/u appt's.     TIME SPENT ON DISCHARGE: >35 minutes

## 2022-06-28 NOTE — PLAN OF CARE
Follow-up care with [facility], unable to contact Holger Puentes/ mother, caregiver

## 2022-06-28 NOTE — DISCHARGE INSTRUCTIONS
Unable to contact Holger Puentes, mother/ caregiver for pt education/teaching session. Follow-up scheduled with [facility]. Denies suicidal ideations/intentions at this time.

## 2022-06-28 NOTE — CONSULTS
Consult received for HH with [facility]iatric [facility]; referral sent to [facility]; pending acceptance.    [facility] accepted and will follow up within 24 hours after DC.

## 2022-06-28 NOTE — CONSULTS
Call from [facility]; patient has an appointment on July 5th at 2:30pm; team and patient's nurse notified.

## 2022-07-02 ENCOUNTER — HOSPITAL ENCOUNTER (EMERGENCY)
Facility: HOSPITAL | Age: 37
Discharge: PSYCHIATRIC OR SUBSTANCE USE FACILITY | End: 2022-07-02
Attending: STUDENT IN AN ORGANIZED HEALTH CARE EDUCATION/TRAINING PROGRAM
Payer: COMMERCIAL

## 2022-07-02 ENCOUNTER — HOSPITAL ENCOUNTER (INPATIENT)
Facility: HOSPITAL | Age: 37
LOS: 9 days | Discharge: HOME | DRG: 885 | End: 2022-07-11
Attending: PSYCHIATRY & NEUROLOGY | Admitting: PSYCHIATRY & NEUROLOGY
Payer: COMMERCIAL

## 2022-07-02 VITALS
HEART RATE: 91 BPM | HEIGHT: 69 IN | DIASTOLIC BLOOD PRESSURE: 82 MMHG | WEIGHT: 150 LBS | TEMPERATURE: 99 F | BODY MASS INDEX: 22.22 KG/M2 | OXYGEN SATURATION: 99 % | RESPIRATION RATE: 16 BRPM | SYSTOLIC BLOOD PRESSURE: 125 MMHG

## 2022-07-02 DIAGNOSIS — F20.9 SCHIZOPHRENIA: ICD-10-CM

## 2022-07-02 DIAGNOSIS — R45.851 SUICIDAL IDEATION: Primary | ICD-10-CM

## 2022-07-02 DIAGNOSIS — R45.851 SUICIDAL THOUGHTS: ICD-10-CM

## 2022-07-02 DIAGNOSIS — Z00.8 MEDICAL CLEARANCE FOR PSYCHIATRIC ADMISSION: ICD-10-CM

## 2022-07-02 DIAGNOSIS — R07.9 CHEST PAIN: ICD-10-CM

## 2022-07-02 LAB
ALBUMIN SERPL-MCNC: 3.6 GM/DL (ref 3.5–5)
ALBUMIN SERPL-MCNC: 3.7 GM/DL (ref 3.5–5)
ALBUMIN/GLOB SERPL: 1.1 RATIO (ref 1.1–2)
ALBUMIN/GLOB SERPL: 1.3 RATIO (ref 1.1–2)
ALP SERPL-CCNC: 86 UNIT/L (ref 40–150)
ALP SERPL-CCNC: 94 UNIT/L (ref 40–150)
ALT SERPL-CCNC: 30 UNIT/L (ref 0–55)
ALT SERPL-CCNC: 35 UNIT/L (ref 0–55)
AMPHET UR QL SCN: NEGATIVE
APAP SERPL-MCNC: <17.4 UG/ML (ref 17.4–30)
APPEARANCE UR: CLEAR
AST SERPL-CCNC: 20 UNIT/L (ref 5–34)
AST SERPL-CCNC: 22 UNIT/L (ref 5–34)
BACTERIA #/AREA URNS AUTO: NORMAL /HPF
BARBITURATE SCN PRESENT UR: NEGATIVE
BASOPHILS # BLD AUTO: 0.04 X10(3)/MCL (ref 0–0.2)
BASOPHILS # BLD AUTO: 0.06 X10(3)/MCL (ref 0–0.2)
BASOPHILS NFR BLD AUTO: 0.6 %
BASOPHILS NFR BLD AUTO: 0.8 %
BENZODIAZ UR QL SCN: NEGATIVE
BILIRUB UR QL STRIP.AUTO: NEGATIVE MG/DL
BILIRUBIN DIRECT+TOT PNL SERPL-MCNC: 0.2 MG/DL
BILIRUBIN DIRECT+TOT PNL SERPL-MCNC: 0.4 MG/DL
BUN SERPL-MCNC: 15 MG/DL (ref 8.9–20.6)
BUN SERPL-MCNC: 7.6 MG/DL (ref 8.9–20.6)
CALCIUM SERPL-MCNC: 9.2 MG/DL (ref 8.4–10.2)
CALCIUM SERPL-MCNC: 9.4 MG/DL (ref 8.4–10.2)
CANNABINOIDS UR QL SCN: POSITIVE
CHLORIDE SERPL-SCNC: 102 MMOL/L (ref 98–107)
CHLORIDE SERPL-SCNC: 104 MMOL/L (ref 98–107)
CK SERPL-CCNC: 187 U/L (ref 30–200)
CO2 SERPL-SCNC: 29 MMOL/L (ref 22–29)
CO2 SERPL-SCNC: 30 MMOL/L (ref 22–29)
COCAINE UR QL SCN: NEGATIVE
COLOR UR AUTO: ABNORMAL
CREAT SERPL-MCNC: 0.81 MG/DL (ref 0.73–1.18)
CREAT SERPL-MCNC: 0.86 MG/DL (ref 0.73–1.18)
EOSINOPHIL # BLD AUTO: 0.56 X10(3)/MCL (ref 0–0.9)
EOSINOPHIL # BLD AUTO: 0.63 X10(3)/MCL (ref 0–0.9)
EOSINOPHIL NFR BLD AUTO: 7.7 %
EOSINOPHIL NFR BLD AUTO: 9.9 %
ERYTHROCYTE [DISTWIDTH] IN BLOOD BY AUTOMATED COUNT: 14.4 % (ref 11.5–17)
ERYTHROCYTE [DISTWIDTH] IN BLOOD BY AUTOMATED COUNT: 14.6 % (ref 11.5–17)
ETHANOL SERPL-MCNC: <10 MG/DL
FENTANYL UR QL SCN: NEGATIVE
GLOBULIN SER-MCNC: 2.9 GM/DL (ref 2.4–3.5)
GLOBULIN SER-MCNC: 3.2 GM/DL (ref 2.4–3.5)
GLUCOSE SERPL-MCNC: 97 MG/DL (ref 74–100)
GLUCOSE SERPL-MCNC: 98 MG/DL (ref 74–100)
GLUCOSE UR QL STRIP.AUTO: NEGATIVE MG/DL
HCT VFR BLD AUTO: 40.6 % (ref 42–52)
HCT VFR BLD AUTO: 41.1 % (ref 42–52)
HGB BLD-MCNC: 13.4 GM/DL (ref 14–18)
HGB BLD-MCNC: 13.6 GM/DL (ref 14–18)
IMM GRANULOCYTES # BLD AUTO: 0.02 X10(3)/MCL (ref 0–0.04)
IMM GRANULOCYTES # BLD AUTO: 0.03 X10(3)/MCL (ref 0–0.04)
IMM GRANULOCYTES NFR BLD AUTO: 0.3 %
IMM GRANULOCYTES NFR BLD AUTO: 0.4 %
KETONES UR QL STRIP.AUTO: ABNORMAL MG/DL
LEUKOCYTE ESTERASE UR QL STRIP.AUTO: NEGATIVE UNIT/L
LYMPHOCYTES # BLD AUTO: 2.08 X10(3)/MCL (ref 0.6–4.6)
LYMPHOCYTES # BLD AUTO: 3.04 X10(3)/MCL (ref 0.6–4.6)
LYMPHOCYTES NFR BLD AUTO: 32.8 %
LYMPHOCYTES NFR BLD AUTO: 41.8 %
MCH RBC QN AUTO: 28.3 PG (ref 27–31)
MCH RBC QN AUTO: 29 PG (ref 27–31)
MCHC RBC AUTO-ENTMCNC: 33 MG/DL (ref 33–36)
MCHC RBC AUTO-ENTMCNC: 33.1 MG/DL (ref 33–36)
MCV RBC AUTO: 85.8 FL (ref 80–94)
MCV RBC AUTO: 87.6 FL (ref 80–94)
MDMA UR QL SCN: NEGATIVE
MONOCYTES # BLD AUTO: 0.7 X10(3)/MCL (ref 0.1–1.3)
MONOCYTES # BLD AUTO: 0.77 X10(3)/MCL (ref 0.1–1.3)
MONOCYTES NFR BLD AUTO: 12.1 %
MONOCYTES NFR BLD AUTO: 9.6 %
NEUTROPHILS # BLD AUTO: 2.8 X10(3)/MCL (ref 2.1–9.2)
NEUTROPHILS # BLD AUTO: 2.9 X10(3)/MCL (ref 2.1–9.2)
NEUTROPHILS NFR BLD AUTO: 39.7 %
NEUTROPHILS NFR BLD AUTO: 44.3 %
NITRITE UR QL STRIP.AUTO: NEGATIVE
NRBC BLD AUTO-RTO: 0 %
NRBC BLD AUTO-RTO: 0 %
OPIATES UR QL SCN: NEGATIVE
PCP UR QL: NEGATIVE
PH UR STRIP.AUTO: 6 [PH]
PH UR: 6 [PH] (ref 3–11)
PLATELET # BLD AUTO: 331 X10(3)/MCL (ref 130–400)
PLATELET # BLD AUTO: 335 X10(3)/MCL (ref 130–400)
PMV BLD AUTO: 10 FL (ref 7.4–10.4)
PMV BLD AUTO: 10.3 FL (ref 7.4–10.4)
POTASSIUM SERPL-SCNC: 4.3 MMOL/L (ref 3.5–5.1)
POTASSIUM SERPL-SCNC: 4.3 MMOL/L (ref 3.5–5.1)
PROT SERPL-MCNC: 6.6 GM/DL (ref 6.4–8.3)
PROT SERPL-MCNC: 6.8 GM/DL (ref 6.4–8.3)
PROT UR QL STRIP.AUTO: NEGATIVE MG/DL
RBC # BLD AUTO: 4.69 X10(6)/MCL (ref 4.7–6.1)
RBC # BLD AUTO: 4.73 X10(6)/MCL (ref 4.7–6.1)
RBC #/AREA URNS AUTO: <5 /HPF
RBC UR QL AUTO: NEGATIVE UNIT/L
SARS-COV-2 RDRP RESP QL NAA+PROBE: NEGATIVE
SODIUM SERPL-SCNC: 139 MMOL/L (ref 136–145)
SODIUM SERPL-SCNC: 142 MMOL/L (ref 136–145)
SP GR UR STRIP.AUTO: 1.03 (ref 1–1.03)
SPECIFIC GRAVITY, URINE AUTO (.000) (OHS): 1.03 (ref 1–1.03)
SQUAMOUS #/AREA URNS AUTO: <5 /HPF
TROPONIN I SERPL-MCNC: <0.01 NG/ML (ref 0–0.04)
TSH SERPL-ACNC: 1.55 UIU/ML (ref 0.35–4.94)
UROBILINOGEN UR STRIP-ACNC: 1 MG/DL
WBC # SPEC AUTO: 6.4 X10(3)/MCL (ref 4.5–11.5)
WBC # SPEC AUTO: 7.3 X10(3)/MCL (ref 4.5–11.5)
WBC #/AREA URNS AUTO: <5 /HPF

## 2022-07-02 PROCEDURE — 82550 ASSAY OF CK (CPK): CPT | Performed by: INTERNAL MEDICINE

## 2022-07-02 PROCEDURE — 93005 ELECTROCARDIOGRAM TRACING: CPT

## 2022-07-02 PROCEDURE — 99285 EMERGENCY DEPT VISIT HI MDM: CPT | Mod: 25

## 2022-07-02 PROCEDURE — 84443 ASSAY THYROID STIM HORMONE: CPT | Performed by: STUDENT IN AN ORGANIZED HEALTH CARE EDUCATION/TRAINING PROGRAM

## 2022-07-02 PROCEDURE — 84484 ASSAY OF TROPONIN QUANT: CPT | Performed by: INTERNAL MEDICINE

## 2022-07-02 PROCEDURE — 81001 URINALYSIS AUTO W/SCOPE: CPT | Mod: 59 | Performed by: STUDENT IN AN ORGANIZED HEALTH CARE EDUCATION/TRAINING PROGRAM

## 2022-07-02 PROCEDURE — 85025 COMPLETE CBC W/AUTO DIFF WBC: CPT | Performed by: INTERNAL MEDICINE

## 2022-07-02 PROCEDURE — 86780 TREPONEMA PALLIDUM: CPT | Performed by: NURSE PRACTITIONER

## 2022-07-02 PROCEDURE — 80143 DRUG ASSAY ACETAMINOPHEN: CPT | Performed by: STUDENT IN AN ORGANIZED HEALTH CARE EDUCATION/TRAINING PROGRAM

## 2022-07-02 PROCEDURE — 25000003 PHARM REV CODE 250: Performed by: NURSE PRACTITIONER

## 2022-07-02 PROCEDURE — 85379 FIBRIN DEGRADATION QUANT: CPT | Performed by: INTERNAL MEDICINE

## 2022-07-02 PROCEDURE — 80053 COMPREHEN METABOLIC PANEL: CPT | Performed by: STUDENT IN AN ORGANIZED HEALTH CARE EDUCATION/TRAINING PROGRAM

## 2022-07-02 PROCEDURE — 11400000 HC PSYCH PRIVATE ROOM

## 2022-07-02 PROCEDURE — 36415 COLL VENOUS BLD VENIPUNCTURE: CPT | Performed by: STUDENT IN AN ORGANIZED HEALTH CARE EDUCATION/TRAINING PROGRAM

## 2022-07-02 PROCEDURE — 80307 DRUG TEST PRSMV CHEM ANLYZR: CPT | Performed by: STUDENT IN AN ORGANIZED HEALTH CARE EDUCATION/TRAINING PROGRAM

## 2022-07-02 PROCEDURE — 87635 SARS-COV-2 COVID-19 AMP PRB: CPT | Performed by: STUDENT IN AN ORGANIZED HEALTH CARE EDUCATION/TRAINING PROGRAM

## 2022-07-02 PROCEDURE — 80053 COMPREHEN METABOLIC PANEL: CPT | Performed by: INTERNAL MEDICINE

## 2022-07-02 PROCEDURE — 82077 ASSAY SPEC XCP UR&BREATH IA: CPT | Performed by: STUDENT IN AN ORGANIZED HEALTH CARE EDUCATION/TRAINING PROGRAM

## 2022-07-02 PROCEDURE — 85025 COMPLETE CBC W/AUTO DIFF WBC: CPT | Performed by: STUDENT IN AN ORGANIZED HEALTH CARE EDUCATION/TRAINING PROGRAM

## 2022-07-02 RX ORDER — IBUPROFEN 400 MG/1
400 TABLET ORAL EVERY 6 HOURS PRN
Status: DISCONTINUED | OUTPATIENT
Start: 2022-07-02 | End: 2022-07-11 | Stop reason: HOSPADM

## 2022-07-02 RX ORDER — IBUPROFEN 200 MG
1 TABLET ORAL DAILY
Status: DISCONTINUED | OUTPATIENT
Start: 2022-07-03 | End: 2022-07-02

## 2022-07-02 RX ORDER — ACETAMINOPHEN 325 MG/1
650 TABLET ORAL EVERY 6 HOURS PRN
Status: DISCONTINUED | OUTPATIENT
Start: 2022-07-02 | End: 2022-07-02 | Stop reason: HOSPADM

## 2022-07-02 RX ORDER — ACETAMINOPHEN 325 MG/1
650 TABLET ORAL EVERY 6 HOURS PRN
Status: DISCONTINUED | OUTPATIENT
Start: 2022-07-02 | End: 2022-07-11 | Stop reason: HOSPADM

## 2022-07-02 RX ORDER — ADHESIVE BANDAGE
30 BANDAGE TOPICAL DAILY PRN
Status: DISCONTINUED | OUTPATIENT
Start: 2022-07-02 | End: 2022-07-11 | Stop reason: HOSPADM

## 2022-07-02 RX ORDER — LORAZEPAM 1 MG/1
2 TABLET ORAL EVERY 8 HOURS PRN
Status: DISCONTINUED | OUTPATIENT
Start: 2022-07-02 | End: 2022-07-02 | Stop reason: HOSPADM

## 2022-07-02 RX ORDER — MAG HYDROX/ALUMINUM HYD/SIMETH 200-200-20
30 SUSPENSION, ORAL (FINAL DOSE FORM) ORAL EVERY 6 HOURS PRN
Status: DISCONTINUED | OUTPATIENT
Start: 2022-07-02 | End: 2022-07-11 | Stop reason: HOSPADM

## 2022-07-02 RX ORDER — IBUPROFEN 200 MG
1 TABLET ORAL DAILY
Status: DISCONTINUED | OUTPATIENT
Start: 2022-07-03 | End: 2022-07-11 | Stop reason: HOSPADM

## 2022-07-02 RX ORDER — OLANZAPINE 5 MG/1
10 TABLET, ORALLY DISINTEGRATING ORAL EVERY 8 HOURS PRN
Status: DISCONTINUED | OUTPATIENT
Start: 2022-07-02 | End: 2022-07-02 | Stop reason: HOSPADM

## 2022-07-02 RX ORDER — MUPIROCIN 20 MG/G
OINTMENT TOPICAL 2 TIMES DAILY
Status: DISCONTINUED | OUTPATIENT
Start: 2022-07-02 | End: 2022-07-05

## 2022-07-02 NOTE — ED PROVIDER NOTES
"Encounter Date: 7/2/2022    SCRIBE #1 NOTE: I, Hsiehceleste Elliosn, am scribing for, and in the presence of,  Kashif Quiñonez MD. I have scribed the following portions of the note - Other sections scribed: HPI, ROS, PE.   SCRIBE #2 NOTE: I, Malini Evans, am scribing for, and in the presence of,  Kashif Quiñonez MD. I have scribed the following portions of the note - Other sections scribed: HPI, ROS, PE.     History     Chief Complaint   Patient presents with    Suicidal     EMS reports pt. SI, plan to OD. Pm Schizophrenia, +hallucinations reports seeing dead relatives, snakes, demons, etc. Attempted to go to [facility] here for medical clearance.      36-year-old male with history of schizophrenia presents to ED via EMS for SI. Pt reports he called [facility] today for admission for SI. Pt states he has been having auditory and visual hallucinations, he goes on stating he has been seeing "the dead" and has been "fighting demons." Pt reports plans to use a razor or gun to end his life due to not wanting to see his mother pass before him. Notes he has swallowed a handful of pills in the past requiring pump. He denies any attempts today.     The history is provided by the patient. No  was used.   Mental Health Problem  The primary symptoms include hallucinations and suicidal ideas. The current episode started just prior to arrival. This is a chronic problem.   Additional symptoms of the illness do not include abdominal pain. He admits to suicidal ideas. He does have a plan to attempt suicide. He contemplates harming himself. He does not contemplate injuring another person. He has not already  injured another person. Risk factors that are present for mental illness include a history of mental illness and a history of suicide attempts.     Review of patient's allergies indicates:   Allergen Reactions    Haloperidol Swelling     Muscle stiffness  Has muscle spasms      Paroxetine Swelling, Other (See Comments) " "and Rash     "i don't remember"  "i don't remember"      Pineapple      Face swells up  Face swells up      Ziprasidone Other (See Comments)     Muscle twitching     Past Medical History:   Diagnosis Date    Anxiety     Depression     Hallucination     History of psychiatric hospitalization     Hx of psychiatric care     Psychiatric problem     Psychosis     Schizoaffective disorder     Sleep difficulties     Suicide attempt     Therapy      History reviewed. No pertinent surgical history.  History reviewed. No pertinent family history.  Social History     Tobacco Use    Smoking status: Current Every Day Smoker     Packs/day: 1.00    Smokeless tobacco: Never Used   Substance Use Topics    Alcohol use: Yes     Alcohol/week: 4.0 standard drinks     Types: 4 Cans of beer per week    Drug use: Never     Review of Systems   Constitutional: Negative for fever.   HENT: Negative for sore throat.    Eyes: Negative for visual disturbance.   Respiratory: Negative for shortness of breath.    Cardiovascular: Negative for chest pain.   Gastrointestinal: Negative for abdominal pain.   Genitourinary: Negative for dysuria.   Musculoskeletal: Negative for joint swelling.   Skin: Negative for rash.   Neurological: Negative for weakness.   Psychiatric/Behavioral: Positive for hallucinations and suicidal ideas.       Physical Exam     Initial Vitals [07/02/22 0734]   BP Pulse Resp Temp SpO2   122/89 106 20 98.6 °F (37 °C) 98 %      MAP       --         Physical Exam    Nursing note and vitals reviewed.  Constitutional: He appears well-developed and well-nourished. He is not diaphoretic. No distress.   Disheveled, unkempt   HENT:   Head: Normocephalic and atraumatic.   Eyes: Conjunctivae and EOM are normal. Pupils are equal, round, and reactive to light.   Neck:   Normal range of motion.  Cardiovascular: Normal rate, regular rhythm, normal heart sounds and intact distal pulses.   No murmur heard.  Pulmonary/Chest: Breath " sounds normal. No respiratory distress. He has no wheezes. He has no rales.   Abdominal: Abdomen is soft. He exhibits no distension. There is no abdominal tenderness.   Musculoskeletal:         General: No tenderness or edema. Normal range of motion.      Cervical back: Normal range of motion.     Neurological: He is alert and oriented to person, place, and time. No cranial nerve deficit.   Skin: Skin is warm and dry. Capillary refill takes less than 2 seconds. No rash noted. No erythema.   Psychiatric: His mood appears anxious. He is actively hallucinating. He expresses suicidal ideation. He expresses no homicidal ideation. He expresses suicidal plans. He expresses no homicidal plans.         ED Course   Procedures  Labs Reviewed   COMPREHENSIVE METABOLIC PANEL - Abnormal; Notable for the following components:       Result Value    Carbon Dioxide 30 (*)     Blood Urea Nitrogen 7.6 (*)     All other components within normal limits   ACETAMINOPHEN LEVEL - Abnormal; Notable for the following components:    Acetaminophen Level <17.4 (*)     All other components within normal limits   CBC WITH DIFFERENTIAL - Abnormal; Notable for the following components:    RBC 4.69 (*)     Hgb 13.6 (*)     Hct 41.1 (*)     All other components within normal limits   ALCOHOL,MEDICAL (ETHANOL) - Normal   CBC W/ AUTO DIFFERENTIAL    Narrative:     The following orders were created for panel order CBC auto differential.  Procedure                               Abnormality         Status                     ---------                               -----------         ------                     CBC with Differential[[id number]]        Abnormal            Final result                 Please view results for these tests on the individual orders.   TSH   URINALYSIS, REFLEX TO URINE CULTURE   DRUG SCREEN, URINE (BEAKER)   SARS-COV-2 RNA AMPLIFICATION, QUAL          Imaging Results    None          Medications   LORazepam tablet 2 mg (has no  administration in time range)   olanzapine zydis disintegrating tablet 10 mg (has no administration in time range)   acetaminophen tablet 650 mg (has no administration in time range)     Medical Decision Making:   ED Management:  Patient is a 36-year-old male who presents to the emergency department for suicidal ideation.  See HPI.  See physical exam.  Labs as noted.  Denies any ingestion attempt today.  All results discussed.  Patient placed under pec.  Medically cleared and hemodynamically stable for continued psychiatric evaluation.          Scribe Attestation:   Scribe #1: I performed the above scribed service and the documentation accurately describes the services I performed. I attest to the accuracy of the note.  Scribe #2: I performed the above scribed service and the documentation accurately describes the services I performed. I attest to the accuracy of the note.    Attending Attestation:           Physician Attestation for Scribe:  Physician Attestation Statement for Scribe #1: I, Kashif Quiñonez MD, reviewed documentation, as scribed by Jori Ellison in my presence, and it is both accurate and complete.   Physician Attestation Statement for Scribe #2: I, Kashif Quiñonez MD, reviewed documentation, as scribed by Malini Evans in my presence, and it is both accurate and complete. I also acknowledge and confirm the content of the note done by Scribe #1.              Medically cleared for psychiatry placement: 7/2/2022  9:03 AM    Clinical Impression:   Final diagnoses:  [R45.851] Suicidal ideation (Primary)  [R45.851] Suicidal thoughts  [Z00.8] Medical clearance for psychiatric admission          ED Disposition Condition    Transfer to Psych Facility         ED Prescriptions     None        Follow-up Information    None          Kashif Quiñonez MD  07/02/22 0903       Kashif Quiñonez MD  07/02/22 0903
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
## 2022-07-03 LAB
CHOLEST SERPL-MCNC: 158 MG/DL
CHOLEST/HDLC SERPL: 3 {RATIO} (ref 0–5)
D DIMER PPP IA.FEU-MCNC: <0.27 UG/ML FEU (ref 0–0.5)
GLUCOSE P FAST SERPL-MCNC: 82 MG/DL (ref 74–100)
HDLC SERPL-MCNC: 46 MG/DL (ref 35–60)
LDLC SERPL CALC-MCNC: 100 MG/DL (ref 50–140)
T PALLIDUM AB SER QL: NONREACTIVE
TRIGL SERPL-MCNC: 58 MG/DL (ref 34–140)
VALPROATE SERPL-MCNC: <12.5 UG/ML (ref 50–100)
VLDLC SERPL CALC-MCNC: 12 MG/DL

## 2022-07-03 PROCEDURE — 80061 LIPID PANEL: CPT | Performed by: NURSE PRACTITIONER

## 2022-07-03 PROCEDURE — 36415 COLL VENOUS BLD VENIPUNCTURE: CPT | Performed by: PSYCHIATRY & NEUROLOGY

## 2022-07-03 PROCEDURE — 25000003 PHARM REV CODE 250: Performed by: PSYCHIATRY & NEUROLOGY

## 2022-07-03 PROCEDURE — 36415 COLL VENOUS BLD VENIPUNCTURE: CPT | Performed by: NURSE PRACTITIONER

## 2022-07-03 PROCEDURE — 82947 ASSAY GLUCOSE BLOOD QUANT: CPT | Performed by: NURSE PRACTITIONER

## 2022-07-03 PROCEDURE — 80164 ASSAY DIPROPYLACETIC ACD TOT: CPT | Performed by: PSYCHIATRY & NEUROLOGY

## 2022-07-03 PROCEDURE — S4991 NICOTINE PATCH NONLEGEND: HCPCS | Performed by: PSYCHIATRY & NEUROLOGY

## 2022-07-03 PROCEDURE — 11400000 HC PSYCH PRIVATE ROOM

## 2022-07-03 PROCEDURE — 25000003 PHARM REV CODE 250: Performed by: NURSE PRACTITIONER

## 2022-07-03 RX ORDER — QUETIAPINE FUMARATE 200 MG/1
200 TABLET, FILM COATED ORAL NIGHTLY
Status: DISCONTINUED | OUTPATIENT
Start: 2022-07-03 | End: 2022-07-05

## 2022-07-03 RX ORDER — SERTRALINE HYDROCHLORIDE 50 MG/1
50 TABLET, FILM COATED ORAL DAILY
Status: DISCONTINUED | OUTPATIENT
Start: 2022-07-03 | End: 2022-07-11 | Stop reason: HOSPADM

## 2022-07-03 RX ORDER — HYDROXYZINE PAMOATE 50 MG/1
50 CAPSULE ORAL EVERY 6 HOURS PRN
Status: DISCONTINUED | OUTPATIENT
Start: 2022-07-03 | End: 2022-07-05

## 2022-07-03 RX ADMIN — IBUPROFEN 400 MG: 400 TABLET, FILM COATED ORAL at 05:07

## 2022-07-03 RX ADMIN — QUETIAPINE FUMARATE 200 MG: 200 TABLET ORAL at 08:07

## 2022-07-03 RX ADMIN — MUPIROCIN: 20 OINTMENT TOPICAL at 08:07

## 2022-07-03 RX ADMIN — SERTRALINE HYDROCHLORIDE 50 MG: 50 TABLET ORAL at 08:07

## 2022-07-03 RX ADMIN — NICOTINE 1 PATCH: 21 PATCH, EXTENDED RELEASE TRANSDERMAL at 08:07

## 2022-07-03 RX ADMIN — IBUPROFEN 400 MG: 400 TABLET, FILM COATED ORAL at 08:07

## 2022-07-03 NOTE — SUBJECTIVE & OBJECTIVE
Patient was seen via video telemedicine and consented to the interview. The patient was located in [place], [place] and the provider was located in [place], [place].    Psychiatric Mental Status Exam:  General Appearance: dissheveled  Arousal: alert  Behavior: cooperative  Movements and Motor Activity: no abnormal involuntary movements noted  Orientation: oriented to person, place, time, and situation  Speech: normal rate, normal rhythm, normal volume, normal tone  Mood: depressed  Affect: mood-congruent  Thought Process: linear, logical  Associations: intact  Thought Content and Perceptions: He has AH, paranoia, Adventism delusions, SI with no plan and no HIRecent and Remote Memory: recent memory intact, remote memory intact  Attention and Concentration: intact, attentive to conversation  Fund of Knowledge: intact, aware of current events, vocabulary appropriate  Insight: improved  Judgment: improved          Patient History               Medical as of 7/3/2022       Past Medical History       Diagnosis Date Comments Source    Anxiety -- -- Provider    Depression -- -- Provider    Hallucination -- -- Provider    History of psychiatric hospitalization -- -- Provider    Hx of psychiatric care -- -- Provider    Psychiatric problem -- -- Provider    Psychosis -- -- Provider    Schizoaffective disorder -- -- Provider    Sleep difficulties -- -- Provider    Suicide attempt -- -- Provider    Therapy -- -- Provider              Pertinent Negatives       Diagnosis Date Noted Comments Source    Addiction to drug 02/14/2020 -- Provider    ADHD (attention deficit hyperactivity disorder) 02/14/2020 -- Provider    Adjustment disorder 02/14/2020 -- Provider    Alcohol abuse 02/14/2020 -- Provider    Anorexia nervosa 02/14/2020 -- Provider    Bipolar disorder 02/14/2020 -- Provider    Borderline personality disorder 02/14/2020 -- Provider    Bulimia nervosa 02/14/2020 -- Provider    CHF (congestive heart failure) 02/14/2020 -- Provider     COPD (chronic obstructive pulmonary disease) 02/14/2020 -- Provider    CVA (cerebral vascular accident) 02/14/2020 -- Provider    Dementia 02/14/2020 -- Provider    Dependence on renal dialysis 02/14/2020 -- Provider    Diabetes mellitus 02/14/2020 -- Provider    Fatigue 02/14/2020 -- Provider    Headache 02/14/2020 -- Provider    HIV infection 02/14/2020 -- Provider    Hypertension 02/14/2020 -- Provider    Liver disease 02/14/2020 -- Provider    May 02/14/2020 -- Provider    Neuropathy 02/14/2020 -- Provider    Obsessive-compulsive disorder 02/14/2020 -- Provider    Oppositional defiant disorder 02/14/2020 -- Provider    Panic disorder 02/14/2020 -- Provider    Psychiatric exam requested by authority 02/14/2020 -- Provider    PTSD (post-traumatic stress disorder) 02/14/2020 -- Provider    Renal disorder 02/14/2020 -- Provider    Seizures 02/14/2020 -- Provider    Substance abuse 02/14/2020 -- Provider    Thyroid disease 02/14/2020 -- Provider    Withdrawal symptoms, alcohol 02/14/2020 -- Provider    Withdrawal symptoms, drug or narcotic 02/14/2020 -- Provider                          Surgical as of 7/3/2022    Past Surgical History: Patient provided no pertinent surgical history.               Family as of 7/3/2022    None               Tobacco Use as of 7/3/2022       Smoking Status Smoking Start Date Smoking Quit Date Packs/Day Years Used    Current Every Day Smoker -- -- 1.00 --      Types Comments Smokeless Tobacco Status Smokeless Tobacco Quit Date Source    -- -- Never Used -- Provider                  Alcohol Use as of 7/3/2022       Alcohol Use Drinks/Week Alcohol/Week Comments Source    Yes 4 Cans of beer 4.0 standard drinks uses alcohol monthly Provider                  Drug Use as of 7/3/2022       Drug Use Types Frequency Comments Source    Yes  Marijuana -- -- Provider                  Sexual Activity as of 7/3/2022       Sexually Active Birth Control Partners Comments Source    Not Currently --  Female -- Provider                  Activities of Daily Living as of 7/3/2022       Activities of Daily Living Question Response Comments Source    Patient feels they ought to cut down on drinking/drug use No -- Provider    Patient annoyed by others criticizing their drinking/drug use No -- Provider    Patient has felt bad or guilty about drinking/drug use No -- Provider    Patient has had a drink/used drugs as an eye opener in the AM No -- Provider                  Social Documentation as of 7/3/2022    None               Occupational as of 7/3/2022    None               Socioeconomic as of 7/3/2022       Marital Status Spouse Name Number of Children Years Education Education Level Preferred Language Ethnicity Race Source    Single -- 0 -- -- English Not  or /a Black or  Provider                  Pertinent History       Question Response Comments    Lives with family --    Place in Birth Order 2nd --    Lives in home --    Number of Siblings 4 --    Raised by -- --    Legal Involvement -- --    Childhood Trauma -- --    Criminal History of -- --    Financial Status disabled --    Highest Level of Education high school graduation --    Does patient have access to a firearm? No --     Service No --    Primary Leisure Activity time with family --    Spirituality non-practicing --          Past Medical History:   Diagnosis Date    Anxiety     Depression     Hallucination     History of psychiatric hospitalization     Hx of psychiatric care     Psychiatric problem     Psychosis     Schizoaffective disorder     Sleep difficulties     Suicide attempt     Therapy      No past surgical history on file.  Family History    None       Tobacco Use    Smoking status: Current Every Day Smoker     Packs/day: 1.00    Smokeless tobacco: Never Used   Substance and Sexual Activity    Alcohol use: Yes     Alcohol/week: 4.0 standard drinks     Types: 4 Cans of beer per week     Comment: uses alcohol  "monthly    Drug use: Yes     Types: Marijuana    Sexual activity: Not Currently     Partners: Female     Review of patient's allergies indicates:   Allergen Reactions    Haloperidol Swelling     Muscle stiffness  Has muscle spasms      Paroxetine Swelling, Other (See Comments) and Rash     "i don't remember"  "i don't remember"      Pineapple      Face swells up  Face swells up      Ziprasidone Other (See Comments)     Muscle twitching       Current Facility-Administered Medications on File Prior to Encounter   Medication    [DISCONTINUED] acetaminophen tablet 650 mg    [DISCONTINUED] LORazepam tablet 2 mg    [DISCONTINUED] olanzapine zydis disintegrating tablet 10 mg     Current Outpatient Medications on File Prior to Encounter   Medication Sig    citalopram (CELEXA) 20 MG tablet Take 1 tablet (20 mg total) by mouth once daily.    divalproex (DEPAKOTE) 500 MG TbEC Take 1 tablet (500 mg total) by mouth every 12 (twelve) hours.    OLANZapine (ZYPREXA) 20 MG tablet Take 1 tablet (20 mg total) by mouth every evening.    OXcarbazepine (TRILEPTAL) 150 MG Tab Take 1 tablet (150 mg total) by mouth 2 (two) times daily.    QUEtiapine (SEROQUEL) 200 MG Tab Take 1 tablet (200 mg total) by mouth once daily.    risperiDONE (RISPERDAL) 1 MG tablet Take 1 tablet (1 mg total) by mouth 2 (two) times daily.    sertraline (ZOLOFT) 50 MG tablet Take 1 tablet (50 mg total) by mouth once daily.    traZODone (DESYREL) 50 MG tablet Take 1 tablet (50 mg total) by mouth every evening.    citalopram (CELEXA) 20 MG tablet Take 20 mg by mouth once daily.    divalproex ER (DEPAKOTE) 500 MG Tb24 Take 500 mg by mouth 2 (two) times daily.    OLANZapine (ZYPREXA) 20 MG tablet Take 20 mg by mouth nightly.    OXcarbazepine (TRILEPTAL) 150 MG Tab Take 300 mg by mouth 2 (two) times daily.    venlafaxine (EFFEXOR-XR) 150 MG Cp24 Take 150 mg by mouth once daily.     Psychotherapeutics (From admission, onward)                None          Review of " "Systems  Strengths and Liabilities: Strength: Patient is expressive/articulate., Liability: Patient has poor judgment    Objective:     Vital Signs (Most Recent):  Temp: 97.7 °F (36.5 °C) (07/03/22 0640)  Pulse: 85 (07/03/22 0640)  Resp: 18 (07/03/22 0640)  BP: 138/71 (07/03/22 0640)  SpO2: 99 % (07/03/22 0640) Vital Signs (24h Range):  Temp:  [97.7 °F (36.5 °C)] 97.7 °F (36.5 °C)  Pulse:  [85-91] 85  Resp:  [16-20] 18  SpO2:  [99 %-100 %] 99 %  BP: (119-138)/(71-82) 138/71     Height: 5' 9" (175.3 cm)  Weight: 67.4 kg (148 lb 9.4 oz)  Body mass index is 21.94 kg/m².    No intake or output data in the 24 hours ending 07/03/22 0832    Physical Exam     Significant Labs: Last 72 Hours:   Recent Lab Results  (Last 5 results in the past 72 hours)        07/03/22  0530   07/03/22  0500   07/02/22  2125   07/02/22  0857   07/02/22  0856        Phencyclidine         Negative       Albumin/Globulin Ratio     1.1           Albumin     3.6           Alkaline Phosphatase     86           ALT     30           Amphetamine Screen, Ur         Negative       Appearance, UA         Clear       AST     20           Bacteria, UA         None Seen       Barbiturate Screen, Ur         Negative       Baso #     0.04           Basophil %     0.6           Benzodiazepine Screen, Urine         Negative       BILIRUBIN TOTAL     0.2           Bilirubin, UA         Negative       BUN     15.0           Calcium     9.2           Cannabinoids, Urine         Positive       Chloride     104           Cholesterol 158               CO2     29           Cocaine (Metab.)         Negative       Color, UA         Dark Yellow       ID NOW COVID-19, (JASON)       Negative         CPK     187           Creatinine     0.81           D-Dimer     <0.27  Comment: D-dimer values of less than 0.5ug/mL FEU in adult patients with a clinically low pre-test probability of developing a DVT yield  a 99% negative predictive value.  D-dimer increases naturally with age, " therefore the negative predictive value in patients older than 80 is 21 - 31%.    D-Dimer testing at [facility] and [facility] is used as an aid in the diagnosis of VTE/PE. The D-Dimer test must be used with one or more additional tests such as imaging studies to evaluate VTE/PE           eGFR if      >60           Eos #     0.63           Eosinophil %     9.9           Fentanyl, Urine         Negative       Globulin, Total     3.2           Gluc Fast 82               Glucose     97           Glucose, UA         Negative       HDL 46               Hematocrit     40.6           Hemoglobin     13.4           Immature Grans (Abs)     0.02           Immature Granulocytes     0.3           Ketones, UA         Trace       LDL Cholesterol External 100.00               Leukocytes, UA         Negative       Lymph #     2.08           LYMPH %     32.8           MCH     28.3           MCHC     33.0           MCV     85.8           MDMA, Urine         Negative       Mono #     0.77           Mono %     12.1           MPV     10.0           Neut #     2.8           Neut %     44.3           NITRITE UA         Negative       nRBC     0.0           Occult Blood UA         Negative       Opiate Scrn, Ur         Negative       pH, UA         6.0       pH, Urine         6.0       Platelets     331           Potassium     4.3           PROTEIN TOTAL     6.8           Protein, UA         Negative       RBC     4.73           RBC, UA         <5       RDW     14.4           Sodium     142           Specific Gravity,UA         1.026       Specific Gravity, Urine Auto         1.026       Squam Epithel, UA         <5       Total Cholesterol/HDL Ratio 3               Triglycerides 58               Troponin I     <0.010           Urobilinogen, UA         1.0       Valproic Acid Lvl   <12.5             Very Low Density Lipoprotein 12               WBC, UA         <5       WBC     6.4                                   Significant Imaging: I have reviewed all pertinent imaging results/findings within the past 24 hours.

## 2022-07-03 NOTE — GROUP NOTE
Nursing Group      Group Focus:Emotions     Number of patients in attendance: 8    Group Start Time: 1430  Group End Time:  1515  Groups Date: 7/3/2022  Group Topic:  Behavioral Health  Group Department: [facility]  Group Facilitators:  Farrah Adams RN  _____________________________________________________________________    Patient Name: Elvis Puentes  MRN: [id number]  Patient Class: IP- Psych   Admission Date\Time: 7/2/2022  5:15 PM  Hospital Length of Stay: 1  Primary Care Provider: Primary Doctor No     Referred by: Behavioral Medicine Unit Treatment Team     Target symptoms: Substance Abuse, Depression and Psychosis     Patient's response to treatment: Active Listening and Self-disclosure     Progress toward goals: Progressing adequately     Interval History: Attended and participated with good response     Diagnosis: Schizophrenia     Plan: Continue treatment on BMU

## 2022-07-03 NOTE — NURSING
Elvis's labs and EKG results were obtained. EKG #2 resulted with sinus rhythm with marked sinus arrhythmia. Labs WDL except for H&H slightly low. V/S remain WDL. Dr. Maurer notified. No new orders. He states that he will see pt 7/3/22. Pt stated CP resolved. He is currently asleep. Awoke x 2 to check the time. Relaxation techniques encouraged. He denies SI and contracts for safety. Q15 observations maintained for safety and suicide precautions. Will continue to monitor closely.

## 2022-07-03 NOTE — ASSESSMENT & PLAN NOTE
He agrees to take seroquel despite recent rhabdomyolysis given need for antipsychotic for active psychosis. HE also agrees to take zoloft for mood and vistaril prn anxiety.

## 2022-07-03 NOTE — CONSULTS
"[facility]  [facility] Medicine  Consult    Attending Physician: Dr. Maurer      Date of Admit: 7/2/2022    Chief Complaint and Duration    No chief complaint on file.     Hallucinations x 1 week    Subjective:    History of Present Illness:    36-year-old  male history of cognitive impairment, schizophrenia, shin, anxiety, frequent hospitalizations marijuana abuse, social alcohol use presents with psychosis and rhabdomyolysis to [facility].  Patient previously voluntarily admitted himself to [facility] secondary to hallucinations and depression.  Patient has been depressed about death within possible death his mother.  Please see psychiatric notes further details.    Patient this time denies any symptoms.  He did have chest pain arrival yesterday.  Pain lasted 5 minutes it was a squeezing pain.  No radiation of the chest pain.  No associated shortness of breath, palpitations, diaphoresis or nausea vomiting.  No associated headache fever cough congestion.  No abdominal pain.  History of cardiac disease, no history of recent stress test.  Denies stimulant use.  Smokes 1 pack per day.  No history of hypertension hyperlipidemia, diabetes.    Past Medical History:    Past Medical History:   Diagnosis Date    Anxiety     Depression     Hallucination     History of psychiatric hospitalization     Hx of psychiatric care     Psychiatric problem     Psychosis     Schizoaffective disorder     Sleep difficulties     Suicide attempt     Therapy        Past Surgical History:    No past surgical history on file.    Allergies:    Review of patient's allergies indicates:   Allergen Reactions    Haloperidol Swelling     Muscle stiffness  Has muscle spasms      Paroxetine Swelling, Other (See Comments) and Rash     "i don't remember"  "i don't remember"      Pineapple      Face swells up  Face swells up      Ziprasidone Other (See Comments)    "  Muscle twitching       Home Medications:    Prior to Admission medications    Medication Sig Start Date End Date Taking? Authorizing Provider   citalopram (CELEXA) 20 MG tablet Take 1 tablet (20 mg total) by mouth once daily. 6/28/22 6/28/23 Yes Alyce Camarillo MD   divalproex (DEPAKOTE) 500 MG TbEC Take 1 tablet (500 mg total) by mouth every 12 (twelve) hours. 6/28/22 6/28/23 Yes Alyce Camarillo MD   OLANZapine (ZYPREXA) 20 MG tablet Take 1 tablet (20 mg total) by mouth every evening. 6/28/22 6/28/23 Yes Alyce Camarillo MD   OXcarbazepine (TRILEPTAL) 150 MG Tab Take 1 tablet (150 mg total) by mouth 2 (two) times daily. 6/28/22 6/28/23 Yes Alyce Camraillo MD   QUEtiapine (SEROQUEL) 200 MG Tab Take 1 tablet (200 mg total) by mouth once daily. 6/28/22 6/28/23 Yes Alyce Camarillo MD   risperiDONE (RISPERDAL) 1 MG tablet Take 1 tablet (1 mg total) by mouth 2 (two) times daily. 6/28/22 6/28/23 Yes Alyce Camarillo MD   sertraline (ZOLOFT) 50 MG tablet Take 1 tablet (50 mg total) by mouth once daily. 6/28/22 6/28/23 Yes Alyce Camarillo MD   traZODone (DESYREL) 50 MG tablet Take 1 tablet (50 mg total) by mouth every evening. 6/28/22 6/28/23 Yes Alyce Camarillo MD   citalopram (CELEXA) 20 MG tablet Take 20 mg by mouth once daily. 2/28/22   Historical Provider   divalproex ER (DEPAKOTE) 500 MG Tb24 Take 500 mg by mouth 2 (two) times daily. 2/28/22   Historical Provider   OLANZapine (ZYPREXA) 20 MG tablet Take 20 mg by mouth nightly. 5/4/22   Historical Provider   OXcarbazepine (TRILEPTAL) 150 MG Tab Take 300 mg by mouth 2 (two) times daily. 5/4/22   Historical Provider   venlafaxine (EFFEXOR-XR) 150 MG Cp24 Take 150 mg by mouth once daily. 5/4/22   Historical Provider       Family History:    History reviewed. No pertinent family history.    Social History:    Social History     Tobacco Use    Smoking status: Current Every Day Smoker     Packs/day: 1.00     "Smokeless tobacco: Never Used   Substance Use Topics    Alcohol use: Yes     Alcohol/week: 4.0 standard drinks     Types: 4 Cans of beer per week     Comment: uses alcohol monthly    Drug use: Yes     Types: Marijuana           Immunizations:C  Currently on File with [facility]:   There is no immunization history on file for this patient.    Objective:    Vitals:    Vitals:    07/02/22 1730 07/03/22 0640   BP: 119/74 138/71   Pulse: 87 85   Resp: 20 18   Temp: 97.7 °F (36.5 °C) 97.7 °F (36.5 °C)   TempSrc:  Oral   SpO2: 100% 99%   Weight: 67.4 kg (148 lb 9.4 oz)    Height: 5' 9" (1.753 m)        IOs:    No intake or output data in the 24 hours ending 07/03/22 0836    No intake/output data recorded.    Physical Examination:    GEN: alert and oriented to person, place, and time, No obvious distress,   conversant  Head: normal cephalic, atraumatic, no lacerations, bruising, hematoma  Eyes: PERRL, extra ocular movements intact,  conjunctivae pink/moist, no sclera icterus  Ears: TM normal, no bulging or drainage.  No mastoid tenderness. No erythema  Neck:Supple. No bruits. No jugular venous distension. No lymphadenopathy.   CV: regular rate rhythm, no murmurs, rubs, gallops,     RESP: lungs clear to auscultation, no wheezes or crackles, rales. Normal percussion, no dullness. trachea midline     ABD: soft, nontender, non distended, positive bowel sounds, no organomegaly, no scars    EXT: Pulses +2 upper and lower extremity, no edema, no cyanosis, capillary refil < 3 seconds     NEURO: Cranial nerves 2-12 grossly intact. Motor 5/5 strength. Sensation to light touch wnl. Reflexes + 2 upper extremities and lower extremities     SKIN:No rashes or lesions. No ulcer. Normal temperature     PSYCH: Normal affect. Normal mood. Approriate       Review of Systems:    Constitutional - no lethargy, night sweats, or weight loss   Eyes - no blurry vision, redness, or pain   Ears, Nose, Mouth, Throat - no ear pain, nasal " congestion, runny nose, or dysphagia  Cardiovascular - no palpitations, or syncope, no chest pain  Respiratory - no trouble breathing, or wheezing   Gastrointestinal - no abdominal pain, no nausea, no vomiting   Genitourinary - no trouble urinating, no dysuria   Musculoskeletal - no joint swelling, redness, no joint pain  Integumentary (skin and/or breast)  Neurological - no confusion, memory loss, no arm and leg weakness  Psychiatric - no depression, no suicidial ideation   Endocrine - no sweating, tremors,   Hematologic/Lymphatic - no lymph node swelling        Laboratory:    Laboratory Data Reviewed: Yes    Microbiology Data Reviewed: Yes    Imaging personally reviewed    Tests ordered    Chart reviewed    Recent Labs   Lab 06/27/22  0520 06/28/22  0949 07/02/22  0819 07/02/22  2125   WBC 9.6 6.8 7.3 6.4   HGB 15.8 15.6 13.6* 13.4*    317 335 331    140 139 142   K 3.6 4.2 4.3 4.3   CO2 29 29 30* 29   BUN 5.9* 10.2 7.6* 15.0   CALCIUM 10.3* 10.0 9.4 9.2   ALBUMIN 4.4 4.2 3.7 3.6   BILITOT 0.5 0.3 0.4 0.2   AST 19 21 22 20   ALT 30 32 35 30   TROPONINI  --   --   --  <0.010       Microbiology Data:        EKG (my interpretation):    Radiology:    X-Ray Lumbar Spine Ap And Lateral    Result Date: 6/7/2022  EXAMINATION: XR LUMBAR SPINE AP AND LATERAL CLINICAL HISTORY: Back pain; TECHNIQUE: 2 or 3 views of the lumbar spine were performed. COMPARISON: None. FINDINGS: BONES: Vertebral body heights are maintained. Alignment is normal.  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 DISCS: Disc heights are preserved. SOFT TISSUES: Regional soft tissues unremarkable.     No appreciable acute osseous abnormality by plain radiographic evaluation. Electronically signed by: Muna Pascual Date:    06/07/2022 Time:    11:22    X-Ray Humerus 2 View Left    Result Date: 6/13/2022  EXAMINATION: XR HUMERUS 2 VIEW LEFT CLINICAL HISTORY: trauma;Rhabdomyolysis COMPARISON: None. FINDINGS: No acute displaced  fractures or dislocations. Joint spaces preserved. No blastic or lytic lesions. Soft tissues within normal limits.     No acute osseous abnormality. Electronically signed by: Beto Cam Date:    06/13/2022 Time:    08:55    CV Ultrasound doppler venous arm left    Result Date: 6/14/2022  There was no evidence of deep or superficial vein thrombosis in the visualized veins of the left upper extremity.    CT Arm (Humerus) Without Contrast Left    Result Date: 6/13/2022  EXAMINATION: CT FOREARM (RADIUS/ULNA) WITHOUT CONTRAST LEFT; CT ARM (HUMERUS) WITHOUT CONTRAST LEFT CLINICAL HISTORY: singificant arm swelling;; significant arm swelling; TECHNIQUE: Helical acquisition through the left upper extremity from the shoulder through the proximal phalanges.  Three plane reconstructions were provided for review.  mGycm. Automatic exposure control, adjustment of mA/kV or iterative reconstruction technique was used to reduce radiation. COMPARISON: Radiographs same day FINDINGS: Motion degraded scan. Allowing for motion no fracture or dislocation is evident. There is limited evaluation of the soft tissues without contrast.  There is some nonspecific subcutaneous edema.  No focal drainable fluid collection is seen.  No free air.     1. Motion degraded scan. 2. No fracture or dislocation. 3. Nonspecific subcutaneous edema. Electronically signed by: Zachariah Pineda Date:    06/13/2022 Time:    17:08    CT Forearm (Radius/Ulna) Without Contrast Left    Result Date: 6/13/2022  EXAMINATION: CT FOREARM (RADIUS/ULNA) WITHOUT CONTRAST LEFT; CT ARM (HUMERUS) WITHOUT CONTRAST LEFT CLINICAL HISTORY: singificant arm swelling;; significant arm swelling; TECHNIQUE: Helical acquisition through the left upper extremity from the shoulder through the proximal phalanges.  Three plane reconstructions were provided for review.  mGycm. Automatic exposure control, adjustment of mA/kV or iterative reconstruction technique was used to reduce  radiation. COMPARISON: Radiographs same day FINDINGS: Motion degraded scan. Allowing for motion no fracture or dislocation is evident. There is limited evaluation of the soft tissues without contrast.  There is some nonspecific subcutaneous edema.  No focal drainable fluid collection is seen.  No free air.     1. Motion degraded scan. 2. No fracture or dislocation. 3. Nonspecific subcutaneous edema. Electronically signed by: Zachariah Pineda Date:    06/13/2022 Time:    17:08      ?    Assessment:    Elvis Puentes is a 36 y.o. male with:    Present on Admission:   Schizophrenia  #Rhabdo  # Chest pain    Plan:    - No clear etiology of rhabdo, most likely dehydration and psychotic agitation versus less likely  versus substance induced (?synthetic marijuana?). No stimulants, heroin noted on UDS (dating back 2 years) or medication induced but no obvious culprit, no statin use     - CK trended to normal    - Chest pain non cardiac, troponin negative and EKG reassuring (ealry repolarization common finding in young thing african americans). D-dimer pending. Consider workup as outpatient pending course                Young Maurer
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
## 2022-07-03 NOTE — NURSING
"At approximately 20:00 pt c/o chest pain. V/S obtained. WP=033/65, HR=88. Pt denies indigestion/heart burn. Describes as "chest pain". NOREEN Espinoza NP notified and order for EKG obtained. EKG performed at approximately 20:10. Results= Sinus Rhythm with marked sinus arrhythmia ST elevation, probably due to repolarization- borderline EKG. NOREEN Espinoza NP notified of results and requested medical be contacted. Spoke with Dr. Maurer, on call. Order received for STAT CBC, CMP, CPK, Triponin, D-Dimer, and to repeat EKG in 30 minutes. Labs ordered and called into laboratory to inform of STAT orders. EKG ordered. Will monitor closely.  "

## 2022-07-03 NOTE — NURSING
"Awake alert and oriented. Paranoid, "I'm afraid to lose my mom". " I hate death". Denies SI now. Admits to hearing voices, " My dead relatives talk to me". Denies SI now. "I want to meet God's requirements to go to heaven. Anxious and preoccupied with smoking, has a nicotine patch. Q 15 min safety checks with suicide precautions. Will monitor mood and behavior and offer emotional support.  "

## 2022-07-03 NOTE — GROUP NOTE
Nursing Group      Group Focus: Reminiscing      Number of patients in attendance: 9    Group Start Time: 1330  Group End Time:  1415  Groups Date: 7/3/2022  Group Topic:  Behavioral Health  Group Department: [facility]  Group Facilitators:  Hugh Drew LPN  _____________________________________________________________________    Patient Name: Elvis Puentes  MRN: [id number]  Patient Class: IP- Psych   Admission Date\Time: 7/2/2022  5:15 PM  Hospital Length of Stay: 1  Primary Care Provider: Primary Doctor No     Referred by: Behavioral Medicine Unit Treatment Team     Target symptoms: Psychosis     Patient's response to treatment: Active Listening and Self-disclosure     Progress toward goals: Progressing adequately     Interval History: Participated with good interaction     Diagnosis: Schizophrenia     Plan: Continue treatment on BMU
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
## 2022-07-04 PROCEDURE — 11400000 HC PSYCH PRIVATE ROOM

## 2022-07-04 PROCEDURE — 25000003 PHARM REV CODE 250: Performed by: PSYCHIATRY & NEUROLOGY

## 2022-07-04 PROCEDURE — S4991 NICOTINE PATCH NONLEGEND: HCPCS | Performed by: PSYCHIATRY & NEUROLOGY

## 2022-07-04 RX ADMIN — IBUPROFEN 400 MG: 400 TABLET, FILM COATED ORAL at 08:07

## 2022-07-04 RX ADMIN — NICOTINE 1 PATCH: 21 PATCH, EXTENDED RELEASE TRANSDERMAL at 08:07

## 2022-07-04 RX ADMIN — HYDROXYZINE PAMOATE 50 MG: 50 CAPSULE ORAL at 09:07

## 2022-07-04 RX ADMIN — QUETIAPINE FUMARATE 200 MG: 200 TABLET ORAL at 08:07

## 2022-07-04 RX ADMIN — MUPIROCIN: 20 OINTMENT TOPICAL at 08:07

## 2022-07-04 RX ADMIN — HYDROXYZINE PAMOATE 50 MG: 50 CAPSULE ORAL at 02:07

## 2022-07-04 RX ADMIN — SERTRALINE HYDROCHLORIDE 50 MG: 50 TABLET ORAL at 08:07

## 2022-07-04 NOTE — GROUP NOTE
Nursing Group      Group Focus: Life Skills      Number of patients in attendance: 7    Group Start Time: 0845  Group End Time:  0930  Groups Date: 7/4/2022  Group Topic:  Behavioral Health  Group Department: [facility]  Group Facilitators:  Lelo Guzmán RN  _____________________________________________________________________    Patient Name: Elvis Puentes  MRN: [id number]  Patient Class: IP- Psych   Admission Date\Time: 7/2/2022  5:15 PM  Hospital Length of Stay: 2  Primary Care Provider: Primary Doctor No     Referred by: Behavioral Medicine Unit Treatment Team     Target symptoms: Poor Coping Skills     Patient's response to treatment: Self-disclosure     Progress toward goals: Progressing slowly     Interval History: Participation was good;  Able to give meaningful disclosure, but became religiously preoccupied and delusional/paranoid.      Diagnosis: Schizophrenia     Plan: Continue treatment on BMU

## 2022-07-04 NOTE — NURSING
"Vistaril 50mg po administered per Pt c/o "My nerves are bad cause I can't smoke here."  Q 15 min safety checks in progress.    "

## 2022-07-04 NOTE — NURSING
Elvis denies SI and contracts for safety. Admits AV. Sees and speaks to  sister. Visible on unit. (+) interaction, and appetite. Broken sleep. Recently out of bed for something to eat. Requesting to read book. Encouraged to attempt to sleep. Relaxation techniques reinforced. Paranoia regarding mother dying and not having someone to help take care of him. He is pleasant and cooperative. Increased interaction with peers. Frequently interacts with staff. Faith delusions. Q15 observations maintained for suicide. Fall precautions initiated due to (+) fall risk assessment tonight due to medications. Encouragement given. Reduirection  provided. Q15 observations maintained for safety, suicide, and fall precautions.Will continue to monitor closely.

## 2022-07-05 PROCEDURE — S4991 NICOTINE PATCH NONLEGEND: HCPCS | Performed by: PSYCHIATRY & NEUROLOGY

## 2022-07-05 PROCEDURE — 11400000 HC PSYCH PRIVATE ROOM

## 2022-07-05 PROCEDURE — 25000003 PHARM REV CODE 250: Performed by: PSYCHIATRY & NEUROLOGY

## 2022-07-05 PROCEDURE — 25000003 PHARM REV CODE 250: Performed by: NURSE PRACTITIONER

## 2022-07-05 RX ORDER — QUETIAPINE FUMARATE 300 MG/1
300 TABLET, FILM COATED ORAL NIGHTLY
Status: DISCONTINUED | OUTPATIENT
Start: 2022-07-05 | End: 2022-07-09

## 2022-07-05 RX ORDER — QUETIAPINE FUMARATE 25 MG/1
25 TABLET, FILM COATED ORAL 2 TIMES DAILY PRN
Status: DISCONTINUED | OUTPATIENT
Start: 2022-07-05 | End: 2022-07-06

## 2022-07-05 RX ADMIN — QUETIAPINE FUMARATE 300 MG: 300 TABLET ORAL at 08:07

## 2022-07-05 RX ADMIN — ACETAMINOPHEN 650 MG: 325 TABLET, FILM COATED ORAL at 05:07

## 2022-07-05 RX ADMIN — NICOTINE 1 PATCH: 21 PATCH, EXTENDED RELEASE TRANSDERMAL at 08:07

## 2022-07-05 RX ADMIN — QUETIAPINE FUMARATE 25 MG: 25 TABLET ORAL at 08:07

## 2022-07-05 RX ADMIN — IBUPROFEN 400 MG: 400 TABLET, FILM COATED ORAL at 08:07

## 2022-07-05 RX ADMIN — SERTRALINE HYDROCHLORIDE 50 MG: 50 TABLET ORAL at 08:07

## 2022-07-05 NOTE — NURSING
Patient did not sleep well.  Patient has been up since 0030am.  Patient is childlike, attention seeking and needy. Patient stated he punched the wall and the wall did not move.  Patient has not scratches, scrapes, bruises or contusions to hand.  Patient able to curl fingers into a tight fist with both hands without any pain and then open them without pain.   Patient confabulates at times and his account of what happened may be inaccurate. No acute distress noted.  Will continue to monitor closely and provide emotional support.

## 2022-07-05 NOTE — NURSING
Patient is awake and alert on the unit.  Patient has constricted affect and labile mood.  Patient uses profanity and requires redirection.  Patient is impulsive but later apologetic for his behavior.  Patient is childlike and suspicious of others. Patient admits to hearing voices.  No acute distress noted.  Will continue to monitor closely and provide emotional support.

## 2022-07-05 NOTE — PROGRESS NOTES
"7/5/2022 7:55 AM   Elvis Puentes   1985   [id number]        Psychiatry Progress Note     SUBJECTIVE:   Elvis Puentes is a 36 y.o. male admitted for psychosis, depression and suicidal thoughts. He has chronic delusions regarding Christian. Speaks of numerous deaths in the family (none of which are new). His past medication list is extremely long and it appears that he was taking duplicate medications. Today he stated that he is "praying to God that we don't burn and that we go to [facility]". He had an increased amount of anxiety the past two days and required Vistaril both days. Nursing staff report an "adverse reaction" and stated that it was not calming at all. We discussed small doses of Seroquel to be used instead. He is focused on discharge and did not want to discuss anything else. He was easily agitated and his voice was loud with an aggressive tone. He very loudly stated "I need out of here now-I'm going to end up fighting staff. They are pointing their imaginary pistols at me. I'm not crazy anymore". I attempted to discuss with him the need to adjust the medications and he got up and left the interview and slammed the door very loudly. He proceeded to pace the halls threatening this provider as well as the staff nurse. We will increase his Seroquel as he is struggling with sleep as well as for his behavior.       Current Medications:   Scheduled Meds:    mupirocin   Nasal BID    nicotine  1 patch Transdermal Daily    QUEtiapine  200 mg Oral QHS    sertraline  50 mg Oral Daily      PRN Meds: acetaminophen, aluminum-magnesium hydroxide-simethicone, hydrOXYzine pamoate, ibuprofen, magnesium hydroxide 400 mg/5 ml   Psychotherapeutics (From admission, onward)            Start     Stop Route Frequency Ordered    07/03/22 2100  QUEtiapine tablet 200 mg         -- Oral Nightly 07/03/22 0835    07/03/22 0900  sertraline tablet 50 mg         -- Oral Daily 07/03/22 0835          Allergies:   Review of patient's " "allergies indicates:   Allergen Reactions    Haloperidol Swelling     Muscle stiffness  Has muscle spasms      Paroxetine Swelling, Other (See Comments) and Rash     "i don't remember"  "i don't remember"      Pineapple      Face swells up  Face swells up      Ziprasidone Other (See Comments)     Muscle twitching        OBJECTIVE:   Vitals   Vitals:    07/05/22 0631   BP: 112/69   Pulse: 94   Resp: 18   Temp: 98.1 °F (36.7 °C)        Labs/Imaging/Studies:   No results found for this or any previous visit (from the past 36 hour(s)).     Psychiatric Mental Status Exam:  General Appearance: dressed in hospital garb  Arousal: alert  Behavior: agitated  Movements and Motor Activity: no abnormal involuntary movements noted  Orientation: oriented to person, place, and time  Speech: rapid, pressured, and loud  Mood: Hostile, aggressive  Affect: labile  Thought Process: bizarre  Associations: no loosening of associations  Thought Content and Perceptions: no persecutory delusions  Recent and Remote Memory: intact  Attention and Concentration: easily distractible  Fund of Knowledge: aware of current events  Insight: impaired  Judgment: impaired    ASSESSMENT/PLAN:   Diagnosis:  SCHIZOPHRENIA AND OTHER PSYCHOTIC DISORDERS; Schziophrenia: Paranoid Type        Past Medical History:   Diagnosis Date    Anxiety     Depression     Hallucination     History of psychiatric hospitalization     Hx of psychiatric care     Psychiatric problem     Psychosis     Schizoaffective disorder     Sleep difficulties     Suicide attempt     Therapy         Plan:  Add Seroquel 25mg bid prn  Increase nighttime dose to 300mg    Expected Disposition Plan: Home        Jarrett QUEZADA PMHNP  Psychiatry  [facility]  "

## 2022-07-05 NOTE — PROGRESS NOTES
Spoke with pt's mother.  Updated her on progress.  She states he was supposed to have started treatment at [facility] outpatient today.

## 2022-07-05 NOTE — NURSING
"Administered Seroquel 25mg PO due to pt yelling, cursing, threatenng and slamming doors.   Pt took medication and stated,  "Thank you, I needed something form my anxiety."  Q 15min safety checks in progress.  "

## 2022-07-05 NOTE — PLAN OF CARE
Met for treatment team.  Pt became very loud, cursing, demanding, slammed door.  Demanding to leave today.  Attempts made by RNP to explain tx plan and current reasons for needing to stay to stabilize mood and psychosis as well as night time sleep.  Pt continued to escalate in anger toward staff and RNP.  He received an oral PRN dose of seroquel as ordered and has since calmed and is resting comfortably in bed.   Night time dosage of seroquel was increased.  Vistaril discontinued.
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
## 2022-07-06 PROCEDURE — 11400000 HC PSYCH PRIVATE ROOM

## 2022-07-06 PROCEDURE — 25000003 PHARM REV CODE 250: Performed by: PSYCHIATRY & NEUROLOGY

## 2022-07-06 PROCEDURE — 25000003 PHARM REV CODE 250: Performed by: NURSE PRACTITIONER

## 2022-07-06 PROCEDURE — S4991 NICOTINE PATCH NONLEGEND: HCPCS | Performed by: PSYCHIATRY & NEUROLOGY

## 2022-07-06 RX ORDER — QUETIAPINE FUMARATE 25 MG/1
25 TABLET, FILM COATED ORAL 2 TIMES DAILY
Status: DISCONTINUED | OUTPATIENT
Start: 2022-07-06 | End: 2022-07-09

## 2022-07-06 RX ADMIN — QUETIAPINE FUMARATE 25 MG: 25 TABLET ORAL at 08:07

## 2022-07-06 RX ADMIN — SERTRALINE HYDROCHLORIDE 50 MG: 50 TABLET ORAL at 08:07

## 2022-07-06 RX ADMIN — QUETIAPINE FUMARATE 300 MG: 300 TABLET ORAL at 08:07

## 2022-07-06 RX ADMIN — NICOTINE 1 PATCH: 21 PATCH, EXTENDED RELEASE TRANSDERMAL at 08:07

## 2022-07-06 RX ADMIN — QUETIAPINE FUMARATE 25 MG: 25 TABLET ORAL at 02:07

## 2022-07-06 NOTE — PROGRESS NOTES
"7/6/2022 8:36 AM   Elvis Puentes   1985   [id number]        Psychiatry Progress Note     SUBJECTIVE:   Elvis Puentes is a 36 y.o. male admitted for psychosis, depression, and suicidal thoughts. After his session with me on yesterday he actually called 911 and told them that he wanted to leave the hospital and that he was held against his will. Last night he reportedly was "killing demons" in the jennings with his "imaginary pistol". When he sat down today at the start of this interview he pointed his fingers at me (as if holding a gun) and acted as if he was pulling the trigger. He started the conversation with "I'm going from suicidal to homicidal if yall don't let me out of this facility. If it was a smoking place it would be ok but it's not so I want out". He was easily distracted during the interview session and exhibited poor concentration and focus. He did leave midway through. I explained the need to continue adjusting the medications due to his psychosis and his aggressive behavior. He remains at high risk and is a danger to himself and others.        Current Medications:   Scheduled Meds:    nicotine  1 patch Transdermal Daily    QUEtiapine  300 mg Oral QHS    sertraline  50 mg Oral Daily      PRN Meds: acetaminophen, aluminum-magnesium hydroxide-simethicone, ibuprofen, magnesium hydroxide 400 mg/5 ml, QUEtiapine   Psychotherapeutics (From admission, onward)            Start     Stop Route Frequency Ordered    07/05/22 2100  QUEtiapine tablet 300 mg         -- Oral Nightly 07/05/22 0805    07/05/22 0909  QUEtiapine tablet 25 mg         -- Oral 2 times daily PRN 07/05/22 0810    07/03/22 0900  sertraline tablet 50 mg         -- Oral Daily 07/03/22 0835          Allergies:   Review of patient's allergies indicates:   Allergen Reactions    Haloperidol Swelling     Muscle stiffness  Has muscle spasms      Paroxetine Swelling, Other (See Comments) and Rash     "i don't remember"  "i don't remember"   "    Pineapple      Face swells up  Face swells up      Ziprasidone Other (See Comments)     Muscle twitching        OBJECTIVE:   Vitals   Vitals:    07/06/22 0600   BP: (!) 122/59   Pulse: 91   Resp: 20   Temp: 97.5 °F (36.4 °C)        Labs/Imaging/Studies:   No results found for this or any previous visit (from the past 36 hour(s)).     Psychiatric Mental Status Exam:  General Appearance: dressed in hospital garb  Arousal: alert  Behavior: agitated, hostile  Movements and Motor Activity: no tics, no tremors, no akathisia, no dystonia, no evidence of tardive dyskinesia  Orientation: oriented to person, place, and time  Speech: loud  Mood: Hostile  Affect: mood-congruent  Thought Process: illogical  Associations: no loosening of associations  Thought Content and Perceptions: + persecutory delusions  Recent and Remote Memory: intact  Attention and Concentration: easily distractible  Fund of Knowledge: aware of current events, unaware of current events  Insight: impaired  Judgment: impaired    ASSESSMENT/PLAN:   Diagnosis:  SCHIZOPHRENIA AND OTHER PSYCHOTIC DISORDERS; Schziophrenia: Paranoid Type      Past Medical History:   Diagnosis Date    Anxiety     Depression     Hallucination     History of psychiatric hospitalization     Hx of psychiatric care     Psychiatric problem     Psychosis     Schizoaffective disorder     Sleep difficulties     Suicide attempt     Therapy        Plan:  Change the prn dose of Seroquel to routine  Monitor closely    Expected Disposition Plan: Home        Jarrett QUEZADA PMHNP  Psychiatry  [facility]

## 2022-07-06 NOTE — NURSING
"Pt got mad and threw Dinner plate in the garbage because he was served shrimp.  Pt cursing and demanding the kitchen be called to get him something else.  Staff advised Pt we would call the kitchen but the food choice he was requesting they may not have.  Pt continued cursing and yelling at staff, calling [facility]'s some "Bull dyke lesbians."   Pt. Then asked to leave dayroom as he was disturbing the other patients eating.  Pt stood up and threatened to "kill all staff in this hospital" and slammed his room door.    "

## 2022-07-06 NOTE — NURSING
Patient slept throughout the night.  No acute distress noted. Will continue to monitor closely and provide emotional support.

## 2022-07-06 NOTE — GROUP NOTE
Nursing Group      Group Focus: Relationship Dynamics      Number of patients in attendance: 6    Group Start Time: 1530  Group End Time:  1615  Groups Date: 7/6/2022  Group Topic:  Behavioral Health  Group Department: [facility]  Group Facilitators:  DAVID RODRÍGUEZ; Hugh Drew LPN  _____________________________________________________________________    Patient Name: Elvis Puentes  MRN: [id number]  Patient Class: IP- Psych   Admission Date\Time: 7/2/2022  5:15 PM  Hospital Length of Stay: 4  Primary Care Provider: Primary Doctor No     Referred by: Behavioral Medicine Unit Treatment Team     Target symptoms: Anxiety, Psychosis and Poor Coping Skills     Patient's response to treatment: Active Listening and Self-disclosure     Progress toward goals: Progressing adequately     Interval History: Participated with good interaction     Diagnosis: Schizophrenia     Plan: Continue treatment on BMU

## 2022-07-06 NOTE — GROUP NOTE
Group Psychotherapy       Group Focus: Promoting Healthy Lifestyles      Number of patients in attendance: 6    Group Start Time: 0900  Group End Time:  0945  Groups Date: 7/6/2022  Group Topic:  Behavioral Health  Group Department: [facility]  Group Facilitators:  Ankur Bullock LCSW  _____________________________________________________________________    Patient Name: Elvis Puentes  MRN: [id number]  Patient Class: IP- Psych   Admission Date\Time: 7/2/2022  5:15 PM  Hospital Length of Stay: 4  Primary Care Provider: Primary Doctor No     Referred by: Behavioral Medicine Unit Treatment Team     Target symptoms: Psychosis     Patient's response to treatment: Active Listening, Self-disclosure and Frequent Questions     Progress toward goals: Progressing adequately     Interval History: Group discussed living one's best life.  Pt spoke of scripture.     Diagnosis: CPS     Plan: Continue treatment on BMU

## 2022-07-06 NOTE — NURSING
"Awake alert and oriented. Irritable, angry. "I'm ready to go home, "I'm going from suicidal to homicidal". Bizarre, disorganized, paranoid. FOI,  Profane language, illogical. Walked out of office with NP, wanted to be discharge. He slept well last night, compliant with medications. Q 15 min safety checks. Will monitor mood and behavior, redirect and offer emotional support.  "

## 2022-07-06 NOTE — NURSING
Patient is awake and alert on the unit.  Patient has constricted affect and labile mood.  Patient is demanding, childlike, needy and attention seeking.  Patient admits to having irritability and anger.  Patient also admits to hearing voices.  No acute distress noted.  Will continue to monitor closely and provide emotional support.

## 2022-07-07 PROCEDURE — 25000003 PHARM REV CODE 250: Performed by: NURSE PRACTITIONER

## 2022-07-07 PROCEDURE — 25000003 PHARM REV CODE 250: Performed by: PSYCHIATRY & NEUROLOGY

## 2022-07-07 PROCEDURE — 11400000 HC PSYCH PRIVATE ROOM

## 2022-07-07 PROCEDURE — S4991 NICOTINE PATCH NONLEGEND: HCPCS | Performed by: PSYCHIATRY & NEUROLOGY

## 2022-07-07 RX ADMIN — NICOTINE 1 PATCH: 21 PATCH, EXTENDED RELEASE TRANSDERMAL at 08:07

## 2022-07-07 RX ADMIN — QUETIAPINE FUMARATE 25 MG: 25 TABLET ORAL at 02:07

## 2022-07-07 RX ADMIN — QUETIAPINE FUMARATE 300 MG: 300 TABLET ORAL at 08:07

## 2022-07-07 RX ADMIN — SERTRALINE HYDROCHLORIDE 50 MG: 50 TABLET ORAL at 08:07

## 2022-07-07 RX ADMIN — QUETIAPINE FUMARATE 25 MG: 25 TABLET ORAL at 08:07

## 2022-07-07 NOTE — PROGRESS NOTES
"7/7/2022 7:17 AM   Elvis Puentes   1985   [id number]        Psychiatry Progress Note     SUBJECTIVE:   Elvis Puentes is a 36 y.o. male admitted for psychosis, depression, and suicidal thoughts. Behavior and mood has started to improve. He apologized for his behavior on yesterday. Stated that the voiced were telling him to "hit them-just hit them". Reports having to "tell them no constantly". He did report that the increase in the daytime medication has helped tremendously. Stated "I used to hear voices all day and all night and they wouldn't stop, now it's about 20%". He is much more calm, less anxious, and much less agitated. I will monitor his response to the Seroquel this morning and if it doesn't make him tired I will try the 50mg tablet instead. He is in agreeance with this. Remains religiously preoccupied. Had his diet changed to No Pork and No Seafood "because of the Bible".        Current Medications:   Scheduled Meds:    nicotine  1 patch Transdermal Daily    QUEtiapine  25 mg Oral BID    QUEtiapine  300 mg Oral QHS    sertraline  50 mg Oral Daily      PRN Meds: acetaminophen, aluminum-magnesium hydroxide-simethicone, ibuprofen, magnesium hydroxide 400 mg/5 ml   Psychotherapeutics (From admission, onward)            Start     Stop Route Frequency Ordered    07/06/22 1400  QUEtiapine tablet 25 mg         -- Oral 2 times daily 07/06/22 0853    07/05/22 2100  QUEtiapine tablet 300 mg         -- Oral Nightly 07/05/22 0805    07/03/22 0900  sertraline tablet 50 mg         -- Oral Daily 07/03/22 0835          Allergies:   Review of patient's allergies indicates:   Allergen Reactions    Haloperidol Swelling     Muscle stiffness  Has muscle spasms      Paroxetine Swelling, Other (See Comments) and Rash     "i don't remember"  "i don't remember"      Pineapple      Face swells up  Face swells up      Ziprasidone Other (See Comments)     Muscle twitching        OBJECTIVE:   Vitals   Vitals:    " "07/07/22 0622   BP: (!) 105/54   Pulse: 98   Resp: 18   Temp: 97.7 °F (36.5 °C)        Labs/Imaging/Studies:   No results found for this or any previous visit (from the past 36 hour(s)).     Psychiatric Mental Status Exam:  General Appearance: appears stated age  Arousal: alert  Behavior: cooperative  Movements and Motor Activity: no tics, no tremors, no akathisia, no dystonia, no evidence of tardive dyskinesia  Orientation: oriented to person, place, and time  Speech: normal rate, normal rhythm, normal volume, normal tone  Mood: "labile but improving"  Affect: mood-congruent  Thought Process: goal-directed  Associations: no loosening of associations  Thought Content and Perceptions: + auditory hallucinations  Recent and Remote Memory: intact  Attention and Concentration: attentive to conversation  Fund of Knowledge: aware of current events  Insight: impaired  Judgment: impaired    ASSESSMENT/PLAN:   Diagnosis:  SCHIZOPHRENIA AND OTHER PSYCHOTIC DISORDERS; Schziophrenia: Paranoid Type        Past Medical History:   Diagnosis Date    Anxiety     Depression     Hallucination     History of psychiatric hospitalization     Hx of psychiatric care     Psychiatric problem     Psychosis     Schizoaffective disorder     Sleep difficulties     Suicide attempt     Therapy         Plan:  Consider increasing the daytime seroquel to 50mg    Expected Disposition Plan: Home        Jarrett QUEZADA PMHNP  Psychiatry  [facility]  "

## 2022-07-07 NOTE — NURSING
Pt is awake and alert, NAD noted, pt verbalized that he is doing good and did not need anything at this time. No hallucinations nor thoughts of SI expressed at this time.

## 2022-07-07 NOTE — GROUP NOTE
[facility] GROUP      Group Focus: Promoting Healthy Lifestyles      Number of patients in attendance: 4    Group Start Time: 1715  Group End Time:  1745  Groups Date: 7/7/2022  Group Topic:  Behavioral Health  Group Department: [facility]  Group Facilitators:  Jinny Anderson  _____________________________________________________________________    Patient Name: Elvis Puentes  MRN: [id number]  Patient Class: IP- Psych   Admission Date\Time: 7/2/2022  5:15 PM  Hospital Length of Stay: 5  Primary Care Provider: Primary Doctor No     Referred by: Behavioral Medicine Unit Treatment Team     Target symptoms: Mood Disorder     Patient's response to treatment: participating     Progress toward goals: Progressing adequately     Interval History: n/a     Diagnosis: Schizophrenia     Plan: Continue treatment on BMU

## 2022-07-07 NOTE — NURSING
"Awake alert and oriented. Slept well last night. No further outbursts of anger. Reports that the voices are "less". "They want me to fight, telling me to hurt people. Religiously preoccupied. Pt has been compliant with medications. Denies SI or HI. Q 15 min safety checks. Will monitor mood, behavior and response to medication changes.  "

## 2022-07-07 NOTE — GROUP NOTE
THERAPEUTIC ACTIVITY GROUP      Group Focus: Stress Management     Number of patients in attendance: 4    Group Start Time: 1230  Group End Time:  1315  Groups Date: 7/7/2022  Group Topic:  Behavioral Health  Group Department: [facility]  Group Facilitators:  WILDER Miller   _____________________________________________________________________    Patient Name: Elvis Puentes  MRN: [id number]  Patient Class: IP- Psych   Admission Date\Time: 7/2/2022  5:15 PM  Hospital Length of Stay: 5  Primary Care Provider: Primary Doctor No     Referred by: Behavioral Medicine Unit Treatment Team     Target symptoms: Anxiety and Poor Coping Skills     Patient's response to treatment: Active Listening and Self-disclosure     Progress toward goals: Progressing adequately     Interval History: Good Participation     Diagnosis: Schizophernia     Plan: Continue treatment on BMU

## 2022-07-07 NOTE — GROUP NOTE
Group Psychotherapy       Group Focus: Psychodynamic Group Psychotherapy      Number of patients in attendance: 5    Group Start Time: 0930  Group End Time:  1015  Groups Date: 7/7/2022  Group Topic:  Behavioral Health  Group Department: [facility]  Group Facilitators:  Ankur Bullock LCSW  _____________________________________________________________________    Patient Name: Elvis Puentes  MRN: [id number]  Patient Class: IP- Psych   Admission Date\Time: 7/2/2022  5:15 PM  Hospital Length of Stay: 5  Primary Care Provider: Primary Doctor No     Referred by: Behavioral Medicine Unit Treatment Team     Target symptoms: Psychosis     Patient's response to treatment: Active Listening and Self-disclosure     Progress toward goals: Progressing slowly     Interval History: Pt remains bizarre and delusional.  religiously preoccupied     Diagnosis: CPS     Plan: Continue treatment on BMU

## 2022-07-08 PROCEDURE — 25000003 PHARM REV CODE 250: Performed by: PSYCHIATRY & NEUROLOGY

## 2022-07-08 PROCEDURE — 25000003 PHARM REV CODE 250: Performed by: NURSE PRACTITIONER

## 2022-07-08 PROCEDURE — S4991 NICOTINE PATCH NONLEGEND: HCPCS | Performed by: PSYCHIATRY & NEUROLOGY

## 2022-07-08 PROCEDURE — 11400000 HC PSYCH PRIVATE ROOM

## 2022-07-08 RX ORDER — QUETIAPINE FUMARATE 300 MG/1
300 TABLET, FILM COATED ORAL NIGHTLY
Qty: 30 TABLET | Refills: 0 | Status: SHIPPED | OUTPATIENT
Start: 2022-07-08 | End: 2022-07-10 | Stop reason: HOSPADM

## 2022-07-08 RX ORDER — SERTRALINE HYDROCHLORIDE 50 MG/1
50 TABLET, FILM COATED ORAL DAILY
Qty: 30 TABLET | Refills: 0 | Status: SHIPPED | OUTPATIENT
Start: 2022-07-08 | End: 2022-08-07

## 2022-07-08 RX ORDER — QUETIAPINE FUMARATE 25 MG/1
25 TABLET, FILM COATED ORAL 2 TIMES DAILY
Qty: 60 TABLET | Refills: 0 | Status: SHIPPED | OUTPATIENT
Start: 2022-07-09 | End: 2022-07-10 | Stop reason: HOSPADM

## 2022-07-08 RX ADMIN — QUETIAPINE FUMARATE 25 MG: 25 TABLET ORAL at 01:07

## 2022-07-08 RX ADMIN — QUETIAPINE FUMARATE 300 MG: 300 TABLET ORAL at 08:07

## 2022-07-08 RX ADMIN — SERTRALINE HYDROCHLORIDE 50 MG: 50 TABLET ORAL at 08:07

## 2022-07-08 RX ADMIN — IBUPROFEN 400 MG: 400 TABLET, FILM COATED ORAL at 08:07

## 2022-07-08 RX ADMIN — QUETIAPINE FUMARATE 25 MG: 25 TABLET ORAL at 08:07

## 2022-07-08 RX ADMIN — NICOTINE 1 PATCH: 21 PATCH, EXTENDED RELEASE TRANSDERMAL at 08:07

## 2022-07-08 NOTE — SUBJECTIVE & OBJECTIVE
"Interval History: Patient was seen via video telemedicine and consented to the interview. The patient was located in [place], [place] and the provider was located in [place], [place].    He is jovial on the unit and has poor boundaries and is intrusive. No aggression on the unit. Planning on discharge home on Monday, 7/11/22. "I feel awesome." He is hopeful about defeating the defeating the Devil and no longer has si/hi. "I'm not suicidal right now." Says meds help his voices and he no longer has hallucinations. HE has residual Congregational delusions but likely at baseline NO side effects on meds. At baseline, discharge home 7/11/22.    Psychiatric Mental Status Exam:  General Appearance: appears stated age  Arousal: alert  Behavior: cooperative  Movements and Motor Activity: no tics, no tremors, no akathisia, no dystonia, no evidence of tardive dyskinesia  Orientation: oriented to person, place, and time  Speech: normal rate, normal rhythm, normal volume, normal tone  Mood: "awesome."  Affect: mood-congruent  Thought Process: goal-directed  Associations: no loosening of associations  Thought Content and Perceptions: HE has residual delusions but no hallucinations and no si/hi.  Recent and Remote Memory: intact  Attention and Concentration: attentive to conversation  Fund of Knowledge: aware of current events  Insight: impaired  Judgment: impaired    Family History    None       Tobacco Use    Smoking status: Current Every Day Smoker     Packs/day: 1.00    Smokeless tobacco: Never Used   Substance and Sexual Activity    Alcohol use: Yes     Alcohol/week: 4.0 standard drinks     Types: 4 Cans of beer per week     Comment: uses alcohol monthly    Drug use: Yes     Types: Marijuana    Sexual activity: Not Currently     Partners: Female     Psychotherapeutics (From admission, onward)                Start     Stop Route Frequency Ordered    07/06/22 1400  QUEtiapine tablet 25 mg         -- Oral 2 times daily 07/06/22 0853    " "07/05/22 2100  QUEtiapine tablet 300 mg         -- Oral Nightly 07/05/22 0805    07/03/22 0900  sertraline tablet 50 mg         -- Oral Daily 07/03/22 0835             Review of Systems  Objective:     Vital Signs (Most Recent):  Temp: 98.2 °F (36.8 °C) (07/08/22 0632)  Pulse: 99 (07/08/22 0632)  Resp: 18 (07/08/22 0632)  BP: (!) 133/58 (07/08/22 0632)  SpO2: 100 % (07/08/22 0632)   Vital Signs (24h Range):  Temp:  [98.2 °F (36.8 °C)-98.4 °F (36.9 °C)] 98.2 °F (36.8 °C)  Pulse:  [91-99] 99  Resp:  [18] 18  SpO2:  [100 %] 100 %  BP: (125-133)/(58-64) 133/58     Height: 5' 9" (175.3 cm)  Weight: 67.1 kg (147 lb 14.9 oz)  Body mass index is 21.85 kg/m².    No intake or output data in the 24 hours ending 07/08/22 1542    Physical Exam     Significant Labs: Last 72 Hours:   Recent Lab Results       None            Significant Imaging: I have reviewed all pertinent imaging results/findings within the past 24 hours.  "

## 2022-07-08 NOTE — ASSESSMENT & PLAN NOTE
Tolerating meds. Residual delusions but not acute danger to self or others. Plan for discharge home on Monday 7/11/22.

## 2022-07-08 NOTE — GROUP NOTE
Education      Group Focus: Offered group to increase interaction with peers.     Number of patients in attendance: 4    Group Start Time: 1000  Group End Time:  1045  Groups Date: 7/8/2022  Group Topic:  Behavioral Health  Group Department: [facility]  Group Facilitators:  Kassy Manjarrez LPN  _____________________________________________________________________    Patient Name: Elvis Puentes  MRN: [id number]  Patient Class: IP- Psych   Admission Date\Time: 7/2/2022  5:15 PM  Hospital Length of Stay: 6  Primary Care Provider: Primary Doctor No     Referred by: Behavioral Medicine Unit Treatment Team     Target symptoms: Psychosis     Patient's response to treatment: Childlike but good interaction with selected peers.      Progress toward goals: Progressing slowly     Interval History: cooperative     Diagnosis: schizophrenia     Plan: Continue treatment on BMU
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
## 2022-07-08 NOTE — NURSING
"Elvis stated that he had a "great" day today. Reported decreased AH. No RTIS noted. He denies SI and contracts for safety. No bx problems noted. Seroquel change today. Religiously preoccupied. No displays of anger. Pleasant, cooperative, and med compliant. Encouragement given. (+) coping discussed. Q15 observations maintained for safety, suicide, and fall precautions.  "

## 2022-07-08 NOTE — PROGRESS NOTES
Discharge Note;  Pt will be discharged on 7/11/22 and followup with [facility].  Pt had an appointment there on 7/5 but missed it as he was in hospital.  Sang would not reschedule so a new referral was sent.  Pt was cooperative with programming.  Pt denies HI/SI on discharge.  All referrals and d/c orders  Will be faxed to provider.

## 2022-07-09 PROCEDURE — S4991 NICOTINE PATCH NONLEGEND: HCPCS | Performed by: PSYCHIATRY & NEUROLOGY

## 2022-07-09 PROCEDURE — 11400000 HC PSYCH PRIVATE ROOM

## 2022-07-09 PROCEDURE — 25000003 PHARM REV CODE 250: Performed by: NURSE PRACTITIONER

## 2022-07-09 PROCEDURE — 25000003 PHARM REV CODE 250: Performed by: PSYCHIATRY & NEUROLOGY

## 2022-07-09 RX ORDER — QUETIAPINE FUMARATE 100 MG/1
100 TABLET, FILM COATED ORAL DAILY
Status: DISCONTINUED | OUTPATIENT
Start: 2022-07-10 | End: 2022-07-11 | Stop reason: HOSPADM

## 2022-07-09 RX ORDER — QUETIAPINE FUMARATE 200 MG/1
400 TABLET, FILM COATED ORAL NIGHTLY
Status: DISCONTINUED | OUTPATIENT
Start: 2022-07-09 | End: 2022-07-11 | Stop reason: HOSPADM

## 2022-07-09 RX ORDER — QUETIAPINE FUMARATE 100 MG/1
100 TABLET, FILM COATED ORAL NIGHTLY
Status: DISCONTINUED | OUTPATIENT
Start: 2022-07-09 | End: 2022-07-09

## 2022-07-09 RX ORDER — TRAZODONE HYDROCHLORIDE 50 MG/1
100 TABLET ORAL ONCE
Status: COMPLETED | OUTPATIENT
Start: 2022-07-09 | End: 2022-07-09

## 2022-07-09 RX ORDER — QUETIAPINE FUMARATE 25 MG/1
TABLET, FILM COATED ORAL
Status: CANCELLED | OUTPATIENT
Start: 2022-07-09

## 2022-07-09 RX ORDER — QUETIAPINE FUMARATE 100 MG/1
100 TABLET, FILM COATED ORAL ONCE
Status: COMPLETED | OUTPATIENT
Start: 2022-07-09 | End: 2022-07-09

## 2022-07-09 RX ADMIN — QUETIAPINE FUMARATE 25 MG: 25 TABLET ORAL at 08:07

## 2022-07-09 RX ADMIN — QUETIAPINE FUMARATE 100 MG: 100 TABLET ORAL at 10:07

## 2022-07-09 RX ADMIN — NICOTINE 1 PATCH: 21 PATCH, EXTENDED RELEASE TRANSDERMAL at 08:07

## 2022-07-09 RX ADMIN — SERTRALINE HYDROCHLORIDE 50 MG: 50 TABLET ORAL at 08:07

## 2022-07-09 RX ADMIN — TRAZODONE HYDROCHLORIDE 100 MG: 50 TABLET ORAL at 01:07

## 2022-07-09 RX ADMIN — QUETIAPINE FUMARATE 400 MG: 200 TABLET ORAL at 08:07

## 2022-07-09 NOTE — NURSING
Elvis is up early and has been intrusive, seeking staff constantly.  Rambling and childlike in his interactions.  Requires redirection often.  Energy is wnl, No physical complaints. Denies SI, HI and AVH.  Q 15 min checks continue for safety.

## 2022-07-09 NOTE — NURSING
Patient is awake and alert on the unit.  Patient has constricted affect and labile moods.  Patient has loud, pressured speech.  Patient is childlike, attention seeking and sexually inappropriate.  Patient admits to having irritability, drug cravings and trouble sleeping.  No acute distress noted.  Will continue to monitor closely and provide emotional support.
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
## 2022-07-09 NOTE — NURSING
Patient slept throughout the night. Patient required PRN for sleep because he was restless.  NO acute distress noted.  Will continue to monitor closely and provide emotional support.

## 2022-07-09 NOTE — HOSPITAL COURSE
"He was admitted to the psych unit and monitored for safety. He was started on seroquel and zoloft. HE was also given a nicotine patch. He tolerated the medication well. He continued to have psychotic features and seroquel was increased. His mood was elevated and immature at times. He was not aggressive. The hallucinations resolved. Mood stabilized to some degree. He was given a dose of trazodone for sleep. He began to deny si/hi. He was irritable at times and seroquel was increased to 100mg qam and 400mg qhs. He tolerated the meds. At the time of discharge, he had residual Jainism delusions but was at his baseline and stable for discharge home.    Psychiatric Mental Status Exam:  General Appearance: appears stated age  Arousal: alert  Behavior: cooperative  Movements and Motor Activity: no tics, no tremors, no akathisia, no dystonia, no evidence of tardive dyskinesia  Orientation: oriented to person, place, and time  Speech: normal rate, normal rhythm, normal volume, normal tone  Mood: "awesome."  Affect: mood-congruent  Thought Process: goal-directed  Associations: no loosening of associations  Thought Content and Perceptions: HE has residual delusions but no hallucinations and no si/hi.  Recent and Remote Memory: intact  Attention and Concentration: attentive to conversation  Fund of Knowledge: aware of current events  Insight: impaired  Judgment: impaired  "
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
## 2022-07-10 PROCEDURE — 25000003 PHARM REV CODE 250: Performed by: PSYCHIATRY & NEUROLOGY

## 2022-07-10 PROCEDURE — S4991 NICOTINE PATCH NONLEGEND: HCPCS | Performed by: PSYCHIATRY & NEUROLOGY

## 2022-07-10 PROCEDURE — 11400000 HC PSYCH PRIVATE ROOM

## 2022-07-10 RX ORDER — QUETIAPINE FUMARATE 400 MG/1
400 TABLET, FILM COATED ORAL NIGHTLY
Qty: 30 TABLET | Refills: 0 | Status: SHIPPED | OUTPATIENT
Start: 2022-07-10 | End: 2022-08-09

## 2022-07-10 RX ORDER — QUETIAPINE FUMARATE 100 MG/1
100 TABLET, FILM COATED ORAL DAILY
Qty: 30 TABLET | Refills: 0 | Status: SHIPPED | OUTPATIENT
Start: 2022-07-11 | End: 2022-08-10

## 2022-07-10 RX ADMIN — SERTRALINE HYDROCHLORIDE 50 MG: 50 TABLET ORAL at 08:07

## 2022-07-10 RX ADMIN — QUETIAPINE FUMARATE 100 MG: 100 TABLET ORAL at 08:07

## 2022-07-10 RX ADMIN — QUETIAPINE FUMARATE 400 MG: 200 TABLET ORAL at 08:07

## 2022-07-10 RX ADMIN — NICOTINE 1 PATCH: 21 PATCH, EXTENDED RELEASE TRANSDERMAL at 08:07

## 2022-07-10 NOTE — NURSING
Elvis has been much quieter today.  He is compliant with medications.  Denies SI, HI;  Denies AVH.  He has mostly been reading in his room today.  Did not attend group.  Staff continues with Q 15 minute and prn rounds with verbal interaction, reassurance and encouragement to shower today.  Appetite good, ate 100% of meals thus far today.   No physical complaints. VS wnl.

## 2022-07-10 NOTE — NURSING
Patient is awake and alert on the unit.  Patient has constricted affect and labile mood.  Patient is demanding, attention seeking, childlike and needy.  Patient is easily agitated but easier to redirect.  No acute distress noted.  Will continue to monitor closely and provide emotional support.

## 2022-07-10 NOTE — GROUP NOTE
Medication Education      Group Focus: Offered group to increase knowledge of meds, safety, side effects and importance of compliance.      Number of patients in attendance: 4    Group Start Time: 1015  Group End Time:  1100  Groups Date: 7/10/2022  Group Topic:  Behavioral Health  Group Department: [facility]  Group Facilitators:  Kassy Manjarrez LPN  _____________________________________________________________________    Patient Name: Elvis Puentes  MRN: [id number]  Patient Class: IP- Psych   Admission Date\Time: 7/2/2022  5:15 PM  Hospital Length of Stay: 8  Primary Care Provider: Primary Doctor No     Referred by: Behavioral Medicine Unit Treatment Team     Target symptoms: na     Patient's response to treatment: Did not attend     Progress toward goals: na     Interval History: na     Diagnosis: na     Plan: Continue treatment on BMU

## 2022-07-11 VITALS
SYSTOLIC BLOOD PRESSURE: 126 MMHG | DIASTOLIC BLOOD PRESSURE: 69 MMHG | TEMPERATURE: 98 F | OXYGEN SATURATION: 100 % | RESPIRATION RATE: 16 BRPM | BODY MASS INDEX: 21.59 KG/M2 | HEIGHT: 69 IN | HEART RATE: 92 BPM | WEIGHT: 145.75 LBS

## 2022-07-11 PROCEDURE — 25000003 PHARM REV CODE 250: Performed by: PSYCHIATRY & NEUROLOGY

## 2022-07-11 PROCEDURE — S4991 NICOTINE PATCH NONLEGEND: HCPCS | Performed by: PSYCHIATRY & NEUROLOGY

## 2022-07-11 RX ADMIN — NICOTINE 1 PATCH: 21 PATCH, EXTENDED RELEASE TRANSDERMAL at 08:07

## 2022-07-11 RX ADMIN — QUETIAPINE FUMARATE 100 MG: 100 TABLET ORAL at 08:07

## 2022-07-11 RX ADMIN — SERTRALINE HYDROCHLORIDE 50 MG: 50 TABLET ORAL at 08:07

## 2022-07-11 NOTE — NURSING
Patient is awake and alert on the unit.  Patient has constricted affect and labile mood. Patient is medication compliant and denies SI or HI.  Patient also denies having any hallucinations.  Patient is anxious and preoccupied with printing papers.  Patient is also attention seeking.  No acute distress noted.  Will continue to monitor closely and provide emotional support.
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
## 2022-08-10 ENCOUNTER — EXTERNAL HOME HEALTH (OUTPATIENT)
Dept: HOME HEALTH SERVICES | Facility: HOSPITAL | Age: 37
End: 2022-08-10

## 2022-09-19 NOTE — PROGRESS NOTES
Complex Case Management      Date/Time:  2022 9:59 AM    Method of communication with patient:phone    [address] [facility] ([facility]) contacted the patient by telephone to perform Ambulatory Care Coordination. Verified name and  (PHI) with patient as identifiers. Provided introduction to self, and explanation of the Ambulatory Care Manager's role. Reviewed most recent clinic visit w/ patient who verbalized understanding. Patient given an opportunity to ask questions. Top Challenges reviewed with the Provider   N/a     Hx of Abdominal adhesions, BPH, DDD, Depression, DM2, Anxiety, HLD, Htn, sciatica, MANISH, CKD  CCM recommended by PCP  States no return of sx after most recent ED visit. States doing ok  No other questions/issues    The patient agrees to contact the PCP office or the [facility] for questions related to their healthcare. Provided contact information for future reference. Disease Specific:   N/A    Home Health Active: No    DME Active: No    Barriers to care? none    Advance Care Planning:   Does patient have an Advance Directive:  not on file; education provided     Medication(s):   Medication reconciliation was not performed with patient, who verbalizes understanding of administration of home medications. There were no barriers to obtaining medications identified at this time. Referral to Pharm D needed: no     Current Outpatient Medications   Medication Sig    ondansetron hcl (Zofran) 4 mg tablet Take 1 Tablet by mouth every eight (8) hours as needed for Nausea.     lancets (TRUEplus Lancets) 33 gauge misc CHECK BLOOD SUGAR THREE TIMES DAILY    DropSafe Alcohol Prep Pads padm USE AS DIRECTED WHEN CHECKING BLOOD SUGAR THREE TIMES DAILY    furosemide (LASIX) 20 mg tablet TAKE 1 TABLET BY MOUTH TWICE DAILY    pramipexole (MIRAPEX) 0.75 mg tablet TAKE 3 TABLETS ONE TIME DAILY    metoprolol succinate (TOPROL-XL) 50 mg XL tablet TAKE 1 TABLET EVERY DAY    sildenafil citrate [facility] Medicine Wards Progress Note     Resident Team: [facility] Medicine List 1  Attending Physician: Lexi Veliz MD  Resident: Dr. Stovall      Subjective:      Brief HPI:  36 y.o.  male who with a history of schizophrenia presented to the ED via ambulance on 2022 from a psych facility in [place]. Pt stated he wanted to check himself in the unit because of feeling lonely and having hallucinations but they noticed his L arm being swollen and sent to ED for further evaluation. Pt states he was repeatedly lifting a 25 lbs weight with his L arm but denies any injuries. He also denies using any IV drugs. He denies pain to the arm, only reports swollen sensation.     In ED labs significant for CK of 70,392 with  and .  Admitted to IM services for management of rhabdomyolysis.     Interval History:  NAEO. Asking to leave.    Review of Systems:  See HPI     Objective:     Last 24 Hour Vital Signs:  BP  Min: 139/86  Max: 160/92  Temp  Av °F (36.7 °C)  Min: 97.7 °F (36.5 °C)  Max: 98.6 °F (37 °C)  Pulse  Av.5  Min: 74  Max: 95  Resp  Av  Min: 18  Max: 18  SpO2  Av.7 %  Min: 99 %  Max: 100 %  I/O last 3 completed shifts:  In: 1320 [P.O.:1320]  Out: -     Physical Examination:  General: appears well, in no acute distress   Eye: no scleral icterus   Respiratory: clear to auscultation bilaterally, nonlabored respirations   Cardiovascular: regular rate and rhythm without murmurs or gallops, no edema in bilateral lower extremities   Gastrointestinal: soft, non-tender, non-distended, bowel sounds present   Genitourinary: no suprapubic tenderness   Musculoskeletal: no gross deformities observed     Laboratory:  Most Recent Data:  CBC:   Lab Results   Component Value Date    WBC 9.6 2022    HGB 14.7 2022    HCT 47.8 2022     2022    MCV 90.0 2022    RDW 14.6 2022     WBC Differential:   Recent Labs   Lab 22  0121 22  0615  (VIAGRA) 100 mg tablet Take 1 Tablet by mouth as needed for Erectile Dysfunction (do not take more than 1 tab in 36 hour period). glipiZIDE (GLUCOTROL) 10 mg tablet TAKE 1 TABLET TWICE DAILY    amitriptyline (ELAVIL) 10 mg tablet TAKE 1 TABLET EVERY NIGHT    simvastatin (ZOCOR) 10 mg tablet TAKE 1 TABLET EVERY NIGHT    buPROPion SR (WELLBUTRIN SR) 150 mg SR tablet TAKE 1 TABLET EVERY DAY    montelukast (SINGULAIR) 10 mg tablet TAKE 1 TABLET EVERY DAY    dapagliflozin (FARXIGA) 10 mg tab tablet Take 1 Tablet by mouth daily. dulaglutide (Trulicity) 3 AT/8.4 mL pnij 3 mg by SubCUTAneous route every seven (7) days. alfuzosin SR (UROXATRAL) 10 mg SR tablet TAKE 1 TABLET BY MOUTH DAILY AFTER DINNER    gabapentin (NEURONTIN) 300 mg capsule Take 300 mg by mouth three (3) times daily. Taking as needed which was given from Dr Elizabeth Cordero in the past    dutasteride (AVODART) 0.5 mg capsule Take 1 Capsule by mouth daily (after dinner). testosterone cypionate (DEPOTESTOTERONE CYPIONATE) 200 mg/mL injection 0.5 mL by IntraMUSCular route every seven (7) days. Max Daily Amount: 100 mg.    glucose blood VI test strips (True Metrix Glucose Test Strip) strip TEST BLOOD SUGAR THREE TIMES DAILY    nitroglycerin (NITROSTAT) 0.4 mg SL tablet DISSOLVE ONE TABLET UNDER TONGUE AS NEEDED FOR CHEST PAIN EVERY 5 MINUTES FOR UP TO 3 DOSES    aspirin delayed-release 81 mg tablet Take 1 Tablet by mouth daily. solifenacin (VESICARE) 10 mg tablet Take 1 Tablet by mouth daily. Blood-Glucose Meter (True Metrix Glucose Meter) misc Check blood sugar 3 times daily    Cane maura by Does Not Apply route. cpap machine kit by Does Not Apply route. Blood-Glucose Meter monitoring kit Use to check blood sugar 3 times daily. Dx E11.9. Please dispense brand preferred by insurance. lancets misc Use to check blood sugar 3 times daily. Dx E11.9. Please dispense brand preferred by insurance.     folic acid/multivit-min/lutein (CENTRUM SILVER PO) 06/20/22  0445 06/21/22  0414 06/22/22  0800   WBC 7.9 8.2 8.2 7.3 9.6   HGB 12.1* 13.8* 13.2* 12.8* 14.7   HCT 37.1* 42.7 39.9* 38.7* 47.8    283 285 268 265   MCV 86.5 86.3 85.8 86.6 90.0     BMP:   Lab Results   Component Value Date     (H) 06/22/2022    K 3.7 06/22/2022    CO2 26 06/22/2022    BUN 6.0 (L) 06/22/2022    CREATININE 0.80 06/22/2022    CALCIUM 10.0 06/22/2022    MG 2.00 06/19/2022     LFTs:   Lab Results   Component Value Date    ALBUMIN 4.3 06/22/2022    BILITOT 0.7 06/22/2022    AST 40 (H) 06/22/2022    ALKPHOS 83 06/22/2022    ALT 45 06/22/2022       Current Medications:     Infusions:   lactated ringers 250 mL/hr at 06/21/22 1714        Scheduled:   enoxaparin  40 mg Subcutaneous Daily    nicotine  1 patch Transdermal Daily    OLANZapine  10 mg Oral QHS    OXcarbazepine  300 mg Oral BID    risperiDONE  2 mg Oral BID    venlafaxine  150 mg Oral Daily        PRN:  chlorproMAZINE, dextrose 10%, dextrose 10%, dextrose 50%, dextrose 50%, glucagon (human recombinant), glucose, glucose, labetalol, naloxone, OLANZapine, QUEtiapine      Assessment & Plan:     Non-traumatic Rhabdomyolysis-improving  L arm swelling- resolved  -likely secondary to repetitive weight lifting prior to presentation, leading to muscle injury  -Initial CK at 70,392--> 934 approx today  -continue maintenance IV  cc/hr  -US negative for DVT  -X-ray negative for any osseus abnormalities  -CT forearm and arm limited by motion but negative for any drainable fluid collection  -ortho consulted, no surgical intervention at this time, reported no concern for compartment syndrome at this time     Transaminitis - improving  -AST, ALT is 400, 104 on admission, downtrending  -likely related to rhabdo      Schizophrenia  -Per Dr. Charles recommendations:  -Trileptal 300mg BID, Venlafaxine  daily, Zyprexa 10mg qhs and Risperidone 2 mg BID  -Seroquel 200 mg nightly PRN insomnia   -for non redirectable aggitation     -1st choice zyprexa 10mg PO or IM q8 hrs PRN for nonredirectable agitation associated with breakthrough psychosis or miguel, use as first option, do not given ativan within 1 hr of Zyprexa   -2nd choice chlorpromazine 25mg PO or IM q6hrs PRN for nonredirectable agitation associated with breakthrough psychosis or miguel  -Pt is currently CEC'd (signed 6/15/22), continue 1:1 observation  -psychiatrist is following        CODE STATUS: FULL  Access: IV  Antibiotics: none  Diet: regular  DVT Prophylaxis: Lovenox 40 daily  GI Prophylaxis: none  Fluids:  cc/hr      Disposition: Continue with fluid resuscitation,  Repeat CPK in AM.     Whitney Stovall MD  [facility] HO-2     Take  by mouth.    lisinopriL (PRINIVIL, ZESTRIL) 20 mg tablet Take 1 Tab by mouth daily. Syringe, Disposable, 3 mL syrg Take as directed    Needle, Disp, 23 G 23 gauge x 1 1/4\" ndle Use to inject 0.5 ml every 7 days    Needle, Disp, 18 G 18 gauge x 1\" ndle Use to aspirate 0.5 ml every 7 days    Wheel Chair maura Assistance with ambulation, gait instability    polyethylene glycol (MIRALAX) 17 gram packet Take 1 Packet by mouth daily. Hold for loose stools (Patient taking differently: Take 17 g by mouth daily as needed. Hold for loose stools)    naloxone 4 mg/actuation spry 4 mg by Nasal route as needed. Back Brace misc 1 Device by Does Not Apply route daily as needed for Pain. One, lumbosacral orthosis/back brace with metal struts      Medically necessary     No current facility-administered medications for this visit.        [facility] follow up appointment(s):   Future Appointments   Date Time Provider Blair Ursula   10/7/2022 11:30 AM Nolvia Martínez DO [facility] BS AMB   1/2/2023 10:15 AM Mag Bowens MD [facility] BS AMB        Non-[facility] follow up appointment(s):      Goals Addressed                   This Visit's Progress     Attends follow up appointments on schedule        9/19/22 Patient will attend all scheduled appointments through 12/19/22       Knowledge and adherence of prescribed medication (ie. action, side effects, missed dose, etc.).        9/19/22 Pt will take all medications prescribed to be evaluated on each outreach through 12/19/22

## 2022-09-23 ENCOUNTER — HOSPITAL ENCOUNTER (EMERGENCY)
Facility: HOSPITAL | Age: 37
Discharge: PSYCHIATRIC OR SUBSTANCE USE FACILITY | End: 2022-09-24
Attending: STUDENT IN AN ORGANIZED HEALTH CARE EDUCATION/TRAINING PROGRAM
Payer: COMMERCIAL

## 2022-09-23 DIAGNOSIS — Z00.8 MEDICAL CLEARANCE FOR PSYCHIATRIC ADMISSION: Primary | ICD-10-CM

## 2022-09-23 DIAGNOSIS — F22 DELUSIONS: ICD-10-CM

## 2022-09-23 DIAGNOSIS — F20.9 SCHIZOPHRENIA, UNSPECIFIED TYPE: ICD-10-CM

## 2022-09-23 LAB
ALBUMIN SERPL-MCNC: 4.4 GM/DL (ref 3.5–5)
ALBUMIN/GLOB SERPL: 1.3 RATIO (ref 1.1–2)
ALP SERPL-CCNC: 89 UNIT/L (ref 40–150)
ALT SERPL-CCNC: 15 UNIT/L (ref 0–55)
AMPHET UR QL SCN: NEGATIVE
APAP SERPL-MCNC: <17.4 UG/ML (ref 17.4–30)
APPEARANCE UR: CLEAR
AST SERPL-CCNC: 21 UNIT/L (ref 5–34)
BACTERIA #/AREA URNS AUTO: ABNORMAL /HPF
BARBITURATE SCN PRESENT UR: NEGATIVE
BASOPHILS # BLD AUTO: 0.04 X10(3)/MCL (ref 0–0.2)
BASOPHILS NFR BLD AUTO: 0.4 %
BENZODIAZ UR QL SCN: POSITIVE
BILIRUB UR QL STRIP.AUTO: NEGATIVE MG/DL
BILIRUBIN DIRECT+TOT PNL SERPL-MCNC: 0.4 MG/DL
BUN SERPL-MCNC: 11 MG/DL (ref 8.9–20.6)
CALCIUM SERPL-MCNC: 10.1 MG/DL (ref 8.4–10.2)
CANNABINOIDS UR QL SCN: POSITIVE
CHLORIDE SERPL-SCNC: 104 MMOL/L (ref 98–107)
CO2 SERPL-SCNC: 22 MMOL/L (ref 22–29)
COCAINE UR QL SCN: NEGATIVE
COLOR UR AUTO: ABNORMAL
CREAT SERPL-MCNC: 1.53 MG/DL (ref 0.73–1.18)
EOSINOPHIL # BLD AUTO: 0.06 X10(3)/MCL (ref 0–0.9)
EOSINOPHIL NFR BLD AUTO: 0.5 %
ERYTHROCYTE [DISTWIDTH] IN BLOOD BY AUTOMATED COUNT: 14.3 % (ref 11.5–17)
ETHANOL SERPL-MCNC: <10 MG/DL
FENTANYL UR QL SCN: NEGATIVE
FLUAV AG UPPER RESP QL IA.RAPID: NOT DETECTED
FLUBV AG UPPER RESP QL IA.RAPID: NOT DETECTED
GFR SERPLBLD CREATININE-BSD FMLA CKD-EPI: 60 MLS/MIN/1.73/M2
GLOBULIN SER-MCNC: 3.4 GM/DL (ref 2.4–3.5)
GLUCOSE SERPL-MCNC: 77 MG/DL (ref 74–100)
GLUCOSE UR QL STRIP.AUTO: NORMAL MG/DL
GRAN CASTS #/AREA URNS LPF: ABNORMAL /LPF
HCT VFR BLD AUTO: 42.5 % (ref 42–52)
HGB BLD-MCNC: 13.9 GM/DL (ref 14–18)
HYALINE CASTS #/AREA URNS LPF: ABNORMAL /LPF
IMM GRANULOCYTES # BLD AUTO: 0.05 X10(3)/MCL (ref 0–0.04)
IMM GRANULOCYTES NFR BLD AUTO: 0.4 %
KETONES UR QL STRIP.AUTO: NEGATIVE MG/DL
LEUKOCYTE ESTERASE UR QL STRIP.AUTO: NEGATIVE UNIT/L
LYMPHOCYTES # BLD AUTO: 1.34 X10(3)/MCL (ref 0.6–4.6)
LYMPHOCYTES NFR BLD AUTO: 11.9 %
MCH RBC QN AUTO: 28.1 PG (ref 27–31)
MCHC RBC AUTO-ENTMCNC: 32.7 MG/DL (ref 33–36)
MCV RBC AUTO: 86 FL (ref 80–94)
MDMA UR QL SCN: NEGATIVE
MONOCYTES # BLD AUTO: 0.77 X10(3)/MCL (ref 0.1–1.3)
MONOCYTES NFR BLD AUTO: 6.9 %
MUCOUS THREADS URNS QL MICRO: ABNORMAL /LPF
NEUTROPHILS # BLD AUTO: 9 X10(3)/MCL (ref 2.1–9.2)
NEUTROPHILS NFR BLD AUTO: 79.9 %
NITRITE UR QL STRIP.AUTO: NEGATIVE
NRBC BLD AUTO-RTO: 0 %
OPIATES UR QL SCN: NEGATIVE
PCP UR QL: NEGATIVE
PH UR STRIP.AUTO: 6 [PH]
PH UR: 1 [PH] (ref 3–11)
PLATELET # BLD AUTO: 275 X10(3)/MCL (ref 130–400)
PMV BLD AUTO: 10.6 FL (ref 7.4–10.4)
POTASSIUM SERPL-SCNC: 4.3 MMOL/L (ref 3.5–5.1)
PROT SERPL-MCNC: 7.8 GM/DL (ref 6.4–8.3)
PROT UR QL STRIP.AUTO: ABNORMAL MG/DL
RBC # BLD AUTO: 4.94 X10(6)/MCL (ref 4.7–6.1)
RBC #/AREA URNS AUTO: ABNORMAL /HPF
RBC UR QL AUTO: NEGATIVE UNIT/L
SALICYLATES SERPL-MCNC: <5 MG/DL
SARS-COV-2 RNA RESP QL NAA+PROBE: NOT DETECTED
SODIUM SERPL-SCNC: 137 MMOL/L (ref 136–145)
SP GR UR STRIP.AUTO: 1.01
SPECIFIC GRAVITY, URINE AUTO (.000) (OHS): 6 (ref 1–1.03)
SQUAMOUS #/AREA URNS LPF: ABNORMAL /HPF
TSH SERPL-ACNC: 0.43 UIU/ML (ref 0.35–4.94)
UROBILINOGEN UR STRIP-ACNC: NORMAL MG/DL
WBC # SPEC AUTO: 11.2 X10(3)/MCL (ref 4.5–11.5)
WBC #/AREA URNS AUTO: ABNORMAL /HPF

## 2022-09-23 PROCEDURE — 80053 COMPREHEN METABOLIC PANEL: CPT | Performed by: STUDENT IN AN ORGANIZED HEALTH CARE EDUCATION/TRAINING PROGRAM

## 2022-09-23 PROCEDURE — 80143 DRUG ASSAY ACETAMINOPHEN: CPT | Performed by: STUDENT IN AN ORGANIZED HEALTH CARE EDUCATION/TRAINING PROGRAM

## 2022-09-23 PROCEDURE — 96372 THER/PROPH/DIAG INJ SC/IM: CPT | Performed by: STUDENT IN AN ORGANIZED HEALTH CARE EDUCATION/TRAINING PROGRAM

## 2022-09-23 PROCEDURE — 87636 SARSCOV2 & INF A&B AMP PRB: CPT | Performed by: STUDENT IN AN ORGANIZED HEALTH CARE EDUCATION/TRAINING PROGRAM

## 2022-09-23 PROCEDURE — 63600175 PHARM REV CODE 636 W HCPCS: Performed by: STUDENT IN AN ORGANIZED HEALTH CARE EDUCATION/TRAINING PROGRAM

## 2022-09-23 PROCEDURE — 36415 COLL VENOUS BLD VENIPUNCTURE: CPT | Performed by: STUDENT IN AN ORGANIZED HEALTH CARE EDUCATION/TRAINING PROGRAM

## 2022-09-23 PROCEDURE — 84443 ASSAY THYROID STIM HORMONE: CPT | Performed by: STUDENT IN AN ORGANIZED HEALTH CARE EDUCATION/TRAINING PROGRAM

## 2022-09-23 PROCEDURE — 85025 COMPLETE CBC W/AUTO DIFF WBC: CPT | Performed by: STUDENT IN AN ORGANIZED HEALTH CARE EDUCATION/TRAINING PROGRAM

## 2022-09-23 PROCEDURE — 80307 DRUG TEST PRSMV CHEM ANLYZR: CPT | Performed by: STUDENT IN AN ORGANIZED HEALTH CARE EDUCATION/TRAINING PROGRAM

## 2022-09-23 PROCEDURE — 82077 ASSAY SPEC XCP UR&BREATH IA: CPT | Performed by: STUDENT IN AN ORGANIZED HEALTH CARE EDUCATION/TRAINING PROGRAM

## 2022-09-23 PROCEDURE — 80179 DRUG ASSAY SALICYLATE: CPT | Performed by: STUDENT IN AN ORGANIZED HEALTH CARE EDUCATION/TRAINING PROGRAM

## 2022-09-23 PROCEDURE — 81001 URINALYSIS AUTO W/SCOPE: CPT | Mod: 59 | Performed by: STUDENT IN AN ORGANIZED HEALTH CARE EDUCATION/TRAINING PROGRAM

## 2022-09-23 PROCEDURE — 99285 EMERGENCY DEPT VISIT HI MDM: CPT | Mod: 25

## 2022-09-23 RX ORDER — MIDAZOLAM HYDROCHLORIDE 1 MG/ML
2 INJECTION INTRAMUSCULAR; INTRAVENOUS
Status: DISCONTINUED | OUTPATIENT
Start: 2022-09-23 | End: 2022-09-23

## 2022-09-23 RX ORDER — DIPHENHYDRAMINE HYDROCHLORIDE 50 MG/ML
50 INJECTION INTRAMUSCULAR; INTRAVENOUS
Status: COMPLETED | OUTPATIENT
Start: 2022-09-23 | End: 2022-09-23

## 2022-09-23 RX ORDER — MIDAZOLAM HYDROCHLORIDE 1 MG/ML
2 INJECTION INTRAMUSCULAR; INTRAVENOUS
Status: COMPLETED | OUTPATIENT
Start: 2022-09-23 | End: 2022-09-23

## 2022-09-23 RX ADMIN — DIPHENHYDRAMINE HYDROCHLORIDE 50 MG: 50 INJECTION INTRAMUSCULAR; INTRAVENOUS at 07:09

## 2022-09-23 RX ADMIN — MIDAZOLAM 2 MG: 1 INJECTION INTRAMUSCULAR; INTRAVENOUS at 07:09

## 2022-09-24 VITALS
BODY MASS INDEX: 22.79 KG/M2 | DIASTOLIC BLOOD PRESSURE: 69 MMHG | SYSTOLIC BLOOD PRESSURE: 139 MMHG | WEIGHT: 154.31 LBS | OXYGEN SATURATION: 99 % | RESPIRATION RATE: 18 BRPM | TEMPERATURE: 99 F | HEART RATE: 87 BPM

## 2022-09-24 NOTE — ED PROVIDER NOTES
"Encounter Date: 9/23/2022       History     Chief Complaint   Patient presents with    Psychiatric Evaluation     Pt brought in by [facility]. Family wants a psych eval. Pt hx of schizophrenia. Pt has been off meds. PT not making sense when triaging him.      37-year-old male presents to ED for psychiatric evaluation.  known schizophrenic.  recently noncompliant with his medications. actively hallucinating and talking about Harry and relationships and this other Jainism statements.  denies any self-harm. endorses smoking marijuana but otherwise no other drug or alcohol abuse.  No homicidal or suicidal ideations.  No physical complaints.  No other complaints or concerns.    Review of patient's allergies indicates:   Allergen Reactions    Haloperidol Swelling     Muscle stiffness  Has muscle spasms      Paroxetine Swelling, Other (See Comments) and Rash     "i don't remember"  "i don't remember"      Pineapple      Face swells up  Face swells up      Ziprasidone Other (See Comments)     Muscle twitching     Past Medical History:   Diagnosis Date    Anxiety     Depression     Hallucination     History of psychiatric hospitalization     Hx of psychiatric care     Psychiatric problem     Psychosis     Schizoaffective disorder     Sleep difficulties     Suicide attempt     Therapy      History reviewed. No pertinent surgical history.  History reviewed. No pertinent family history.  Social History     Tobacco Use    Smoking status: Every Day     Packs/day: 1.00     Types: Cigarettes    Smokeless tobacco: Never   Substance Use Topics    Alcohol use: Yes     Alcohol/week: 4.0 standard drinks     Types: 4 Cans of beer per week     Comment: uses alcohol monthly    Drug use: Yes     Types: Marijuana     Review of Systems   Constitutional:  Negative for chills and fever.   HENT:  Negative for congestion, rhinorrhea and sore throat.    Eyes:  Negative for pain, discharge and itching.   Respiratory:  Negative for chest tightness and " shortness of breath.    Cardiovascular:  Negative for chest pain and palpitations.   Gastrointestinal:  Negative for abdominal pain, nausea and vomiting.   Genitourinary:  Negative for dysuria and hematuria.   Musculoskeletal:  Negative for myalgias and neck pain.   Skin:  Negative for color change and rash.   Neurological:  Negative for dizziness, weakness and headaches.   Psychiatric/Behavioral:  Positive for hallucinations. Negative for confusion and suicidal ideas. The patient is hyperactive.      Physical Exam     Initial Vitals [09/23/22 1849]   BP Pulse Resp Temp SpO2   (!) 142/86 92 18 98.7 °F (37.1 °C) 100 %      MAP       --         Physical Exam    Constitutional: He appears well-developed and well-nourished. He is not diaphoretic. No distress.   HENT:   Head: Normocephalic and atraumatic.   Eyes: Conjunctivae and EOM are normal. Pupils are equal, round, and reactive to light.   Neck: Neck supple. No tracheal deviation present.   Normal range of motion.  Cardiovascular:  Normal rate, regular rhythm and normal heart sounds.           Pulmonary/Chest: Breath sounds normal. No respiratory distress.   Abdominal: Abdomen is soft. There is no abdominal tenderness. There is no rebound.   Musculoskeletal:         General: No tenderness. Normal range of motion.      Cervical back: Normal range of motion and neck supple.     Neurological: He is alert and oriented to person, place, and time. He has normal strength. GCS score is 15. GCS eye subscore is 4. GCS verbal subscore is 5. GCS motor subscore is 6.   Skin: Skin is warm and dry. Capillary refill takes less than 2 seconds. No rash noted.   Psychiatric: His behavior is normal. Judgment normal. His mood appears anxious. His speech is rapid and/or pressured. He is actively hallucinating. Thought content is delusional. Thought content is not paranoid. He expresses no homicidal and no suicidal ideation. He expresses no suicidal plans and no homicidal plans. He is  attentive.       ED Course   Critical Care    Date/Time: 9/23/2022 10:05 PM  Performed by: Willy Anaya MD  Authorized by: Willy Anaya MD   Total critical care time (exclusive of procedural time) : 30 minutes  Critical care time was exclusive of separately billable procedures and treating other patients.  Critical care was time spent personally by me on the following activities: evaluation of patient's response to treatment, obtaining history from patient or surrogate, ordering and review of laboratory studies, pulse oximetry, review of old charts, development of treatment plan with patient or surrogate, examination of patient, ordering and performing treatments and interventions, ordering and review of radiographic studies and re-evaluation of patient's condition.  Comments: Chemical sedation      Labs Reviewed   COMPREHENSIVE METABOLIC PANEL - Abnormal; Notable for the following components:       Result Value    Creatinine 1.53 (*)     All other components within normal limits   ACETAMINOPHEN LEVEL - Abnormal; Notable for the following components:    Acetaminophen Level <17.4 (*)     All other components within normal limits   CBC WITH DIFFERENTIAL - Abnormal; Notable for the following components:    Hgb 13.9 (*)     MCHC 32.7 (*)     MPV 10.6 (*)     IG# 0.05 (*)     All other components within normal limits   TSH - Normal   ALCOHOL,MEDICAL (ETHANOL) - Normal   COVID/FLU A&B PCR - Normal   CBC W/ AUTO DIFFERENTIAL    Narrative:     The following orders were created for panel order CBC auto differential.  Procedure                               Abnormality         Status                     ---------                               -----------         ------                     CBC with Differential[[id number]]        Abnormal            Final result                 Please view results for these tests on the individual orders.   SALICYLATE LEVEL   URINALYSIS, REFLEX TO URINE CULTURE   DRUG SCREEN, URINE (BEAKER)    EXTRA TUBES    Narrative:     The following orders were created for panel order EXTRA TUBES.  Procedure                               Abnormality         Status                     ---------                               -----------         ------                     Light Blue Top Hold[[id number]]                              In process                 Red Top Hold[[id number]]                                     In process                   Please view results for these tests on the individual orders.   LIGHT BLUE TOP HOLD   RED TOP HOLD          Imaging Results    None          Medications   diphenhydrAMINE injection 50 mg (50 mg Intramuscular Given 9/23/22 1938)   midazolam (VERSED) 1 mg/mL injection 2 mg (2 mg Intramuscular Given 9/23/22 1938)     Medical Decision Making:   Clinical Tests:   Lab Tests: Reviewed and Ordered  ED Management:  37-year-old male presents to ED with worsening hallucinations/delusions.  Known schizophrenic off medication.  Here saying random things and making bizarre statements.  Vitals grossly stable.  Patient's laboratory analysis is grossly unremarkable at this time is medically cleared for placement at 1st available facility.  Was PEC'd for patient safety.  Was given a chemical sedation secondary to the patient's agitation.  Never aggressive or dangers.  No other complaints or concerns at this time. (Jaclyn)                        Clinical Impression:   Final diagnoses:  [Z00.8] Medical clearance for psychiatric admission (Primary)  [F20.9] Schizophrenia, unspecified type  [F22] Delusions        ED Disposition Condition    Transfer to Psych Facility Stable          ED Prescriptions    None       Follow-up Information    None          Willy Anaya MD  09/23/22 0590

## 2022-10-02 ENCOUNTER — HOSPITAL ENCOUNTER (EMERGENCY)
Facility: HOSPITAL | Age: 37
Discharge: PSYCHIATRIC OR SUBSTANCE USE FACILITY | End: 2022-10-03
Attending: FAMILY MEDICINE
Payer: COMMERCIAL

## 2022-10-02 DIAGNOSIS — F22 PARANOID DELUSION: Primary | ICD-10-CM

## 2022-10-02 LAB
ALBUMIN SERPL-MCNC: 3.8 GM/DL (ref 3.5–5)
ALBUMIN/GLOB SERPL: 1.3 RATIO (ref 1.1–2)
ALP SERPL-CCNC: 71 UNIT/L (ref 40–150)
ALT SERPL-CCNC: 16 UNIT/L (ref 0–55)
AMPHET UR QL SCN: NEGATIVE
APPEARANCE UR: CLEAR
AST SERPL-CCNC: 17 UNIT/L (ref 5–34)
BACTERIA #/AREA URNS AUTO: ABNORMAL /HPF
BARBITURATE SCN PRESENT UR: NEGATIVE
BASOPHILS # BLD AUTO: 0.04 X10(3)/MCL (ref 0–0.2)
BASOPHILS NFR BLD AUTO: 0.6 %
BENZODIAZ UR QL SCN: POSITIVE
BILIRUB UR QL STRIP.AUTO: NEGATIVE MG/DL
BILIRUBIN DIRECT+TOT PNL SERPL-MCNC: 0.2 MG/DL
BUN SERPL-MCNC: 5.7 MG/DL (ref 8.9–20.6)
CALCIUM SERPL-MCNC: 9.1 MG/DL (ref 8.4–10.2)
CANNABINOIDS UR QL SCN: POSITIVE
CASTS URNS MICRO: ABNORMAL /LPF
CHLORIDE SERPL-SCNC: 109 MMOL/L (ref 98–107)
CO2 SERPL-SCNC: 24 MMOL/L (ref 22–29)
COCAINE UR QL SCN: NEGATIVE
COLOR UR AUTO: YELLOW
CREAT SERPL-MCNC: 1.1 MG/DL (ref 0.73–1.18)
EOSINOPHIL # BLD AUTO: 0.11 X10(3)/MCL (ref 0–0.9)
EOSINOPHIL NFR BLD AUTO: 1.7 %
ERYTHROCYTE [DISTWIDTH] IN BLOOD BY AUTOMATED COUNT: 14.3 % (ref 11.5–17)
ETHANOL SERPL-MCNC: <10 MG/DL
FENTANYL UR QL SCN: NEGATIVE
FLUAV AG UPPER RESP QL IA.RAPID: NOT DETECTED
FLUBV AG UPPER RESP QL IA.RAPID: NOT DETECTED
GFR SERPLBLD CREATININE-BSD FMLA CKD-EPI: >60 MLS/MIN/1.73/M2
GLOBULIN SER-MCNC: 2.9 GM/DL (ref 2.4–3.5)
GLUCOSE SERPL-MCNC: 85 MG/DL (ref 74–100)
GLUCOSE UR QL STRIP.AUTO: NORMAL MG/DL
HCT VFR BLD AUTO: 38 % (ref 42–52)
HGB BLD-MCNC: 12.4 GM/DL (ref 14–18)
HYALINE CASTS #/AREA URNS LPF: ABNORMAL /LPF
IMM GRANULOCYTES # BLD AUTO: 0.02 X10(3)/MCL (ref 0–0.04)
IMM GRANULOCYTES NFR BLD AUTO: 0.3 %
KETONES UR QL STRIP.AUTO: NEGATIVE MG/DL
LEUKOCYTE ESTERASE UR QL STRIP.AUTO: NEGATIVE UNIT/L
LYMPHOCYTES # BLD AUTO: 1.8 X10(3)/MCL (ref 0.6–4.6)
LYMPHOCYTES NFR BLD AUTO: 28.2 %
MCH RBC QN AUTO: 28.8 PG (ref 27–31)
MCHC RBC AUTO-ENTMCNC: 32.6 MG/DL (ref 33–36)
MCV RBC AUTO: 88.2 FL (ref 80–94)
MDMA UR QL SCN: NEGATIVE
MONOCYTES # BLD AUTO: 0.41 X10(3)/MCL (ref 0.1–1.3)
MONOCYTES NFR BLD AUTO: 6.4 %
MUCOUS THREADS URNS QL MICRO: ABNORMAL /LPF
NEUTROPHILS # BLD AUTO: 4 X10(3)/MCL (ref 2.1–9.2)
NEUTROPHILS NFR BLD AUTO: 62.8 %
NITRITE UR QL STRIP.AUTO: NEGATIVE
NRBC BLD AUTO-RTO: 0 %
OPIATES UR QL SCN: NEGATIVE
PCP UR QL: NEGATIVE
PH UR STRIP.AUTO: 6 [PH]
PH UR: 6 [PH] (ref 3–11)
PLATELET # BLD AUTO: 248 X10(3)/MCL (ref 130–400)
PLATELETS.RETICULATED NFR BLD AUTO: 5.1 % (ref 0.9–11.2)
PMV BLD AUTO: 11 FL (ref 7.4–10.4)
POTASSIUM SERPL-SCNC: 4.1 MMOL/L (ref 3.5–5.1)
PROT SERPL-MCNC: 6.7 GM/DL (ref 6.4–8.3)
PROT UR QL STRIP.AUTO: NEGATIVE MG/DL
RBC # BLD AUTO: 4.31 X10(6)/MCL (ref 4.7–6.1)
RBC #/AREA URNS AUTO: ABNORMAL /HPF
RBC UR QL AUTO: NEGATIVE UNIT/L
SARS-COV-2 RNA RESP QL NAA+PROBE: NOT DETECTED
SODIUM SERPL-SCNC: 142 MMOL/L (ref 136–145)
SP GR UR STRIP.AUTO: 1.02
SPECIFIC GRAVITY, URINE AUTO (.000) (OHS): 1.02 (ref 1–1.03)
SQUAMOUS #/AREA URNS LPF: ABNORMAL /HPF
UROBILINOGEN UR STRIP-ACNC: NORMAL MG/DL
WBC # SPEC AUTO: 6.4 X10(3)/MCL (ref 4.5–11.5)
WBC #/AREA URNS AUTO: ABNORMAL /HPF

## 2022-10-02 PROCEDURE — 99285 EMERGENCY DEPT VISIT HI MDM: CPT

## 2022-10-02 PROCEDURE — 80307 DRUG TEST PRSMV CHEM ANLYZR: CPT | Performed by: FAMILY MEDICINE

## 2022-10-02 PROCEDURE — 96372 THER/PROPH/DIAG INJ SC/IM: CPT | Performed by: FAMILY MEDICINE

## 2022-10-02 PROCEDURE — 85025 COMPLETE CBC W/AUTO DIFF WBC: CPT | Performed by: FAMILY MEDICINE

## 2022-10-02 PROCEDURE — 25000003 PHARM REV CODE 250: Performed by: FAMILY MEDICINE

## 2022-10-02 PROCEDURE — 80053 COMPREHEN METABOLIC PANEL: CPT | Performed by: FAMILY MEDICINE

## 2022-10-02 PROCEDURE — 93005 ELECTROCARDIOGRAM TRACING: CPT

## 2022-10-02 PROCEDURE — 81001 URINALYSIS AUTO W/SCOPE: CPT | Performed by: FAMILY MEDICINE

## 2022-10-02 PROCEDURE — S0166 INJ OLANZAPINE 2.5MG: HCPCS | Performed by: FAMILY MEDICINE

## 2022-10-02 PROCEDURE — 63600175 PHARM REV CODE 636 W HCPCS: Performed by: FAMILY MEDICINE

## 2022-10-02 PROCEDURE — 36415 COLL VENOUS BLD VENIPUNCTURE: CPT | Performed by: FAMILY MEDICINE

## 2022-10-02 PROCEDURE — 0241U COVID/FLU A&B PCR: CPT | Performed by: FAMILY MEDICINE

## 2022-10-02 PROCEDURE — 82077 ASSAY SPEC XCP UR&BREATH IA: CPT | Performed by: FAMILY MEDICINE

## 2022-10-02 RX ORDER — MIDAZOLAM HYDROCHLORIDE 1 MG/ML
5 INJECTION INTRAMUSCULAR; INTRAVENOUS
Status: COMPLETED | OUTPATIENT
Start: 2022-10-02 | End: 2022-10-02

## 2022-10-02 RX ORDER — OLANZAPINE 10 MG/2ML
5 INJECTION, POWDER, FOR SOLUTION INTRAMUSCULAR ONCE AS NEEDED
Status: COMPLETED | OUTPATIENT
Start: 2022-10-02 | End: 2022-10-02

## 2022-10-02 RX ADMIN — MIDAZOLAM 5 MG: 1 INJECTION INTRAMUSCULAR; INTRAVENOUS at 03:10

## 2022-10-02 RX ADMIN — OLANZAPINE 5 MG: 10 INJECTION, POWDER, FOR SOLUTION INTRAMUSCULAR at 02:10

## 2022-10-02 NOTE — ED PROVIDER NOTES
Name: Elvis Puentes   Age: 37 y.o.  Sex: male    Chief complaint:   Chief Complaint   Patient presents with    Psychiatric Evaluation     Mother called [facility] due to patient walking in and out of traffic with hyper Restorationist ideation. Pt denies SI,HI,a/v hallucination.       Patient arrived with: EMS  History obtained from: Patient and Family    Subjective:   37-year-old male with a past medical history of schizophrenia, anxiety chin laceration.  Patient denies suicidal homicidal ideation.  Denies hallucinations.  Says he is compliant with his medications which is Risperdal.    Per mom:  They recently left from [place] and patient was being hyper Restorationist.  He walked into traffic saying that Harry wanted him.  She ran into traffic after him to make sure that he did not get hurt.  Has she tried to calm him down there is another individual that appointed [facility] and he started to become extremely aggressive towards this individual.  Mom says that he normally takes Seroquel.  Has not taken Risperdal and at least 2 weeks.  He has not made any statements about wanting to hurt himself for wanting to hurt other people.          Past Medical History:   Diagnosis Date    Anxiety     Depression     Hallucination     History of psychiatric hospitalization     Hx of psychiatric care     Psychiatric problem     Psychosis     Schizoaffective disorder     Sleep difficulties     Suicide attempt     Therapy      No past surgical history on file.  Social History     Socioeconomic History    Marital status: Single    Number of children: 0   Tobacco Use    Smoking status: Every Day     Packs/day: 1.00     Types: Cigarettes    Smokeless tobacco: Never   Substance and Sexual Activity    Alcohol use: Yes     Alcohol/week: 4.0 standard drinks     Types: 4 Cans of beer per week     Comment: uses alcohol monthly    Drug use: Yes     Types: Marijuana    Sexual activity: Not Currently     Partners: Female   Other Topics Concern    Patient  "feels they ought to cut down on drinking/drug use No    Patient annoyed by others criticizing their drinking/drug use No    Patient has felt bad or guilty about drinking/drug use No    Patient has had a drink/used drugs as an eye opener in the AM No     Review of patient's allergies indicates:   Allergen Reactions    Haloperidol Swelling     Muscle stiffness  Has muscle spasms      Paroxetine Swelling, Other (See Comments) and Rash     "i don't remember"  "i don't remember"      Pineapple      Face swells up  Face swells up      Ziprasidone Other (See Comments)     Muscle twitching        Review of Systems   Constitutional:  Negative for diaphoresis and fever.   HENT:  Negative for congestion and sore throat.    Eyes:  Negative for pain and discharge.   Respiratory:  Negative for cough and shortness of breath.    Cardiovascular:  Negative for chest pain and palpitations.   Gastrointestinal:  Negative for diarrhea and vomiting.   Genitourinary:  Negative for dysuria and hematuria.   Musculoskeletal:  Negative for back pain and myalgias.   Skin:  Negative for itching and rash.   Neurological:  Negative for weakness and headaches.   Psychiatric/Behavioral:  Negative for hallucinations and suicidal ideas.         Objective:     Vitals:    10/02/22 1440   BP: 128/78   Pulse: 92   Resp: 18   Temp: 98.4 °F (36.9 °C)        Physical Exam  Constitutional:       Appearance: He is not toxic-appearing.   HENT:      Head: Normocephalic and atraumatic.   Musculoskeletal:         General: No deformity. Normal range of motion.      Cervical back: Normal range of motion and neck supple.   Skin:     General: Skin is warm and dry.   Neurological:      General: No focal deficit present.      Mental Status: He is alert and oriented to person, place, and time.   Psychiatric:         Attention and Perception: He is inattentive. He perceives auditory hallucinations.         Mood and Affect: Mood normal.         Behavior: Behavior normal.   "       Thought Content: Thought content is paranoid and delusional. Thought content does not include homicidal or suicidal ideation.        Records:  Nursing records and triage records reviewed  Prior records reviewed    Medical decision making:   Patient presents to emergency department with hallucinations.  Mom had to pull him out from traffic because he was trying to go towards [place].  Is not currently taking Risperdal which patient says he is taking.  Patient is otherwise nontoxic appearing.  Will pec.  Will get psychiatric labs for evaluation.  Attempted to explain to the patient that we are going to be getting blood work, patient did not understand and said that he would fight with us.  Will be medicated with Zyprexa and Versed.    ED Course as of 10/02/22 1704   Sun Oct 02, 2022   1654 Cbc and CMP within normal limits.  ETOH negative.  Influenza and COVID negative.      Patient is medical cleared will be okay to transfer to psychiatric facility. [RK]      ED Course User Index  [RK] Tomasz Hoffman MD          EKG:  ECG Results              EKG 12-lead (Preliminary result)  Result time 10/02/22 16:54:49      ED Interpretation by Tomasz Hoffman MD (10/02/22 16:54:49, [facility], Emergency Medicine)    Normal sinus rhythm at a rate of 79, no signs of ST elevation or depression, normal axis, normal intervals with a QTC of 408                                     Critical Care    Date/Time: 10/2/2022 5:04 PM  Performed by: Tomasz Hoffman MD  Authorized by: Tomasz Hoffman MD   Total critical care time (exclusive of procedural time) : 35 minutes  Comments: Upon my evaluation, this patient had a high probability of imminent or life-threatening deterioration due to agitation and aggression needing IV/IM sedation, which required my direct attention, intervention, and personal management.    I have personally provided 35 minutes of critical care time exclusive of time  spent on separately billed procedures. Time includes review of chart, history and physical exam of the patient, review of laboratory data, review of radiology results, discussion with consultants, and monitoring for potential decompensation. Interventions were performed as documented above.              Diagnosis:  Final diagnoses:  [F22] Paranoid delusion (Primary)     ED Prescriptions    None       Follow-up Information    None         Tomasz Hoffman M.D.  Emergency Medicine Physician     (Please note that this chart was completed via voice to text dictation. There may be typographical errors or substitutions that are unintentional, or uncorrected. Every attempt was made to proofread the chart prior to completion. If there are any questions, please contact the physician for final clarification).         Tomasz Hoffman MD  10/02/22 1051

## 2022-10-03 VITALS
RESPIRATION RATE: 18 BRPM | SYSTOLIC BLOOD PRESSURE: 132 MMHG | HEART RATE: 87 BPM | OXYGEN SATURATION: 100 % | HEIGHT: 70 IN | TEMPERATURE: 98 F | DIASTOLIC BLOOD PRESSURE: 79 MMHG | BODY MASS INDEX: 21.47 KG/M2 | WEIGHT: 150 LBS

## 2022-10-14 ENCOUNTER — HOSPITAL ENCOUNTER (EMERGENCY)
Facility: HOSPITAL | Age: 37
Discharge: HOME | End: 2022-10-14
Attending: INTERNAL MEDICINE
Payer: COMMERCIAL

## 2022-10-14 VITALS
DIASTOLIC BLOOD PRESSURE: 74 MMHG | SYSTOLIC BLOOD PRESSURE: 115 MMHG | HEIGHT: 69 IN | BODY MASS INDEX: 21.22 KG/M2 | TEMPERATURE: 99 F | WEIGHT: 143.31 LBS | HEART RATE: 110 BPM | RESPIRATION RATE: 20 BRPM | OXYGEN SATURATION: 100 %

## 2022-10-14 DIAGNOSIS — F20.9 CHRONIC SCHIZOPHRENIA: Primary | ICD-10-CM

## 2022-10-14 PROCEDURE — 25000003 PHARM REV CODE 250: Performed by: INTERNAL MEDICINE

## 2022-10-14 PROCEDURE — 99283 EMERGENCY DEPT VISIT LOW MDM: CPT | Mod: 25

## 2022-10-14 RX ORDER — SERTRALINE HYDROCHLORIDE 50 MG/1
50 TABLET, FILM COATED ORAL DAILY
Status: DISCONTINUED | OUTPATIENT
Start: 2022-10-14 | End: 2022-10-14 | Stop reason: HOSPADM

## 2022-10-14 RX ORDER — OXCARBAZEPINE 300 MG/1
300 TABLET, FILM COATED ORAL 2 TIMES DAILY
Qty: 60 TABLET | Refills: 11 | Status: SHIPPED | OUTPATIENT
Start: 2022-10-14 | End: 2023-10-14

## 2022-10-14 RX ORDER — DIVALPROEX SODIUM 250 MG/1
500 TABLET, DELAYED RELEASE ORAL
Status: COMPLETED | OUTPATIENT
Start: 2022-10-14 | End: 2022-10-14

## 2022-10-14 RX ORDER — QUETIAPINE FUMARATE 100 MG/1
100 TABLET, FILM COATED ORAL ONCE
Status: COMPLETED | OUTPATIENT
Start: 2022-10-14 | End: 2022-10-14

## 2022-10-14 RX ADMIN — DIVALPROEX SODIUM 500 MG: 250 TABLET, DELAYED RELEASE ORAL at 04:10

## 2022-10-14 RX ADMIN — QUETIAPINE FUMARATE 100 MG: 100 TABLET ORAL at 03:10

## 2022-10-14 RX ADMIN — SERTRALINE HYDROCHLORIDE 50 MG: 50 TABLET ORAL at 03:10

## 2022-10-14 NOTE — ED PROVIDER NOTES
"Encounter Date: 10/14/2022       History     Chief Complaint   Patient presents with    Psychiatric Evaluation     Brought here be police for psych eval. No SI/HI. +tachycardia, police state pt was running right before pt was brought here.     Brought by police due to abnormal behavior. Pt with Hx of schizophrenia, states he was going to eat some burgers at [employer] and they stopped him. Denies SI, HI or hallucinations. He is holding a bible and states he believes in God.     The history is provided by the patient and the police.   Review of patient's allergies indicates:   Allergen Reactions    Haloperidol Swelling     Muscle stiffness  Has muscle spasms      Paroxetine Swelling, Other (See Comments) and Rash     "i don't remember"  "i don't remember"      Pineapple      Face swells up  Face swells up      Ziprasidone Other (See Comments)     Muscle twitching     Past Medical History:   Diagnosis Date    Anxiety     Depression     Hallucination     History of psychiatric hospitalization     Hx of psychiatric care     Psychiatric problem     Psychosis     Schizoaffective disorder     Sleep difficulties     Suicide attempt     Therapy      No past surgical history on file.  No family history on file.  Social History     Tobacco Use    Smoking status: Every Day     Packs/day: 1.00     Types: Cigarettes    Smokeless tobacco: Never   Substance Use Topics    Alcohol use: Yes     Alcohol/week: 4.0 standard drinks     Types: 4 Cans of beer per week     Comment: uses alcohol monthly    Drug use: Yes     Types: Marijuana     Review of Systems   Constitutional:  Negative for fever.   HENT:  Negative for sore throat.    Respiratory:  Negative for shortness of breath.    Cardiovascular:  Negative for chest pain.   Gastrointestinal:  Negative for nausea.   Genitourinary:  Negative for dysuria.   Musculoskeletal:  Negative for back pain.   Skin:  Negative for rash.   Neurological:  Negative for weakness.   Hematological:  Does not " bruise/bleed easily.   All other systems reviewed and are negative.    Physical Exam     Initial Vitals [10/14/22 1424]   BP Pulse Resp Temp SpO2   130/75 (!) 153 18 98.8 °F (37.1 °C) 100 %      MAP       --         Physical Exam    Nursing note and vitals reviewed.  Constitutional: He appears well-developed.   HENT:   Head: Normocephalic and atraumatic.   Eyes: Pupils are equal, round, and reactive to light.   Neck: Neck supple.   Normal range of motion.  Cardiovascular:  Regular rhythm, normal heart sounds and intact distal pulses.           Pulmonary/Chest: Breath sounds normal. No respiratory distress.   Abdominal: Abdomen is soft. He exhibits no distension. There is no abdominal tenderness.   Musculoskeletal:         General: No edema. Normal range of motion.      Cervical back: Normal range of motion and neck supple.     Neurological: He is alert and oriented to person, place, and time. He has normal strength. GCS score is 15. GCS eye subscore is 4. GCS verbal subscore is 5. GCS motor subscore is 6.   Skin: Skin is warm and dry. No rash noted.   Psychiatric: He has a normal mood and affect. His speech is normal and behavior is normal. He is not aggressive, not hyperactive, not actively hallucinating and not combative. Thought content is not paranoid and not delusional. Cognition and memory are normal. He expresses impulsivity. He expresses no homicidal and no suicidal ideation. He is attentive.       ED Course   Procedures  Labs Reviewed - No data to display       Imaging Results    None          Medications   sertraline tablet 50 mg (50 mg Oral Given 10/14/22 1517)   QUEtiapine tablet 100 mg (100 mg Oral Given 10/14/22 1517)   divalproex EC tablet 500 mg (500 mg Oral Given 10/14/22 1607)      16:00    At this time mother arrives, states he was discharged from the behavioral unit few days ago, new medications prescribed but have not filled them, will go to the pharmacy today. She was giving him his medication  when he ran through the door.     Pt calm and cooperative at ED, took his medications w/o problems. Pt stable without unstable psychiatric condition at this time. Will discharge with his mother                        Clinical Impression:   Final diagnoses:  [F20.9] Chronic schizophrenia (Primary)      ED Disposition Condition    Discharge Stable          ED Prescriptions       Medication Sig Dispense Start Date End Date Auth. Provider    OXcarbazepine (TRILEPTAL) 300 MG Tab Take 1 tablet (300 mg total) by mouth 2 (two) times daily. 60 tablet 10/14/2022 10/14/2023 Reggie Rowley MD          Follow-up Information       Follow up With Specialties Details Why Contact Info    [facility] Emergency Medicine  If symptoms worsen [address] [address] [phone]    [facility] [place]  Schedule an appointment as soon as possible for a visit in 1 week  [address] [address]    [facility] Behavioral Health, Psychiatry, Psychology Schedule an appointment as soon as possible for a visit in 1 week  [address] [id number] [phone]               Reggie Rowley MD  10/14/22 8940

## 2022-10-25 ENCOUNTER — HOSPITAL ENCOUNTER (EMERGENCY)
Facility: HOSPITAL | Age: 37
Discharge: HOME | End: 2022-10-25
Attending: INTERNAL MEDICINE
Payer: COMMERCIAL

## 2022-10-25 VITALS
OXYGEN SATURATION: 99 % | SYSTOLIC BLOOD PRESSURE: 123 MMHG | DIASTOLIC BLOOD PRESSURE: 76 MMHG | BODY MASS INDEX: 20.9 KG/M2 | RESPIRATION RATE: 18 BRPM | HEART RATE: 98 BPM | TEMPERATURE: 98 F | HEIGHT: 69 IN | WEIGHT: 141.13 LBS

## 2022-10-25 DIAGNOSIS — G25.9 EXTRAPYRAMIDAL REACTION: Primary | ICD-10-CM

## 2022-10-25 PROCEDURE — 99283 EMERGENCY DEPT VISIT LOW MDM: CPT

## 2022-10-25 PROCEDURE — 25000003 PHARM REV CODE 250: Performed by: INTERNAL MEDICINE

## 2022-10-25 RX ORDER — BENZTROPINE MESYLATE 0.5 MG/1
0.5 TABLET ORAL 2 TIMES DAILY
Qty: 60 TABLET | Refills: 4 | Status: SHIPPED | OUTPATIENT
Start: 2022-10-25 | End: 2023-10-25

## 2022-10-25 RX ORDER — DIPHENHYDRAMINE HCL 25 MG
25 CAPSULE ORAL
Status: COMPLETED | OUTPATIENT
Start: 2022-10-25 | End: 2022-10-25

## 2022-10-25 RX ORDER — BENZTROPINE MESYLATE 0.5 MG/1
0.5 TABLET ORAL 2 TIMES DAILY
Qty: 60 TABLET | Refills: 4 | Status: SHIPPED | OUTPATIENT
Start: 2022-10-25 | End: 2022-10-25 | Stop reason: SDUPTHER

## 2022-10-25 RX ADMIN — DIPHENHYDRAMINE HYDROCHLORIDE 25 MG: 25 CAPSULE ORAL at 10:10

## 2022-10-26 NOTE — ED PROVIDER NOTES
"Encounter Date: 10/25/2022       History     Chief Complaint   Patient presents with    Mental Health Problem     States current medication is giving him multiple sx. C/O involuntary movement. Denies SI/HI.     Presents with complaints of heart palpitations and inability to move the left side of his body upon waking this AM. Pt has extensive psychiatric history and states he is currently taking Zyprexa and Seroquel. Pt is concerned he is having an allergic reaction to his medications.    The history is provided by the patient and the EMS personnel.   Review of patient's allergies indicates:   Allergen Reactions    Haloperidol Swelling     Muscle stiffness  Has muscle spasms      Paroxetine Swelling, Other (See Comments) and Rash     "i don't remember"  "i don't remember"      Pineapple      Face swells up  Face swells up      Ziprasidone Other (See Comments)     Muscle twitching     Past Medical History:   Diagnosis Date    Anxiety     Depression     Hallucination     History of psychiatric hospitalization     Hx of psychiatric care     Psychiatric problem     Psychosis     Schizoaffective disorder     Sleep difficulties     Suicide attempt     Therapy      No past surgical history on file.  No family history on file.  Social History     Tobacco Use    Smoking status: Every Day     Packs/day: 1.00     Types: Cigarettes    Smokeless tobacco: Never   Substance Use Topics    Alcohol use: Yes     Alcohol/week: 4.0 standard drinks     Types: 4 Cans of beer per week     Comment: uses alcohol monthly    Drug use: Yes     Types: Marijuana     Review of Systems   Constitutional:  Negative for fever.   HENT:  Negative for facial swelling, sore throat and trouble swallowing.    Eyes:  Negative for redness and itching.   Respiratory:  Negative for shortness of breath.    Cardiovascular:  Positive for palpitations. Negative for chest pain.   Gastrointestinal:  Negative for abdominal pain, diarrhea, nausea and vomiting. "   Genitourinary:  Negative for dysuria.   Musculoskeletal:  Negative for back pain.   Skin:  Negative for rash.   Neurological:  Negative for weakness and headaches.        Sensation of left sided paralysis   Hematological:  Does not bruise/bleed easily.   All other systems reviewed and are negative.    Physical Exam     Initial Vitals [10/25/22 2136]   BP Pulse Resp Temp SpO2   132/80 (!) 114 18 98.2 °F (36.8 °C) 99 %      MAP       --         Physical Exam    Nursing note and vitals reviewed.  Constitutional: He appears well-developed and well-nourished.   HENT:   Head: Normocephalic and atraumatic.   Eyes: EOM are normal. Pupils are equal, round, and reactive to light.   Neck:   Normal range of motion.  Cardiovascular:  Regular rhythm.           Pulmonary/Chest: No respiratory distress.   Musculoskeletal:         General: Normal range of motion.      Cervical back: Normal range of motion.     Neurological: He is alert and oriented to person, place, and time. GCS score is 15. GCS eye subscore is 4. GCS verbal subscore is 5. GCS motor subscore is 6.   Skin: Skin is warm and dry. No rash noted.   Psychiatric: His speech is normal and behavior is normal. His affect is blunt. He is not actively hallucinating. Thought content is delusional (Faith delusions). Cognition and memory are normal. He does not express inappropriate judgment.   Flat affect; Faith delusions He is attentive.       ED Course   Procedures  Labs Reviewed - No data to display       Imaging Results    None          Medications - No data to display                           Clinical Impression:   Final diagnoses:  [G25.9] Extrapyramidal reaction (Primary)      ED Disposition Condition    Discharge Stable          ED Prescriptions       Medication Sig Dispense Start Date End Date Auth. Provider    benztropine (COGENTIN) 0.5 MG tablet Take 1 tablet (0.5 mg total) by mouth 2 (two) times daily. 60 tablet 10/25/2022 10/25/2023 Reggie Pérez  MD Amrik          Follow-up Information       Follow up With Specialties Details Why Contact Info    [facility] Emergency Medicine  If symptoms worsen [address] [facility] [id number] [phone]    [facility] Behavioral Health, Psychiatry, Psychology Schedule an appointment as soon as possible for a visit in 1 month  [address] [address]  [phone]               Reggie Rowley MD  10/25/22 5079

## 2022-11-24 ENCOUNTER — HOSPITAL ENCOUNTER (EMERGENCY)
Facility: HOSPITAL | Age: 37
Discharge: HOME | End: 2022-11-25
Attending: INTERNAL MEDICINE
Payer: COMMERCIAL

## 2022-11-24 VITALS
DIASTOLIC BLOOD PRESSURE: 66 MMHG | SYSTOLIC BLOOD PRESSURE: 128 MMHG | RESPIRATION RATE: 20 BRPM | WEIGHT: 150 LBS | OXYGEN SATURATION: 98 % | TEMPERATURE: 97 F | BODY MASS INDEX: 21.47 KG/M2 | HEART RATE: 72 BPM | HEIGHT: 70 IN

## 2022-11-24 DIAGNOSIS — F32.A DEPRESSION WITH SUICIDAL IDEATION: Primary | ICD-10-CM

## 2022-11-24 DIAGNOSIS — Z00.8 MEDICAL CLEARANCE FOR PSYCHIATRIC ADMISSION: ICD-10-CM

## 2022-11-24 DIAGNOSIS — R45.851 DEPRESSION WITH SUICIDAL IDEATION: Primary | ICD-10-CM

## 2022-11-24 LAB
ALBUMIN SERPL-MCNC: 4 GM/DL (ref 3.5–5)
ALBUMIN/GLOB SERPL: 1.1 RATIO (ref 1.1–2)
ALP SERPL-CCNC: 81 UNIT/L (ref 40–150)
ALT SERPL-CCNC: 12 UNIT/L (ref 0–55)
AMPHET UR QL SCN: NEGATIVE
APPEARANCE UR: CLEAR
AST SERPL-CCNC: 17 UNIT/L (ref 5–34)
BACTERIA #/AREA URNS AUTO: ABNORMAL /HPF
BARBITURATE SCN PRESENT UR: NEGATIVE
BASOPHILS # BLD AUTO: 0.05 X10(3)/MCL (ref 0–0.2)
BASOPHILS NFR BLD AUTO: 0.5 %
BENZODIAZ UR QL SCN: NEGATIVE
BILIRUB UR QL STRIP.AUTO: NEGATIVE MG/DL
BILIRUBIN DIRECT+TOT PNL SERPL-MCNC: 0.4 MG/DL
BUN SERPL-MCNC: 13.8 MG/DL (ref 8.9–20.6)
CALCIUM SERPL-MCNC: 9.9 MG/DL (ref 8.4–10.2)
CANNABINOIDS UR QL SCN: POSITIVE
CHLORIDE SERPL-SCNC: 105 MMOL/L (ref 98–107)
CO2 SERPL-SCNC: 26 MMOL/L (ref 22–29)
COCAINE UR QL SCN: NEGATIVE
COLOR UR AUTO: ABNORMAL
CREAT SERPL-MCNC: 1.23 MG/DL (ref 0.73–1.18)
EOSINOPHIL # BLD AUTO: 0.46 X10(3)/MCL (ref 0–0.9)
EOSINOPHIL NFR BLD AUTO: 4.3 %
ERYTHROCYTE [DISTWIDTH] IN BLOOD BY AUTOMATED COUNT: 14.3 % (ref 11.5–17)
ETHANOL SERPL-MCNC: <10 MG/DL
FENTANYL UR QL SCN: NEGATIVE
GFR SERPLBLD CREATININE-BSD FMLA CKD-EPI: >60 MLS/MIN/1.73/M2
GLOBULIN SER-MCNC: 3.7 GM/DL (ref 2.4–3.5)
GLUCOSE SERPL-MCNC: 68 MG/DL (ref 74–100)
GLUCOSE UR QL STRIP.AUTO: NORMAL MG/DL
HCT VFR BLD AUTO: 43.8 % (ref 42–52)
HGB BLD-MCNC: 14.6 GM/DL (ref 14–18)
HYALINE CASTS #/AREA URNS LPF: ABNORMAL /LPF
IMM GRANULOCYTES # BLD AUTO: 0.02 X10(3)/MCL (ref 0–0.04)
IMM GRANULOCYTES NFR BLD AUTO: 0.2 %
KETONES UR QL STRIP.AUTO: ABNORMAL MG/DL
LEUKOCYTE ESTERASE UR QL STRIP.AUTO: NEGATIVE UNIT/L
LYMPHOCYTES # BLD AUTO: 3.23 X10(3)/MCL (ref 0.6–4.6)
LYMPHOCYTES NFR BLD AUTO: 30.4 %
MCH RBC QN AUTO: 28.1 PG (ref 27–31)
MCHC RBC AUTO-ENTMCNC: 33.3 MG/DL (ref 33–36)
MCV RBC AUTO: 84.4 FL (ref 80–94)
MDMA UR QL SCN: NEGATIVE
MONOCYTES # BLD AUTO: 0.71 X10(3)/MCL (ref 0.1–1.3)
MONOCYTES NFR BLD AUTO: 6.7 %
MUCOUS THREADS URNS QL MICRO: ABNORMAL /LPF
NEUTROPHILS # BLD AUTO: 6.2 X10(3)/MCL (ref 2.1–9.2)
NEUTROPHILS NFR BLD AUTO: 57.9 %
NITRITE UR QL STRIP.AUTO: NEGATIVE
NRBC BLD AUTO-RTO: 0 %
OPIATES UR QL SCN: NEGATIVE
PCP UR QL: NEGATIVE
PH UR STRIP.AUTO: 6 [PH]
PH UR: 6 [PH] (ref 3–11)
PLATELET # BLD AUTO: 254 X10(3)/MCL (ref 130–400)
PMV BLD AUTO: 10.3 FL (ref 7.4–10.4)
POTASSIUM SERPL-SCNC: 3.9 MMOL/L (ref 3.5–5.1)
PROT SERPL-MCNC: 7.7 GM/DL (ref 6.4–8.3)
PROT UR QL STRIP.AUTO: NEGATIVE MG/DL
RBC # BLD AUTO: 5.19 X10(6)/MCL (ref 4.7–6.1)
RBC #/AREA URNS AUTO: ABNORMAL /HPF
RBC UR QL AUTO: NEGATIVE UNIT/L
SARS-COV-2 RDRP RESP QL NAA+PROBE: NEGATIVE
SODIUM SERPL-SCNC: 142 MMOL/L (ref 136–145)
SP GR UR STRIP.AUTO: 1.02
SPECIFIC GRAVITY, URINE AUTO (.000) (OHS): 1.02 (ref 1–1.03)
SQUAMOUS #/AREA URNS LPF: ABNORMAL /HPF
TSH SERPL-ACNC: 0.44 UIU/ML (ref 0.35–4.94)
UROBILINOGEN UR STRIP-ACNC: ABNORMAL MG/DL
WBC # SPEC AUTO: 10.6 X10(3)/MCL (ref 4.5–11.5)
WBC #/AREA URNS AUTO: ABNORMAL /HPF

## 2022-11-24 PROCEDURE — 82077 ASSAY SPEC XCP UR&BREATH IA: CPT | Performed by: INTERNAL MEDICINE

## 2022-11-24 PROCEDURE — 25000003 PHARM REV CODE 250: Performed by: INTERNAL MEDICINE

## 2022-11-24 PROCEDURE — 85025 COMPLETE CBC W/AUTO DIFF WBC: CPT | Performed by: INTERNAL MEDICINE

## 2022-11-24 PROCEDURE — 80053 COMPREHEN METABOLIC PANEL: CPT | Performed by: INTERNAL MEDICINE

## 2022-11-24 PROCEDURE — 81001 URINALYSIS AUTO W/SCOPE: CPT | Mod: 59 | Performed by: INTERNAL MEDICINE

## 2022-11-24 PROCEDURE — 93005 ELECTROCARDIOGRAM TRACING: CPT

## 2022-11-24 PROCEDURE — 87635 SARS-COV-2 COVID-19 AMP PRB: CPT | Performed by: INTERNAL MEDICINE

## 2022-11-24 PROCEDURE — 80307 DRUG TEST PRSMV CHEM ANLYZR: CPT | Performed by: INTERNAL MEDICINE

## 2022-11-24 PROCEDURE — 99285 EMERGENCY DEPT VISIT HI MDM: CPT | Mod: 25

## 2022-11-24 PROCEDURE — 84443 ASSAY THYROID STIM HORMONE: CPT | Performed by: INTERNAL MEDICINE

## 2022-11-24 RX ORDER — QUETIAPINE FUMARATE 100 MG/1
100 TABLET, FILM COATED ORAL ONCE
Status: COMPLETED | OUTPATIENT
Start: 2022-11-24 | End: 2022-11-24

## 2022-11-24 RX ORDER — IBUPROFEN 200 MG
1 TABLET ORAL DAILY
Status: DISCONTINUED | OUTPATIENT
Start: 2022-11-25 | End: 2022-11-25 | Stop reason: HOSPADM

## 2022-11-24 RX ORDER — OXCARBAZEPINE 300 MG/1
300 TABLET, FILM COATED ORAL 2 TIMES DAILY
Status: DISCONTINUED | OUTPATIENT
Start: 2022-11-24 | End: 2022-11-25 | Stop reason: HOSPADM

## 2022-11-24 RX ADMIN — QUETIAPINE FUMARATE 100 MG: 100 TABLET ORAL at 10:11

## 2022-11-24 RX ADMIN — OXCARBAZEPINE 300 MG: 300 TABLET, FILM COATED ORAL at 10:11

## 2022-11-25 NOTE — ED PROVIDER NOTES
"Encounter Date: 11/24/2022       History     Chief Complaint   Patient presents with    Psychiatric Evaluation     SI, wants to cut wrists. Depressed. Chronic AV hallucinations     Presents with depression and SI, Plan to cut his wrist. States he is facing too many death in his family and he will not like his mother to die, so he will like to die before her.    The history is provided by the patient and the EMS personnel.   Review of patient's allergies indicates:   Allergen Reactions    Haloperidol Swelling     Muscle stiffness  Has muscle spasms      Paroxetine Swelling, Other (See Comments) and Rash     "i don't remember"  "i don't remember"      Pineapple      Face swells up  Face swells up      Ziprasidone Other (See Comments)     Muscle twitching     Past Medical History:   Diagnosis Date    Anxiety     Depression     Hallucination     History of psychiatric hospitalization     Hx of psychiatric care     Psychiatric problem     Psychosis     Schizoaffective disorder     Sleep difficulties     Suicide attempt     Therapy      No past surgical history on file.  No family history on file.  Social History     Tobacco Use    Smoking status: Every Day     Packs/day: 1.00     Types: Cigarettes    Smokeless tobacco: Never   Substance Use Topics    Alcohol use: Yes     Alcohol/week: 4.0 standard drinks     Types: 4 Cans of beer per week     Comment: uses alcohol monthly    Drug use: Yes     Types: Marijuana     Review of Systems   Unable to perform ROS: Psychiatric disorder     Physical Exam     Initial Vitals [11/24/22 2128]   BP Pulse Resp Temp SpO2   128/66 72 20 97.2 °F (36.2 °C) 98 %      MAP       --         Physical Exam    Nursing note and vitals reviewed.    Psychiatric: His speech is rapid and/or pressured and tangential. He is hyperactive and actively hallucinating. Thought content is delusional. Cognition and memory are normal. He expresses impulsivity and inappropriate judgment. He " exhibits a depressed mood. He expresses suicidal ideation. He expresses suicidal plans. He is inattentive.       ED Course   Procedures  Labs Reviewed   CBC W/ AUTO DIFFERENTIAL    Narrative:     The following orders were created for panel order CBC auto differential.  Procedure                               Abnormality         Status                     ---------                               -----------         ------                     CBC with Differential[[id number]]                                                         Please view results for these tests on the individual orders.   COMPREHENSIVE METABOLIC PANEL   TSH   URINALYSIS, REFLEX TO URINE CULTURE   DRUG SCREEN, URINE (BEAKER)   ALCOHOL,MEDICAL (ETHANOL)   SARS-COV-2 RNA AMPLIFICATION, QUAL   CBC WITH DIFFERENTIAL     EKG Readings: (Independently Interpreted)   Initial Reading: No STEMI. Rhythm: Normal Sinus Rhythm. Heart Rate: 68. Ectopy: No Ectopy. Conduction: Normal. ST Segments: Normal ST Segments. T Waves: Normal. Clinical Impression: Normal Sinus Rhythm     Imaging Results    None          Medications   nicotine 14 mg/24 hr 1 patch (has no administration in time range)                    Medically cleared for psychiatry placement: 11/24/2022  9:39 PM         Clinical Impression:   Final diagnoses:  [Z00.8] Medical clearance for psychiatric admission  [F32.A, R45.851] Depression with suicidal ideation (Primary)      ED Disposition Condition    Transfer to Psych Facility Stable          ED Prescriptions    None       Follow-up Information    None          Reggie Rowley MD  11/24/22 2139       Reggie Rowley MD  11/24/22 2224

## 2022-12-01 ENCOUNTER — HOSPITAL ENCOUNTER (EMERGENCY)
Facility: HOSPITAL | Age: 37
Discharge: PSYCHIATRIC OR SUBSTANCE USE FACILITY | End: 2022-12-02
Attending: FAMILY MEDICINE
Payer: COMMERCIAL

## 2022-12-01 DIAGNOSIS — F32.A DEPRESSION WITH SUICIDAL IDEATION: Primary | ICD-10-CM

## 2022-12-01 DIAGNOSIS — R45.851 DEPRESSION WITH SUICIDAL IDEATION: Primary | ICD-10-CM

## 2022-12-01 LAB
AMPHET UR QL SCN: NEGATIVE
APPEARANCE UR: CLEAR
BACTERIA #/AREA URNS AUTO: ABNORMAL /HPF
BARBITURATE SCN PRESENT UR: NEGATIVE
BASOPHILS # BLD AUTO: 0.04 X10(3)/MCL (ref 0–0.2)
BASOPHILS NFR BLD AUTO: 0.2 %
BENZODIAZ UR QL SCN: NEGATIVE
BILIRUB UR QL STRIP.AUTO: NEGATIVE MG/DL
CANNABINOIDS UR QL SCN: POSITIVE
COCAINE UR QL SCN: NEGATIVE
COLOR UR AUTO: ABNORMAL
EOSINOPHIL # BLD AUTO: 0 X10(3)/MCL (ref 0–0.9)
EOSINOPHIL NFR BLD AUTO: 0 %
ERYTHROCYTE [DISTWIDTH] IN BLOOD BY AUTOMATED COUNT: 14.1 % (ref 11.5–17)
FENTANYL UR QL SCN: NEGATIVE
GLUCOSE UR QL STRIP.AUTO: ABNORMAL MG/DL
HCT VFR BLD AUTO: 40.6 % (ref 42–52)
HGB BLD-MCNC: 13.7 GM/DL (ref 14–18)
HYALINE CASTS #/AREA URNS LPF: ABNORMAL /LPF
IMM GRANULOCYTES # BLD AUTO: 0.15 X10(3)/MCL (ref 0–0.04)
IMM GRANULOCYTES NFR BLD AUTO: 0.6 %
KETONES UR QL STRIP.AUTO: NEGATIVE MG/DL
LEUKOCYTE ESTERASE UR QL STRIP.AUTO: NEGATIVE UNIT/L
LYMPHOCYTES # BLD AUTO: 2.04 X10(3)/MCL (ref 0.6–4.6)
LYMPHOCYTES NFR BLD AUTO: 8.7 %
MCH RBC QN AUTO: 28.2 PG (ref 27–31)
MCHC RBC AUTO-ENTMCNC: 33.7 MG/DL (ref 33–36)
MCV RBC AUTO: 83.5 FL (ref 80–94)
MDMA UR QL SCN: NEGATIVE
MONOCYTES # BLD AUTO: 1.55 X10(3)/MCL (ref 0.1–1.3)
MONOCYTES NFR BLD AUTO: 6.6 %
NEUTROPHILS # BLD AUTO: 19.5 X10(3)/MCL (ref 2.1–9.2)
NEUTROPHILS NFR BLD AUTO: 83.9 %
NITRITE UR QL STRIP.AUTO: NEGATIVE
NRBC BLD AUTO-RTO: 0 %
OPIATES UR QL SCN: NEGATIVE
PCP UR QL: NEGATIVE
PH UR STRIP.AUTO: 5 [PH]
PH UR: 5 [PH] (ref 3–11)
PLATELET # BLD AUTO: 241 X10(3)/MCL (ref 130–400)
PMV BLD AUTO: 11.4 FL (ref 7.4–10.4)
PROT UR QL STRIP.AUTO: NEGATIVE MG/DL
RBC # BLD AUTO: 4.86 X10(6)/MCL (ref 4.7–6.1)
RBC #/AREA URNS AUTO: ABNORMAL /HPF
RBC UR QL AUTO: NEGATIVE UNIT/L
SARS-COV-2 RDRP RESP QL NAA+PROBE: NEGATIVE
SP GR UR STRIP.AUTO: 1.01
SPECIFIC GRAVITY, URINE AUTO (.000) (OHS): 1.01 (ref 1–1.03)
SQUAMOUS #/AREA URNS LPF: ABNORMAL /HPF
UROBILINOGEN UR STRIP-ACNC: NORMAL MG/DL
WBC # SPEC AUTO: 23.3 X10(3)/MCL (ref 4.5–11.5)
WBC #/AREA URNS AUTO: ABNORMAL /HPF

## 2022-12-01 PROCEDURE — 90471 IMMUNIZATION ADMIN: CPT | Performed by: FAMILY MEDICINE

## 2022-12-01 PROCEDURE — 80053 COMPREHEN METABOLIC PANEL: CPT | Performed by: FAMILY MEDICINE

## 2022-12-01 PROCEDURE — 82077 ASSAY SPEC XCP UR&BREATH IA: CPT | Performed by: FAMILY MEDICINE

## 2022-12-01 PROCEDURE — 63600175 PHARM REV CODE 636 W HCPCS: Performed by: FAMILY MEDICINE

## 2022-12-01 PROCEDURE — 84443 ASSAY THYROID STIM HORMONE: CPT | Performed by: FAMILY MEDICINE

## 2022-12-01 PROCEDURE — 90715 TDAP VACCINE 7 YRS/> IM: CPT | Performed by: FAMILY MEDICINE

## 2022-12-01 PROCEDURE — 80307 DRUG TEST PRSMV CHEM ANLYZR: CPT | Performed by: FAMILY MEDICINE

## 2022-12-01 PROCEDURE — 81001 URINALYSIS AUTO W/SCOPE: CPT | Performed by: FAMILY MEDICINE

## 2022-12-01 PROCEDURE — 99285 EMERGENCY DEPT VISIT HI MDM: CPT | Mod: 25

## 2022-12-01 PROCEDURE — 85025 COMPLETE CBC W/AUTO DIFF WBC: CPT | Performed by: FAMILY MEDICINE

## 2022-12-01 PROCEDURE — 87635 SARS-COV-2 COVID-19 AMP PRB: CPT | Performed by: FAMILY MEDICINE

## 2022-12-01 RX ADMIN — TETANUS TOXOID, REDUCED DIPHTHERIA TOXOID AND ACELLULAR PERTUSSIS VACCINE, ADSORBED 0.5 ML: 5; 2.5; 8; 8; 2.5 SUSPENSION INTRAMUSCULAR at 10:12

## 2022-12-02 VITALS
OXYGEN SATURATION: 100 % | DIASTOLIC BLOOD PRESSURE: 79 MMHG | WEIGHT: 159 LBS | SYSTOLIC BLOOD PRESSURE: 128 MMHG | HEART RATE: 81 BPM | TEMPERATURE: 97 F | HEIGHT: 69 IN | RESPIRATION RATE: 18 BRPM | BODY MASS INDEX: 23.55 KG/M2

## 2022-12-02 LAB
ALBUMIN SERPL-MCNC: 4.3 GM/DL (ref 3.5–5)
ALBUMIN/GLOB SERPL: 1.2 RATIO (ref 1.1–2)
ALP SERPL-CCNC: 76 UNIT/L (ref 40–150)
ALT SERPL-CCNC: 20 UNIT/L (ref 0–55)
AST SERPL-CCNC: 56 UNIT/L (ref 5–34)
BILIRUBIN DIRECT+TOT PNL SERPL-MCNC: 0.7 MG/DL
BUN SERPL-MCNC: 9.9 MG/DL (ref 8.9–20.6)
CALCIUM SERPL-MCNC: 10.3 MG/DL (ref 8.4–10.2)
CHLORIDE SERPL-SCNC: 103 MMOL/L (ref 98–107)
CO2 SERPL-SCNC: 24 MMOL/L (ref 22–29)
CREAT SERPL-MCNC: 1.05 MG/DL (ref 0.73–1.18)
ETHANOL SERPL-MCNC: <10 MG/DL
GFR SERPLBLD CREATININE-BSD FMLA CKD-EPI: >60 MLS/MIN/1.73/M2
GLOBULIN SER-MCNC: 3.6 GM/DL (ref 2.4–3.5)
GLUCOSE SERPL-MCNC: 97 MG/DL (ref 74–100)
POTASSIUM SERPL-SCNC: 4.3 MMOL/L (ref 3.5–5.1)
PROT SERPL-MCNC: 7.9 GM/DL (ref 6.4–8.3)
SODIUM SERPL-SCNC: 138 MMOL/L (ref 136–145)
TSH SERPL-ACNC: 0.57 UIU/ML (ref 0.35–4.94)

## 2022-12-02 PROCEDURE — 96372 THER/PROPH/DIAG INJ SC/IM: CPT | Performed by: FAMILY MEDICINE

## 2022-12-02 PROCEDURE — 25000003 PHARM REV CODE 250

## 2022-12-02 PROCEDURE — S0166 INJ OLANZAPINE 2.5MG: HCPCS

## 2022-12-02 PROCEDURE — 63600175 PHARM REV CODE 636 W HCPCS: Performed by: FAMILY MEDICINE

## 2022-12-02 RX ORDER — DIPHENHYDRAMINE HYDROCHLORIDE 50 MG/ML
50 INJECTION INTRAMUSCULAR; INTRAVENOUS
Status: COMPLETED | OUTPATIENT
Start: 2022-12-02 | End: 2022-12-02

## 2022-12-02 RX ORDER — CHLORPROMAZINE HYDROCHLORIDE 25 MG/ML
100 INJECTION INTRAMUSCULAR
Status: COMPLETED | OUTPATIENT
Start: 2022-12-02 | End: 2022-12-02

## 2022-12-02 RX ORDER — OLANZAPINE 10 MG/2ML
INJECTION, POWDER, FOR SOLUTION INTRAMUSCULAR
Status: COMPLETED
Start: 2022-12-02 | End: 2022-12-02

## 2022-12-02 RX ORDER — MIDAZOLAM HYDROCHLORIDE 1 MG/ML
4 INJECTION INTRAMUSCULAR; INTRAVENOUS
Status: COMPLETED | OUTPATIENT
Start: 2022-12-02 | End: 2022-12-02

## 2022-12-02 RX ADMIN — MIDAZOLAM HYDROCHLORIDE 4 MG: 1 INJECTION, SOLUTION INTRAMUSCULAR; INTRAVENOUS at 02:12

## 2022-12-02 RX ADMIN — DIPHENHYDRAMINE HYDROCHLORIDE 50 MG: 50 INJECTION INTRAMUSCULAR; INTRAVENOUS at 02:12

## 2022-12-02 RX ADMIN — OLANZAPINE: 10 INJECTION, POWDER, FOR SOLUTION INTRAMUSCULAR at 08:12

## 2022-12-02 RX ADMIN — CHLORPROMAZINE HYDROCHLORIDE 100 MG: 25 INJECTION INTRAMUSCULAR at 02:12

## 2022-12-02 NOTE — ED PROVIDER NOTES
"Encounter Date: 12/1/2022       History     Chief Complaint   Patient presents with    Psychiatric Evaluation     SI, attempted to cut left wrist. Superficial laceration. Denies HI     37-year-old gentleman presents emergency room by ambulance with suicidal ideations.  He reports that his mother does not love him, and that she loves the other children more.  Patient tried cutting his left wrist with a razor (superficial) and a suicide attempt.  Patient is still reports being actively suicidal because "nobody loves him".  Currently calm and cooperative and redirectable.    Review of patient's allergies indicates:   Allergen Reactions    Haloperidol Swelling     Muscle stiffness  Has muscle spasms      Paroxetine Swelling, Other (See Comments) and Rash     "i don't remember"  "i don't remember"      Pineapple      Face swells up  Face swells up      Ziprasidone Other (See Comments)     Muscle twitching     Past Medical History:   Diagnosis Date    Anxiety     Depression     Hallucination     History of psychiatric hospitalization     Hx of psychiatric care     Psychiatric problem     Psychosis     Schizoaffective disorder     Sleep difficulties     Suicide attempt     Therapy      History reviewed. No pertinent surgical history.  History reviewed. No pertinent family history.  Social History     Tobacco Use    Smoking status: Every Day     Packs/day: 1.00     Types: Cigarettes    Smokeless tobacco: Never   Substance Use Topics    Alcohol use: Yes     Alcohol/week: 4.0 standard drinks     Types: 4 Cans of beer per week     Comment: uses alcohol monthly    Drug use: Yes     Types: Marijuana     Review of Systems   Constitutional:  Negative for chills, fatigue and fever.   HENT:  Negative for ear pain, rhinorrhea and sore throat.    Eyes:  Negative for photophobia and pain.   Respiratory:  Negative for cough, shortness of breath and wheezing.    Cardiovascular:  Negative for chest pain.   Gastrointestinal:  Negative for " abdominal pain, diarrhea, nausea and vomiting.   Genitourinary:  Negative for dysuria.   Neurological:  Negative for dizziness, weakness and headaches.   All other systems reviewed and are negative.    Physical Exam     Initial Vitals [12/01/22 2104]   BP Pulse Resp Temp SpO2   134/72 77 20 97.3 °F (36.3 °C) 100 %      MAP       --         Physical Exam    Nursing note and vitals reviewed.  Constitutional: He appears well-developed and well-nourished.   HENT:   Head: Normocephalic and atraumatic.   Eyes: EOM are normal. Pupils are equal, round, and reactive to light.   Neck: Neck supple.   Normal range of motion.  Cardiovascular:  Normal rate, regular rhythm, normal heart sounds and intact distal pulses.     Exam reveals no gallop and no friction rub.       No murmur heard.  Pulmonary/Chest: Breath sounds normal. No respiratory distress.   Abdominal: Abdomen is soft. Bowel sounds are normal. He exhibits no distension. There is no abdominal tenderness.   Musculoskeletal:         General: Normal range of motion.      Cervical back: Normal range of motion and neck supple.      Comments: 0.5 cm superficial laceration left radial wrist, no active bleeding.     Neurological: He is alert and oriented to person, place, and time. He has normal strength.   Skin: Skin is warm and dry.   Psychiatric: His speech is normal and behavior is normal. Judgment normal. He exhibits a depressed mood. He expresses suicidal ideation. He expresses suicidal plans.       ED Course   Procedures  Labs Reviewed   COMPREHENSIVE METABOLIC PANEL - Abnormal; Notable for the following components:       Result Value    Calcium Level Total 10.3 (*)     Globulin 3.6 (*)     Aspartate Aminotransferase 56 (*)     All other components within normal limits   URINALYSIS, REFLEX TO URINE CULTURE - Abnormal; Notable for the following components:    Glucose, UA 1+ (*)     All other components within normal limits   DRUG SCREEN, URINE (BEAKER) - Abnormal;  Notable for the following components:    Cannabinoids, Urine Positive (*)     All other components within normal limits    Narrative:     Cut off concentrations:    Amphetamines - 1000 ng/ml  Barbiturates - 200 ng/ml  Benzodiazepine - 200 ng/ml  Cannabinoids (THC) - 50 ng/ml  Cocaine - 300 ng/ml  Fentanyl - 1.0 ng/ml  MDMA - 500 ng/ml  Opiates - 300 ng/ml   Phencyclidine (PCP) - 25 ng/ml    Specimen submitted for drug analysis and tested for pH and specific gravity in order to evaluate sample integrity. Suspect tampering if specific gravity is <1.003 and/or pH is not within the range of 4.5 - 8.0  False negatives may result form substances such as bleach added to urine.  False positives may result for the presence of a substance with similar chemical structure to the drug or its metabolite.    This test provides only a PRELIMINARY analytical test result. A more specific alternate chemical method must be used in order to obtain a confirmed analytical result. Gas chromatography/mass spectrometry (GC/MS) is the preferred confirmatory method. Other chemical confirmation methods are available. Clinical consideration and professional judgement should be applied to any drug of abuse test result, particularly when preliminary positive results are used.    Positive results will be confirmed only at the physicians request. Unconfirmed screening results are to be used only for medical purposes (treatment).        CBC WITH DIFFERENTIAL - Abnormal; Notable for the following components:    WBC 23.3 (*)     Hgb 13.7 (*)     Hct 40.6 (*)     MPV 11.4 (*)     Neut # 19.5 (*)     Mono # 1.55 (*)     IG# 0.15 (*)     All other components within normal limits   TSH - Normal   ALCOHOL,MEDICAL (ETHANOL) - Normal   SARS-COV-2 RNA AMPLIFICATION, QUAL - Normal    Narrative:     The IDNOW COVID-19 assay is a rapid molecular in vitro diagnostic test utilizing an isothermal nucleic acid amplification technology intended for the qualitative  detection of nucleic acid from the SARS-CoV-2 viral RNA in direct nasal, nasopharyngeal or throat swabs from individuals who are suspected of COVID-19 by their healthcare provider.   CBC W/ AUTO DIFFERENTIAL    Narrative:     The following orders were created for panel order CBC auto differential.  Procedure                               Abnormality         Status                     ---------                               -----------         ------                     CBC with Differential[[id number]]        Abnormal            Final result                 Please view results for these tests on the individual orders.   EXTRA TUBES    Narrative:     The following orders were created for panel order EXTRA TUBES.  Procedure                               Abnormality         Status                     ---------                               -----------         ------                     Red Top Hold[[id number]]                                                                  Please view results for these tests on the individual orders.   RED TOP HOLD          Imaging Results    None          Medications   Tdap (BOOSTRIX) vaccine injection 0.5 mL (0.5 mLs Intramuscular Given 12/1/22 2248)     Medical Decision Making:   Initial Assessment:   Due to suicidal ideations and attempt to cut wrists, will place under a [facility] for inpatient psychiatric evaluation and treatment.           ED Course as of 12/02/22 0046   u Dec 01, 2022   2347 WBC(!): 23.3 [MW]   2347 Hemoglobin(!): 13.7 [MW]   2347 Hematocrit(!): 40.6 [MW]   2347 Platelets: 241 [MW]   2348 Color, UA: Light-Yellow [MW]   2348 Ketones, UA: Negative [MW]   2348 Bacteria, UA: None Seen [MW]   2348 WBC, UA: None Seen [MW]   2348 Leukocytes, UA: Negative [MW]   Fri Dec 02, 2022   0044 Leukocytosis of 23k likely stress reaction; no source of infection.  Medically cleared for psychiatric placement. [MW]      ED Course User Index  [MW] Shahbaz Gooden MD       Medically  cleared for psychiatry placement: 12/2/2022 12:45 AM         Clinical Impression:   Final diagnoses:  [F32.A, R45.851] Depression with suicidal ideation (Primary)      ED Disposition Condition    Transfer to Psych Facility Stable          ED Prescriptions    None       Follow-up Information    None          Shahbaz Gooden MD  12/02/22 0046

## 2023-01-13 NOTE — PROGRESS NOTES
EMB result from 1/12/23 came back negative for ACR/IF pending per Dr. Malin. DSA/EBV/CMV/Allosure results pending . Labs addressed yesterday. Relayed result to Dr. Lara. Plan to 1) decrease Pred 7.5/5mg starting 1/26/23 then EMB 2/09/23, 2) labs on Monday 3) will discuss Pred taper plan in lieu of (+)CMV and L1 compression fracture in MDM next week 4) provided info to see Dr. Franklin for further frac evaluation and inquire about cardiac rehab clearance 5) difficulty filling Juanis Greenberg RN will investigate. Called and discussed plan of care with the patient. Able to verbalized understanding of such and rpt instructions provided.    [facility] Medicine Wards Progress Note     Resident Team: [facility] Medicine List 1  Attending Physician: Kt Martinez MD  Resident: Chai Wood MD  Intern: Deepak Day MD      Subjective:      Brief HPI:  36 y.o.  male who with a history of schizophrenia presented to the ED via ambulance on 2022 from a psych facility in [place]. Pt stated he wanted to check himself in the unit because of feeling lonely and having hallucinations but they noticed his L arm being swollen and sent to ED for further evaluation. Pt states he was repeatedly lifting a 25 lbs weight with his L arm but denies any injuries. He also denies using any IV drugs. He denies pain to the arm, only reports swollen sensation. In ED labs significant for CK of 70,392 with  and . Admitted to IM services for management of rhabdomyolysis.     Interval History (22):  NAEON. Nurse reports patient had difficulty sleeping. This morning, patient was very calm and pleasant, and participated in conversation well. He denies any current headache, lightheadedness/dizziness, nausea/vomiting, fever/chillls, or abdominal pain. He reports no difficulty/pain with urination.    Review of Systems:  See HPI     Objective:     Last 24 Hour Vital Signs:  BP  Min: 125/78  Max: 152/92  Temp  Av.7 °F (36.5 °C)  Min: 97.3 °F (36.3 °C)  Max: 98.1 °F (36.7 °C)  Pulse  Av.7  Min: 72  Max: 95  Resp  Av  Min: 18  Max: 18  SpO2  Av.7 %  Min: 99 %  Max: 100 %  I/O last 3 completed shifts:  In: 4500 [I.V.:4500]  Out: -     Physical Examination:  General: appears well, in no acute distress   Eye: no scleral icterus, mild scleral icterus  Respiratory: clear to auscultation bilaterally, nonlabored respirations   Cardiovascular: regular rate and rhythm without murmurs or gallops, no edema in bilateral lower extremities   Gastrointestinal: soft, non-tender, non-distended, bowel sounds present   Musculoskeletal: no gross deformities observed      Laboratory:  Most Recent Data:  CBC:   Lab Results   Component Value Date    WBC 9.2 06/26/2022    HGB 14.0 06/26/2022    HCT 42.9 06/26/2022     06/26/2022    MCV 85.8 06/26/2022    RDW 14.5 06/26/2022     WBC Differential:   Recent Labs   Lab 06/22/22  0800 06/23/22  0431 06/24/22  0809 06/25/22  0522 06/26/22  0623   WBC 9.6 9.8 10.3 12.6* 9.2   HGB 14.7 13.1* 13.2* 13.0* 14.0   HCT 47.8 40.4* 40.6* 41.0* 42.9    263 260 240 265   MCV 90.0 86.1 86.8 88.2 85.8     BMP:   Lab Results   Component Value Date     06/26/2022    K 3.8 06/26/2022    CO2 29 06/26/2022    BUN 4.9 (L) 06/26/2022    CREATININE 0.77 06/26/2022    CALCIUM 10.1 06/26/2022    MG 2.00 06/23/2022     LFTs:   Lab Results   Component Value Date    ALBUMIN 4.0 06/26/2022    BILITOT 0.3 06/26/2022    AST 18 06/26/2022    ALKPHOS 81 06/26/2022    ALT 24 06/26/2022       Current Medications:     Infusions:   lactated ringers 125 mL/hr at 06/26/22 0454        Scheduled:   enoxaparin  40 mg Subcutaneous Daily    nicotine  1 patch Transdermal Daily    OLANZapine  10 mg Oral QAM    OLANZapine  20 mg Oral QHS    OXcarbazepine  300 mg Oral BID    sodium chloride 0.9%  10 mL Intravenous Q6H    venlafaxine  150 mg Oral Daily        PRN:  dextrose 10%, dextrose 10%, dextrose 50%, dextrose 50%, glucagon (human recombinant), glucose, glucose, ibuprofen, labetalol, naloxone, QUEtiapine, Flushing PICC Protocol **AND** sodium chloride 0.9% **AND** sodium chloride 0.9%      Assessment & Plan:     Non-traumatic Rhabdomyolysis - resolved  -likely secondary to repetitive weight lifting prior to presentation, leading to muscle injury  -Initial CK at 70,392--> 477  -Discontinued maintenance IVF (6/26/22)   -he is medically cleared for psych placement    Schizophrenia  -Per Dr. Charles recommendations:  -Trileptal 300mg BID, Venlafaxine  daily, Zyprexa 10mg qAM and 20mg qhs  -Seroquel 200 mg nightly PRN insomnia   -Pt is currently CEC'd  (signed 6/15/22), continue 1:1 observation  -psychiatrist is following     CODE STATUS: FULL  Access: IV  Antibiotics: none  Diet: regular  DVT Prophylaxis: Lovenox 40 daily  GI Prophylaxis: none  Fluids: none    Disposition (6/26/22): Rhabdo has resolved.  CK <500.  Patient is medically cleared for psych placement.    Will discuss case with attending; attending addendum to follow.     Deepak Day MD  [facility] HO-1

## 2023-01-29 ENCOUNTER — HOSPITAL ENCOUNTER (EMERGENCY)
Facility: HOSPITAL | Age: 38
Discharge: PSYCHIATRIC OR SUBSTANCE USE FACILITY | End: 2023-01-29
Attending: EMERGENCY MEDICINE
Payer: COMMERCIAL

## 2023-01-29 VITALS
TEMPERATURE: 98 F | SYSTOLIC BLOOD PRESSURE: 104 MMHG | OXYGEN SATURATION: 99 % | RESPIRATION RATE: 18 BRPM | HEART RATE: 79 BPM | BODY MASS INDEX: 22.22 KG/M2 | DIASTOLIC BLOOD PRESSURE: 61 MMHG | WEIGHT: 150 LBS | HEIGHT: 69 IN

## 2023-01-29 DIAGNOSIS — F25.9 SCHIZOAFFECTIVE DISORDER, UNSPECIFIED TYPE: Primary | ICD-10-CM

## 2023-01-29 DIAGNOSIS — F22 DELUSIONS: ICD-10-CM

## 2023-01-29 DIAGNOSIS — R44.0 AUDITORY HALLUCINATIONS: ICD-10-CM

## 2023-01-29 LAB
ALBUMIN SERPL-MCNC: 4.1 G/DL (ref 3.5–5)
ALBUMIN/GLOB SERPL: 1.2 RATIO (ref 1.1–2)
ALP SERPL-CCNC: 75 UNIT/L (ref 40–150)
ALT SERPL-CCNC: 19 UNIT/L (ref 0–55)
AMPHET UR QL SCN: NEGATIVE
APAP SERPL-MCNC: <17.4 UG/ML (ref 17.4–30)
APPEARANCE UR: CLEAR
AST SERPL-CCNC: 28 UNIT/L (ref 5–34)
BARBITURATE SCN PRESENT UR: NEGATIVE
BASOPHILS # BLD AUTO: 0.04 X10(3)/MCL (ref 0–0.2)
BASOPHILS NFR BLD AUTO: 0.5 %
BENZODIAZ UR QL SCN: NEGATIVE
BILIRUB UR QL STRIP.AUTO: NEGATIVE MG/DL
BILIRUBIN DIRECT+TOT PNL SERPL-MCNC: 0.7 MG/DL
BUN SERPL-MCNC: 6.7 MG/DL (ref 8.9–20.6)
CALCIUM SERPL-MCNC: 9.7 MG/DL (ref 8.4–10.2)
CANNABINOIDS UR QL SCN: POSITIVE
CHLORIDE SERPL-SCNC: 105 MMOL/L (ref 98–107)
CO2 SERPL-SCNC: 24 MMOL/L (ref 22–29)
COCAINE UR QL SCN: NEGATIVE
COLOR UR AUTO: YELLOW
CREAT SERPL-MCNC: 1.34 MG/DL (ref 0.73–1.18)
EOSINOPHIL # BLD AUTO: 0.26 X10(3)/MCL (ref 0–0.9)
EOSINOPHIL NFR BLD AUTO: 3.1 %
ERYTHROCYTE [DISTWIDTH] IN BLOOD BY AUTOMATED COUNT: 15.8 % (ref 11.5–17)
ETHANOL SERPL-MCNC: <10 MG/DL
FLUAV AG UPPER RESP QL IA.RAPID: NOT DETECTED
FLUBV AG UPPER RESP QL IA.RAPID: NOT DETECTED
GFR SERPLBLD CREATININE-BSD FMLA CKD-EPI: >60 MLS/MIN/1.73/M2
GLOBULIN SER-MCNC: 3.5 GM/DL (ref 2.4–3.5)
GLUCOSE SERPL-MCNC: 90 MG/DL (ref 74–100)
GLUCOSE UR QL STRIP.AUTO: NEGATIVE MG/DL
HCT VFR BLD AUTO: 38.5 % (ref 42–52)
HGB BLD-MCNC: 13.1 GM/DL (ref 14–18)
IMM GRANULOCYTES # BLD AUTO: 0.02 X10(3)/MCL (ref 0–0.04)
IMM GRANULOCYTES NFR BLD AUTO: 0.2 %
KETONES UR QL STRIP.AUTO: 15 MG/DL
LEUKOCYTE ESTERASE UR QL STRIP.AUTO: NEGATIVE UNIT/L
LYMPHOCYTES # BLD AUTO: 1.88 X10(3)/MCL (ref 0.6–4.6)
LYMPHOCYTES NFR BLD AUTO: 22.6 %
MCH RBC QN AUTO: 28.6 PG
MCHC RBC AUTO-ENTMCNC: 34 MG/DL (ref 33–36)
MCV RBC AUTO: 84.1 FL (ref 80–94)
MDMA UR QL SCN: NEGATIVE
MONOCYTES # BLD AUTO: 0.6 X10(3)/MCL (ref 0.1–1.3)
MONOCYTES NFR BLD AUTO: 7.2 %
NEUTROPHILS # BLD AUTO: 5.52 X10(3)/MCL (ref 2.1–9.2)
NEUTROPHILS NFR BLD AUTO: 66.4 %
NITRITE UR QL STRIP.AUTO: NEGATIVE
NRBC BLD AUTO-RTO: 0 %
OPIATES UR QL SCN: NEGATIVE
PCP UR QL: NEGATIVE
PH UR STRIP.AUTO: 6 [PH]
PH UR: 6 [PH] (ref 3–11)
PLATELET # BLD AUTO: 259 X10(3)/MCL (ref 130–400)
PMV BLD AUTO: 11 FL (ref 7.4–10.4)
POTASSIUM SERPL-SCNC: 4.1 MMOL/L (ref 3.5–5.1)
PROT SERPL-MCNC: 7.6 GM/DL (ref 6.4–8.3)
PROT UR QL STRIP.AUTO: NEGATIVE MG/DL
RBC # BLD AUTO: 4.58 X10(6)/MCL (ref 4.7–6.1)
RBC UR QL AUTO: NEGATIVE UNIT/L
SARS-COV-2 RNA RESP QL NAA+PROBE: NOT DETECTED
SODIUM SERPL-SCNC: 138 MMOL/L (ref 136–145)
SP GR UR STRIP.AUTO: 1.01
TSH SERPL-ACNC: 0.42 UIU/ML (ref 0.35–4.94)
UROBILINOGEN UR STRIP-ACNC: 0.2 MG/DL
WBC # SPEC AUTO: 8.3 X10(3)/MCL (ref 4.5–11.5)

## 2023-01-29 PROCEDURE — 80143 DRUG ASSAY ACETAMINOPHEN: CPT | Performed by: EMERGENCY MEDICINE

## 2023-01-29 PROCEDURE — 80307 DRUG TEST PRSMV CHEM ANLYZR: CPT | Performed by: EMERGENCY MEDICINE

## 2023-01-29 PROCEDURE — 85025 COMPLETE CBC W/AUTO DIFF WBC: CPT | Performed by: EMERGENCY MEDICINE

## 2023-01-29 PROCEDURE — 0240U COVID/FLU A&B PCR: CPT | Performed by: EMERGENCY MEDICINE

## 2023-01-29 PROCEDURE — 25000003 PHARM REV CODE 250: Performed by: EMERGENCY MEDICINE

## 2023-01-29 PROCEDURE — 80053 COMPREHEN METABOLIC PANEL: CPT | Performed by: EMERGENCY MEDICINE

## 2023-01-29 PROCEDURE — 99285 EMERGENCY DEPT VISIT HI MDM: CPT

## 2023-01-29 PROCEDURE — 81003 URINALYSIS AUTO W/O SCOPE: CPT | Performed by: EMERGENCY MEDICINE

## 2023-01-29 PROCEDURE — 82077 ASSAY SPEC XCP UR&BREATH IA: CPT | Performed by: EMERGENCY MEDICINE

## 2023-01-29 PROCEDURE — 84443 ASSAY THYROID STIM HORMONE: CPT | Performed by: EMERGENCY MEDICINE

## 2023-01-29 RX ORDER — LORAZEPAM 1 MG/1
1 TABLET ORAL EVERY 4 HOURS PRN
Status: DISCONTINUED | OUTPATIENT
Start: 2023-01-29 | End: 2023-01-29 | Stop reason: HOSPADM

## 2023-01-29 RX ADMIN — LORAZEPAM 1 MG: 1 TABLET ORAL at 02:01

## 2023-01-29 NOTE — ED TRIAGE NOTES
Pt to er for psych evaluation. Denies S.I. or H.I. states recently involved in argument with mother.

## 2023-01-29 NOTE — ED PROVIDER NOTES
"Encounter Date: 1/29/2023       History     Chief Complaint   Patient presents with    Psychiatric Evaluation     Pt to er for psych evaluation. Denies S.I. or H.I. states recently involved in argument with mother.     Patient is 36 yo M presenting for hallucinatoins and religiousity. He has as hx of schizoaffective disorder documented but denies any psychiatric history or current medications. He reports that he got into an argument with his mother earlier and he thinks the devil is trying to work through others to get to him. Said he thinks his mother might be possessed and this did not seem like hyperbole when I tried to clarify with the patient. He also reports hallucinations of ghosts that are auditory, possibly visual. He doesn't say what specifically they are tell him except to say that they are being 'rude'. He denies any SI or HI. He does have his bible with him. He deneis nay other complaints like fevers, chills, or other complaints. He has had inpatient stay at [facility] per the patient previously.      Review of patient's allergies indicates:   Allergen Reactions    Haloperidol Swelling     Muscle stiffness  Has muscle spasms      Paroxetine Swelling, Other (See Comments) and Rash     "i don't remember"  "i don't remember"      Pineapple      Face swells up  Face swells up      Ziprasidone Other (See Comments)     Muscle twitching     Past Medical History:   Diagnosis Date    Anxiety     Depression     Hallucination     History of psychiatric hospitalization     Hx of psychiatric care     Psychiatric problem     Psychosis     Schizoaffective disorder     Sleep difficulties     Suicide attempt     Therapy      No past surgical history on file.  No family history on file.  Social History     Tobacco Use    Smoking status: Every Day     Packs/day: 1.00     Types: Cigarettes    Smokeless tobacco: Never   Substance Use Topics    Alcohol use: Yes     Alcohol/week: 4.0 standard drinks     Types: 4 Cans of beer per " week     Comment: uses alcohol monthly    Drug use: Yes     Types: Marijuana     Review of Systems   Psychiatric/Behavioral:  Positive for agitation, hallucinations and suicidal ideas. Negative for self-injury.      Physical Exam     Initial Vitals [01/29/23 1335]   BP Pulse Resp Temp SpO2   136/84 97 20 97.9 °F (36.6 °C) 99 %      MAP       --         Physical Exam    Nursing note and vitals reviewed.  Constitutional: He appears well-developed and well-nourished. He is not diaphoretic. No distress.   HENT:   Head: Normocephalic and atraumatic.   Eyes: Conjunctivae are normal.   Neck: Neck supple.   Cardiovascular:  Normal rate, regular rhythm and normal heart sounds.           Pulmonary/Chest: Breath sounds normal. No respiratory distress. He has no wheezes. He has no rhonchi.   Abdominal: Abdomen is soft. Bowel sounds are normal. He exhibits no distension. There is no abdominal tenderness. There is no rebound and no guarding.   Musculoskeletal:         General: No edema. Normal range of motion.      Cervical back: Neck supple.     Neurological: He is alert and oriented to person, place, and time.   Skin: Skin is warm and dry.   Psychiatric:   + religiousity, delusions, hallucinations       ED Course   Procedures  Labs Reviewed   COMPREHENSIVE METABOLIC PANEL - Abnormal; Notable for the following components:       Result Value    Blood Urea Nitrogen 6.7 (*)     Creatinine 1.34 (*)     All other components within normal limits   URINALYSIS, REFLEX TO URINE CULTURE - Abnormal; Notable for the following components:    Ketones, UA 15 (*)     All other components within normal limits   ACETAMINOPHEN LEVEL - Abnormal; Notable for the following components:    Acetaminophen Level <17.4 (*)     All other components within normal limits   CBC WITH DIFFERENTIAL - Abnormal; Notable for the following components:    RBC 4.58 (*)     Hgb 13.1 (*)     Hct 38.5 (*)     MPV 11.0 (*)     All other components within normal limits    TSH - Normal   ALCOHOL,MEDICAL (ETHANOL) - Normal   COVID/FLU A&B PCR - Normal    Narrative:     The Xpert Xpress SARS-CoV-2/FLU/RSV plus is a rapid, multiplexed real-time PCR test intended for the simultaneous qualitative detection and differentiation of SARS-CoV-2, Influenza A, Influenza B, and respiratory syncytial virus (RSV) viral RNA in either nasopharyngeal swab or nasal swab specimens.         CBC W/ AUTO DIFFERENTIAL    Narrative:     The following orders were created for panel order CBC auto differential.  Procedure                               Abnormality         Status                     ---------                               -----------         ------                     CBC with Differential[[id number]]        Abnormal            Final result                 Please view results for these tests on the individual orders.   DRUG SCREEN, URINE (BEAKER)          Imaging Results    None          Medications   LORazepam tablet 1 mg (1 mg Oral Given 1/29/23 1407)                    Medically cleared for psychiatry placement: 1/29/2023  3:46 PM         Clinical Impression:   Final diagnoses:  [F25.9] Schizoaffective disorder, unspecified type (Primary)  [R44.0] Auditory hallucinations  [F22] Delusions        ED Disposition Condition    Transfer to Psych Facility Stable          ED Prescriptions    None       Follow-up Information    None          Lexis Manzo MD  01/29/23 4717

## 2023-03-20 ENCOUNTER — HOSPITAL ENCOUNTER (EMERGENCY)
Facility: HOSPITAL | Age: 38
Discharge: PSYCHIATRIC OR SUBSTANCE USE FACILITY | End: 2023-03-20
Attending: EMERGENCY MEDICINE
Payer: COMMERCIAL

## 2023-03-20 VITALS
SYSTOLIC BLOOD PRESSURE: 132 MMHG | BODY MASS INDEX: 22.73 KG/M2 | RESPIRATION RATE: 18 BRPM | HEART RATE: 83 BPM | TEMPERATURE: 99 F | DIASTOLIC BLOOD PRESSURE: 74 MMHG | WEIGHT: 150 LBS | HEIGHT: 68 IN | OXYGEN SATURATION: 100 %

## 2023-03-20 DIAGNOSIS — R45.851 SUICIDAL IDEATION: Primary | ICD-10-CM

## 2023-03-20 LAB
ALBUMIN SERPL-MCNC: 4.3 G/DL (ref 3.5–5)
ALBUMIN/GLOB SERPL: 1.1 RATIO (ref 1.1–2)
ALP SERPL-CCNC: 78 UNIT/L (ref 40–150)
ALT SERPL-CCNC: 16 UNIT/L (ref 0–55)
AMPHET UR QL SCN: NEGATIVE
APPEARANCE UR: CLEAR
AST SERPL-CCNC: 22 UNIT/L (ref 5–34)
BARBITURATE SCN PRESENT UR: NEGATIVE
BASOPHILS # BLD AUTO: 0.05 X10(3)/MCL (ref 0–0.2)
BASOPHILS NFR BLD AUTO: 0.4 %
BENZODIAZ UR QL SCN: NEGATIVE
BILIRUB UR QL STRIP.AUTO: NEGATIVE MG/DL
BILIRUBIN DIRECT+TOT PNL SERPL-MCNC: 0.4 MG/DL
BUN SERPL-MCNC: 8.7 MG/DL (ref 8.9–20.6)
CALCIUM SERPL-MCNC: 9.8 MG/DL (ref 8.4–10.2)
CANNABINOIDS UR QL SCN: POSITIVE
CHLORIDE SERPL-SCNC: 102 MMOL/L (ref 98–107)
CO2 SERPL-SCNC: 25 MMOL/L (ref 22–29)
COCAINE UR QL SCN: NEGATIVE
COLOR UR AUTO: YELLOW
CREAT SERPL-MCNC: 1.07 MG/DL (ref 0.73–1.18)
EOSINOPHIL # BLD AUTO: 0.27 X10(3)/MCL (ref 0–0.9)
EOSINOPHIL NFR BLD AUTO: 2.1 %
ERYTHROCYTE [DISTWIDTH] IN BLOOD BY AUTOMATED COUNT: 15.1 % (ref 11.5–17)
ETHANOL SERPL-MCNC: <10 MG/DL
FENTANYL UR QL SCN: NEGATIVE
FLUAV AG UPPER RESP QL IA.RAPID: NOT DETECTED
FLUBV AG UPPER RESP QL IA.RAPID: NOT DETECTED
GFR SERPLBLD CREATININE-BSD FMLA CKD-EPI: >60 MLS/MIN/1.73/M2
GLOBULIN SER-MCNC: 3.8 GM/DL (ref 2.4–3.5)
GLUCOSE SERPL-MCNC: 98 MG/DL (ref 74–100)
GLUCOSE UR QL STRIP.AUTO: NEGATIVE MG/DL
HCT VFR BLD AUTO: 42.3 % (ref 42–52)
HGB BLD-MCNC: 13.8 G/DL (ref 14–18)
IMM GRANULOCYTES # BLD AUTO: 0.03 X10(3)/MCL (ref 0–0.04)
IMM GRANULOCYTES NFR BLD AUTO: 0.2 %
KETONES UR QL STRIP.AUTO: NEGATIVE MG/DL
LEUKOCYTE ESTERASE UR QL STRIP.AUTO: NEGATIVE UNIT/L
LYMPHOCYTES # BLD AUTO: 2.53 X10(3)/MCL (ref 0.6–4.6)
LYMPHOCYTES NFR BLD AUTO: 19.9 %
MCH RBC QN AUTO: 28.4 PG
MCHC RBC AUTO-ENTMCNC: 32.6 G/DL (ref 33–36)
MCV RBC AUTO: 87 FL (ref 80–94)
MDMA UR QL SCN: NEGATIVE
MONOCYTES # BLD AUTO: 0.9 X10(3)/MCL (ref 0.1–1.3)
MONOCYTES NFR BLD AUTO: 7.1 %
NEUTROPHILS # BLD AUTO: 8.96 X10(3)/MCL (ref 2.1–9.2)
NEUTROPHILS NFR BLD AUTO: 70.3 %
NITRITE UR QL STRIP.AUTO: NEGATIVE
NRBC BLD AUTO-RTO: 0 %
OPIATES UR QL SCN: NEGATIVE
PCP UR QL: NEGATIVE
PH UR STRIP.AUTO: 6.5 [PH]
PH UR: 6.5 [PH] (ref 3–11)
PLATELET # BLD AUTO: 242 X10(3)/MCL (ref 130–400)
PMV BLD AUTO: 11.2 FL (ref 7.4–10.4)
POTASSIUM SERPL-SCNC: 3.5 MMOL/L (ref 3.5–5.1)
PROT SERPL-MCNC: 8.1 GM/DL (ref 6.4–8.3)
PROT UR QL STRIP.AUTO: NEGATIVE MG/DL
RBC # BLD AUTO: 4.86 X10(6)/MCL (ref 4.7–6.1)
RBC UR QL AUTO: NEGATIVE UNIT/L
SARS-COV-2 RNA RESP QL NAA+PROBE: NOT DETECTED
SODIUM SERPL-SCNC: 138 MMOL/L (ref 136–145)
SP GR UR STRIP.AUTO: 1.02
UROBILINOGEN UR STRIP-ACNC: 0.2 MG/DL
VALPROATE SERPL-MCNC: 15.2 UG/ML (ref 50–100)
WBC # SPEC AUTO: 12.7 X10(3)/MCL (ref 4.5–11.5)

## 2023-03-20 PROCEDURE — 81003 URINALYSIS AUTO W/O SCOPE: CPT | Mod: 59 | Performed by: EMERGENCY MEDICINE

## 2023-03-20 PROCEDURE — 80164 ASSAY DIPROPYLACETIC ACD TOT: CPT | Performed by: EMERGENCY MEDICINE

## 2023-03-20 PROCEDURE — 80307 DRUG TEST PRSMV CHEM ANLYZR: CPT | Performed by: EMERGENCY MEDICINE

## 2023-03-20 PROCEDURE — 80053 COMPREHEN METABOLIC PANEL: CPT | Performed by: EMERGENCY MEDICINE

## 2023-03-20 PROCEDURE — 99285 EMERGENCY DEPT VISIT HI MDM: CPT

## 2023-03-20 PROCEDURE — 0240U COVID/FLU A&B PCR: CPT | Performed by: EMERGENCY MEDICINE

## 2023-03-20 PROCEDURE — 85025 COMPLETE CBC W/AUTO DIFF WBC: CPT | Performed by: EMERGENCY MEDICINE

## 2023-03-20 PROCEDURE — 82077 ASSAY SPEC XCP UR&BREATH IA: CPT | Performed by: EMERGENCY MEDICINE

## 2023-03-20 RX ORDER — DIVALPROEX SODIUM 500 MG/1
1000 TABLET, FILM COATED, EXTENDED RELEASE ORAL NIGHTLY
COMMUNITY
Start: 2023-02-07

## 2023-03-20 RX ORDER — RISPERIDONE 4 MG/1
4 TABLET ORAL 2 TIMES DAILY
COMMUNITY
Start: 2023-02-07

## 2023-03-21 NOTE — ED PROVIDER NOTES
"Encounter Date: 3/20/2023       History     Chief Complaint   Patient presents with    Suicidal     Mr. Puentes is a 37-year-old male with a history of anxiety, depression, psychosis, schizoaffective disorder, prior suicide attempts, who states that he is very depressed and suicidal because he is worried about his mom's health.  He states that all of his aunties have  and now he is worried that his mother will die and he just wants to die himself and go to [facility] and get it over with.  He states he has occasional hallucinations but none right now, occasionally smokes marijuana but denies any other drug use and denies alcohol use.  He has no physical complaints and otherwise is feeling well.  He states that he is taking his medications as prescribed.      Review of patient's allergies indicates:   Allergen Reactions    Haloperidol Swelling     Muscle stiffness  Has muscle spasms      Paroxetine Swelling, Other (See Comments) and Rash     "i don't remember"  "i don't remember"      Pineapple      Face swells up  Face swells up      Ziprasidone Other (See Comments)     Muscle twitching     Past Medical History:   Diagnosis Date    Anxiety     Depression     Hallucination     History of psychiatric hospitalization     Hx of psychiatric care     Psychiatric problem     Psychosis     Schizoaffective disorder     Sleep difficulties     Suicide attempt     Therapy      No past surgical history on file.  No family history on file.  Social History     Tobacco Use    Smoking status: Every Day     Packs/day: 1.00     Types: Cigarettes    Smokeless tobacco: Never   Substance Use Topics    Alcohol use: Yes     Alcohol/week: 4.0 standard drinks     Types: 4 Cans of beer per week     Comment: uses alcohol monthly    Drug use: Yes     Types: Marijuana     Review of Systems   Psychiatric/Behavioral:  Positive for hallucinations and suicidal ideas.    All other systems reviewed and are negative.    Physical Exam     Initial Vitals " [03/20/23 1844]   BP Pulse Resp Temp SpO2   (!) 144/81 89 18 97.9 °F (36.6 °C) 100 %      MAP       --         Physical Exam    Nursing note and vitals reviewed.  Constitutional: Vital signs are normal. He appears well-developed and well-nourished.   HENT:   Head: Normocephalic and atraumatic.   Eyes: Pupils are equal, round, and reactive to light.   Neck: Neck supple.   Cardiovascular:  Normal rate, regular rhythm and normal heart sounds.           Pulmonary/Chest: Breath sounds normal. No respiratory distress.   Musculoskeletal:      Cervical back: Neck supple. No edema or erythema.     Neurological: He is alert.   Skin: Skin is warm and dry.   Psychiatric:   Suicidal, plans to overdose, occasional hallucinations, no hallucinations right now,, polite, cooperative       ED Course   Procedures  Labs Reviewed   CBC WITH DIFFERENTIAL - Abnormal; Notable for the following components:       Result Value    WBC 12.7 (*)     Hgb 13.8 (*)     MCHC 32.6 (*)     MPV 11.2 (*)     All other components within normal limits   COMPREHENSIVE METABOLIC PANEL - Abnormal; Notable for the following components:    Blood Urea Nitrogen 8.7 (*)     Globulin 3.8 (*)     All other components within normal limits   DRUG SCREEN, URINE (BEAKER) - Abnormal; Notable for the following components:    Cannabinoids, Urine Positive (*)     All other components within normal limits    Narrative:     Cut off concentrations:    Amphetamines - 1000 ng/ml  Barbiturates - 200 ng/ml  Benzodiazepine - 200 ng/ml  Cannabinoids (THC) - 50 ng/ml  Cocaine - 300 ng/ml  Fentanyl - 1.0 ng/ml  MDMA - 500 ng/ml  Opiates - 300 ng/ml   Phencyclidine (PCP) - 25 ng/ml    Specimen submitted for drug analysis and tested for pH and specific gravity in order to evaluate sample integrity. Suspect tampering if specific gravity is <1.003 and/or pH is not within the range of 4.5 - 8.0  False negatives may result form substances such as bleach added to urine.  False positives may  result for the presence of a substance with similar chemical structure to the drug or its metabolite.    This test provides only a PRELIMINARY analytical test result. A more specific alternate chemical method must be used in order to obtain a confirmed analytical result. Gas chromatography/mass spectrometry (GC/MS) is the preferred confirmatory method. Other chemical confirmation methods are available. Clinical consideration and professional judgement should be applied to any drug of abuse test result, particularly when preliminary positive results are used.    Positive results will be confirmed only at the physicians request. Unconfirmed screening results are to be used only for medical purposes (treatment).        VALPROIC ACID - Abnormal; Notable for the following components:    Valproic Acid Level 15.2 (*)     All other components within normal limits   ALCOHOL,MEDICAL (ETHANOL) - Normal   COVID/FLU A&B PCR - Normal    Narrative:     The Xpert Xpress SARS-CoV-2/FLU/RSV plus is a rapid, multiplexed real-time PCR test intended for the simultaneous qualitative detection and differentiation of SARS-CoV-2, Influenza A, Influenza B, and respiratory syncytial virus (RSV) viral RNA in either nasopharyngeal swab or nasal swab specimens.         URINALYSIS, REFLEX TO URINE CULTURE          Imaging Results    None          Medications - No data to display  Medical Decision Making:   Initial Assessment:   Mr. Puentes is a 37-year-old male with a history of anxiety, depression, psychosis, schizoaffective disorder, prior suicide attempts, who states that he is very depressed and suicidal because he is worried about his mom's health.  He states that all of his aunties have  and now he is worried that his mother will die and he just wants to die himself and go to [facility] and get it over with.  He states he has occasional hallucinations but none right now, occasionally smokes marijuana but denies any other drug use and denies  alcohol use.  He has no physical complaints and otherwise is feeling well.  He states that he is taking his medications as prescribed.    Differential Diagnosis:     Differential diagnosis includes but is not limited to psychiatric disorder, psychosis, substance abuse, medication noncompliance.  ED Management:    Patient was seen and evaluated in the emergency room cleared for psychiatric placement              Medically cleared for psychiatry placement: 3/20/2023  8:19 PM         Clinical Impression:   Final diagnoses:  [R45.851] Suicidal ideation (Primary)        ED Disposition Condition    Transfer to Psych Facility Stable          ED Prescriptions    None       Follow-up Information    None          Trudy Alvarado MD  03/20/23 2020

## 2023-04-13 ENCOUNTER — HOSPITAL ENCOUNTER (EMERGENCY)
Facility: HOSPITAL | Age: 38
Discharge: HOME | End: 2023-04-13
Attending: EMERGENCY MEDICINE
Payer: COMMERCIAL

## 2023-04-13 VITALS
BODY MASS INDEX: 22.73 KG/M2 | WEIGHT: 150 LBS | RESPIRATION RATE: 20 BRPM | OXYGEN SATURATION: 100 % | TEMPERATURE: 99 F | HEIGHT: 68 IN | SYSTOLIC BLOOD PRESSURE: 142 MMHG | DIASTOLIC BLOOD PRESSURE: 79 MMHG | HEART RATE: 96 BPM

## 2023-04-13 DIAGNOSIS — F20.9 SCHIZOPHRENIA, CHRONIC WITH ACUTE EXACERBATION: Primary | ICD-10-CM

## 2023-04-13 DIAGNOSIS — Z00.8 MEDICAL CLEARANCE FOR PSYCHIATRIC ADMISSION: ICD-10-CM

## 2023-04-13 LAB
ALBUMIN SERPL-MCNC: 4 G/DL (ref 3.5–5)
ALBUMIN/GLOB SERPL: 1.2 RATIO (ref 1.1–2)
ALP SERPL-CCNC: 67 UNIT/L (ref 40–150)
ALT SERPL-CCNC: 13 UNIT/L (ref 0–55)
AMPHET UR QL SCN: NEGATIVE
APAP SERPL-MCNC: <17.4 UG/ML (ref 17.4–30)
APPEARANCE UR: CLEAR
AST SERPL-CCNC: 24 UNIT/L (ref 5–34)
BACTERIA #/AREA URNS AUTO: ABNORMAL /HPF
BARBITURATE SCN PRESENT UR: NEGATIVE
BASOPHILS # BLD AUTO: 0.06 X10(3)/MCL (ref 0–0.2)
BASOPHILS NFR BLD AUTO: 0.7 %
BENZODIAZ UR QL SCN: NEGATIVE
BILIRUB UR QL STRIP.AUTO: NEGATIVE MG/DL
BILIRUBIN DIRECT+TOT PNL SERPL-MCNC: 0.3 MG/DL
BUN SERPL-MCNC: 8.1 MG/DL (ref 8.9–20.6)
CALCIUM SERPL-MCNC: 9.1 MG/DL (ref 8.4–10.2)
CANNABINOIDS UR QL SCN: POSITIVE
CHLORIDE SERPL-SCNC: 104 MMOL/L (ref 98–107)
CO2 SERPL-SCNC: 27 MMOL/L (ref 22–29)
COCAINE UR QL SCN: NEGATIVE
COLOR UR AUTO: YELLOW
CREAT SERPL-MCNC: 1.07 MG/DL (ref 0.73–1.18)
EOSINOPHIL # BLD AUTO: 0.32 X10(3)/MCL (ref 0–0.9)
EOSINOPHIL NFR BLD AUTO: 3.7 %
ERYTHROCYTE [DISTWIDTH] IN BLOOD BY AUTOMATED COUNT: 14.4 % (ref 11.5–17)
ETHANOL SERPL-MCNC: <10 MG/DL
FENTANYL UR QL SCN: NEGATIVE
GFR SERPLBLD CREATININE-BSD FMLA CKD-EPI: >60 MLS/MIN/1.73/M2
GLOBULIN SER-MCNC: 3.3 GM/DL (ref 2.4–3.5)
GLUCOSE SERPL-MCNC: 90 MG/DL (ref 74–100)
GLUCOSE UR QL STRIP.AUTO: NORMAL MG/DL
HCT VFR BLD AUTO: 35.7 % (ref 42–52)
HGB BLD-MCNC: 12 G/DL (ref 14–18)
HYALINE CASTS #/AREA URNS LPF: ABNORMAL /LPF
IMM GRANULOCYTES # BLD AUTO: 0.02 X10(3)/MCL (ref 0–0.04)
IMM GRANULOCYTES NFR BLD AUTO: 0.2 %
KETONES UR QL STRIP.AUTO: NEGATIVE MG/DL
LEUKOCYTE ESTERASE UR QL STRIP.AUTO: NEGATIVE UNIT/L
LYMPHOCYTES # BLD AUTO: 1.81 X10(3)/MCL (ref 0.6–4.6)
LYMPHOCYTES NFR BLD AUTO: 20.9 %
MCH RBC QN AUTO: 27.8 PG (ref 27–31)
MCHC RBC AUTO-ENTMCNC: 33.6 G/DL (ref 33–36)
MCV RBC AUTO: 82.8 FL (ref 80–94)
MDMA UR QL SCN: NEGATIVE
MONOCYTES # BLD AUTO: 0.58 X10(3)/MCL (ref 0.1–1.3)
MONOCYTES NFR BLD AUTO: 6.7 %
NEUTROPHILS # BLD AUTO: 5.88 X10(3)/MCL (ref 2.1–9.2)
NEUTROPHILS NFR BLD AUTO: 67.8 %
NITRITE UR QL STRIP.AUTO: NEGATIVE
NRBC BLD AUTO-RTO: 0 %
OPIATES UR QL SCN: NEGATIVE
PCP UR QL: NEGATIVE
PH UR STRIP.AUTO: 5.5 [PH]
PH UR: 5.5 [PH] (ref 3–11)
PLATELET # BLD AUTO: 275 X10(3)/MCL (ref 130–400)
PMV BLD AUTO: 10.6 FL (ref 7.4–10.4)
POTASSIUM SERPL-SCNC: 4 MMOL/L (ref 3.5–5.1)
PROT SERPL-MCNC: 7.3 GM/DL (ref 6.4–8.3)
PROT UR QL STRIP.AUTO: NEGATIVE MG/DL
RBC # BLD AUTO: 4.31 X10(6)/MCL (ref 4.7–6.1)
RBC #/AREA URNS AUTO: ABNORMAL /HPF
RBC UR QL AUTO: NEGATIVE UNIT/L
SALICYLATES SERPL-MCNC: <5 MG/DL
SARS-COV-2 RDRP RESP QL NAA+PROBE: NEGATIVE
SODIUM SERPL-SCNC: 138 MMOL/L (ref 136–145)
SP GR UR STRIP.AUTO: 1.02
SPECIFIC GRAVITY, URINE AUTO (.000) (OHS): 1.02 (ref 1–1.03)
SQUAMOUS #/AREA URNS LPF: ABNORMAL /HPF
TSH SERPL-ACNC: 0.8 UIU/ML (ref 0.35–4.94)
UROBILINOGEN UR STRIP-ACNC: ABNORMAL MG/DL
WBC # SPEC AUTO: 8.7 X10(3)/MCL (ref 4.5–11.5)
WBC #/AREA URNS AUTO: ABNORMAL /HPF

## 2023-04-13 PROCEDURE — 80307 DRUG TEST PRSMV CHEM ANLYZR: CPT | Performed by: EMERGENCY MEDICINE

## 2023-04-13 PROCEDURE — 85025 COMPLETE CBC W/AUTO DIFF WBC: CPT | Performed by: EMERGENCY MEDICINE

## 2023-04-13 PROCEDURE — 80053 COMPREHEN METABOLIC PANEL: CPT | Performed by: EMERGENCY MEDICINE

## 2023-04-13 PROCEDURE — 82077 ASSAY SPEC XCP UR&BREATH IA: CPT | Performed by: EMERGENCY MEDICINE

## 2023-04-13 PROCEDURE — 80143 DRUG ASSAY ACETAMINOPHEN: CPT | Performed by: EMERGENCY MEDICINE

## 2023-04-13 PROCEDURE — 84443 ASSAY THYROID STIM HORMONE: CPT | Performed by: EMERGENCY MEDICINE

## 2023-04-13 PROCEDURE — 81001 URINALYSIS AUTO W/SCOPE: CPT | Mod: 59 | Performed by: EMERGENCY MEDICINE

## 2023-04-13 PROCEDURE — 87635 SARS-COV-2 COVID-19 AMP PRB: CPT | Performed by: EMERGENCY MEDICINE

## 2023-04-13 PROCEDURE — 80179 DRUG ASSAY SALICYLATE: CPT | Performed by: EMERGENCY MEDICINE

## 2023-04-13 PROCEDURE — 99285 EMERGENCY DEPT VISIT HI MDM: CPT

## 2023-04-13 NOTE — ED PROVIDER NOTES
"Encounter Date: 4/13/2023       History     Chief Complaint   Patient presents with    OPCM Chart Review     OPC     Here under OPC completed by family for grave disability, walking the streets monitoring Bible verses all the time, history of substance abuse, schizoaffective disorder, multiple psychiatric hospitalizations, suicide attempt.  He states he is taking his medicine, denies problems, states "they are doing this out of spite".  No physical complaints.  Judged unreliable.     The history is provided by the patient, the police and a relative. No  was used.   Review of patient's allergies indicates:   Allergen Reactions    Haloperidol Swelling     Muscle stiffness  Has muscle spasms      Paroxetine Swelling, Other (See Comments) and Rash     "i don't remember"  "i don't remember"      Pineapple      Face swells up  Face swells up      Ziprasidone Other (See Comments)     Muscle twitching     Past Medical History:   Diagnosis Date    Anxiety     Depression     Hallucination     History of psychiatric hospitalization     Hx of psychiatric care     Psychiatric problem     Psychosis     Schizoaffective disorder     Sleep difficulties     Suicide attempt     Therapy      History reviewed. No pertinent surgical history.  History reviewed. No pertinent family history.  Social History     Tobacco Use    Smoking status: Every Day     Packs/day: 1.00     Types: Cigarettes    Smokeless tobacco: Never   Substance Use Topics    Alcohol use: Yes     Alcohol/week: 4.0 standard drinks     Types: 4 Cans of beer per week     Comment: uses alcohol monthly    Drug use: Yes     Types: Marijuana     Review of Systems   Unable to perform ROS: Psychiatric disorder     Physical Exam     Initial Vitals [04/13/23 1136]   BP Pulse Resp Temp SpO2   (!) 142/79 96 20 98.9 °F (37.2 °C) 100 %      MAP       --         Physical Exam    Nursing note and vitals reviewed.  Constitutional: He appears well-developed and " well-nourished. He is not diaphoretic. No distress.   HENT:   Head: Normocephalic and atraumatic.   Mouth/Throat: Oropharynx is clear and moist. No oropharyngeal exudate.   Eyes: Conjunctivae and EOM are normal. Pupils are equal, round, and reactive to light. Right eye exhibits no discharge. Left eye exhibits no discharge. No scleral icterus.   Neck: Neck supple. No thyromegaly present. No tracheal deviation present. No JVD present.   Normal range of motion.  Cardiovascular:  Normal rate, regular rhythm and normal heart sounds.     Exam reveals no gallop and no friction rub.       No murmur heard.  Pulmonary/Chest: Breath sounds normal. No respiratory distress. He has no wheezes. He has no rhonchi. He has no rales. He exhibits no tenderness.   Abdominal: Abdomen is soft. Bowel sounds are normal. He exhibits no distension and no mass. There is no abdominal tenderness. There is no rebound and no guarding.   Musculoskeletal:         General: No tenderness or edema. Normal range of motion.      Cervical back: Normal range of motion and neck supple.     Lymphadenopathy:     He has no cervical adenopathy.   Neurological: He is alert and oriented to person, place, and time. He has normal strength. No cranial nerve deficit.   Skin: Skin is warm and dry. No rash noted. No erythema.   Psychiatric:   Alert, oriented x4, moderately anxious, mildly agitated but cooperative.  Feels paranoid, persecuted, states he has not done anything wrong.  Poor insight and judgment globally.  Unable to fully test memory.  No apparent active delusion, hallucination, suicidality, or homicidality.  Judged gravely disabled.       ED Course   Procedures  Labs Reviewed   COMPREHENSIVE METABOLIC PANEL - Abnormal; Notable for the following components:       Result Value    Blood Urea Nitrogen 8.1 (*)     All other components within normal limits   URINALYSIS, REFLEX TO URINE CULTURE - Abnormal; Notable for the following components:    Urobilinogen, UA  1+ (*)     All other components within normal limits   DRUG SCREEN, URINE (BEAKER) - Abnormal; Notable for the following components:    Cannabinoids, Urine Positive (*)     All other components within normal limits    Narrative:     Cut off concentrations:    Amphetamines - 1000 ng/ml  Barbiturates - 200 ng/ml  Benzodiazepine - 200 ng/ml  Cannabinoids (THC) - 50 ng/ml  Cocaine - 300 ng/ml  Fentanyl - 1.0 ng/ml  MDMA - 500 ng/ml  Opiates - 300 ng/ml   Phencyclidine (PCP) - 25 ng/ml    Specimen submitted for drug analysis and tested for pH and specific gravity in order to evaluate sample integrity. Suspect tampering if specific gravity is <1.003 and/or pH is not within the range of 4.5 - 8.0  False negatives may result form substances such as bleach added to urine.  False positives may result for the presence of a substance with similar chemical structure to the drug or its metabolite.    This test provides only a PRELIMINARY analytical test result. A more specific alternate chemical method must be used in order to obtain a confirmed analytical result. Gas chromatography/mass spectrometry (GC/MS) is the preferred confirmatory method. Other chemical confirmation methods are available. Clinical consideration and professional judgement should be applied to any drug of abuse test result, particularly when preliminary positive results are used.    Positive results will be confirmed only at the physicians request. Unconfirmed screening results are to be used only for medical purposes (treatment).        ACETAMINOPHEN LEVEL - Abnormal; Notable for the following components:    Acetaminophen Level <17.4 (*)     All other components within normal limits   CBC WITH DIFFERENTIAL - Abnormal; Notable for the following components:    RBC 4.31 (*)     Hgb 12.0 (*)     Hct 35.7 (*)     MPV 10.6 (*)     All other components within normal limits   TSH - Normal   ALCOHOL,MEDICAL (ETHANOL) - Normal   SARS-COV-2 RNA AMPLIFICATION, QUAL -  Normal    Narrative:     The IDNOW COVID-19 assay is a rapid molecular in vitro diagnostic test utilizing an isothermal nucleic acid amplification technology intended for the qualitative detection of nucleic acid from the SARS-CoV-2 viral RNA in direct nasal, nasopharyngeal or throat swabs from individuals who are suspected of COVID-19 by their healthcare provider.   CBC W/ AUTO DIFFERENTIAL    Narrative:     The following orders were created for panel order CBC auto differential.  Procedure                               Abnormality         Status                     ---------                               -----------         ------                     CBC with Differential[[id number]]        Abnormal            Final result                 Please view results for these tests on the individual orders.   SALICYLATE LEVEL          Imaging Results    None          Medications - No data to display     Additional MDM:   Differential Diagnosis:   Psychiatric disorder/ Substance abuse              Medically cleared for psychiatry placement: 4/13/2023 12:00 PM      Medical Decision Making  Problems Addressed:  Schizophrenia, chronic with acute exacerbation: chronic illness or injury with exacerbation, progression, or side effects of treatment    Amount and/or Complexity of Data Reviewed  Labs: ordered. Decision-making details documented in ED Course.    Risk  Decision regarding hospitalization.               Clinical Impression:   Final diagnoses:  [F20.9] Schizophrenia, chronic with acute exacerbation (Primary)  [Z00.8] Medical clearance for psychiatric admission        ED Disposition Condition    Transfer to Psych Facility Stable          ED Prescriptions    None       Follow-up Information    None          Zacharaih Rincon MD  04/13/23 1543       Zachariah Rincon MD  04/13/23 [id number]

## 2023-08-04 ENCOUNTER — HOSPITAL ENCOUNTER (EMERGENCY)
Facility: HOSPITAL | Age: 38
Discharge: PSYCHIATRIC OR SUBSTANCE USE FACILITY | End: 2023-08-04
Attending: STUDENT IN AN ORGANIZED HEALTH CARE EDUCATION/TRAINING PROGRAM
Payer: COMMERCIAL

## 2023-08-04 VITALS
TEMPERATURE: 98 F | DIASTOLIC BLOOD PRESSURE: 79 MMHG | RESPIRATION RATE: 18 BRPM | OXYGEN SATURATION: 98 % | SYSTOLIC BLOOD PRESSURE: 118 MMHG | HEART RATE: 98 BPM

## 2023-08-04 DIAGNOSIS — F23 ACUTE PSYCHOSIS: ICD-10-CM

## 2023-08-04 DIAGNOSIS — Z00.8 MEDICAL CLEARANCE FOR PSYCHIATRIC ADMISSION: Primary | ICD-10-CM

## 2023-08-04 LAB
ALBUMIN SERPL-MCNC: 4.2 G/DL (ref 3.5–5)
ALBUMIN/GLOB SERPL: 1.2 RATIO (ref 1.1–2)
ALP SERPL-CCNC: 78 UNIT/L (ref 40–150)
ALT SERPL-CCNC: 15 UNIT/L (ref 0–55)
AMPHET UR QL SCN: NEGATIVE
APAP SERPL-MCNC: <17.4 UG/ML (ref 17.4–30)
APPEARANCE UR: CLEAR
AST SERPL-CCNC: 20 UNIT/L (ref 5–34)
BACTERIA #/AREA URNS AUTO: ABNORMAL /HPF
BARBITURATE SCN PRESENT UR: NEGATIVE
BASOPHILS # BLD AUTO: 0.06 X10(3)/MCL
BASOPHILS NFR BLD AUTO: 0.9 %
BENZODIAZ UR QL SCN: NEGATIVE
BILIRUB UR QL STRIP.AUTO: NEGATIVE
BILIRUBIN DIRECT+TOT PNL SERPL-MCNC: 0.3 MG/DL
BUN SERPL-MCNC: 6.9 MG/DL (ref 8.9–20.6)
CALCIUM SERPL-MCNC: 9.8 MG/DL (ref 8.4–10.2)
CANNABINOIDS UR QL SCN: POSITIVE
CHLORIDE SERPL-SCNC: 108 MMOL/L (ref 98–107)
CO2 SERPL-SCNC: 23 MMOL/L (ref 22–29)
COCAINE UR QL SCN: NEGATIVE
COLOR UR: YELLOW
CREAT SERPL-MCNC: 1.1 MG/DL (ref 0.73–1.18)
EOSINOPHIL # BLD AUTO: 0.14 X10(3)/MCL (ref 0–0.9)
EOSINOPHIL NFR BLD AUTO: 2.2 %
ERYTHROCYTE [DISTWIDTH] IN BLOOD BY AUTOMATED COUNT: 15.9 % (ref 11.5–17)
ETHANOL SERPL-MCNC: <10 MG/DL
FENTANYL UR QL SCN: NEGATIVE
FLUAV AG UPPER RESP QL IA.RAPID: NOT DETECTED
FLUBV AG UPPER RESP QL IA.RAPID: NOT DETECTED
GFR SERPLBLD CREATININE-BSD FMLA CKD-EPI: >60 MLS/MIN/1.73/M2
GLOBULIN SER-MCNC: 3.6 GM/DL (ref 2.4–3.5)
GLUCOSE SERPL-MCNC: 107 MG/DL (ref 74–100)
GLUCOSE UR QL STRIP.AUTO: NORMAL
HCT VFR BLD AUTO: 39.8 % (ref 42–52)
HGB BLD-MCNC: 12.9 G/DL (ref 14–18)
HYALINE CASTS #/AREA URNS LPF: ABNORMAL /LPF
IMM GRANULOCYTES # BLD AUTO: 0.02 X10(3)/MCL (ref 0–0.04)
IMM GRANULOCYTES NFR BLD AUTO: 0.3 %
KETONES UR QL STRIP.AUTO: NEGATIVE
LEUKOCYTE ESTERASE UR QL STRIP.AUTO: NEGATIVE
LYMPHOCYTES # BLD AUTO: 1.67 X10(3)/MCL (ref 0.6–4.6)
LYMPHOCYTES NFR BLD AUTO: 25.7 %
MCH RBC QN AUTO: 26.3 PG (ref 27–31)
MCHC RBC AUTO-ENTMCNC: 32.4 G/DL (ref 33–36)
MCV RBC AUTO: 81.2 FL (ref 80–94)
MDMA UR QL SCN: NEGATIVE
MONOCYTES # BLD AUTO: 0.44 X10(3)/MCL (ref 0.1–1.3)
MONOCYTES NFR BLD AUTO: 6.8 %
MUCOUS THREADS URNS QL MICRO: ABNORMAL /LPF
NEUTROPHILS # BLD AUTO: 4.16 X10(3)/MCL (ref 2.1–9.2)
NEUTROPHILS NFR BLD AUTO: 64.1 %
NITRITE UR QL STRIP.AUTO: NEGATIVE
NRBC BLD AUTO-RTO: 0 %
OPIATES UR QL SCN: NEGATIVE
PCP UR QL: NEGATIVE
PH UR STRIP.AUTO: 5.5 [PH]
PH UR: 5.5 [PH] (ref 3–11)
PLATELET # BLD AUTO: 294 X10(3)/MCL (ref 130–400)
PMV BLD AUTO: 10.7 FL (ref 7.4–10.4)
POTASSIUM SERPL-SCNC: 4.3 MMOL/L (ref 3.5–5.1)
PROT SERPL-MCNC: 7.8 GM/DL (ref 6.4–8.3)
PROT UR QL STRIP.AUTO: ABNORMAL
RBC # BLD AUTO: 4.9 X10(6)/MCL (ref 4.7–6.1)
RBC #/AREA URNS AUTO: ABNORMAL /HPF
RBC UR QL AUTO: NEGATIVE
SARS-COV-2 RNA RESP QL NAA+PROBE: NOT DETECTED
SODIUM SERPL-SCNC: 140 MMOL/L (ref 136–145)
SP GR UR STRIP.AUTO: 1.03
SPECIFIC GRAVITY, URINE AUTO (.000) (OHS): 1.03 (ref 1–1.03)
SQUAMOUS #/AREA URNS LPF: ABNORMAL /HPF
TSH SERPL-ACNC: 0.62 UIU/ML (ref 0.35–4.94)
UROBILINOGEN UR STRIP-ACNC: ABNORMAL
WBC # SPEC AUTO: 6.49 X10(3)/MCL (ref 4.5–11.5)
WBC #/AREA URNS AUTO: ABNORMAL /HPF

## 2023-08-04 PROCEDURE — 0240U COVID/FLU A&B PCR: CPT | Performed by: STUDENT IN AN ORGANIZED HEALTH CARE EDUCATION/TRAINING PROGRAM

## 2023-08-04 PROCEDURE — 85025 COMPLETE CBC W/AUTO DIFF WBC: CPT | Performed by: STUDENT IN AN ORGANIZED HEALTH CARE EDUCATION/TRAINING PROGRAM

## 2023-08-04 PROCEDURE — 82077 ASSAY SPEC XCP UR&BREATH IA: CPT | Performed by: STUDENT IN AN ORGANIZED HEALTH CARE EDUCATION/TRAINING PROGRAM

## 2023-08-04 PROCEDURE — 99285 EMERGENCY DEPT VISIT HI MDM: CPT

## 2023-08-04 PROCEDURE — 80143 DRUG ASSAY ACETAMINOPHEN: CPT | Performed by: STUDENT IN AN ORGANIZED HEALTH CARE EDUCATION/TRAINING PROGRAM

## 2023-08-04 PROCEDURE — 80307 DRUG TEST PRSMV CHEM ANLYZR: CPT | Performed by: STUDENT IN AN ORGANIZED HEALTH CARE EDUCATION/TRAINING PROGRAM

## 2023-08-04 PROCEDURE — 25000003 PHARM REV CODE 250: Performed by: STUDENT IN AN ORGANIZED HEALTH CARE EDUCATION/TRAINING PROGRAM

## 2023-08-04 PROCEDURE — 84443 ASSAY THYROID STIM HORMONE: CPT | Performed by: STUDENT IN AN ORGANIZED HEALTH CARE EDUCATION/TRAINING PROGRAM

## 2023-08-04 PROCEDURE — 80053 COMPREHEN METABOLIC PANEL: CPT | Performed by: STUDENT IN AN ORGANIZED HEALTH CARE EDUCATION/TRAINING PROGRAM

## 2023-08-04 PROCEDURE — 81001 URINALYSIS AUTO W/SCOPE: CPT | Mod: 59 | Performed by: STUDENT IN AN ORGANIZED HEALTH CARE EDUCATION/TRAINING PROGRAM

## 2023-08-04 RX ORDER — OLANZAPINE 20 MG/1
20 TABLET, ORALLY DISINTEGRATING ORAL ONCE
Status: COMPLETED | OUTPATIENT
Start: 2023-08-04 | End: 2023-08-04

## 2023-08-04 RX ADMIN — OLANZAPINE 20 MG: 20 TABLET, ORALLY DISINTEGRATING ORAL at 03:08

## 2023-08-04 NOTE — ED PROVIDER NOTES
"Encounter Date: 8/4/2023       History     Chief Complaint   Patient presents with    Psychiatric Evaluation     Arrives via EMS. Hyper Scientology. Hx of schizophrenia      37-year-old male presents to ED for hyper Scientology behavior.  Has significant psychiatric history.  Family concerned and requested patient be evaluated.  Patient presents with four bibles.  States he was afraid of burning forever in hell.  Is expressing hyper Scientology beliefs.  No SI or HI.  No other complaints or concerns at this time.      Review of patient's allergies indicates:   Allergen Reactions    Haloperidol Swelling     Muscle stiffness  Has muscle spasms      Paroxetine Swelling, Other (See Comments) and Rash     "i don't remember"  "i don't remember"      Pineapple      Face swells up  Face swells up      Ziprasidone Other (See Comments)     Muscle twitching     Past Medical History:   Diagnosis Date    Anxiety     Depression     Hallucination     History of psychiatric hospitalization     Hx of psychiatric care     Psychiatric problem     Psychosis     Schizoaffective disorder     Sleep difficulties     Suicide attempt     Therapy      No past surgical history on file.  No family history on file.  Social History     Tobacco Use    Smoking status: Every Day     Current packs/day: 1.00     Types: Cigarettes    Smokeless tobacco: Never   Substance Use Topics    Alcohol use: Yes     Alcohol/week: 4.0 standard drinks of alcohol     Types: 4 Cans of beer per week     Comment: uses alcohol monthly    Drug use: Yes     Types: Marijuana     Review of Systems   Constitutional:  Negative for chills and fever.   HENT:  Negative for congestion, rhinorrhea and sore throat.    Eyes:  Negative for pain, discharge and itching.   Respiratory:  Negative for chest tightness and shortness of breath.    Cardiovascular:  Negative for chest pain and palpitations.   Gastrointestinal:  Negative for abdominal pain, nausea and vomiting.   Genitourinary:  " Negative for dysuria and hematuria.   Musculoskeletal:  Negative for myalgias and neck pain.   Skin:  Negative for color change and rash.   Neurological:  Negative for dizziness, weakness and headaches.   Psychiatric/Behavioral:  Positive for hallucinations. Negative for confusion and suicidal ideas. The patient is nervous/anxious and is hyperactive.        Physical Exam     Initial Vitals   BP Pulse Resp Temp SpO2   -- -- -- -- --      MAP       --         Physical Exam    Constitutional: He appears well-developed and well-nourished. He is not diaphoretic. No distress.   Resting comfortably in the room upon entry.  Awakes easily.   HENT:   Head: Normocephalic and atraumatic.   Eyes: Conjunctivae and EOM are normal. Pupils are equal, round, and reactive to light.   Neck: Neck supple. No tracheal deviation present.   Normal range of motion.  Cardiovascular:  Normal rate, regular rhythm and normal heart sounds.           Pulmonary/Chest: Breath sounds normal. No respiratory distress.   Abdominal: Abdomen is soft. There is no abdominal tenderness. There is no rebound.   Musculoskeletal:         General: No tenderness. Normal range of motion.      Cervical back: Normal range of motion and neck supple.     Neurological: He is alert and oriented to person, place, and time. He has normal strength. GCS score is 15. GCS eye subscore is 4. GCS verbal subscore is 5. GCS motor subscore is 6.   Skin: Skin is warm and dry. Capillary refill takes less than 2 seconds. No rash noted.   Psychiatric: His mood appears anxious. His speech is rapid and/or pressured. He is hyperactive and actively hallucinating. Thought content is paranoid.         ED Course   Procedures  Labs Reviewed   URINALYSIS, REFLEX TO URINE CULTURE - Abnormal; Notable for the following components:       Result Value    Protein, UA 1+ (*)     Urobilinogen, UA 1+ (*)     Squamous Epithelial Cells, UA Trace (*)     Mucous, UA Occ (*)     Hyaline Casts, UA 3-5 (*)      All other components within normal limits   ACETAMINOPHEN LEVEL - Abnormal; Notable for the following components:    Acetaminophen Level <17.4 (*)     All other components within normal limits   COMPREHENSIVE METABOLIC PANEL - Abnormal; Notable for the following components:    Chloride 108 (*)     Glucose Level 107 (*)     Blood Urea Nitrogen 6.9 (*)     Globulin 3.6 (*)     All other components within normal limits   DRUG SCREEN, URINE (BEAKER) - Abnormal; Notable for the following components:    Cannabinoids, Urine Positive (*)     All other components within normal limits    Narrative:     Cut off concentrations:    Amphetamines - 1000 ng/ml  Barbiturates - 200 ng/ml  Benzodiazepine - 200 ng/ml  Cannabinoids (THC) - 50 ng/ml  Cocaine - 300 ng/ml  Fentanyl - 1.0 ng/ml  MDMA - 500 ng/ml  Opiates - 300 ng/ml   Phencyclidine (PCP) - 25 ng/ml    Specimen submitted for drug analysis and tested for pH and specific gravity in order to evaluate sample integrity. Suspect tampering if specific gravity is <1.003 and/or pH is not within the range of 4.5 - 8.0  False negatives may result form substances such as bleach added to urine.  False positives may result for the presence of a substance with similar chemical structure to the drug or its metabolite.    This test provides only a PRELIMINARY analytical test result. A more specific alternate chemical method must be used in order to obtain a confirmed analytical result. Gas chromatography/mass spectrometry (GC/MS) is the preferred confirmatory method. Other chemical confirmation methods are available. Clinical consideration and professional judgement should be applied to any drug of abuse test result, particularly when preliminary positive results are used.    Positive results will be confirmed only at the physicians request. Unconfirmed screening results are to be used only for medical purposes (treatment).        CBC WITH DIFFERENTIAL - Abnormal; Notable for the following  components:    Hgb 12.9 (*)     Hct 39.8 (*)     MCH 26.3 (*)     MCHC 32.4 (*)     MPV 10.7 (*)     All other components within normal limits   ALCOHOL,MEDICAL (ETHANOL) - Normal   TSH - Normal   COVID/FLU A&B PCR - Normal    Narrative:     The Xpert Xpress SARS-CoV-2/FLU/RSV plus is a rapid, multiplexed real-time PCR test intended for the simultaneous qualitative detection and differentiation of SARS-CoV-2, Influenza A, Influenza B, and respiratory syncytial virus (RSV) viral RNA in either nasopharyngeal swab or nasal swab specimens.         CBC W/ AUTO DIFFERENTIAL    Narrative:     The following orders were created for panel order CBC auto differential.  Procedure                               Abnormality         Status                     ---------                               -----------         ------                     CBC with Differential[[id number]]        Abnormal            Final result                 Please view results for these tests on the individual orders.          Imaging Results    None          Medications - No data to display  Medical Decision Making:   History:   Old Medical Records: I decided to obtain old medical records.  Initial Assessment:   37-year-old male presents to ED with hyper Mormonism beliefs with significant history of schizophrenia  Differential Diagnosis:   Schizophrenia  Anxiety  Polysubstance abuse  Withdrawal  Clinical Tests:   Lab Tests: Ordered and Reviewed  ED Management:  Patient has significant psychiatric history.  Evidence of acute psychosis present.  PEC'd for patient's safety.  Patient's physical exam is grossly unremarkable.  Patient's laboratory analysis is grossly unremarkable.  At this time patient is medically cleared for placement at 1st available psychiatric facility.  Awaiting acceptance and transfer. ([facility])                           Clinical Impression:   Final diagnoses:  [Z00.8] Medical clearance for psychiatric admission (Primary)  [F23] Acute  psychosis        ED Disposition Condition    Transfer to Psych Facility Stable          ED Prescriptions    None       Follow-up Information    None          Willy Anaya MD  08/04/23 4962

## 2023-09-15 ENCOUNTER — HOSPITAL ENCOUNTER (EMERGENCY)
Facility: HOSPITAL | Age: 38
Discharge: PSYCHIATRIC OR SUBSTANCE USE FACILITY | End: 2023-09-15
Attending: STUDENT IN AN ORGANIZED HEALTH CARE EDUCATION/TRAINING PROGRAM
Payer: COMMERCIAL

## 2023-09-15 VITALS
DIASTOLIC BLOOD PRESSURE: 75 MMHG | SYSTOLIC BLOOD PRESSURE: 133 MMHG | HEART RATE: 86 BPM | OXYGEN SATURATION: 98 % | TEMPERATURE: 98 F | RESPIRATION RATE: 18 BRPM

## 2023-09-15 DIAGNOSIS — F29 PSYCHOSIS, UNSPECIFIED PSYCHOSIS TYPE: ICD-10-CM

## 2023-09-15 DIAGNOSIS — Z00.8 MEDICAL CLEARANCE FOR PSYCHIATRIC ADMISSION: Primary | ICD-10-CM

## 2023-09-15 LAB
ALBUMIN SERPL-MCNC: 4 G/DL (ref 3.5–5)
ALBUMIN/GLOB SERPL: 1.1 RATIO (ref 1.1–2)
ALP SERPL-CCNC: 82 UNIT/L (ref 40–150)
ALT SERPL-CCNC: 12 UNIT/L (ref 0–55)
AMPHET UR QL SCN: NEGATIVE
APAP SERPL-MCNC: <17.4 UG/ML (ref 17.4–30)
APPEARANCE UR: CLEAR
AST SERPL-CCNC: 18 UNIT/L (ref 5–34)
BACTERIA #/AREA URNS AUTO: ABNORMAL /HPF
BARBITURATE SCN PRESENT UR: NEGATIVE
BASOPHILS # BLD AUTO: 0.07 X10(3)/MCL
BASOPHILS NFR BLD AUTO: 1 %
BENZODIAZ UR QL SCN: NEGATIVE
BILIRUB SERPL-MCNC: 0.3 MG/DL
BILIRUB UR QL STRIP.AUTO: NEGATIVE
BUN SERPL-MCNC: 7 MG/DL (ref 8.9–20.6)
CALCIUM SERPL-MCNC: 9.8 MG/DL (ref 8.4–10.2)
CANNABINOIDS UR QL SCN: POSITIVE
CASTS URNS MICRO: ABNORMAL /LPF
CHLORIDE SERPL-SCNC: 109 MMOL/L (ref 98–107)
CO2 SERPL-SCNC: 26 MMOL/L (ref 22–29)
COCAINE UR QL SCN: NEGATIVE
COLOR UR: YELLOW
CREAT SERPL-MCNC: 1.05 MG/DL (ref 0.73–1.18)
EOSINOPHIL # BLD AUTO: 0.18 X10(3)/MCL (ref 0–0.9)
EOSINOPHIL NFR BLD AUTO: 2.5 %
ERYTHROCYTE [DISTWIDTH] IN BLOOD BY AUTOMATED COUNT: 16.8 % (ref 11.5–17)
ETHANOL SERPL-MCNC: <10 MG/DL
FENTANYL UR QL SCN: NEGATIVE
GFR SERPLBLD CREATININE-BSD FMLA CKD-EPI: >60 MLS/MIN/1.73/M2
GLOBULIN SER-MCNC: 3.6 GM/DL (ref 2.4–3.5)
GLUCOSE SERPL-MCNC: 75 MG/DL (ref 74–100)
GLUCOSE UR QL STRIP.AUTO: NORMAL
HCT VFR BLD AUTO: 41.2 % (ref 42–52)
HGB BLD-MCNC: 13.2 G/DL (ref 14–18)
HYALINE CASTS #/AREA URNS LPF: ABNORMAL /LPF
IMM GRANULOCYTES # BLD AUTO: 0.01 X10(3)/MCL (ref 0–0.04)
IMM GRANULOCYTES NFR BLD AUTO: 0.1 %
KETONES UR QL STRIP.AUTO: NEGATIVE
LEUKOCYTE ESTERASE UR QL STRIP.AUTO: NEGATIVE
LYMPHOCYTES # BLD AUTO: 2.11 X10(3)/MCL (ref 0.6–4.6)
LYMPHOCYTES NFR BLD AUTO: 29.9 %
MCH RBC QN AUTO: 26 PG (ref 27–31)
MCHC RBC AUTO-ENTMCNC: 32 G/DL (ref 33–36)
MCV RBC AUTO: 81.1 FL (ref 80–94)
MDMA UR QL SCN: NEGATIVE
MONOCYTES # BLD AUTO: 0.4 X10(3)/MCL (ref 0.1–1.3)
MONOCYTES NFR BLD AUTO: 5.7 %
MUCOUS THREADS URNS QL MICRO: ABNORMAL /LPF
NEUTROPHILS # BLD AUTO: 4.29 X10(3)/MCL (ref 2.1–9.2)
NEUTROPHILS NFR BLD AUTO: 60.8 %
NITRITE UR QL STRIP.AUTO: NEGATIVE
NRBC BLD AUTO-RTO: 0 %
OPIATES UR QL SCN: NEGATIVE
PCP UR QL: NEGATIVE
PH UR STRIP.AUTO: 5.5 [PH]
PH UR: 5.5 [PH] (ref 3–11)
PLATELET # BLD AUTO: 269 X10(3)/MCL (ref 130–400)
PMV BLD AUTO: 10.3 FL (ref 7.4–10.4)
POTASSIUM SERPL-SCNC: 4.7 MMOL/L (ref 3.5–5.1)
PROT SERPL-MCNC: 7.6 GM/DL (ref 6.4–8.3)
PROT UR QL STRIP.AUTO: ABNORMAL
RBC # BLD AUTO: 5.08 X10(6)/MCL (ref 4.7–6.1)
RBC #/AREA URNS AUTO: ABNORMAL /HPF
RBC UR QL AUTO: NEGATIVE
SALICYLATES SERPL-MCNC: <5 MG/DL (ref 15–30)
SARS-COV-2 RDRP RESP QL NAA+PROBE: NEGATIVE
SODIUM SERPL-SCNC: 140 MMOL/L (ref 136–145)
SP GR UR STRIP.AUTO: 1.03 (ref 1–1.03)
SQUAMOUS #/AREA URNS LPF: ABNORMAL /HPF
T4 FREE SERPL-MCNC: 0.96 NG/DL (ref 0.7–1.48)
TSH SERPL-ACNC: 0.27 UIU/ML (ref 0.35–4.94)
UROBILINOGEN UR STRIP-ACNC: ABNORMAL
WBC # SPEC AUTO: 7.06 X10(3)/MCL (ref 4.5–11.5)
WBC #/AREA URNS AUTO: ABNORMAL /HPF

## 2023-09-15 PROCEDURE — 84439 ASSAY OF FREE THYROXINE: CPT | Performed by: STUDENT IN AN ORGANIZED HEALTH CARE EDUCATION/TRAINING PROGRAM

## 2023-09-15 PROCEDURE — 82077 ASSAY SPEC XCP UR&BREATH IA: CPT | Performed by: STUDENT IN AN ORGANIZED HEALTH CARE EDUCATION/TRAINING PROGRAM

## 2023-09-15 PROCEDURE — 80053 COMPREHEN METABOLIC PANEL: CPT | Performed by: STUDENT IN AN ORGANIZED HEALTH CARE EDUCATION/TRAINING PROGRAM

## 2023-09-15 PROCEDURE — 81001 URINALYSIS AUTO W/SCOPE: CPT | Mod: 59 | Performed by: STUDENT IN AN ORGANIZED HEALTH CARE EDUCATION/TRAINING PROGRAM

## 2023-09-15 PROCEDURE — 85025 COMPLETE CBC W/AUTO DIFF WBC: CPT | Performed by: STUDENT IN AN ORGANIZED HEALTH CARE EDUCATION/TRAINING PROGRAM

## 2023-09-15 PROCEDURE — 84443 ASSAY THYROID STIM HORMONE: CPT | Performed by: STUDENT IN AN ORGANIZED HEALTH CARE EDUCATION/TRAINING PROGRAM

## 2023-09-15 PROCEDURE — 80179 DRUG ASSAY SALICYLATE: CPT | Performed by: STUDENT IN AN ORGANIZED HEALTH CARE EDUCATION/TRAINING PROGRAM

## 2023-09-15 PROCEDURE — 99285 EMERGENCY DEPT VISIT HI MDM: CPT

## 2023-09-15 PROCEDURE — 80307 DRUG TEST PRSMV CHEM ANLYZR: CPT | Performed by: STUDENT IN AN ORGANIZED HEALTH CARE EDUCATION/TRAINING PROGRAM

## 2023-09-15 PROCEDURE — 87635 SARS-COV-2 COVID-19 AMP PRB: CPT | Performed by: STUDENT IN AN ORGANIZED HEALTH CARE EDUCATION/TRAINING PROGRAM

## 2023-09-15 PROCEDURE — 80143 DRUG ASSAY ACETAMINOPHEN: CPT | Performed by: STUDENT IN AN ORGANIZED HEALTH CARE EDUCATION/TRAINING PROGRAM

## 2023-09-15 RX ORDER — IBUPROFEN 200 MG
1 TABLET ORAL
Status: DISCONTINUED | OUTPATIENT
Start: 2023-09-15 | End: 2023-09-15 | Stop reason: HOSPADM

## 2023-09-15 NOTE — ED PROVIDER NOTES
"Encounter Date: 9/15/2023       History     Chief Complaint   Patient presents with    Psychiatric Evaluation     Pt in / [facility] FOR PSY EVAL. DENIES SI/HI. MAKING THREATS.  HYPER Mandaen.  WANDED      38-year-old male presents to ED for paranoia.  Is expressing hyper Yarsani beliefs.  Believes his mother is possessed.  Was previously at [facility] psychiatric facility about a month ago and states his symptoms have been worsening since.  Carries several bibles with him, has loud proclaimed Yarsani belief and is attempting to express them upon others.  Grossly paranoid.  Denies any attempts of self-harm.  No SI or HI.  Denies polysubstance abuse.  No other complaints or concerns at this time.      Review of patient's allergies indicates:   Allergen Reactions    Haloperidol Swelling     Muscle stiffness  Has muscle spasms      Paroxetine Swelling, Other (See Comments) and Rash     "i don't remember"  "i don't remember"      Pineapple      Face swells up  Face swells up      Ziprasidone Other (See Comments)     Muscle twitching     Past Medical History:   Diagnosis Date    Anxiety     Depression     Hallucination     History of psychiatric hospitalization     Hx of psychiatric care     Psychiatric problem     Psychosis     Schizoaffective disorder     Sleep difficulties     Suicide attempt     Therapy      History reviewed. No pertinent surgical history.  History reviewed. No pertinent family history.  Social History     Tobacco Use    Smoking status: Every Day     Current packs/day: 1.00     Types: Cigarettes    Smokeless tobacco: Never   Substance Use Topics    Alcohol use: Yes     Alcohol/week: 4.0 standard drinks of alcohol     Types: 4 Cans of beer per week     Comment: uses alcohol monthly    Drug use: Yes     Types: Marijuana     Review of Systems   Constitutional:  Negative for chills and fever.   HENT:  Negative for congestion, rhinorrhea and sore throat.    Eyes:  Negative for pain, discharge and " itching.   Respiratory:  Negative for chest tightness and shortness of breath.    Cardiovascular:  Negative for chest pain and palpitations.   Gastrointestinal:  Negative for abdominal pain, nausea and vomiting.   Genitourinary:  Negative for dysuria and hematuria.   Musculoskeletal:  Negative for myalgias and neck pain.   Skin:  Negative for color change and rash.   Neurological:  Negative for dizziness, weakness and headaches.   Psychiatric/Behavioral:  Negative for confusion, self-injury and suicidal ideas. The patient is nervous/anxious. The patient is not hyperactive.         Paranoid       Physical Exam     Initial Vitals [09/15/23 1403]   BP Pulse Resp Temp SpO2   (!) 147/80 95 16 97.9 °F (36.6 °C) 97 %      MAP       --         Physical Exam    Constitutional: He appears well-developed and well-nourished. He is not diaphoretic. No distress.   HENT:   Head: Normocephalic and atraumatic.   Eyes: Conjunctivae and EOM are normal. Pupils are equal, round, and reactive to light.   Neck: Neck supple. No tracheal deviation present.   Normal range of motion.  Cardiovascular:  Normal rate, regular rhythm and normal heart sounds.           Pulmonary/Chest: Breath sounds normal. No respiratory distress.   Abdominal: Abdomen is soft. There is no abdominal tenderness. There is no rebound.   Musculoskeletal:         General: No tenderness. Normal range of motion.      Cervical back: Normal range of motion and neck supple.     Neurological: He is alert and oriented to person, place, and time. He has normal strength. GCS score is 15. GCS eye subscore is 4. GCS verbal subscore is 5. GCS motor subscore is 6.   Skin: Skin is warm and dry. Capillary refill takes less than 2 seconds. No rash noted.   Psychiatric: His behavior is normal. His mood appears anxious. His speech is rapid and/or pressured. Thought content is paranoid and delusional. He expresses no homicidal and no suicidal ideation. He expresses no suicidal plans and  no homicidal plans.         ED Course   Procedures  Labs Reviewed   COMPREHENSIVE METABOLIC PANEL - Abnormal; Notable for the following components:       Result Value    Chloride 109 (*)     Blood Urea Nitrogen 7.0 (*)     Globulin 3.6 (*)     All other components within normal limits   TSH - Abnormal; Notable for the following components:    Thyroid Stimulating Hormone 0.273 (*)     All other components within normal limits   URINALYSIS, REFLEX TO URINE CULTURE - Abnormal; Notable for the following components:    Protein, UA Trace (*)     Urobilinogen, UA 1+ (*)     Squamous Epithelial Cells, UA Trace (*)     Mucous, UA Trace (*)     Unclassified Cast, UA 0-2 (*)     Hyaline Casts, UA 0-2 (*)     All other components within normal limits   DRUG SCREEN, URINE (BEAKER) - Abnormal; Notable for the following components:    Cannabinoids, Urine Positive (*)     All other components within normal limits    Narrative:     Cut off concentrations:    Amphetamines - 1000 ng/ml  Barbiturates - 200 ng/ml  Benzodiazepine - 200 ng/ml  Cannabinoids (THC) - 50 ng/ml  Cocaine - 300 ng/ml  Fentanyl - 1.0 ng/ml  MDMA - 500 ng/ml  Opiates - 300 ng/ml   Phencyclidine (PCP) - 25 ng/ml    Specimen submitted for drug analysis and tested for pH and specific gravity in order to evaluate sample integrity. Suspect tampering if specific gravity is <1.003 and/or pH is not within the range of 4.5 - 8.0  False negatives may result form substances such as bleach added to urine.  False positives may result for the presence of a substance with similar chemical structure to the drug or its metabolite.    This test provides only a PRELIMINARY analytical test result. A more specific alternate chemical method must be used in order to obtain a confirmed analytical result. Gas chromatography/mass spectrometry (GC/MS) is the preferred confirmatory method. Other chemical confirmation methods are available. Clinical consideration and professional judgement  should be applied to any drug of abuse test result, particularly when preliminary positive results are used.    Positive results will be confirmed only at the physicians request. Unconfirmed screening results are to be used only for medical purposes (treatment).        ACETAMINOPHEN LEVEL - Abnormal; Notable for the following components:    Acetaminophen Level <17.4 (*)     All other components within normal limits   SALICYLATE LEVEL - Abnormal; Notable for the following components:    Salicylate Level <5.0 (*)     All other components within normal limits   CBC WITH DIFFERENTIAL - Abnormal; Notable for the following components:    Hgb 13.2 (*)     Hct 41.2 (*)     MCH 26.0 (*)     MCHC 32.0 (*)     All other components within normal limits   ALCOHOL,MEDICAL (ETHANOL) - Normal   SARS-COV-2 RNA AMPLIFICATION, QUAL - Normal    Narrative:     The IDNOW COVID-19 assay is a rapid molecular in vitro diagnostic test utilizing an isothermal nucleic acid amplification technology intended for the qualitative detection of nucleic acid from the SARS-CoV-2 viral RNA in direct nasal, nasopharyngeal or throat swabs from individuals who are suspected of COVID-19 by their healthcare provider.   CBC W/ AUTO DIFFERENTIAL    Narrative:     The following orders were created for panel order CBC auto differential.  Procedure                               Abnormality         Status                     ---------                               -----------         ------                     CBC with Differential[[id number]]       Abnormal            Final result                 Please view results for these tests on the individual orders.   EXTRA TUBES    Narrative:     The following orders were created for panel order EXTRA TUBES.  Procedure                               Abnormality         Status                     ---------                               -----------         ------                     Light Blue Top Hold[[id number]]                              In process                 Gold Top Hold[[id number]]                                   In process                   Please view results for these tests on the individual orders.   LIGHT BLUE TOP HOLD   GOLD TOP HOLD   T4, FREE          Imaging Results    None          Medications   nicotine 21 mg/24 hr 1 patch (has no administration in time range)     Medical Decision Making  38-year-old male with significant psychiatric history presents to ED for hyper Zoroastrian beliefs.    Differential.  Schizophrenia, polysubstance abuse, withdrawal, paranoia, bipolar disorder    Patient arrives with stable vitals.  No acute findings on exam except for sitting bedside actively reading from Bible expressing gross paranoia that his mother is possessed.  PEC'd for patient's safety.  Laboratory analysis at this time is grossly unremarkable and therefore at this time is medically cleared for placement at 1st available psychiatric facility.  Awaiting acceptance and transfer. (Jaclyn)     Amount and/or Complexity of Data Reviewed  Labs: ordered.    Risk  OTC drugs.                               Clinical Impression:   Final diagnoses:  [Z00.8] Medical clearance for psychiatric admission (Primary)  [F29] Psychosis, unspecified psychosis type        ED Disposition Condition    Transfer to Psych Facility Stable          ED Prescriptions    None       Follow-up Information    None          Willy Anaya MD  09/15/23 6693

## 2023-10-13 ENCOUNTER — HOSPITAL ENCOUNTER (EMERGENCY)
Facility: HOSPITAL | Age: 38
Discharge: PSYCHIATRIC OR SUBSTANCE USE FACILITY | End: 2023-10-13
Attending: STUDENT IN AN ORGANIZED HEALTH CARE EDUCATION/TRAINING PROGRAM
Payer: COMMERCIAL

## 2023-10-13 VITALS
BODY MASS INDEX: 22.73 KG/M2 | HEART RATE: 110 BPM | HEIGHT: 68 IN | WEIGHT: 150 LBS | OXYGEN SATURATION: 98 % | RESPIRATION RATE: 18 BRPM | TEMPERATURE: 98 F | DIASTOLIC BLOOD PRESSURE: 78 MMHG | SYSTOLIC BLOOD PRESSURE: 124 MMHG

## 2023-10-13 DIAGNOSIS — Z00.8 MEDICAL CLEARANCE FOR PSYCHIATRIC ADMISSION: Primary | ICD-10-CM

## 2023-10-13 DIAGNOSIS — F23 ACUTE PSYCHOSIS: ICD-10-CM

## 2023-10-13 LAB
ALBUMIN SERPL-MCNC: 4.1 G/DL (ref 3.5–5)
ALBUMIN/GLOB SERPL: 1.2 RATIO (ref 1.1–2)
ALP SERPL-CCNC: 77 UNIT/L (ref 40–150)
ALT SERPL-CCNC: 14 UNIT/L (ref 0–55)
AMPHET UR QL SCN: NEGATIVE
APAP SERPL-MCNC: <17.4 UG/ML (ref 17.4–30)
APPEARANCE UR: CLEAR
AST SERPL-CCNC: 14 UNIT/L (ref 5–34)
BACTERIA #/AREA URNS AUTO: ABNORMAL /HPF
BARBITURATE SCN PRESENT UR: NEGATIVE
BASOPHILS # BLD AUTO: 0.08 X10(3)/MCL
BASOPHILS NFR BLD AUTO: 0.7 %
BENZODIAZ UR QL SCN: NEGATIVE
BILIRUB SERPL-MCNC: 0.2 MG/DL
BILIRUB UR QL STRIP.AUTO: NEGATIVE
BUN SERPL-MCNC: 7.7 MG/DL (ref 8.9–20.6)
CALCIUM SERPL-MCNC: 9.3 MG/DL (ref 8.4–10.2)
CANNABINOIDS UR QL SCN: POSITIVE
CHLORIDE SERPL-SCNC: 106 MMOL/L (ref 98–107)
CO2 SERPL-SCNC: 26 MMOL/L (ref 22–29)
COCAINE UR QL SCN: NEGATIVE
COLOR UR AUTO: ABNORMAL
CREAT SERPL-MCNC: 1.04 MG/DL (ref 0.73–1.18)
EOSINOPHIL # BLD AUTO: 0.26 X10(3)/MCL (ref 0–0.9)
EOSINOPHIL NFR BLD AUTO: 2.4 %
ERYTHROCYTE [DISTWIDTH] IN BLOOD BY AUTOMATED COUNT: 17.3 % (ref 11.5–17)
ETHANOL SERPL-MCNC: <10 MG/DL
FENTANYL UR QL SCN: NEGATIVE
GFR SERPLBLD CREATININE-BSD FMLA CKD-EPI: >60 MLS/MIN/1.73/M2
GLOBULIN SER-MCNC: 3.5 GM/DL (ref 2.4–3.5)
GLUCOSE SERPL-MCNC: 101 MG/DL (ref 70–110)
GLUCOSE SERPL-MCNC: 44 MG/DL (ref 74–100)
GLUCOSE UR QL STRIP.AUTO: NORMAL
HCT VFR BLD AUTO: 39 % (ref 42–52)
HGB BLD-MCNC: 13.1 G/DL (ref 14–18)
HYALINE CASTS #/AREA URNS LPF: ABNORMAL /LPF
IMM GRANULOCYTES # BLD AUTO: 0.03 X10(3)/MCL (ref 0–0.04)
IMM GRANULOCYTES NFR BLD AUTO: 0.3 %
KETONES UR QL STRIP.AUTO: NEGATIVE
LEUKOCYTE ESTERASE UR QL STRIP.AUTO: NEGATIVE
LYMPHOCYTES # BLD AUTO: 2.74 X10(3)/MCL (ref 0.6–4.6)
LYMPHOCYTES NFR BLD AUTO: 24.8 %
MCH RBC QN AUTO: 27.1 PG (ref 27–31)
MCHC RBC AUTO-ENTMCNC: 33.6 G/DL (ref 33–36)
MCV RBC AUTO: 80.7 FL (ref 80–94)
MDMA UR QL SCN: NEGATIVE
MONOCYTES # BLD AUTO: 0.7 X10(3)/MCL (ref 0.1–1.3)
MONOCYTES NFR BLD AUTO: 6.3 %
MUCOUS THREADS URNS QL MICRO: ABNORMAL /LPF
NEUTROPHILS # BLD AUTO: 7.22 X10(3)/MCL (ref 2.1–9.2)
NEUTROPHILS NFR BLD AUTO: 65.5 %
NITRITE UR QL STRIP.AUTO: NEGATIVE
NRBC BLD AUTO-RTO: 0 %
OPIATES UR QL SCN: NEGATIVE
PCP UR QL: NEGATIVE
PH UR STRIP.AUTO: 5 [PH]
PH UR: 5 [PH] (ref 3–11)
PLATELET # BLD AUTO: 279 X10(3)/MCL (ref 130–400)
PMV BLD AUTO: 9.9 FL (ref 7.4–10.4)
POCT GLUCOSE: 101 MG/DL (ref 70–110)
POTASSIUM SERPL-SCNC: 3.4 MMOL/L (ref 3.5–5.1)
PROT SERPL-MCNC: 7.6 GM/DL (ref 6.4–8.3)
PROT UR QL STRIP.AUTO: ABNORMAL
RBC # BLD AUTO: 4.83 X10(6)/MCL (ref 4.7–6.1)
RBC #/AREA URNS AUTO: ABNORMAL /HPF
RBC UR QL AUTO: NEGATIVE
SARS-COV-2 RDRP RESP QL NAA+PROBE: NEGATIVE
SODIUM SERPL-SCNC: 142 MMOL/L (ref 136–145)
SP GR UR STRIP.AUTO: 1.01 (ref 1–1.03)
SQUAMOUS #/AREA URNS LPF: ABNORMAL /HPF
TSH SERPL-ACNC: 0.97 UIU/ML (ref 0.35–4.94)
UROBILINOGEN UR STRIP-ACNC: NORMAL
WBC # SPEC AUTO: 11.03 X10(3)/MCL (ref 4.5–11.5)
WBC #/AREA URNS AUTO: ABNORMAL /HPF

## 2023-10-13 PROCEDURE — 82077 ASSAY SPEC XCP UR&BREATH IA: CPT | Performed by: STUDENT IN AN ORGANIZED HEALTH CARE EDUCATION/TRAINING PROGRAM

## 2023-10-13 PROCEDURE — 85025 COMPLETE CBC W/AUTO DIFF WBC: CPT | Performed by: STUDENT IN AN ORGANIZED HEALTH CARE EDUCATION/TRAINING PROGRAM

## 2023-10-13 PROCEDURE — 82962 GLUCOSE BLOOD TEST: CPT

## 2023-10-13 PROCEDURE — 84443 ASSAY THYROID STIM HORMONE: CPT | Performed by: STUDENT IN AN ORGANIZED HEALTH CARE EDUCATION/TRAINING PROGRAM

## 2023-10-13 PROCEDURE — 80307 DRUG TEST PRSMV CHEM ANLYZR: CPT | Performed by: STUDENT IN AN ORGANIZED HEALTH CARE EDUCATION/TRAINING PROGRAM

## 2023-10-13 PROCEDURE — 87635 SARS-COV-2 COVID-19 AMP PRB: CPT | Performed by: STUDENT IN AN ORGANIZED HEALTH CARE EDUCATION/TRAINING PROGRAM

## 2023-10-13 PROCEDURE — 99285 EMERGENCY DEPT VISIT HI MDM: CPT

## 2023-10-13 PROCEDURE — 81001 URINALYSIS AUTO W/SCOPE: CPT | Mod: 59 | Performed by: STUDENT IN AN ORGANIZED HEALTH CARE EDUCATION/TRAINING PROGRAM

## 2023-10-13 PROCEDURE — 80053 COMPREHEN METABOLIC PANEL: CPT | Performed by: STUDENT IN AN ORGANIZED HEALTH CARE EDUCATION/TRAINING PROGRAM

## 2023-10-13 PROCEDURE — 80143 DRUG ASSAY ACETAMINOPHEN: CPT | Performed by: STUDENT IN AN ORGANIZED HEALTH CARE EDUCATION/TRAINING PROGRAM

## 2023-10-13 RX ORDER — OLANZAPINE 10 MG/2ML
10 INJECTION, POWDER, FOR SOLUTION INTRAMUSCULAR ONCE AS NEEDED
Status: DISCONTINUED | OUTPATIENT
Start: 2023-10-13 | End: 2023-10-14 | Stop reason: HOSPADM

## 2023-10-14 NOTE — ED PROVIDER NOTES
"Encounter Date: 10/13/2023       History     Chief Complaint   Patient presents with    Psychiatric Evaluation     Pt arrives via [facility] for a psych evaluation after he called stating he feels like he needs to go to the hospital; hx of schizophrenia; denies SI and HI     Patient presents to the emergency department for psychiatric evaluation.  He was here stating he was very paranoid and hallucinating.  States he was hearing voices tell him bad things, he was concerned with what is going on in the world in the wars and he was concerned that the end of the world is coming.  He keeps urgent need to pray because of the fractures coming soon, it was very hyper Syn[employer].  He denies any suicidal or homicidal ideation.    The history is provided by the patient.     Review of patient's allergies indicates:   Allergen Reactions    Haloperidol Swelling     Muscle stiffness  Has muscle spasms      Paroxetine Swelling, Other (See Comments) and Rash     "i don't remember"  "i don't remember"      Pineapple      Face swells up  Face swells up      Ziprasidone Other (See Comments)     Muscle twitching     Past Medical History:   Diagnosis Date    Anxiety     Depression     Hallucination     History of psychiatric hospitalization     Hx of psychiatric care     Psychiatric problem     Psychosis     Schizoaffective disorder     Sleep difficulties     Suicide attempt     Therapy      History reviewed. No pertinent surgical history.  History reviewed. No pertinent family history.  Social History     Tobacco Use    Smoking status: Every Day     Current packs/day: 1.00     Types: Cigarettes    Smokeless tobacco: Never   Substance Use Topics    Alcohol use: Yes     Alcohol/week: 4.0 standard drinks of alcohol     Types: 4 Cans of beer per week     Comment: uses alcohol monthly    Drug use: Yes     Types: Marijuana     Review of Systems   Unable to perform ROS: Psychiatric disorder       Physical Exam     Initial Vitals [10/13/23 2030]   BP " Pulse Resp Temp SpO2   124/78 110 18 98.1 °F (36.7 °C) 98 %      MAP       --         Physical Exam    Nursing note and vitals reviewed.  Constitutional: He appears well-developed and well-nourished.   HENT:   Head: Normocephalic and atraumatic.   Eyes: Conjunctivae are normal. Pupils are equal, round, and reactive to light.   Neck: Neck supple.   Normal range of motion.  Cardiovascular:  Normal rate and regular rhythm.           Pulmonary/Chest: Breath sounds normal. No respiratory distress.   Abdominal: Abdomen is soft. There is no abdominal tenderness.   Musculoskeletal:         General: No edema. Normal range of motion.      Cervical back: Normal range of motion and neck supple.     Neurological: He is alert and oriented to person, place, and time.   Skin: Skin is warm and dry.         ED Course   Procedures  Labs Reviewed   COMPREHENSIVE METABOLIC PANEL - Abnormal; Notable for the following components:       Result Value    Potassium Level 3.4 (*)     Glucose Level 44 (*)     Blood Urea Nitrogen 7.7 (*)     All other components within normal limits   URINALYSIS, REFLEX TO URINE CULTURE - Abnormal; Notable for the following components:    Protein, UA Trace (*)     Squamous Epithelial Cells, UA Trace (*)     Mucous, UA Trace (*)     All other components within normal limits   DRUG SCREEN, URINE (BEAKER) - Abnormal; Notable for the following components:    Cannabinoids, Urine Positive (*)     All other components within normal limits    Narrative:     Cut off concentrations:    Amphetamines - 1000 ng/ml  Barbiturates - 200 ng/ml  Benzodiazepine - 200 ng/ml  Cannabinoids (THC) - 50 ng/ml  Cocaine - 300 ng/ml  Fentanyl - 1.0 ng/ml  MDMA - 500 ng/ml  Opiates - 300 ng/ml   Phencyclidine (PCP) - 25 ng/ml    Specimen submitted for drug analysis and tested for pH and specific gravity in order to evaluate sample integrity. Suspect tampering if specific gravity is <1.003 and/or pH is not within the range of 4.5 -  8.0  False negatives may result form substances such as bleach added to urine.  False positives may result for the presence of a substance with similar chemical structure to the drug or its metabolite.    This test provides only a PRELIMINARY analytical test result. A more specific alternate chemical method must be used in order to obtain a confirmed analytical result. Gas chromatography/mass spectrometry (GC/MS) is the preferred confirmatory method. Other chemical confirmation methods are available. Clinical consideration and professional judgement should be applied to any drug of abuse test result, particularly when preliminary positive results are used.    Positive results will be confirmed only at the physicians request. Unconfirmed screening results are to be used only for medical purposes (treatment).        ACETAMINOPHEN LEVEL - Abnormal; Notable for the following components:    Acetaminophen Level <17.4 (*)     All other components within normal limits   CBC WITH DIFFERENTIAL - Abnormal; Notable for the following components:    Hgb 13.1 (*)     Hct 39.0 (*)     RDW 17.3 (*)     All other components within normal limits   ALCOHOL,MEDICAL (ETHANOL) - Normal   SARS-COV-2 RNA AMPLIFICATION, QUAL - Normal    Narrative:     The IDNOW COVID-19 assay is a rapid molecular in vitro diagnostic test utilizing an isothermal nucleic acid amplification technology intended for the qualitative detection of nucleic acid from the SARS-CoV-2 viral RNA in direct nasal, nasopharyngeal or throat swabs from individuals who are suspected of COVID-19 by their healthcare provider.   CBC W/ AUTO DIFFERENTIAL    Narrative:     The following orders were created for panel order CBC auto differential.  Procedure                               Abnormality         Status                     ---------                               -----------         ------                     CBC with Differential[[id number]]       Abnormal            Final  result                 Please view results for these tests on the individual orders.   TSH   EXTRA TUBES    Narrative:     The following orders were created for panel order EXTRA TUBES.  Procedure                               Abnormality         Status                     ---------                               -----------         ------                     Light Blue Top Hold[[id number]]                             In process                 Gold Top Hold[[id number]]                                   In process                   Please view results for these tests on the individual orders.   LIGHT BLUE TOP HOLD   GOLD TOP HOLD   POCT GLUCOSE, HAND-HELD DEVICE   POCT GLUCOSE          Imaging Results    None          Medications   OLANZapine injection 10 mg (has no administration in time range)     Medical Decision Making  PEC was placed due to the patient's gravely disabled status from his psychosis.  CBC was unremarkable, CMP shows a glucose of 44, he was on no insulin, he tolerated a meal tray and some juice well on repeat his blood sugar was 101.  Blood tox panel was unremarkable.  He was medically stable for psychiatric admission as long as a repeat glucose remains normal.    Amount and/or Complexity of Data Reviewed  Labs: ordered. Decision-making details documented in ED Course.    Risk  Prescription drug management.                               Clinical Impression:   Final diagnoses:  [Z00.8] Medical clearance for psychiatric admission (Primary)  [F23] Acute psychosis        ED Disposition Condition    Transfer to Psych Facility Stable          ED Prescriptions    None       Follow-up Information    None          Sai Curtis MD  10/13/23 3736

## 2023-12-16 ENCOUNTER — HOSPITAL ENCOUNTER (EMERGENCY)
Facility: HOSPITAL | Age: 38
Discharge: PSYCHIATRIC OR SUBSTANCE USE FACILITY | End: 2023-12-16
Attending: FAMILY MEDICINE
Payer: COMMERCIAL

## 2023-12-16 VITALS
RESPIRATION RATE: 20 BRPM | OXYGEN SATURATION: 98 % | HEART RATE: 95 BPM | TEMPERATURE: 98 F | DIASTOLIC BLOOD PRESSURE: 71 MMHG | SYSTOLIC BLOOD PRESSURE: 117 MMHG

## 2023-12-16 DIAGNOSIS — F23 ACUTE PSYCHOSIS: Primary | ICD-10-CM

## 2023-12-16 LAB
ALBUMIN SERPL-MCNC: 3.7 G/DL (ref 3.5–5)
ALBUMIN/GLOB SERPL: 1.1 RATIO (ref 1.1–2)
ALP SERPL-CCNC: 89 UNIT/L (ref 40–150)
ALT SERPL-CCNC: 35 UNIT/L (ref 0–55)
AMPHET UR QL SCN: NEGATIVE
APAP SERPL-MCNC: <17.4 UG/ML (ref 17.4–30)
APPEARANCE UR: CLEAR
AST SERPL-CCNC: 81 UNIT/L (ref 5–34)
BACTERIA #/AREA URNS AUTO: ABNORMAL /HPF
BARBITURATE SCN PRESENT UR: NEGATIVE
BASOPHILS # BLD AUTO: 0.04 X10(3)/MCL
BASOPHILS NFR BLD AUTO: 0.4 %
BENZODIAZ UR QL SCN: NEGATIVE
BILIRUB SERPL-MCNC: 0.3 MG/DL
BILIRUB UR QL STRIP.AUTO: NEGATIVE
BUN SERPL-MCNC: 7.8 MG/DL (ref 8.9–20.6)
CALCIUM SERPL-MCNC: 8.6 MG/DL (ref 8.4–10.2)
CANNABINOIDS UR QL SCN: POSITIVE
CHLORIDE SERPL-SCNC: 106 MMOL/L (ref 98–107)
CO2 SERPL-SCNC: 24 MMOL/L (ref 22–29)
COCAINE UR QL SCN: NEGATIVE
COLOR UR AUTO: ABNORMAL
CREAT SERPL-MCNC: 0.93 MG/DL (ref 0.73–1.18)
EOSINOPHIL # BLD AUTO: 0.57 X10(3)/MCL (ref 0–0.9)
EOSINOPHIL NFR BLD AUTO: 5.3 %
ERYTHROCYTE [DISTWIDTH] IN BLOOD BY AUTOMATED COUNT: 17.6 % (ref 11.5–17)
ETHANOL SERPL-MCNC: <10 MG/DL
FENTANYL UR QL SCN: NEGATIVE
GFR SERPLBLD CREATININE-BSD FMLA CKD-EPI: >60 MLS/MIN/1.73/M2
GLOBULIN SER-MCNC: 3.4 GM/DL (ref 2.4–3.5)
GLUCOSE SERPL-MCNC: 140 MG/DL (ref 74–100)
GLUCOSE UR QL STRIP.AUTO: NORMAL
HCT VFR BLD AUTO: 40.6 % (ref 42–52)
HGB BLD-MCNC: 13.4 G/DL (ref 14–18)
HYALINE CASTS #/AREA URNS LPF: ABNORMAL /LPF
IMM GRANULOCYTES # BLD AUTO: 0.03 X10(3)/MCL (ref 0–0.04)
IMM GRANULOCYTES NFR BLD AUTO: 0.3 %
KETONES UR QL STRIP.AUTO: NEGATIVE
LEUKOCYTE ESTERASE UR QL STRIP.AUTO: NEGATIVE
LYMPHOCYTES # BLD AUTO: 3.48 X10(3)/MCL (ref 0.6–4.6)
LYMPHOCYTES NFR BLD AUTO: 32.4 %
MCH RBC QN AUTO: 27.2 PG (ref 27–31)
MCHC RBC AUTO-ENTMCNC: 33 G/DL (ref 33–36)
MCV RBC AUTO: 82.4 FL (ref 80–94)
MDMA UR QL SCN: NEGATIVE
MONOCYTES # BLD AUTO: 0.87 X10(3)/MCL (ref 0.1–1.3)
MONOCYTES NFR BLD AUTO: 8.1 %
NEUTROPHILS # BLD AUTO: 5.74 X10(3)/MCL (ref 2.1–9.2)
NEUTROPHILS NFR BLD AUTO: 53.5 %
NITRITE UR QL STRIP.AUTO: NEGATIVE
NRBC BLD AUTO-RTO: 0 %
OPIATES UR QL SCN: NEGATIVE
PCP UR QL: NEGATIVE
PH UR STRIP.AUTO: 5.5 [PH]
PH UR: 5.5 [PH] (ref 3–11)
PLATELET # BLD AUTO: 261 X10(3)/MCL (ref 130–400)
PMV BLD AUTO: 10.2 FL (ref 7.4–10.4)
POTASSIUM SERPL-SCNC: 3.5 MMOL/L (ref 3.5–5.1)
PROT SERPL-MCNC: 7.1 GM/DL (ref 6.4–8.3)
PROT UR QL STRIP.AUTO: NEGATIVE
RBC # BLD AUTO: 4.93 X10(6)/MCL (ref 4.7–6.1)
RBC #/AREA URNS AUTO: ABNORMAL /HPF
RBC UR QL AUTO: NEGATIVE
SALICYLATES SERPL-MCNC: <5 MG/DL (ref 15–30)
SARS-COV-2 RDRP RESP QL NAA+PROBE: NEGATIVE
SODIUM SERPL-SCNC: 139 MMOL/L (ref 136–145)
SP GR UR STRIP.AUTO: 1.01 (ref 1–1.03)
SPECIFIC GRAVITY, URINE AUTO (.000) (OHS): 1.01 (ref 1–1.03)
SQUAMOUS #/AREA URNS LPF: ABNORMAL /HPF
TSH SERPL-ACNC: 1.03 UIU/ML (ref 0.35–4.94)
UROBILINOGEN UR STRIP-ACNC: NORMAL
WBC # SPEC AUTO: 10.73 X10(3)/MCL (ref 4.5–11.5)
WBC #/AREA URNS AUTO: ABNORMAL /HPF

## 2023-12-16 PROCEDURE — 80143 DRUG ASSAY ACETAMINOPHEN: CPT | Performed by: FAMILY MEDICINE

## 2023-12-16 PROCEDURE — 87635 SARS-COV-2 COVID-19 AMP PRB: CPT | Performed by: FAMILY MEDICINE

## 2023-12-16 PROCEDURE — 81001 URINALYSIS AUTO W/SCOPE: CPT | Mod: XB | Performed by: FAMILY MEDICINE

## 2023-12-16 PROCEDURE — 80179 DRUG ASSAY SALICYLATE: CPT | Performed by: FAMILY MEDICINE

## 2023-12-16 PROCEDURE — 82077 ASSAY SPEC XCP UR&BREATH IA: CPT | Performed by: FAMILY MEDICINE

## 2023-12-16 PROCEDURE — 99285 EMERGENCY DEPT VISIT HI MDM: CPT

## 2023-12-16 PROCEDURE — 80053 COMPREHEN METABOLIC PANEL: CPT | Performed by: FAMILY MEDICINE

## 2023-12-16 PROCEDURE — 80307 DRUG TEST PRSMV CHEM ANLYZR: CPT | Performed by: FAMILY MEDICINE

## 2023-12-16 PROCEDURE — 84443 ASSAY THYROID STIM HORMONE: CPT | Performed by: FAMILY MEDICINE

## 2023-12-16 PROCEDURE — 85025 COMPLETE CBC W/AUTO DIFF WBC: CPT | Performed by: FAMILY MEDICINE

## 2023-12-16 RX ORDER — DIPHENHYDRAMINE HYDROCHLORIDE 50 MG/ML
50 INJECTION INTRAMUSCULAR; INTRAVENOUS ONCE AS NEEDED
Status: DISCONTINUED | OUTPATIENT
Start: 2023-12-16 | End: 2023-12-16 | Stop reason: HOSPADM

## 2023-12-16 RX ORDER — LORAZEPAM 2 MG/ML
2 INJECTION INTRAMUSCULAR ONCE AS NEEDED
Status: DISCONTINUED | OUTPATIENT
Start: 2023-12-16 | End: 2023-12-16 | Stop reason: HOSPADM

## 2023-12-16 RX ORDER — OLANZAPINE 10 MG/2ML
10 INJECTION, POWDER, FOR SOLUTION INTRAMUSCULAR ONCE AS NEEDED
Status: DISCONTINUED | OUTPATIENT
Start: 2023-12-16 | End: 2023-12-16 | Stop reason: HOSPADM

## 2023-12-16 NOTE — ED PROVIDER NOTES
"Encounter Date: 12/16/2023       History     Chief Complaint   Patient presents with    Psychiatric Evaluation     Patient has  a history of  schizophrenia  and  bipolar  and  according  to  his  MOM  he  is  off  his  medications.  Mom  called  ambulance  because they got into  a heated  argument     Patient is a 38-year-old gentleman brought to emergency room by ambulance due to schizophrenia, medication noncompliance, bizarre behavior.  EMS was contacted by patient's family.  Patient currently calm cooperative.  He was currently reading versus from his bible; tangential though redirectable.    The history is provided by the patient and the EMS personnel.     Review of patient's allergies indicates:   Allergen Reactions    Haloperidol Swelling     Muscle stiffness  Has muscle spasms      Paroxetine Swelling, Other (See Comments) and Rash     "i don't remember"  "i don't remember"      Pineapple      Face swells up  Face swells up      Ziprasidone Other (See Comments)     Muscle twitching     Past Medical History:   Diagnosis Date    Anxiety     Depression     Hallucination     History of psychiatric hospitalization     Hx of psychiatric care     Psychiatric problem     Psychosis     Schizoaffective disorder     Sleep difficulties     Suicide attempt     Therapy      History reviewed. No pertinent surgical history.  History reviewed. No pertinent family history.  Social History     Tobacco Use    Smoking status: Every Day     Current packs/day: 1.00     Types: Cigarettes    Smokeless tobacco: Never   Substance Use Topics    Alcohol use: Yes     Alcohol/week: 4.0 standard drinks of alcohol     Types: 4 Cans of beer per week     Comment: uses alcohol monthly    Drug use: Yes     Types: Marijuana     Review of Systems   Constitutional:  Negative for chills, fatigue and fever.   HENT:  Negative for ear pain, rhinorrhea and sore throat.    Eyes:  Negative for photophobia and pain.   Respiratory:  Negative for cough, " shortness of breath and wheezing.    Cardiovascular:  Negative for chest pain.   Gastrointestinal:  Negative for abdominal pain, diarrhea, nausea and vomiting.   Genitourinary:  Negative for dysuria.   Neurological:  Negative for dizziness, weakness and headaches.   All other systems reviewed and are negative.      Physical Exam     Initial Vitals [12/16/23 0310]   BP Pulse Resp Temp SpO2   117/71 95 20 97.7 °F (36.5 °C) 98 %      MAP       --         Physical Exam    Nursing note and vitals reviewed.  Constitutional: He appears well-developed and well-nourished.   HENT:   Head: Normocephalic and atraumatic.   Eyes: EOM are normal. Pupils are equal, round, and reactive to light.   Neck: Neck supple.   Normal range of motion.  Cardiovascular:  Normal rate, regular rhythm and normal heart sounds.     Exam reveals no gallop and no friction rub.       No murmur heard.  Pulmonary/Chest: Breath sounds normal. No respiratory distress.   Abdominal: Abdomen is soft. Bowel sounds are normal. He exhibits no distension. There is no abdominal tenderness.   Musculoskeletal:         General: Normal range of motion.      Cervical back: Normal range of motion and neck supple.     Neurological: He is alert and oriented to person, place, and time. He has normal strength.   Skin: Skin is warm and dry.   Psychiatric: Judgment normal. His mood appears anxious. His speech is tangential. He is hyperactive. He is not agitated, not aggressive and not combative. Thought content is paranoid and delusional.         ED Course   Procedures  Labs Reviewed   COMPREHENSIVE METABOLIC PANEL - Abnormal; Notable for the following components:       Result Value    Glucose Level 140 (*)     Blood Urea Nitrogen 7.8 (*)     Aspartate Aminotransferase 81 (*)     All other components within normal limits   URINALYSIS, REFLEX TO URINE CULTURE - Abnormal; Notable for the following components:    Bacteria, UA Trace (*)     All other components within normal  limits   DRUG SCREEN, URINE (BEAKER) - Abnormal; Notable for the following components:    Cannabinoids, Urine Positive (*)     All other components within normal limits    Narrative:     Cut off concentrations:    Amphetamines - 1000 ng/ml  Barbiturates - 200 ng/ml  Benzodiazepine - 200 ng/ml  Cannabinoids (THC) - 50 ng/ml  Cocaine - 300 ng/ml  Fentanyl - 1.0 ng/ml  MDMA - 500 ng/ml  Opiates - 300 ng/ml   Phencyclidine (PCP) - 25 ng/ml    Specimen submitted for drug analysis and tested for pH and specific gravity in order to evaluate sample integrity. Suspect tampering if specific gravity is <1.003 and/or pH is not within the range of 4.5 - 8.0  False negatives may result form substances such as bleach added to urine.  False positives may result for the presence of a substance with similar chemical structure to the drug or its metabolite.    This test provides only a PRELIMINARY analytical test result. A more specific alternate chemical method must be used in order to obtain a confirmed analytical result. Gas chromatography/mass spectrometry (GC/MS) is the preferred confirmatory method. Other chemical confirmation methods are available. Clinical consideration and professional judgement should be applied to any drug of abuse test result, particularly when preliminary positive results are used.    Positive results will be confirmed only at the physicians request. Unconfirmed screening results are to be used only for medical purposes (treatment).        ACETAMINOPHEN LEVEL - Abnormal; Notable for the following components:    Acetaminophen Level <17.4 (*)     All other components within normal limits   SALICYLATE LEVEL - Abnormal; Notable for the following components:    Salicylate Level <5.0 (*)     All other components within normal limits   CBC WITH DIFFERENTIAL - Abnormal; Notable for the following components:    Hgb 13.4 (*)     Hct 40.6 (*)     RDW 17.6 (*)     All other components within normal limits   TSH -  Normal   ALCOHOL,MEDICAL (ETHANOL) - Normal   SARS-COV-2 RNA AMPLIFICATION, QUAL - Normal    Narrative:     The IDNOW COVID-19 assay is a rapid molecular in vitro diagnostic test utilizing an isothermal nucleic acid amplification technology intended for the qualitative detection of nucleic acid from the SARS-CoV-2 viral RNA in direct nasal, nasopharyngeal or throat swabs from individuals who are suspected of COVID-19 by their healthcare provider.   CBC W/ AUTO DIFFERENTIAL    Narrative:     The following orders were created for panel order CBC auto differential.  Procedure                               Abnormality         Status                     ---------                               -----------         ------                     CBC with Differential[[id number]]       Abnormal            Final result                 Please view results for these tests on the individual orders.          Imaging Results    None          Medications - No data to display  Medical Decision Making  38-year-old gentleman brought to emergency room for evaluation, currently presents with bizarre behavior, hyper Tenriism, noncompliant with home medications.  Due to worsening symptoms, and family being concerned with patient being in the household, will place under physician emergency certificate for inpatient psychiatric evaluation treatment    Amount and/or Complexity of Data Reviewed  Labs: ordered.               ED Course as of 12/16/23 0422   Sat Dec 16, 2023   0421 Medically cleared for psychiatric placement [MW]      ED Course User Index  [MW] Shahbaz Gooden MD       Medically cleared for psychiatry placement: 12/16/2023  4:21 AM                   Clinical Impression:  Final diagnoses:  [F23] Acute psychosis (Primary)          ED Disposition Condition    Transfer to Psych Facility Stable          ED Prescriptions    None       Follow-up Information    None          Shahbaz Gooden MD  12/16/23 0422

## 2024-01-16 ENCOUNTER — HOSPITAL ENCOUNTER (EMERGENCY)
Facility: HOSPITAL | Age: 39
Discharge: PSYCHIATRIC OR SUBSTANCE USE FACILITY | End: 2024-01-16
Attending: FAMILY MEDICINE
Payer: COMMERCIAL

## 2024-01-16 VITALS
BODY MASS INDEX: 22.81 KG/M2 | SYSTOLIC BLOOD PRESSURE: 116 MMHG | OXYGEN SATURATION: 100 % | WEIGHT: 150 LBS | TEMPERATURE: 98 F | RESPIRATION RATE: 20 BRPM | DIASTOLIC BLOOD PRESSURE: 76 MMHG | HEART RATE: 94 BPM

## 2024-01-16 DIAGNOSIS — F25.9 SCHIZOAFFECTIVE DISORDER, UNSPECIFIED TYPE: Primary | ICD-10-CM

## 2024-01-16 LAB
ALBUMIN SERPL-MCNC: 4 G/DL (ref 3.5–5)
ALBUMIN/GLOB SERPL: 1.1 RATIO (ref 1.1–2)
ALP SERPL-CCNC: 99 UNIT/L (ref 40–150)
ALT SERPL-CCNC: 13 UNIT/L (ref 0–55)
AMPHET UR QL SCN: NEGATIVE
APPEARANCE UR: CLEAR
AST SERPL-CCNC: 14 UNIT/L (ref 5–34)
BACTERIA #/AREA URNS AUTO: ABNORMAL /HPF
BARBITURATE SCN PRESENT UR: NEGATIVE
BASOPHILS # BLD AUTO: 0.07 X10(3)/MCL
BASOPHILS NFR BLD AUTO: 0.9 %
BENZODIAZ UR QL SCN: NEGATIVE
BILIRUB SERPL-MCNC: 0.3 MG/DL
BILIRUB UR QL STRIP.AUTO: NEGATIVE
BUN SERPL-MCNC: 8 MG/DL (ref 8.9–20.6)
CALCIUM SERPL-MCNC: 9.2 MG/DL (ref 8.4–10.2)
CANNABINOIDS UR QL SCN: POSITIVE
CHLORIDE SERPL-SCNC: 109 MMOL/L (ref 98–107)
CO2 SERPL-SCNC: 23 MMOL/L (ref 22–29)
COCAINE UR QL SCN: NEGATIVE
COLOR UR AUTO: YELLOW
CREAT SERPL-MCNC: 1.07 MG/DL (ref 0.73–1.18)
EOSINOPHIL # BLD AUTO: 0.3 X10(3)/MCL (ref 0–0.9)
EOSINOPHIL NFR BLD AUTO: 3.8 %
ERYTHROCYTE [DISTWIDTH] IN BLOOD BY AUTOMATED COUNT: 16.6 % (ref 11.5–17)
ETHANOL SERPL-MCNC: <10 MG/DL
FENTANYL UR QL SCN: NEGATIVE
FLUAV AG UPPER RESP QL IA.RAPID: NOT DETECTED
FLUBV AG UPPER RESP QL IA.RAPID: NOT DETECTED
GFR SERPLBLD CREATININE-BSD FMLA CKD-EPI: >60 MLS/MIN/1.73/M2
GLOBULIN SER-MCNC: 3.6 GM/DL (ref 2.4–3.5)
GLUCOSE SERPL-MCNC: 104 MG/DL (ref 74–100)
GLUCOSE UR QL STRIP.AUTO: NORMAL
HCT VFR BLD AUTO: 44.5 % (ref 42–52)
HGB BLD-MCNC: 14.4 G/DL (ref 14–18)
HOLD SPECIMEN: NORMAL
HOLD SPECIMEN: NORMAL
HYALINE CASTS #/AREA URNS LPF: ABNORMAL /LPF
IMM GRANULOCYTES # BLD AUTO: 0.02 X10(3)/MCL (ref 0–0.04)
IMM GRANULOCYTES NFR BLD AUTO: 0.3 %
KETONES UR QL STRIP.AUTO: ABNORMAL
LEUKOCYTE ESTERASE UR QL STRIP.AUTO: NEGATIVE
LYMPHOCYTES # BLD AUTO: 2.88 X10(3)/MCL (ref 0.6–4.6)
LYMPHOCYTES NFR BLD AUTO: 36.4 %
MCH RBC QN AUTO: 27.1 PG (ref 27–31)
MCHC RBC AUTO-ENTMCNC: 32.4 G/DL (ref 33–36)
MCV RBC AUTO: 83.6 FL (ref 80–94)
MDMA UR QL SCN: NEGATIVE
MONOCYTES # BLD AUTO: 0.78 X10(3)/MCL (ref 0.1–1.3)
MONOCYTES NFR BLD AUTO: 9.8 %
MUCOUS THREADS URNS QL MICRO: ABNORMAL /LPF
NEUTROPHILS # BLD AUTO: 3.87 X10(3)/MCL (ref 2.1–9.2)
NEUTROPHILS NFR BLD AUTO: 48.8 %
NITRITE UR QL STRIP.AUTO: NEGATIVE
NRBC BLD AUTO-RTO: 0 %
OPIATES UR QL SCN: NEGATIVE
PCP UR QL: NEGATIVE
PH UR STRIP.AUTO: 5.5 [PH]
PH UR: 6 [PH] (ref 3–11)
PLATELET # BLD AUTO: 263 X10(3)/MCL (ref 130–400)
PMV BLD AUTO: 10.3 FL (ref 7.4–10.4)
POTASSIUM SERPL-SCNC: 3.7 MMOL/L (ref 3.5–5.1)
PROT SERPL-MCNC: 7.6 GM/DL (ref 6.4–8.3)
PROT UR QL STRIP.AUTO: ABNORMAL
RBC # BLD AUTO: 5.32 X10(6)/MCL (ref 4.7–6.1)
RBC #/AREA URNS AUTO: ABNORMAL /HPF
RBC UR QL AUTO: NEGATIVE
SARS-COV-2 RDRP RESP QL NAA+PROBE: POSITIVE
SARS-COV-2 RNA RESP QL NAA+PROBE: NOT DETECTED
SODIUM SERPL-SCNC: 141 MMOL/L (ref 136–145)
SP GR UR STRIP.AUTO: 1.04 (ref 1–1.03)
SPECIFIC GRAVITY, URINE AUTO (.000) (OHS): 1.02 (ref 1–1.03)
SQUAMOUS #/AREA URNS LPF: ABNORMAL /HPF
TSH SERPL-ACNC: 1.34 UIU/ML (ref 0.35–4.94)
UROBILINOGEN UR STRIP-ACNC: ABNORMAL
WBC # SPEC AUTO: 7.92 X10(3)/MCL (ref 4.5–11.5)
WBC #/AREA URNS AUTO: ABNORMAL /HPF

## 2024-01-16 PROCEDURE — 81001 URINALYSIS AUTO W/SCOPE: CPT | Mod: XB | Performed by: FAMILY MEDICINE

## 2024-01-16 PROCEDURE — 84443 ASSAY THYROID STIM HORMONE: CPT | Performed by: FAMILY MEDICINE

## 2024-01-16 PROCEDURE — 82077 ASSAY SPEC XCP UR&BREATH IA: CPT | Performed by: FAMILY MEDICINE

## 2024-01-16 PROCEDURE — 63600175 PHARM REV CODE 636 W HCPCS: Mod: JZ,JG | Performed by: FAMILY MEDICINE

## 2024-01-16 PROCEDURE — 87635 SARS-COV-2 COVID-19 AMP PRB: CPT | Performed by: FAMILY MEDICINE

## 2024-01-16 PROCEDURE — 80307 DRUG TEST PRSMV CHEM ANLYZR: CPT | Performed by: FAMILY MEDICINE

## 2024-01-16 PROCEDURE — 99285 EMERGENCY DEPT VISIT HI MDM: CPT

## 2024-01-16 PROCEDURE — 85025 COMPLETE CBC W/AUTO DIFF WBC: CPT | Performed by: FAMILY MEDICINE

## 2024-01-16 PROCEDURE — 96372 THER/PROPH/DIAG INJ SC/IM: CPT | Performed by: FAMILY MEDICINE

## 2024-01-16 PROCEDURE — 0240U COVID/FLU A&B PCR: CPT | Performed by: FAMILY MEDICINE

## 2024-01-16 PROCEDURE — 80053 COMPREHEN METABOLIC PANEL: CPT | Performed by: FAMILY MEDICINE

## 2024-01-16 RX ORDER — CHLORPROMAZINE HYDROCHLORIDE 100 MG/1
100 TABLET, FILM COATED ORAL 3 TIMES DAILY
COMMUNITY
Start: 2023-12-26

## 2024-01-16 RX ORDER — PROPRANOLOL HYDROCHLORIDE 10 MG/1
10 TABLET ORAL 3 TIMES DAILY
COMMUNITY
Start: 2023-12-26

## 2024-01-16 RX ORDER — CYPROHEPTADINE HYDROCHLORIDE 4 MG/1
4 TABLET ORAL 3 TIMES DAILY
COMMUNITY
Start: 2023-12-26

## 2024-01-16 RX ORDER — OLANZAPINE 10 MG/2ML
10 INJECTION, POWDER, FOR SOLUTION INTRAMUSCULAR ONCE AS NEEDED
Status: COMPLETED | OUTPATIENT
Start: 2024-01-16 | End: 2024-01-16

## 2024-01-16 RX ORDER — DIPHENHYDRAMINE HYDROCHLORIDE 50 MG/ML
50 INJECTION INTRAMUSCULAR; INTRAVENOUS ONCE AS NEEDED
Status: COMPLETED | OUTPATIENT
Start: 2024-01-16 | End: 2024-01-16

## 2024-01-16 RX ORDER — LITHIUM CARBONATE 300 MG/1
300 CAPSULE ORAL 2 TIMES DAILY
COMMUNITY
Start: 2023-12-26 | End: 2024-06-09

## 2024-01-16 RX ADMIN — OLANZAPINE 10 MG: 10 INJECTION, POWDER, FOR SOLUTION INTRAMUSCULAR at 09:01

## 2024-01-16 RX ADMIN — DIPHENHYDRAMINE HYDROCHLORIDE 50 MG: 50 INJECTION INTRAMUSCULAR; INTRAVENOUS at 09:01
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
## 2024-01-17 NOTE — ED NOTES
[facility] here for pt transportation. Pt AAOx4, ambulates with steady gait. In no apparent distress.

## 2024-03-22 ENCOUNTER — HOSPITAL ENCOUNTER (EMERGENCY)
Facility: HOSPITAL | Age: 39
Discharge: PSYCHIATRIC OR SUBSTANCE USE FACILITY | End: 2024-03-22
Attending: STUDENT IN AN ORGANIZED HEALTH CARE EDUCATION/TRAINING PROGRAM
Payer: COMMERCIAL

## 2024-03-22 VITALS
HEART RATE: 101 BPM | TEMPERATURE: 98 F | HEIGHT: 69 IN | RESPIRATION RATE: 18 BRPM | WEIGHT: 140 LBS | SYSTOLIC BLOOD PRESSURE: 137 MMHG | OXYGEN SATURATION: 99 % | BODY MASS INDEX: 20.73 KG/M2 | DIASTOLIC BLOOD PRESSURE: 84 MMHG

## 2024-03-22 DIAGNOSIS — Z00.8 MEDICAL CLEARANCE FOR PSYCHIATRIC ADMISSION: Primary | ICD-10-CM

## 2024-03-22 DIAGNOSIS — F23 ACUTE PSYCHOSIS: ICD-10-CM

## 2024-03-22 LAB
ALBUMIN SERPL-MCNC: 4 G/DL (ref 3.5–5)
ALBUMIN/GLOB SERPL: 1.3 RATIO (ref 1.1–2)
ALP SERPL-CCNC: 80 UNIT/L (ref 40–150)
ALT SERPL-CCNC: 17 UNIT/L (ref 0–55)
AMPHET UR QL SCN: NEGATIVE
APAP SERPL-MCNC: <17.4 UG/ML (ref 17.4–30)
APPEARANCE UR: CLEAR
AST SERPL-CCNC: 30 UNIT/L (ref 5–34)
BACTERIA #/AREA URNS AUTO: ABNORMAL /HPF
BARBITURATE SCN PRESENT UR: NEGATIVE
BASOPHILS # BLD AUTO: 0.07 X10(3)/MCL
BASOPHILS NFR BLD AUTO: 0.7 %
BENZODIAZ UR QL SCN: NEGATIVE
BILIRUB SERPL-MCNC: 0.5 MG/DL
BILIRUB UR QL STRIP.AUTO: NEGATIVE
BUN SERPL-MCNC: 10.9 MG/DL (ref 8.9–20.6)
CALCIUM SERPL-MCNC: 9.8 MG/DL (ref 8.4–10.2)
CANNABINOIDS UR QL SCN: POSITIVE
CHLORIDE SERPL-SCNC: 108 MMOL/L (ref 98–107)
CO2 SERPL-SCNC: 20 MMOL/L (ref 22–29)
COCAINE UR QL SCN: NEGATIVE
COLOR UR AUTO: YELLOW
CREAT SERPL-MCNC: 1.23 MG/DL (ref 0.73–1.18)
EOSINOPHIL # BLD AUTO: 0.16 X10(3)/MCL (ref 0–0.9)
EOSINOPHIL NFR BLD AUTO: 1.7 %
ERYTHROCYTE [DISTWIDTH] IN BLOOD BY AUTOMATED COUNT: 15.8 % (ref 11.5–17)
ETHANOL SERPL-MCNC: <10 MG/DL
FENTANYL UR QL SCN: NEGATIVE
GFR SERPLBLD CREATININE-BSD FMLA CKD-EPI: >60 MLS/MIN/1.73/M2
GLOBULIN SER-MCNC: 3.2 GM/DL (ref 2.4–3.5)
GLUCOSE SERPL-MCNC: 103 MG/DL (ref 74–100)
GLUCOSE UR QL STRIP.AUTO: NORMAL
HCT VFR BLD AUTO: 41.6 % (ref 42–52)
HGB BLD-MCNC: 13.9 G/DL (ref 14–18)
HOLD SPECIMEN: NORMAL
HYALINE CASTS #/AREA URNS LPF: ABNORMAL /LPF
IMM GRANULOCYTES # BLD AUTO: 0.03 X10(3)/MCL (ref 0–0.04)
IMM GRANULOCYTES NFR BLD AUTO: 0.3 %
KETONES UR QL STRIP.AUTO: ABNORMAL
LEUKOCYTE ESTERASE UR QL STRIP.AUTO: NEGATIVE
LYMPHOCYTES # BLD AUTO: 2.11 X10(3)/MCL (ref 0.6–4.6)
LYMPHOCYTES NFR BLD AUTO: 22.1 %
MCH RBC QN AUTO: 28.7 PG (ref 27–31)
MCHC RBC AUTO-ENTMCNC: 33.4 G/DL (ref 33–36)
MCV RBC AUTO: 85.8 FL (ref 80–94)
MDMA UR QL SCN: NEGATIVE
MONOCYTES # BLD AUTO: 0.82 X10(3)/MCL (ref 0.1–1.3)
MONOCYTES NFR BLD AUTO: 8.6 %
MUCOUS THREADS URNS QL MICRO: ABNORMAL /LPF
NEUTROPHILS # BLD AUTO: 6.34 X10(3)/MCL (ref 2.1–9.2)
NEUTROPHILS NFR BLD AUTO: 66.6 %
NITRITE UR QL STRIP.AUTO: NEGATIVE
NRBC BLD AUTO-RTO: 0 %
OPIATES UR QL SCN: NEGATIVE
PCP UR QL: NEGATIVE
PH UR STRIP.AUTO: 5.5 [PH]
PH UR: 5.5 [PH] (ref 3–11)
PLATELET # BLD AUTO: 219 X10(3)/MCL (ref 130–400)
PMV BLD AUTO: 10.7 FL (ref 7.4–10.4)
POTASSIUM SERPL-SCNC: 4.2 MMOL/L (ref 3.5–5.1)
PROT SERPL-MCNC: 7.2 GM/DL (ref 6.4–8.3)
PROT UR QL STRIP.AUTO: ABNORMAL
RBC # BLD AUTO: 4.85 X10(6)/MCL (ref 4.7–6.1)
RBC #/AREA URNS AUTO: ABNORMAL /HPF
RBC UR QL AUTO: NEGATIVE
SARS-COV-2 RDRP RESP QL NAA+PROBE: NEGATIVE
SODIUM SERPL-SCNC: 140 MMOL/L (ref 136–145)
SP GR UR STRIP.AUTO: 1.02 (ref 1–1.03)
SPECIFIC GRAVITY, URINE AUTO (.000) (OHS): 1.02 (ref 1–1.03)
SQUAMOUS #/AREA URNS LPF: ABNORMAL /HPF
TSH SERPL-ACNC: 0.48 UIU/ML (ref 0.35–4.94)
UROBILINOGEN UR STRIP-ACNC: ABNORMAL
WBC # SPEC AUTO: 9.53 X10(3)/MCL (ref 4.5–11.5)
WBC #/AREA URNS AUTO: ABNORMAL /HPF

## 2024-03-22 PROCEDURE — 85025 COMPLETE CBC W/AUTO DIFF WBC: CPT | Performed by: STUDENT IN AN ORGANIZED HEALTH CARE EDUCATION/TRAINING PROGRAM

## 2024-03-22 PROCEDURE — 84443 ASSAY THYROID STIM HORMONE: CPT | Performed by: STUDENT IN AN ORGANIZED HEALTH CARE EDUCATION/TRAINING PROGRAM

## 2024-03-22 PROCEDURE — 80143 DRUG ASSAY ACETAMINOPHEN: CPT | Performed by: STUDENT IN AN ORGANIZED HEALTH CARE EDUCATION/TRAINING PROGRAM

## 2024-03-22 PROCEDURE — 99285 EMERGENCY DEPT VISIT HI MDM: CPT | Mod: 25

## 2024-03-22 PROCEDURE — 82077 ASSAY SPEC XCP UR&BREATH IA: CPT | Performed by: STUDENT IN AN ORGANIZED HEALTH CARE EDUCATION/TRAINING PROGRAM

## 2024-03-22 PROCEDURE — 80307 DRUG TEST PRSMV CHEM ANLYZR: CPT | Performed by: STUDENT IN AN ORGANIZED HEALTH CARE EDUCATION/TRAINING PROGRAM

## 2024-03-22 PROCEDURE — 96372 THER/PROPH/DIAG INJ SC/IM: CPT | Performed by: STUDENT IN AN ORGANIZED HEALTH CARE EDUCATION/TRAINING PROGRAM

## 2024-03-22 PROCEDURE — 80053 COMPREHEN METABOLIC PANEL: CPT | Performed by: STUDENT IN AN ORGANIZED HEALTH CARE EDUCATION/TRAINING PROGRAM

## 2024-03-22 PROCEDURE — 87635 SARS-COV-2 COVID-19 AMP PRB: CPT | Performed by: STUDENT IN AN ORGANIZED HEALTH CARE EDUCATION/TRAINING PROGRAM

## 2024-03-22 PROCEDURE — 63600175 PHARM REV CODE 636 W HCPCS: Performed by: STUDENT IN AN ORGANIZED HEALTH CARE EDUCATION/TRAINING PROGRAM

## 2024-03-22 PROCEDURE — 81001 URINALYSIS AUTO W/SCOPE: CPT | Mod: XB | Performed by: STUDENT IN AN ORGANIZED HEALTH CARE EDUCATION/TRAINING PROGRAM

## 2024-03-22 RX ORDER — DIPHENHYDRAMINE HYDROCHLORIDE 50 MG/ML
50 INJECTION INTRAMUSCULAR; INTRAVENOUS
Status: COMPLETED | OUTPATIENT
Start: 2024-03-22 | End: 2024-03-22

## 2024-03-22 RX ORDER — OLANZAPINE 10 MG/2ML
10 INJECTION, POWDER, FOR SOLUTION INTRAMUSCULAR
Status: COMPLETED | OUTPATIENT
Start: 2024-03-22 | End: 2024-03-22

## 2024-03-22 RX ADMIN — OLANZAPINE 10 MG: 10 INJECTION, POWDER, FOR SOLUTION INTRAMUSCULAR at 01:03

## 2024-03-22 RX ADMIN — DIPHENHYDRAMINE HYDROCHLORIDE 50 MG: 50 INJECTION INTRAMUSCULAR; INTRAVENOUS at 01:03
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
## 2024-03-28 ENCOUNTER — HOSPITAL ENCOUNTER (EMERGENCY)
Facility: HOSPITAL | Age: 39
Discharge: HOME | End: 2024-03-28
Attending: FAMILY MEDICINE
Payer: COMMERCIAL

## 2024-03-28 VITALS
OXYGEN SATURATION: 98 % | RESPIRATION RATE: 20 BRPM | TEMPERATURE: 97 F | SYSTOLIC BLOOD PRESSURE: 107 MMHG | HEART RATE: 63 BPM | HEIGHT: 69 IN | DIASTOLIC BLOOD PRESSURE: 72 MMHG | BODY MASS INDEX: 18.02 KG/M2 | WEIGHT: 121.69 LBS

## 2024-03-28 DIAGNOSIS — Z13.9 ENCOUNTER FOR MEDICAL SCREENING EXAMINATION: Primary | ICD-10-CM

## 2024-03-28 PROCEDURE — 99282 EMERGENCY DEPT VISIT SF MDM: CPT

## 2024-03-29 NOTE — DISCHARGE INSTRUCTIONS
Report to Emergency Department if symptoms return or worsen; [facility] Within 1 to 2 days, It is important that you follow up with your primary care provider or specialist if indicated for further evaluation, workup, and treatment as necessary. The exam and treatment you received in Emergency Department was for an urgent problem and NOT INTENDED AS COMPLETE CARE. It is important that you FOLLOW UP with a doctor for ongoing care. If your symptoms become WORSE or you DO NOT IMPROVE and you are unable to reach your health care provider, you should RETURN to the Emergency Department. The Emergency Department provider has provided a PRELIMINARY INTERPRETATION of all your studies. A final interpretation may be done after you are discharged. If a change in your diagnosis or treatment is needed WE WILL CONTACT YOU. It is critical that we have a CURRENT PHONE NUMBER FOR YOU.
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
## 2024-04-02 ENCOUNTER — HOSPITAL ENCOUNTER (EMERGENCY)
Facility: HOSPITAL | Age: 39
Discharge: PSYCHIATRIC OR SUBSTANCE USE FACILITY | End: 2024-04-03
Attending: EMERGENCY MEDICINE
Payer: COMMERCIAL

## 2024-04-02 VITALS
RESPIRATION RATE: 20 BRPM | BODY MASS INDEX: 19.26 KG/M2 | HEART RATE: 88 BPM | DIASTOLIC BLOOD PRESSURE: 82 MMHG | TEMPERATURE: 99 F | WEIGHT: 130 LBS | SYSTOLIC BLOOD PRESSURE: 128 MMHG | OXYGEN SATURATION: 98 % | HEIGHT: 69 IN

## 2024-04-02 DIAGNOSIS — F25.9 SCHIZOAFFECTIVE DISORDER, UNSPECIFIED TYPE: Primary | ICD-10-CM

## 2024-04-02 LAB
ALBUMIN SERPL-MCNC: 3.6 G/DL (ref 3.5–5)
ALBUMIN/GLOB SERPL: 1.2 RATIO (ref 1.1–2)
ALP SERPL-CCNC: 81 UNIT/L (ref 40–150)
ALT SERPL-CCNC: 19 UNIT/L (ref 0–55)
AMPHET UR QL SCN: NEGATIVE
APAP SERPL-MCNC: <17.4 UG/ML (ref 17.4–30)
APPEARANCE UR: CLEAR
AST SERPL-CCNC: 22 UNIT/L (ref 5–34)
BACTERIA #/AREA URNS AUTO: ABNORMAL /HPF
BARBITURATE SCN PRESENT UR: NEGATIVE
BASOPHILS # BLD AUTO: 0.07 X10(3)/MCL
BASOPHILS NFR BLD AUTO: 0.6 %
BENZODIAZ UR QL SCN: NEGATIVE
BILIRUB SERPL-MCNC: 0.2 MG/DL
BILIRUB UR QL STRIP.AUTO: ABNORMAL
BUN SERPL-MCNC: 10 MG/DL (ref 8.9–20.6)
CALCIUM SERPL-MCNC: 8.7 MG/DL (ref 8.4–10.2)
CANNABINOIDS UR QL SCN: POSITIVE
CHLORIDE SERPL-SCNC: 110 MMOL/L (ref 98–107)
CO2 SERPL-SCNC: 23 MMOL/L (ref 22–29)
COCAINE UR QL SCN: NEGATIVE
COLOR UR AUTO: ABNORMAL
CREAT SERPL-MCNC: 0.95 MG/DL (ref 0.73–1.18)
EOSINOPHIL # BLD AUTO: 0.37 X10(3)/MCL (ref 0–0.9)
EOSINOPHIL NFR BLD AUTO: 2.9 %
ERYTHROCYTE [DISTWIDTH] IN BLOOD BY AUTOMATED COUNT: 15.8 % (ref 11.5–17)
ETHANOL SERPL-MCNC: <10 MG/DL
FENTANYL UR QL SCN: NEGATIVE
FLUAV AG UPPER RESP QL IA.RAPID: NOT DETECTED
FLUBV AG UPPER RESP QL IA.RAPID: NOT DETECTED
GFR SERPLBLD CREATININE-BSD FMLA CKD-EPI: >60 MLS/MIN/1.73/M2
GLOBULIN SER-MCNC: 3.1 GM/DL (ref 2.4–3.5)
GLUCOSE SERPL-MCNC: 103 MG/DL (ref 74–100)
GLUCOSE UR QL STRIP.AUTO: NEGATIVE
HCT VFR BLD AUTO: 38.6 % (ref 42–52)
HGB BLD-MCNC: 13 G/DL (ref 14–18)
IMM GRANULOCYTES # BLD AUTO: 0.04 X10(3)/MCL (ref 0–0.04)
IMM GRANULOCYTES NFR BLD AUTO: 0.3 %
KETONES UR QL STRIP.AUTO: ABNORMAL
LEUKOCYTE ESTERASE UR QL STRIP.AUTO: NEGATIVE
LYMPHOCYTES # BLD AUTO: 2.93 X10(3)/MCL (ref 0.6–4.6)
LYMPHOCYTES NFR BLD AUTO: 23.3 %
MCH RBC QN AUTO: 28.5 PG (ref 27–31)
MCHC RBC AUTO-ENTMCNC: 33.7 G/DL (ref 33–36)
MCV RBC AUTO: 84.6 FL (ref 80–94)
MDMA UR QL SCN: NEGATIVE
MONOCYTES # BLD AUTO: 0.68 X10(3)/MCL (ref 0.1–1.3)
MONOCYTES NFR BLD AUTO: 5.4 %
MUCOUS THREADS URNS QL MICRO: ABNORMAL /LPF
NEUTROPHILS # BLD AUTO: 8.5 X10(3)/MCL (ref 2.1–9.2)
NEUTROPHILS NFR BLD AUTO: 67.5 %
NITRITE UR QL STRIP.AUTO: NEGATIVE
NRBC BLD AUTO-RTO: 0 %
OPIATES UR QL SCN: NEGATIVE
PCP UR QL: NEGATIVE
PH UR STRIP.AUTO: 5.5 [PH]
PH UR: 5.5 [PH] (ref 3–11)
PLATELET # BLD AUTO: 253 X10(3)/MCL (ref 130–400)
PMV BLD AUTO: 11.1 FL (ref 7.4–10.4)
POTASSIUM SERPL-SCNC: 3.3 MMOL/L (ref 3.5–5.1)
PROT SERPL-MCNC: 6.7 GM/DL (ref 6.4–8.3)
PROT UR QL STRIP.AUTO: ABNORMAL
RBC # BLD AUTO: 4.56 X10(6)/MCL (ref 4.7–6.1)
RBC #/AREA URNS AUTO: ABNORMAL /HPF
RBC UR QL AUTO: NEGATIVE
SARS-COV-2 RNA RESP QL NAA+PROBE: NOT DETECTED
SODIUM SERPL-SCNC: 143 MMOL/L (ref 136–145)
SP GR UR STRIP.AUTO: >=1.03 (ref 1–1.03)
SPECIFIC GRAVITY, URINE AUTO (.000) (OHS): >=1.03 (ref 1–1.03)
SQUAMOUS #/AREA URNS AUTO: ABNORMAL /HPF
TSH SERPL-ACNC: 0.82 UIU/ML (ref 0.35–4.94)
UROBILINOGEN UR STRIP-ACNC: 1
WBC # SPEC AUTO: 12.59 X10(3)/MCL (ref 4.5–11.5)
WBC #/AREA URNS AUTO: ABNORMAL /HPF

## 2024-04-02 PROCEDURE — 85025 COMPLETE CBC W/AUTO DIFF WBC: CPT | Performed by: EMERGENCY MEDICINE

## 2024-04-02 PROCEDURE — 80143 DRUG ASSAY ACETAMINOPHEN: CPT | Performed by: EMERGENCY MEDICINE

## 2024-04-02 PROCEDURE — 81003 URINALYSIS AUTO W/O SCOPE: CPT | Performed by: EMERGENCY MEDICINE

## 2024-04-02 PROCEDURE — 80307 DRUG TEST PRSMV CHEM ANLYZR: CPT | Performed by: EMERGENCY MEDICINE

## 2024-04-02 PROCEDURE — 96372 THER/PROPH/DIAG INJ SC/IM: CPT | Performed by: EMERGENCY MEDICINE

## 2024-04-02 PROCEDURE — 82077 ASSAY SPEC XCP UR&BREATH IA: CPT | Performed by: EMERGENCY MEDICINE

## 2024-04-02 PROCEDURE — 84443 ASSAY THYROID STIM HORMONE: CPT | Performed by: EMERGENCY MEDICINE

## 2024-04-02 PROCEDURE — 99285 EMERGENCY DEPT VISIT HI MDM: CPT | Mod: 25

## 2024-04-02 PROCEDURE — 80053 COMPREHEN METABOLIC PANEL: CPT | Performed by: EMERGENCY MEDICINE

## 2024-04-02 PROCEDURE — 63600175 PHARM REV CODE 636 W HCPCS: Performed by: EMERGENCY MEDICINE

## 2024-04-02 PROCEDURE — 0240U COVID/FLU A&B PCR: CPT | Performed by: EMERGENCY MEDICINE

## 2024-04-02 RX ORDER — LORAZEPAM 2 MG/ML
2 INJECTION INTRAMUSCULAR
Status: COMPLETED | OUTPATIENT
Start: 2024-04-02 | End: 2024-04-02

## 2024-04-02 RX ADMIN — LORAZEPAM 2 MG: 2 INJECTION INTRAMUSCULAR; INTRAVENOUS at 08:04
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
## 2024-04-08 ENCOUNTER — HOSPITAL ENCOUNTER (EMERGENCY)
Facility: HOSPITAL | Age: 39
Discharge: PSYCHIATRIC OR SUBSTANCE USE FACILITY | End: 2024-04-09
Attending: STUDENT IN AN ORGANIZED HEALTH CARE EDUCATION/TRAINING PROGRAM
Payer: COMMERCIAL

## 2024-04-08 VITALS
BODY MASS INDEX: 18.04 KG/M2 | SYSTOLIC BLOOD PRESSURE: 149 MMHG | OXYGEN SATURATION: 100 % | TEMPERATURE: 99 F | RESPIRATION RATE: 18 BRPM | HEART RATE: 110 BPM | DIASTOLIC BLOOD PRESSURE: 90 MMHG | WEIGHT: 126 LBS | HEIGHT: 70 IN

## 2024-04-08 DIAGNOSIS — F23 ACUTE PSYCHOSIS: ICD-10-CM

## 2024-04-08 DIAGNOSIS — Z00.8 MEDICAL CLEARANCE FOR PSYCHIATRIC ADMISSION: Primary | ICD-10-CM

## 2024-04-08 LAB
ALBUMIN SERPL-MCNC: 4.4 G/DL (ref 3.5–5)
ALBUMIN/GLOB SERPL: 1.2 RATIO (ref 1.1–2)
ALP SERPL-CCNC: 86 UNIT/L (ref 40–150)
ALT SERPL-CCNC: 31 UNIT/L (ref 0–55)
AMPHET UR QL SCN: NEGATIVE
APAP SERPL-MCNC: <17.4 UG/ML (ref 17.4–30)
APPEARANCE UR: CLEAR
AST SERPL-CCNC: 24 UNIT/L (ref 5–34)
BACTERIA #/AREA URNS AUTO: ABNORMAL /HPF
BARBITURATE SCN PRESENT UR: NEGATIVE
BASOPHILS # BLD AUTO: 0.09 X10(3)/MCL
BASOPHILS NFR BLD AUTO: 1.2 %
BENZODIAZ UR QL SCN: NEGATIVE
BILIRUB SERPL-MCNC: 0.3 MG/DL
BILIRUB UR QL STRIP.AUTO: NEGATIVE
BUN SERPL-MCNC: 10.3 MG/DL (ref 8.9–20.6)
CALCIUM SERPL-MCNC: 10.4 MG/DL (ref 8.4–10.2)
CANNABINOIDS UR QL SCN: POSITIVE
CHLORIDE SERPL-SCNC: 106 MMOL/L (ref 98–107)
CO2 SERPL-SCNC: 21 MMOL/L (ref 22–29)
COCAINE UR QL SCN: NEGATIVE
COLOR UR AUTO: YELLOW
CREAT SERPL-MCNC: 1.37 MG/DL (ref 0.73–1.18)
EOSINOPHIL # BLD AUTO: 0.19 X10(3)/MCL (ref 0–0.9)
EOSINOPHIL NFR BLD AUTO: 2.5 %
ERYTHROCYTE [DISTWIDTH] IN BLOOD BY AUTOMATED COUNT: 15.5 % (ref 11.5–17)
ETHANOL SERPL-MCNC: <10 MG/DL
FENTANYL UR QL SCN: NEGATIVE
GFR SERPLBLD CREATININE-BSD FMLA CKD-EPI: >60 MLS/MIN/1.73/M2
GLOBULIN SER-MCNC: 3.7 GM/DL (ref 2.4–3.5)
GLUCOSE SERPL-MCNC: 97 MG/DL (ref 74–100)
GLUCOSE UR QL STRIP.AUTO: NEGATIVE
HCT VFR BLD AUTO: 44.4 % (ref 42–52)
HGB BLD-MCNC: 15.1 G/DL (ref 14–18)
HYALINE CASTS #/AREA URNS LPF: ABNORMAL /LPF
IMM GRANULOCYTES # BLD AUTO: 0.02 X10(3)/MCL (ref 0–0.04)
IMM GRANULOCYTES NFR BLD AUTO: 0.3 %
KETONES UR QL STRIP.AUTO: NEGATIVE
LEUKOCYTE ESTERASE UR QL STRIP.AUTO: 25
LYMPHOCYTES # BLD AUTO: 2.55 X10(3)/MCL (ref 0.6–4.6)
LYMPHOCYTES NFR BLD AUTO: 33.5 %
MCH RBC QN AUTO: 28.9 PG (ref 27–31)
MCHC RBC AUTO-ENTMCNC: 34 G/DL (ref 33–36)
MCV RBC AUTO: 85.1 FL (ref 80–94)
MDMA UR QL SCN: NEGATIVE
MONOCYTES # BLD AUTO: 0.67 X10(3)/MCL (ref 0.1–1.3)
MONOCYTES NFR BLD AUTO: 8.8 %
MUCOUS THREADS URNS QL MICRO: ABNORMAL /LPF
NEUTROPHILS # BLD AUTO: 4.1 X10(3)/MCL (ref 2.1–9.2)
NEUTROPHILS NFR BLD AUTO: 53.7 %
NITRITE UR QL STRIP.AUTO: NEGATIVE
NRBC BLD AUTO-RTO: 0 %
OPIATES UR QL SCN: NEGATIVE
PCP UR QL: NEGATIVE
PH UR STRIP.AUTO: 5.5 [PH]
PH UR: 5.5 [PH] (ref 3–11)
PLATELET # BLD AUTO: 325 X10(3)/MCL (ref 130–400)
PMV BLD AUTO: 10 FL (ref 7.4–10.4)
POTASSIUM SERPL-SCNC: 4.1 MMOL/L (ref 3.5–5.1)
PROT SERPL-MCNC: 8.1 GM/DL (ref 6.4–8.3)
PROT UR QL STRIP.AUTO: ABNORMAL
RBC # BLD AUTO: 5.22 X10(6)/MCL (ref 4.7–6.1)
RBC #/AREA URNS AUTO: ABNORMAL /HPF
RBC UR QL AUTO: NEGATIVE
SARS-COV-2 RDRP RESP QL NAA+PROBE: NEGATIVE
SODIUM SERPL-SCNC: 139 MMOL/L (ref 136–145)
SP GR UR STRIP.AUTO: 1.01 (ref 1–1.03)
SPECIFIC GRAVITY, URINE AUTO (.000) (OHS): 1.01 (ref 1–1.03)
SQUAMOUS #/AREA URNS LPF: ABNORMAL /HPF
TSH SERPL-ACNC: 0.86 UIU/ML (ref 0.35–4.94)
UROBILINOGEN UR STRIP-ACNC: NORMAL
WBC # SPEC AUTO: 7.62 X10(3)/MCL (ref 4.5–11.5)
WBC #/AREA URNS AUTO: ABNORMAL /HPF

## 2024-04-08 PROCEDURE — 84443 ASSAY THYROID STIM HORMONE: CPT | Performed by: STUDENT IN AN ORGANIZED HEALTH CARE EDUCATION/TRAINING PROGRAM

## 2024-04-08 PROCEDURE — 80143 DRUG ASSAY ACETAMINOPHEN: CPT | Performed by: STUDENT IN AN ORGANIZED HEALTH CARE EDUCATION/TRAINING PROGRAM

## 2024-04-08 PROCEDURE — 80053 COMPREHEN METABOLIC PANEL: CPT | Performed by: STUDENT IN AN ORGANIZED HEALTH CARE EDUCATION/TRAINING PROGRAM

## 2024-04-08 PROCEDURE — 63600175 PHARM REV CODE 636 W HCPCS: Mod: JZ,JG | Performed by: STUDENT IN AN ORGANIZED HEALTH CARE EDUCATION/TRAINING PROGRAM

## 2024-04-08 PROCEDURE — 96372 THER/PROPH/DIAG INJ SC/IM: CPT | Performed by: STUDENT IN AN ORGANIZED HEALTH CARE EDUCATION/TRAINING PROGRAM

## 2024-04-08 PROCEDURE — 82077 ASSAY SPEC XCP UR&BREATH IA: CPT | Performed by: STUDENT IN AN ORGANIZED HEALTH CARE EDUCATION/TRAINING PROGRAM

## 2024-04-08 PROCEDURE — 80307 DRUG TEST PRSMV CHEM ANLYZR: CPT | Performed by: STUDENT IN AN ORGANIZED HEALTH CARE EDUCATION/TRAINING PROGRAM

## 2024-04-08 PROCEDURE — 81001 URINALYSIS AUTO W/SCOPE: CPT | Mod: XB | Performed by: STUDENT IN AN ORGANIZED HEALTH CARE EDUCATION/TRAINING PROGRAM

## 2024-04-08 PROCEDURE — 87635 SARS-COV-2 COVID-19 AMP PRB: CPT | Performed by: STUDENT IN AN ORGANIZED HEALTH CARE EDUCATION/TRAINING PROGRAM

## 2024-04-08 PROCEDURE — 99285 EMERGENCY DEPT VISIT HI MDM: CPT | Mod: 25

## 2024-04-08 PROCEDURE — 85025 COMPLETE CBC W/AUTO DIFF WBC: CPT | Performed by: STUDENT IN AN ORGANIZED HEALTH CARE EDUCATION/TRAINING PROGRAM

## 2024-04-08 RX ORDER — MIDAZOLAM HYDROCHLORIDE 2 MG/2ML
2 INJECTION, SOLUTION INTRAMUSCULAR; INTRAVENOUS
Status: DISCONTINUED | OUTPATIENT
Start: 2024-04-08 | End: 2024-04-09 | Stop reason: HOSPADM

## 2024-04-08 RX ORDER — DIPHENHYDRAMINE HYDROCHLORIDE 50 MG/ML
50 INJECTION INTRAMUSCULAR; INTRAVENOUS
Status: COMPLETED | OUTPATIENT
Start: 2024-04-08 | End: 2024-04-08

## 2024-04-08 RX ORDER — OLANZAPINE 10 MG/2ML
10 INJECTION, POWDER, FOR SOLUTION INTRAMUSCULAR
Status: COMPLETED | OUTPATIENT
Start: 2024-04-08 | End: 2024-04-08

## 2024-04-08 RX ADMIN — OLANZAPINE 10 MG: 10 INJECTION, POWDER, FOR SOLUTION INTRAMUSCULAR at 04:04

## 2024-04-08 RX ADMIN — DIPHENHYDRAMINE HYDROCHLORIDE 50 MG: 50 INJECTION INTRAMUSCULAR; INTRAVENOUS at 04:04
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
## 2024-04-19 ENCOUNTER — HOSPITAL ENCOUNTER (EMERGENCY)
Facility: HOSPITAL | Age: 39
Discharge: PSYCHIATRIC OR SUBSTANCE USE FACILITY | End: 2024-04-19
Attending: EMERGENCY MEDICINE
Payer: COMMERCIAL

## 2024-04-19 VITALS
WEIGHT: 130 LBS | BODY MASS INDEX: 18.61 KG/M2 | HEIGHT: 70 IN | HEART RATE: 86 BPM | RESPIRATION RATE: 18 BRPM | DIASTOLIC BLOOD PRESSURE: 62 MMHG | OXYGEN SATURATION: 100 % | TEMPERATURE: 98 F | SYSTOLIC BLOOD PRESSURE: 123 MMHG

## 2024-04-19 DIAGNOSIS — R44.3 HALLUCINATIONS: Primary | ICD-10-CM

## 2024-04-19 DIAGNOSIS — Z00.8 MEDICAL CLEARANCE FOR PSYCHIATRIC ADMISSION: ICD-10-CM

## 2024-04-19 LAB
ALBUMIN SERPL-MCNC: 3.9 G/DL (ref 3.5–5)
ALBUMIN/GLOB SERPL: 1.2 RATIO (ref 1.1–2)
ALP SERPL-CCNC: 80 UNIT/L (ref 40–150)
ALT SERPL-CCNC: 20 UNIT/L (ref 0–55)
AMPHET UR QL SCN: NEGATIVE
APAP SERPL-MCNC: <17.4 UG/ML (ref 17.4–30)
APPEARANCE UR: CLEAR
AST SERPL-CCNC: 24 UNIT/L (ref 5–34)
BACTERIA #/AREA URNS AUTO: NORMAL /HPF
BARBITURATE SCN PRESENT UR: NEGATIVE
BASOPHILS # BLD AUTO: 0.03 X10(3)/MCL
BASOPHILS NFR BLD AUTO: 0.3 %
BENZODIAZ UR QL SCN: NEGATIVE
BILIRUB SERPL-MCNC: 0.3 MG/DL
BILIRUB UR QL STRIP.AUTO: NEGATIVE
BUN SERPL-MCNC: 7.9 MG/DL (ref 8.9–20.6)
CALCIUM SERPL-MCNC: 9.3 MG/DL (ref 8.4–10.2)
CANNABINOIDS UR QL SCN: POSITIVE
CHLORIDE SERPL-SCNC: 108 MMOL/L (ref 98–107)
CO2 SERPL-SCNC: 26 MMOL/L (ref 22–29)
COCAINE UR QL SCN: NEGATIVE
COLOR UR AUTO: ABNORMAL
CREAT SERPL-MCNC: 0.72 MG/DL (ref 0.73–1.18)
EOSINOPHIL # BLD AUTO: 0.32 X10(3)/MCL (ref 0–0.9)
EOSINOPHIL NFR BLD AUTO: 3 %
ERYTHROCYTE [DISTWIDTH] IN BLOOD BY AUTOMATED COUNT: 15.9 % (ref 11.5–17)
ETHANOL SERPL-MCNC: <10 MG/DL
FENTANYL UR QL SCN: NEGATIVE
FLUAV AG UPPER RESP QL IA.RAPID: NOT DETECTED
FLUBV AG UPPER RESP QL IA.RAPID: NOT DETECTED
GFR SERPLBLD CREATININE-BSD FMLA CKD-EPI: >60 MLS/MIN/1.73/M2
GLOBULIN SER-MCNC: 3.2 GM/DL (ref 2.4–3.5)
GLUCOSE SERPL-MCNC: 86 MG/DL (ref 74–100)
GLUCOSE UR QL STRIP.AUTO: NEGATIVE
HCT VFR BLD AUTO: 38.7 % (ref 42–52)
HGB BLD-MCNC: 13 G/DL (ref 14–18)
IMM GRANULOCYTES # BLD AUTO: 0.03 X10(3)/MCL (ref 0–0.04)
IMM GRANULOCYTES NFR BLD AUTO: 0.3 %
KETONES UR QL STRIP.AUTO: NEGATIVE
LEUKOCYTE ESTERASE UR QL STRIP.AUTO: ABNORMAL
LITHIUM SERPL-MCNC: <0.1 MMOL/L (ref 0.5–1.2)
LYMPHOCYTES # BLD AUTO: 2.27 X10(3)/MCL (ref 0.6–4.6)
LYMPHOCYTES NFR BLD AUTO: 21.2 %
MCH RBC QN AUTO: 29 PG (ref 27–31)
MCHC RBC AUTO-ENTMCNC: 33.6 G/DL (ref 33–36)
MCV RBC AUTO: 86.4 FL (ref 80–94)
MDMA UR QL SCN: NEGATIVE
MONOCYTES # BLD AUTO: 0.86 X10(3)/MCL (ref 0.1–1.3)
MONOCYTES NFR BLD AUTO: 8 %
NEUTROPHILS # BLD AUTO: 7.22 X10(3)/MCL (ref 2.1–9.2)
NEUTROPHILS NFR BLD AUTO: 67.2 %
NITRITE UR QL STRIP.AUTO: NEGATIVE
NRBC BLD AUTO-RTO: 0 %
OHS QRS DURATION: 84 MS
OHS QTC CALCULATION: 423 MS
OPIATES UR QL SCN: NEGATIVE
PCP UR QL: NEGATIVE
PH UR STRIP.AUTO: 6.5 [PH]
PH UR: 6.5 [PH] (ref 3–11)
PLATELET # BLD AUTO: 202 X10(3)/MCL (ref 130–400)
PMV BLD AUTO: 10.6 FL (ref 7.4–10.4)
POTASSIUM SERPL-SCNC: 3.9 MMOL/L (ref 3.5–5.1)
PROT SERPL-MCNC: 7.1 GM/DL (ref 6.4–8.3)
PROT UR QL STRIP.AUTO: NEGATIVE
RBC # BLD AUTO: 4.48 X10(6)/MCL (ref 4.7–6.1)
RBC #/AREA URNS AUTO: NORMAL /HPF
RBC UR QL AUTO: NEGATIVE
SALICYLATES SERPL-MCNC: <5 MG/DL (ref 15–30)
SARS-COV-2 RNA RESP QL NAA+PROBE: NOT DETECTED
SODIUM SERPL-SCNC: 142 MMOL/L (ref 136–145)
SP GR UR STRIP.AUTO: 1.01 (ref 1–1.03)
SPECIFIC GRAVITY, URINE AUTO (.000) (OHS): 1.01 (ref 1–1.03)
SQUAMOUS #/AREA URNS AUTO: NORMAL /HPF
TSH SERPL-ACNC: 0.81 UIU/ML (ref 0.35–4.94)
UROBILINOGEN UR STRIP-ACNC: 0.2
VALPROATE SERPL-MCNC: 39.8 UG/ML (ref 50–100)
WBC # SPEC AUTO: 10.73 X10(3)/MCL (ref 4.5–11.5)
WBC #/AREA URNS AUTO: NORMAL /HPF

## 2024-04-19 PROCEDURE — 80179 DRUG ASSAY SALICYLATE: CPT | Performed by: EMERGENCY MEDICINE

## 2024-04-19 PROCEDURE — 0240U COVID/FLU A&B PCR: CPT | Performed by: EMERGENCY MEDICINE

## 2024-04-19 PROCEDURE — 93005 ELECTROCARDIOGRAM TRACING: CPT

## 2024-04-19 PROCEDURE — 81001 URINALYSIS AUTO W/SCOPE: CPT | Mod: XB | Performed by: EMERGENCY MEDICINE

## 2024-04-19 PROCEDURE — 85025 COMPLETE CBC W/AUTO DIFF WBC: CPT | Performed by: EMERGENCY MEDICINE

## 2024-04-19 PROCEDURE — 80307 DRUG TEST PRSMV CHEM ANLYZR: CPT | Performed by: EMERGENCY MEDICINE

## 2024-04-19 PROCEDURE — 84443 ASSAY THYROID STIM HORMONE: CPT | Performed by: EMERGENCY MEDICINE

## 2024-04-19 PROCEDURE — 80143 DRUG ASSAY ACETAMINOPHEN: CPT | Performed by: EMERGENCY MEDICINE

## 2024-04-19 PROCEDURE — 80164 ASSAY DIPROPYLACETIC ACD TOT: CPT | Performed by: EMERGENCY MEDICINE

## 2024-04-19 PROCEDURE — 82077 ASSAY SPEC XCP UR&BREATH IA: CPT | Performed by: EMERGENCY MEDICINE

## 2024-04-19 PROCEDURE — 99285 EMERGENCY DEPT VISIT HI MDM: CPT | Mod: 25

## 2024-04-19 PROCEDURE — 80053 COMPREHEN METABOLIC PANEL: CPT | Performed by: EMERGENCY MEDICINE

## 2024-04-19 PROCEDURE — 80178 ASSAY OF LITHIUM: CPT | Performed by: EMERGENCY MEDICINE

## 2024-04-19 RX ORDER — LORAZEPAM 1 MG/1
1 TABLET ORAL EVERY 4 HOURS PRN
Status: DISCONTINUED | OUTPATIENT
Start: 2024-04-19 | End: 2024-04-19 | Stop reason: HOSPADM

## 2024-04-19 NOTE — ED PROVIDER NOTES
"Encounter Date: 4/19/2024       History     Chief Complaint   Patient presents with    Psychiatric Evaluation     Pt to er c/o having depression and hallucinations. Denies S.I. or H.I.     Patient is a 39 yo M who presents for psychiatric clearance. He has hx of schizoaffective disorder with hyper religiosity and has required multiple admissions in the past. He states that he has been having hallucinations again, seeing demons in people and they are making him angry and he is afraid he will attack someone. He does not wish to harm any one specifically. He denies any SI. He reports compliance with his medications. Review of his latest medications appears that he is on lithium, divalproex, and trileptal. He denies any chest pain, sob, leg swelling, abdominal pain or other complaints.         Review of patient's allergies indicates:   Allergen Reactions    Haloperidol Swelling     Muscle stiffness  Has muscle spasms      Paroxetine Swelling, Other (See Comments) and Rash     "i don't remember"  "i don't remember"      Pineapple      Face swells up  Face swells up      Ziprasidone Other (See Comments)     Muscle twitching     Past Medical History:   Diagnosis Date    Anxiety     Depression     Hallucination     History of psychiatric hospitalization     Hx of psychiatric care     Psychiatric problem     Psychosis     Schizoaffective disorder     Sleep difficulties     Suicide attempt     Therapy      No past surgical history on file.  No family history on file.  Social History     Tobacco Use    Smoking status: Every Day     Current packs/day: 1.00     Types: Cigarettes    Smokeless tobacco: Never   Substance Use Topics    Alcohol use: Yes     Alcohol/week: 4.0 standard drinks of alcohol     Types: 4 Cans of beer per week     Comment: uses alcohol monthly    Drug use: Yes     Types: Marijuana     Review of Systems   Constitutional:  Negative for fever.   HENT:  Negative for sore throat.    Respiratory:  Negative for " shortness of breath.    Cardiovascular:  Negative for chest pain.   Gastrointestinal:  Negative for nausea.   Genitourinary:  Negative for dysuria.   Musculoskeletal:  Negative for back pain.   Skin:  Negative for rash.   Neurological:  Negative for weakness.   Hematological:  Does not bruise/bleed easily.   Psychiatric/Behavioral:  Positive for hallucinations.        Physical Exam     Initial Vitals [04/19/24 1202]   BP Pulse Resp Temp SpO2   107/71 96 20 98.6 °F (37 °C) 100 %      MAP       --         Physical Exam    Nursing note and vitals reviewed.  Constitutional: He appears well-developed and well-nourished. He is not diaphoretic. No distress.   HENT:   Head: Normocephalic and atraumatic.   Eyes: Conjunctivae are normal.   Neck: Neck supple.   Cardiovascular:  Normal rate, regular rhythm and normal heart sounds.           Pulmonary/Chest: Breath sounds normal. No respiratory distress. He has no wheezes. He has no rhonchi.   Abdominal: Abdomen is soft. Bowel sounds are normal. He exhibits no distension. There is no abdominal tenderness. There is no rebound and no guarding.   Musculoskeletal:         General: No edema. Normal range of motion.      Cervical back: Neck supple.     Neurological: He is alert and oriented to person, place, and time.   Skin: Skin is warm and dry.   Psychiatric:   Patient midlly agitated, abnormal thought content, hyper-Advent          ED Course   Procedures  Labs Reviewed   COMPREHENSIVE METABOLIC PANEL - Abnormal; Notable for the following components:       Result Value    Chloride 108 (*)     Blood Urea Nitrogen 7.9 (*)     Creatinine 0.72 (*)     All other components within normal limits   URINALYSIS, REFLEX TO URINE CULTURE - Abnormal; Notable for the following components:    Leukocyte Esterase, UA Trace (*)     All other components within normal limits   DRUG SCREEN, URINE (BEAKER) - Abnormal; Notable for the following components:    Cannabinoids, Urine Positive (*)     All  other components within normal limits    Narrative:     Cut off concentrations:    Amphetamines - 1000 ng/ml  Barbiturates - 200 ng/ml  Benzodiazepine - 200 ng/ml  Cannabinoids (THC) - 50 ng/ml  Cocaine - 300 ng/ml  Fentanyl - 1.0 ng/ml  MDMA - 500 ng/ml  Opiates - 300 ng/ml   Phencyclidine (PCP) - 25 ng/ml    Specimen submitted for drug analysis and tested for pH and specific gravity in order to evaluate sample integrity. Suspect tampering if specific gravity is <1.003 and/or pH is not within the range of 4.5 - 8.0  False negatives may result form substances such as bleach added to urine.  False positives may result for the presence of a substance with similar chemical structure to the drug or its metabolite.    This test provides only a PRELIMINARY analytical test result. A more specific alternate chemical method must be used in order to obtain a confirmed analytical result. Gas chromatography/mass spectrometry (GC/MS) is the preferred confirmatory method. Other chemical confirmation methods are available. Clinical consideration and professional judgement should be applied to any drug of abuse test result, particularly when preliminary positive results are used.    Positive results will be confirmed only at the physicians request. Unconfirmed screening results are to be used only for medical purposes (treatment).        ACETAMINOPHEN LEVEL - Abnormal; Notable for the following components:    Acetaminophen Level <17.4 (*)     All other components within normal limits   VALPROIC ACID - Abnormal; Notable for the following components:    Valproic Acid Level 39.8 (*)     All other components within normal limits   SALICYLATE LEVEL - Abnormal; Notable for the following components:    Salicylate Level <5.0 (*)     All other components within normal limits   CBC WITH DIFFERENTIAL - Abnormal; Notable for the following components:    RBC 4.48 (*)     Hgb 13.0 (*)     Hct 38.7 (*)     MPV 10.6 (*)     All other components  within normal limits   TSH - Normal   ALCOHOL,MEDICAL (ETHANOL) - Normal   COVID/FLU A&B PCR - Normal    Narrative:     The Xpert Xpress SARS-CoV-2/FLU/RSV plus is a rapid, multiplexed real-time PCR test intended for the simultaneous qualitative detection and differentiation of SARS-CoV-2, Influenza A, Influenza B, and respiratory syncytial virus (RSV) viral RNA in either nasopharyngeal swab or nasal swab specimens.         URINALYSIS, MICROSCOPIC - Normal   CBC W/ AUTO DIFFERENTIAL    Narrative:     The following orders were created for panel order CBC auto differential.  Procedure                               Abnormality         Status                     ---------                               -----------         ------                     CBC with Differential[[id number]]       Abnormal            Final result                 Please view results for these tests on the individual orders.   LITHIUM LEVEL     EKG Readings: (Independently Interpreted)   EKG Interpretation 12:22 PM    Rate: 86  Rhythm: NSR  QRS: WNL  Qtc: WNL  No signs of ischemia  Benign early repol         Imaging Results    None          Medications   LORazepam tablet 1 mg (has no administration in time range)     Medical Decision Making  Amount and/or Complexity of Data Reviewed  Labs: ordered.    Risk  Prescription drug management.               ED Course as of 04/19/24 1452   Fri Apr 19, 2024   1439 Initially was told by lab that the lithium level as sent at 1pm.  However the sample had not started running; called lab back and was told sample had not been sent off and they were waiting on  at 530pm. At this time it is unclear when result will return. Patient's other medications have been subtherapeutic. He does not exhibit any signs to suggest lithium toxicity such as GI complaints, tremors. He denies any SI or attempt to harm self. Will clear for placement and follow up on Lithium level.  [GM]      ED Course User Index  [GM] Jovany  Lexis PEREIRA MD       Medically cleared for psychiatry placement: 4/19/2024  2:46 PM                   Clinical Impression:  Final diagnoses:  [Z00.8] Medical clearance for psychiatric admission  [R44.3] Hallucinations (Primary)          ED Disposition Condition    Transfer to Psych Facility Stable          ED Prescriptions    None       Follow-up Information    None          Lexis Manzo MD  04/19/24 7561

## 2024-05-14 ENCOUNTER — HOSPITAL ENCOUNTER (EMERGENCY)
Facility: HOSPITAL | Age: 39
Discharge: HOME | End: 2024-05-14
Attending: FAMILY MEDICINE
Payer: COMMERCIAL

## 2024-05-14 VITALS
HEIGHT: 70 IN | SYSTOLIC BLOOD PRESSURE: 114 MMHG | OXYGEN SATURATION: 98 % | HEART RATE: 68 BPM | DIASTOLIC BLOOD PRESSURE: 68 MMHG | BODY MASS INDEX: 20.76 KG/M2 | TEMPERATURE: 99 F | WEIGHT: 145 LBS | RESPIRATION RATE: 18 BRPM

## 2024-05-14 DIAGNOSIS — Z13.9 ENCOUNTER FOR MEDICAL SCREENING EXAMINATION: Primary | ICD-10-CM

## 2024-05-14 PROCEDURE — 99282 EMERGENCY DEPT VISIT SF MDM: CPT

## 2024-05-14 NOTE — ED PROVIDER NOTES
"Encounter Date: 5/14/2024       History     Chief Complaint   Patient presents with    Toxic Inhalation     Pt. Sprayed Lysol in to his own mouth. Original variety     Elvis Puentes is a 38 y.o. male who presents to the ED with complaints of spraying lysol in his mouth x 3 times because the bottle says it kills 99.9% of bacteria and he was just "trying to get clean." He presents tonight with his mother. His only complaint is a bad taste in his mouth. He denies chest pain, shortness of breath, nausea, vomiting, diarrhea.       The history is provided by the patient. No  was used.     Review of patient's allergies indicates:   Allergen Reactions    Haloperidol Swelling     Muscle stiffness  Has muscle spasms      Paroxetine Swelling, Other (See Comments) and Rash     "i don't remember"  "i don't remember"      Pineapple      Face swells up  Face swells up      Ziprasidone Other (See Comments)     Muscle twitching     Past Medical History:   Diagnosis Date    Anxiety     Depression     Hallucination     History of psychiatric hospitalization     Hx of psychiatric care     Psychiatric problem     Psychosis     Schizoaffective disorder     Sleep difficulties     Suicide attempt     Therapy      History reviewed. No pertinent surgical history.  No family history on file.  Social History     Tobacco Use    Smoking status: Every Day     Current packs/day: 1.00     Types: Cigarettes    Smokeless tobacco: Never   Substance Use Topics    Alcohol use: Yes     Alcohol/week: 4.0 standard drinks of alcohol     Types: 4 Cans of beer per week     Comment: uses alcohol monthly    Drug use: Yes     Types: Marijuana     Review of Systems   Constitutional:  Negative for chills, fatigue and fever.   HENT:  Negative for congestion, ear pain, sinus pain and sore throat.    Eyes:  Negative for pain.   Respiratory:  Negative for cough, chest tightness and shortness of breath.    Cardiovascular:  Negative for chest " "pain.   Gastrointestinal:  Negative for abdominal pain, constipation, diarrhea, nausea and vomiting.   Genitourinary:  Negative for dysuria.   Musculoskeletal:  Negative for back pain and joint swelling.   Skin:  Negative for color change and rash.   Neurological:  Negative for dizziness and weakness.   Psychiatric/Behavioral:  Negative for behavioral problems and confusion.        Physical Exam     Initial Vitals [05/14/24 0019]   BP Pulse Resp Temp SpO2   114/68 68 18 98.6 °F (37 °C) 98 %      MAP       --         Physical Exam    Nursing note and vitals reviewed.  Constitutional: He appears well-developed and well-nourished.   HENT:   Head: Normocephalic and atraumatic.   Nose: Nose normal.   Eyes: Conjunctivae and EOM are normal. Pupils are equal, round, and reactive to light.   Neck: Neck supple. No JVD present.   Normal range of motion.  Cardiovascular:  Normal rate, regular rhythm, normal heart sounds and intact distal pulses.           Pulmonary/Chest: Breath sounds normal. No respiratory distress. He has no wheezes. He has no rhonchi. He has no rales.   Abdominal: Abdomen is soft. Bowel sounds are normal. There is no abdominal tenderness.   Musculoskeletal:         General: No tenderness. Normal range of motion.      Cervical back: Normal range of motion and neck supple.     Lymphadenopathy:     He has no cervical adenopathy.   Neurological: He is alert and oriented to person, place, and time.   Skin: Skin is warm. Capillary refill takes less than 2 seconds.   Psychiatric: He has a normal mood and affect. Thought content normal.         ED Course   Procedures  Labs Reviewed - No data to display       Imaging Results    None          Medications - No data to display  Medical Decision Making  Elvis Puentes is a 38 y.o. male who presents to the ED with complaints of spraying lysol in his mouth x 3 times because the bottle says it kills 99.9% of bacteria and he was just "trying to get clean." He presents " tonight with his mother. His only complaint is a bad taste in his mouth. He denies chest pain, shortness of breath, nausea, vomiting, diarrhea.     Hospital Course: Patient has no complaints. Vitals stable. Requesting a cup of coffee and discharge. I have instructed him to not spray the Lysol in his mouth anymore, and to only spray in the air and on surfaces. He verbalized understanding. Stable for discharge.                                       Clinical Impression:  Final diagnoses:  [Z13.9] Encounter for medical screening examination (Primary)          ED Disposition Condition    Discharge Stable          ED Prescriptions    None       Follow-up Information       Follow up With Specialties Details Why Contact Info    [facility]  In 1 week  [facility] [facility] [id number]    [facility] Emergency Medicine In 3 days As needed, If symptoms worsen [facility] [facility] [id number] [phone]             Charisse Mascorro PA-C  05/14/24 0036

## 2024-06-09 ENCOUNTER — HOSPITAL ENCOUNTER (EMERGENCY)
Facility: HOSPITAL | Age: 39
Discharge: HOME | End: 2024-06-09
Attending: STUDENT IN AN ORGANIZED HEALTH CARE EDUCATION/TRAINING PROGRAM
Payer: COMMERCIAL

## 2024-06-09 VITALS
WEIGHT: 154.56 LBS | DIASTOLIC BLOOD PRESSURE: 65 MMHG | HEART RATE: 60 BPM | BODY MASS INDEX: 22.13 KG/M2 | SYSTOLIC BLOOD PRESSURE: 107 MMHG | OXYGEN SATURATION: 100 % | TEMPERATURE: 98 F | HEIGHT: 70 IN | RESPIRATION RATE: 20 BRPM

## 2024-06-09 DIAGNOSIS — Z76.0 ENCOUNTER FOR MEDICATION REFILL: Primary | ICD-10-CM

## 2024-06-09 PROCEDURE — 99281 EMR DPT VST MAYX REQ PHY/QHP: CPT

## 2024-06-09 RX ORDER — CHLORPROMAZINE HYDROCHLORIDE 25 MG/1
25 TABLET, FILM COATED ORAL 3 TIMES DAILY
COMMUNITY
Start: 2024-04-30 | End: 2024-06-09

## 2024-06-09 RX ORDER — GUANFACINE 1 MG/1
1 TABLET ORAL 2 TIMES DAILY
Qty: 60 TABLET | Refills: 0 | Status: SHIPPED | OUTPATIENT
Start: 2024-06-09 | End: 2025-06-09

## 2024-06-09 RX ORDER — TRAZODONE HYDROCHLORIDE 50 MG/1
50 TABLET ORAL NIGHTLY
Qty: 30 TABLET | Refills: 0 | Status: SHIPPED | OUTPATIENT
Start: 2024-06-09 | End: 2025-06-09

## 2024-06-09 RX ORDER — HALOPERIDOL 10 MG/1
10 TABLET ORAL 2 TIMES DAILY
COMMUNITY
Start: 2024-04-30 | End: 2024-06-09

## 2024-06-09 RX ORDER — LITHIUM CARBONATE 300 MG/1
300 CAPSULE ORAL 2 TIMES DAILY
Qty: 60 CAPSULE | Refills: 0 | Status: SHIPPED | OUTPATIENT
Start: 2024-06-09

## 2024-06-09 RX ORDER — HALOPERIDOL 10 MG/1
10 TABLET ORAL 2 TIMES DAILY
Qty: 60 TABLET | Refills: 0 | Status: SHIPPED | OUTPATIENT
Start: 2024-06-09

## 2024-06-09 RX ORDER — CHLORPROMAZINE HYDROCHLORIDE 25 MG/1
25 TABLET, FILM COATED ORAL 3 TIMES DAILY
Qty: 90 TABLET | Refills: 0 | Status: SHIPPED | OUTPATIENT
Start: 2024-06-09

## 2024-06-09 NOTE — ED PROVIDER NOTES
"Encounter Date: 6/9/2024       History     Chief Complaint   Patient presents with    Medication Refill     Needs med refills and also pcp referral     Patient presents to the emergency department requesting a medication refill.  He states he is out of his psychiatric medications.  He is appropriate, does not appear to be acutely psychotic.    The history is provided by the patient.     Review of patient's allergies indicates:   Allergen Reactions    Haloperidol Swelling     Muscle stiffness  Has muscle spasms      Paroxetine Swelling, Other (See Comments) and Rash     "i don't remember"  "i don't remember"      Pineapple      Face swells up  Face swells up      Ziprasidone Other (See Comments)     Muscle twitching     Past Medical History:   Diagnosis Date    Anxiety     Depression     Hallucination     History of psychiatric hospitalization     Hx of psychiatric care     Psychiatric problem     Psychosis     Schizoaffective disorder     Sleep difficulties     Suicide attempt     Therapy      History reviewed. No pertinent surgical history.  No family history on file.  Social History     Tobacco Use    Smoking status: Every Day     Current packs/day: 1.00     Types: Cigarettes    Smokeless tobacco: Never   Substance Use Topics    Alcohol use: Yes     Alcohol/week: 4.0 standard drinks of alcohol     Types: 4 Cans of beer per week     Comment: uses alcohol monthly    Drug use: Not Currently     Types: Marijuana     Review of Systems   Constitutional:  Negative for chills and fever.   HENT:  Negative for congestion and sore throat.    Respiratory:  Negative for cough and shortness of breath.    Cardiovascular:  Negative for chest pain and palpitations.   Gastrointestinal:  Negative for abdominal pain and nausea.   Genitourinary:  Negative for dysuria and hematuria.   Musculoskeletal:  Negative for arthralgias and myalgias.   Neurological:  Negative for dizziness and weakness.       Physical Exam     Initial Vitals " [06/09/24 1145]   BP Pulse Resp Temp SpO2   107/65 60 20 98.4 °F (36.9 °C) 100 %      MAP       --         Physical Exam    Nursing note and vitals reviewed.  Constitutional: He appears well-developed and well-nourished.   HENT:   Head: Normocephalic and atraumatic.   Eyes: Conjunctivae are normal. Pupils are equal, round, and reactive to light.   Neck: Neck supple.   Normal range of motion.  Cardiovascular:  Normal rate and regular rhythm.           Pulmonary/Chest: Breath sounds normal. No respiratory distress.   Abdominal: Abdomen is soft. There is no abdominal tenderness.   Musculoskeletal:         General: No edema. Normal range of motion.      Cervical back: Normal range of motion and neck supple.     Neurological: He is alert and oriented to person, place, and time.   Skin: Skin is warm and dry.         ED Course   Procedures  Labs Reviewed - No data to display       Imaging Results    None          Medications - No data to display  Medical Decision Making  Medications were refilled.  Discharge.    Risk  Prescription drug management.                                      Clinical Impression:  Final diagnoses:  [Z76.0] Encounter for medication refill (Primary)          ED Disposition Condition    Discharge Stable          ED Prescriptions       Medication Sig Dispense Start Date End Date Auth. Provider    lithium (ESKALITH) 300 MG capsule Take 1 capsule (300 mg total) by mouth 2 (two) times daily. 60 capsule 6/9/2024 -- Sai Curtis MD    haloperidoL (HALDOL) 10 MG tablet Take 1 tablet (10 mg total) by mouth 2 (two) times daily. 60 tablet 6/9/2024 -- Sai Curtis MD    traZODone (DESYREL) 50 MG tablet Take 1 tablet (50 mg total) by mouth every evening. 30 tablet 6/9/2024 6/9/2025 Sai Curtis MD    guanFACINE (TENEX) 1 MG Tab Take 1 tablet (1 mg total) by mouth 2 (two) times a day. 60 tablet 6/9/2024 6/9/2025 Sai Curtis MD    chlorproMAZINE (THORAZINE) 25 MG tablet Take 1 tablet (25 mg total) by  mouth 3 (three) times daily. 90 tablet 6/9/2024 -- Sai Curtis MD          Follow-up Information       Follow up With Specialties Details Why Contact Info    [facility] - Emergency Dept Emergency Medicine Go to  If symptoms worsen [address] [facility] [address] [phone]             Sai Curtis MD  06/09/24 1211       Sai Curtis MD  06/09/24 1210

## 2024-07-11 ENCOUNTER — HOSPITAL ENCOUNTER (EMERGENCY)
Facility: HOSPITAL | Age: 39
Discharge: HOME | End: 2024-07-11
Attending: EMERGENCY MEDICINE
Payer: COMMERCIAL

## 2024-07-11 VITALS
WEIGHT: 160 LBS | SYSTOLIC BLOOD PRESSURE: 109 MMHG | HEART RATE: 92 BPM | TEMPERATURE: 97 F | RESPIRATION RATE: 20 BRPM | BODY MASS INDEX: 22.9 KG/M2 | HEIGHT: 70 IN | OXYGEN SATURATION: 99 % | DIASTOLIC BLOOD PRESSURE: 75 MMHG

## 2024-07-11 DIAGNOSIS — Z76.0 MEDICATION REFILL: Primary | ICD-10-CM

## 2024-07-11 DIAGNOSIS — L84 CORN OR CALLUS: ICD-10-CM

## 2024-07-11 PROCEDURE — 99281 EMR DPT VST MAYX REQ PHY/QHP: CPT

## 2024-07-11 RX ORDER — LITHIUM CARBONATE 300 MG
300 TABLET ORAL 2 TIMES DAILY
Qty: 60 TABLET | Refills: 0 | Status: ON HOLD | OUTPATIENT
Start: 2024-07-11 | End: 2024-07-19

## 2024-07-11 RX ORDER — CHLORPROMAZINE HYDROCHLORIDE 25 MG/1
25 TABLET, FILM COATED ORAL 3 TIMES DAILY
Qty: 90 TABLET | Refills: 0 | Status: ON HOLD | OUTPATIENT
Start: 2024-07-11 | End: 2024-07-19

## 2024-07-11 RX ORDER — GUANFACINE 1 MG/1
1 TABLET ORAL 2 TIMES DAILY
Qty: 60 TABLET | Refills: 0 | Status: ON HOLD | OUTPATIENT
Start: 2024-07-11 | End: 2024-07-19

## 2024-07-11 RX ORDER — TRAZODONE HYDROCHLORIDE 50 MG/1
50 TABLET ORAL NIGHTLY
Qty: 30 TABLET | Refills: 0 | Status: ON HOLD | OUTPATIENT
Start: 2024-07-11 | End: 2024-07-19

## 2024-07-11 NOTE — ED PROVIDER NOTES
"Encounter Date: 7/11/2024       History     Chief Complaint   Patient presents with    Medication Refill     Reports of needing medication refill. Patient's mother states he needs lithium 300mg po bid, haldol 10mg po bid, guanfacine 1mg po bid, trazodone 50mg po nightly, and chlorpromazine 25mg po tid. Patient also reports having a callus on his right foot he wants looked at. Denies fevers. Denies SI/HI/any hallucinations     38 y.o.  male with a history of depression, schizoaffective disorder, and anxiety presents to Emergency Department with a chief complaint of medication refill. Patient had medications filled last month and reports he is out on today. Last dose of medications on today. Associated symptoms include callus to R foot. Symptoms are aggravated with palpation and there are no alleviating factors. The patient denies CP, SOB, fever, SI, HI, hallucinations, or dizziness. No other reported symptoms at this time      The history is provided by the patient and a parent. No  was used.   Medication Refill  This is a new problem. The problem has not changed since onset.Pertinent negatives include no chest pain, no abdominal pain, no headaches and no shortness of breath. He has tried nothing for the symptoms.     Review of patient's allergies indicates:   Allergen Reactions    Haloperidol Swelling     Muscle stiffness  Has muscle spasms      Paroxetine Swelling, Other (See Comments) and Rash     "i don't remember"  "i don't remember"      Pineapple      Face swells up  Face swells up      Ziprasidone Other (See Comments)     Muscle twitching     Past Medical History:   Diagnosis Date    Anxiety     Depression     Hallucination     History of psychiatric hospitalization     Hx of psychiatric care     Psychiatric problem     Psychosis     Schizoaffective disorder     Sleep difficulties     Suicide attempt     Therapy      History reviewed. No pertinent surgical history.  No " family history on file.  Social History     Tobacco Use    Smoking status: Every Day     Current packs/day: 1.00     Types: Cigarettes    Smokeless tobacco: Never   Substance Use Topics    Alcohol use: Yes     Alcohol/week: 4.0 standard drinks of alcohol     Types: 4 Cans of beer per week     Comment: uses alcohol monthly    Drug use: Not Currently     Types: Marijuana     Review of Systems   Constitutional:  Negative for chills, fatigue and fever.   Eyes:  Negative for photophobia and visual disturbance.   Respiratory:  Negative for cough, shortness of breath and wheezing.    Cardiovascular:  Negative for chest pain.   Gastrointestinal:  Negative for abdominal pain, nausea and vomiting.   Musculoskeletal:  Positive for arthralgias. Negative for gait problem and joint swelling.   Skin:  Negative for color change and wound.   Neurological:  Negative for dizziness, syncope, weakness and headaches.   All other systems reviewed and are negative.      Physical Exam     Initial Vitals [07/11/24 1327]   BP Pulse Resp Temp SpO2   109/75 92 20 97.3 °F (36.3 °C) 99 %      MAP       --         Physical Exam    Nursing note and vitals reviewed.  Constitutional: He appears well-developed and well-nourished. He is not diaphoretic. He is cooperative.  Non-toxic appearance. No distress.   HENT:   Head: Normocephalic and atraumatic.   Right Ear: External ear normal.   Left Ear: External ear normal.   Nose: Nose normal.   Eyes: Conjunctivae and EOM are normal. Pupils are equal, round, and reactive to light.   Neck: Neck supple.   Normal range of motion.  Cardiovascular:  Normal rate, regular rhythm, S1 normal, S2 normal, normal heart sounds, intact distal pulses and normal pulses.           Pulmonary/Chest: Effort normal and breath sounds normal. No tachypnea and no bradypnea. No respiratory distress. He has no decreased breath sounds. He has no wheezes. He has no rhonchi. He has no rales. He exhibits no tenderness.   Abdominal:  Abdomen is soft. Bowel sounds are normal. He exhibits no distension. There is no abdominal tenderness. There is no rebound.   Musculoskeletal:         General: Normal range of motion.      Cervical back: Normal range of motion and neck supple.        Feet:       Comments: Thickened, roughened skin noted to outlined areas. No redness, swelling, wounds, or drainage noted. Areas consistent with callous. CMS intact. Full 5/5 ROM noted. All other adjacent joints otherwise normal.        Neurological: He is alert and oriented to person, place, and time. He has normal strength. No sensory deficit. GCS score is 15. GCS eye subscore is 4. GCS verbal subscore is 5. GCS motor subscore is 6.   Skin: Skin is warm and dry. Capillary refill takes less than 2 seconds.   Psychiatric: His speech is normal. He is withdrawn. He is not actively hallucinating. He expresses no homicidal and no suicidal ideation. He expresses no suicidal plans and no homicidal plans.         ED Course   Procedures  Labs Reviewed - No data to display       Imaging Results    None          Medications - No data to display  Medical Decision Making  Patient awake, alert, has non-labored breathing, and follows commands appropriately. Arrived to ED due for refill on medications. Also c/o pain when walking to R plantar surface. Denies injury/trauma. Afebrile. Denies SI, HI, or hallucinations. Last dose of medications on today. Does not have PCP. Afebrile. NAD Noted.         Differential Diagnosis: Medication Refill, Schizoaffective Disorder, Corn, Callus     Amount and/or Complexity of Data Reviewed  Independent Historian: parent     Details: Patient's mother at bedside.  Discussion of management or test interpretation with external provider(s): Refilled patient's psychiatric medications. Had lengthy discussion with patient and patient's mother about medication refills in ER. Explained that patient needs to establish a PCP and f/u with psychiatry for further  evaluation and treatment. Verbalized understanding. Number provided to establish PCP. Educated patient on treatment for callus or corn. To keep feel clean, moisturize area, and avoid walking around barefoot. No signs of infection on exam. Discussed plan of care and interventions with patient. Agreed to and aware of plan of care. Comfortable being discharged home. Patient discharged home. Patient denies new or additional complaints; no further tests indicated at this time. Verbalized understanding of instructions. No emergent or apparent distress noted prior to discharge. To follow up with PCP in 1 week as needed. Strict ER return precautions given.       Risk  OTC drugs.  Prescription drug management.                                      Clinical Impression:  Final diagnoses:  [Z76.0] Medication refill (Primary)  [L84] Corn or callus          ED Disposition Condition    Discharge Stable          ED Prescriptions       Medication Sig Dispense Start Date End Date Auth. Provider    chlorproMAZINE (THORAZINE) 25 MG tablet Take 1 tablet (25 mg total) by mouth 3 (three) times daily. 90 tablet 7/11/2024 8/10/2024 Maria D Flynn, NP    traZODone (DESYREL) 50 MG tablet Take 1 tablet (50 mg total) by mouth every evening. 30 tablet 7/11/2024 8/10/2024 Maria D Flynn, NP    guanFACINE (TENEX) 1 MG Tab Take 1 tablet (1 mg total) by mouth 2 (two) times a day. 60 tablet 7/11/2024 8/10/2024 Maria D Flynn, NP    lithium (LITHOTAB) 300 mg tablet Take 1 tablet (300 mg total) by mouth 2 (two) times a day. 60 tablet 7/11/2024 8/10/2024 Maria D Flynn, RANDALL          Follow-up Information       Follow up With Specialties Details Why Contact Info    PCP  Call in 1 week [phone] to establish a primary care provider.     [facility] - Emergency Dept Emergency Medicine Go to  If symptoms worsen, As needed [address]  [facility] [address] [phone]             Gee  Maria D FLOWERS NP  07/11/24 1414

## 2024-07-16 ENCOUNTER — HOSPITAL ENCOUNTER (INPATIENT)
Facility: HOSPITAL | Age: 39
LOS: 3 days | Discharge: HOME | DRG: 885 | End: 2024-07-19
Attending: PSYCHIATRY & NEUROLOGY | Admitting: PSYCHIATRY & NEUROLOGY
Payer: COMMERCIAL

## 2024-07-16 ENCOUNTER — HOSPITAL ENCOUNTER (EMERGENCY)
Facility: HOSPITAL | Age: 39
Discharge: PSYCHIATRIC OR SUBSTANCE USE FACILITY | End: 2024-07-16
Attending: EMERGENCY MEDICINE
Payer: COMMERCIAL

## 2024-07-16 VITALS
WEIGHT: 160 LBS | TEMPERATURE: 98 F | BODY MASS INDEX: 25.71 KG/M2 | HEART RATE: 67 BPM | RESPIRATION RATE: 17 BRPM | HEIGHT: 66 IN | OXYGEN SATURATION: 100 % | DIASTOLIC BLOOD PRESSURE: 76 MMHG | SYSTOLIC BLOOD PRESSURE: 122 MMHG

## 2024-07-16 DIAGNOSIS — F32.A DEPRESSION: ICD-10-CM

## 2024-07-16 DIAGNOSIS — R45.851 SUICIDAL IDEATION: Primary | ICD-10-CM

## 2024-07-16 LAB
ALBUMIN SERPL-MCNC: 4.2 G/DL (ref 3.5–5)
ALBUMIN/GLOB SERPL: 1.2 RATIO (ref 1.1–2)
ALP SERPL-CCNC: 89 UNIT/L (ref 40–150)
ALT SERPL-CCNC: 30 UNIT/L (ref 0–55)
AMPHET UR QL SCN: NEGATIVE
ANION GAP SERPL CALC-SCNC: 8 MEQ/L
APAP SERPL-MCNC: <10 UG/ML (ref 10–30)
AST SERPL-CCNC: 23 UNIT/L (ref 5–34)
BARBITURATE SCN PRESENT UR: NEGATIVE
BASOPHILS # BLD AUTO: 0.1 X10(3)/MCL
BASOPHILS NFR BLD AUTO: 0.8 %
BENZODIAZ UR QL SCN: NEGATIVE
BILIRUB SERPL-MCNC: 0.4 MG/DL
BILIRUB UR QL STRIP.AUTO: NEGATIVE
BUN SERPL-MCNC: 9.7 MG/DL (ref 8.9–20.6)
CALCIUM SERPL-MCNC: 9.7 MG/DL (ref 8.4–10.2)
CANNABINOIDS UR QL SCN: POSITIVE
CHLORIDE SERPL-SCNC: 102 MMOL/L (ref 98–107)
CLARITY UR: CLEAR
CO2 SERPL-SCNC: 28 MMOL/L (ref 22–29)
COCAINE UR QL SCN: NEGATIVE
COLOR UR AUTO: NORMAL
CREAT SERPL-MCNC: 1.2 MG/DL (ref 0.73–1.18)
CREAT/UREA NIT SERPL: 8
EOSINOPHIL # BLD AUTO: 0.69 X10(3)/MCL (ref 0–0.9)
EOSINOPHIL NFR BLD AUTO: 5.8 %
ERYTHROCYTE [DISTWIDTH] IN BLOOD BY AUTOMATED COUNT: 14 % (ref 11.5–17)
ETHANOL SERPL-MCNC: <10 MG/DL
FENTANYL UR QL SCN: NEGATIVE
FLUAV AG UPPER RESP QL IA.RAPID: NOT DETECTED
FLUBV AG UPPER RESP QL IA.RAPID: NOT DETECTED
GFR SERPLBLD CREATININE-BSD FMLA CKD-EPI: >60 ML/MIN/1.73/M2
GLOBULIN SER-MCNC: 3.6 GM/DL (ref 2.4–3.5)
GLUCOSE SERPL-MCNC: 108 MG/DL (ref 74–100)
GLUCOSE UR QL STRIP: NEGATIVE
HCT VFR BLD AUTO: 46.1 % (ref 42–52)
HGB BLD-MCNC: 15.5 G/DL (ref 14–18)
HGB UR QL STRIP: NEGATIVE
IMM GRANULOCYTES # BLD AUTO: 0.03 X10(3)/MCL (ref 0–0.04)
IMM GRANULOCYTES NFR BLD AUTO: 0.3 %
KETONES UR QL STRIP: NEGATIVE
LEUKOCYTE ESTERASE UR QL STRIP: NEGATIVE
LITHIUM SERPL-MCNC: 0.29 MMOL/L (ref 0.5–1.2)
LYMPHOCYTES # BLD AUTO: 3.86 X10(3)/MCL (ref 0.6–4.6)
LYMPHOCYTES NFR BLD AUTO: 32.7 %
MCH RBC QN AUTO: 30 PG (ref 27–31)
MCHC RBC AUTO-ENTMCNC: 33.6 G/DL (ref 33–36)
MCV RBC AUTO: 89.2 FL (ref 80–94)
MDMA UR QL SCN: NEGATIVE
MONOCYTES # BLD AUTO: 0.75 X10(3)/MCL (ref 0.1–1.3)
MONOCYTES NFR BLD AUTO: 6.4 %
NEUTROPHILS # BLD AUTO: 6.37 X10(3)/MCL (ref 2.1–9.2)
NEUTROPHILS NFR BLD AUTO: 54 %
NITRITE UR QL STRIP: NEGATIVE
NRBC BLD AUTO-RTO: 0 %
OPIATES UR QL SCN: NEGATIVE
PCP UR QL: NEGATIVE
PH UR STRIP: 6.5 [PH]
PH UR: 6.5 [PH] (ref 3–11)
PLATELET # BLD AUTO: 292 X10(3)/MCL (ref 130–400)
PMV BLD AUTO: 10.5 FL (ref 7.4–10.4)
POTASSIUM SERPL-SCNC: 3.7 MMOL/L (ref 3.5–5.1)
PROT SERPL-MCNC: 7.8 GM/DL (ref 6.4–8.3)
PROT UR QL STRIP: NEGATIVE
RBC # BLD AUTO: 5.17 X10(6)/MCL (ref 4.7–6.1)
SALICYLATES SERPL-MCNC: <5 MG/DL (ref 15–30)
SARS-COV-2 RNA RESP QL NAA+PROBE: NOT DETECTED
SODIUM SERPL-SCNC: 138 MMOL/L (ref 136–145)
SP GR UR STRIP.AUTO: 1.01 (ref 1–1.03)
SPECIFIC GRAVITY, URINE AUTO (.000) (OHS): 1.01 (ref 1–1.03)
TSH SERPL-ACNC: 4.86 UIU/ML (ref 0.35–4.94)
UROBILINOGEN UR STRIP-ACNC: 0.2
WBC # BLD AUTO: 11.8 X10(3)/MCL (ref 4.5–11.5)

## 2024-07-16 PROCEDURE — 80178 ASSAY OF LITHIUM: CPT | Performed by: EMERGENCY MEDICINE

## 2024-07-16 PROCEDURE — 25000003 PHARM REV CODE 250: Performed by: PSYCHIATRY & NEUROLOGY

## 2024-07-16 PROCEDURE — 80307 DRUG TEST PRSMV CHEM ANLYZR: CPT | Performed by: EMERGENCY MEDICINE

## 2024-07-16 PROCEDURE — 80053 COMPREHEN METABOLIC PANEL: CPT | Performed by: EMERGENCY MEDICINE

## 2024-07-16 PROCEDURE — 81003 URINALYSIS AUTO W/O SCOPE: CPT | Mod: 59 | Performed by: EMERGENCY MEDICINE

## 2024-07-16 PROCEDURE — 84443 ASSAY THYROID STIM HORMONE: CPT | Performed by: EMERGENCY MEDICINE

## 2024-07-16 PROCEDURE — 11400000 HC PSYCH PRIVATE ROOM

## 2024-07-16 PROCEDURE — 80143 DRUG ASSAY ACETAMINOPHEN: CPT | Performed by: EMERGENCY MEDICINE

## 2024-07-16 PROCEDURE — 80179 DRUG ASSAY SALICYLATE: CPT | Performed by: EMERGENCY MEDICINE

## 2024-07-16 PROCEDURE — 25000003 PHARM REV CODE 250

## 2024-07-16 PROCEDURE — 99285 EMERGENCY DEPT VISIT HI MDM: CPT

## 2024-07-16 PROCEDURE — 82077 ASSAY SPEC XCP UR&BREATH IA: CPT | Performed by: EMERGENCY MEDICINE

## 2024-07-16 PROCEDURE — 0240U COVID/FLU A&B PCR: CPT | Performed by: EMERGENCY MEDICINE

## 2024-07-16 PROCEDURE — 85025 COMPLETE CBC W/AUTO DIFF WBC: CPT | Performed by: EMERGENCY MEDICINE

## 2024-07-16 PROCEDURE — S4991 NICOTINE PATCH NONLEGEND: HCPCS | Performed by: PSYCHIATRY & NEUROLOGY

## 2024-07-16 RX ORDER — ALUMINUM HYDROXIDE, MAGNESIUM HYDROXIDE, AND SIMETHICONE 1200; 120; 1200 MG/30ML; MG/30ML; MG/30ML
30 SUSPENSION ORAL EVERY 6 HOURS PRN
Status: DISCONTINUED | OUTPATIENT
Start: 2024-07-16 | End: 2024-07-19 | Stop reason: HOSPADM

## 2024-07-16 RX ORDER — TRAZODONE HYDROCHLORIDE 100 MG/1
100 TABLET ORAL NIGHTLY PRN
Status: DISCONTINUED | OUTPATIENT
Start: 2024-07-16 | End: 2024-07-19 | Stop reason: HOSPADM

## 2024-07-16 RX ORDER — DIPHENHYDRAMINE HYDROCHLORIDE 50 MG/ML
50 INJECTION INTRAMUSCULAR; INTRAVENOUS EVERY 4 HOURS PRN
Status: DISCONTINUED | OUTPATIENT
Start: 2024-07-16 | End: 2024-07-19 | Stop reason: HOSPADM

## 2024-07-16 RX ORDER — LORAZEPAM 1 MG/1
2 TABLET ORAL EVERY 4 HOURS PRN
Status: DISCONTINUED | OUTPATIENT
Start: 2024-07-16 | End: 2024-07-19 | Stop reason: HOSPADM

## 2024-07-16 RX ORDER — SERTRALINE HYDROCHLORIDE 50 MG/1
50 TABLET, FILM COATED ORAL DAILY
Status: DISCONTINUED | OUTPATIENT
Start: 2024-07-16 | End: 2024-07-17

## 2024-07-16 RX ORDER — ACETAMINOPHEN 325 MG/1
650 TABLET ORAL EVERY 6 HOURS PRN
Status: DISCONTINUED | OUTPATIENT
Start: 2024-07-16 | End: 2024-07-19 | Stop reason: HOSPADM

## 2024-07-16 RX ORDER — HYDROXYZINE HYDROCHLORIDE 50 MG/1
50 TABLET, FILM COATED ORAL EVERY 4 HOURS PRN
Status: DISCONTINUED | OUTPATIENT
Start: 2024-07-16 | End: 2024-07-19 | Stop reason: HOSPADM

## 2024-07-16 RX ORDER — MUPIROCIN 20 MG/G
OINTMENT TOPICAL 2 TIMES DAILY
Status: DISCONTINUED | OUTPATIENT
Start: 2024-07-16 | End: 2024-07-16

## 2024-07-16 RX ORDER — LITHIUM CARBONATE 300 MG/1
300 TABLET, FILM COATED, EXTENDED RELEASE ORAL EVERY 12 HOURS
Status: DISCONTINUED | OUTPATIENT
Start: 2024-07-16 | End: 2024-07-19 | Stop reason: HOSPADM

## 2024-07-16 RX ORDER — CHLORPROMAZINE HYDROCHLORIDE 25 MG/1
25 TABLET, FILM COATED ORAL 3 TIMES DAILY
Status: DISCONTINUED | OUTPATIENT
Start: 2024-07-16 | End: 2024-07-19 | Stop reason: HOSPADM

## 2024-07-16 RX ORDER — LORAZEPAM 2 MG/ML
2 INJECTION INTRAMUSCULAR EVERY 4 HOURS PRN
Status: DISCONTINUED | OUTPATIENT
Start: 2024-07-16 | End: 2024-07-19 | Stop reason: HOSPADM

## 2024-07-16 RX ORDER — IBUPROFEN 200 MG
1 TABLET ORAL DAILY
Status: DISCONTINUED | OUTPATIENT
Start: 2024-07-16 | End: 2024-07-19 | Stop reason: HOSPADM

## 2024-07-16 RX ORDER — DIPHENHYDRAMINE HCL 50 MG
50 CAPSULE ORAL EVERY 4 HOURS PRN
Status: DISCONTINUED | OUTPATIENT
Start: 2024-07-16 | End: 2024-07-19 | Stop reason: HOSPADM

## 2024-07-16 RX ADMIN — SERTRALINE HYDROCHLORIDE 50 MG: 50 TABLET ORAL at 11:07

## 2024-07-16 RX ADMIN — CHLORPROMAZINE HYDROCHLORIDE 25 MG: 25 TABLET, FILM COATED ORAL at 11:07

## 2024-07-16 RX ADMIN — LITHIUM CARBONATE 300 MG: 300 TABLET, FILM COATED, EXTENDED RELEASE ORAL at 10:07

## 2024-07-16 RX ADMIN — CHLORPROMAZINE HYDROCHLORIDE 25 MG: 25 TABLET, FILM COATED ORAL at 10:07

## 2024-07-16 RX ADMIN — NICOTINE 1 PATCH: 21 PATCH, EXTENDED RELEASE TRANSDERMAL at 08:07

## 2024-07-16 RX ADMIN — HYDROXYZINE HYDROCHLORIDE 50 MG: 50 TABLET, FILM COATED ORAL at 08:07

## 2024-07-16 RX ADMIN — HYDROXYZINE HYDROCHLORIDE 50 MG: 50 TABLET, FILM COATED ORAL at 04:07

## 2024-07-16 RX ADMIN — LITHIUM CARBONATE 300 MG: 300 TABLET, FILM COATED, EXTENDED RELEASE ORAL at 11:07

## 2024-07-16 NOTE — PROGRESS NOTES
Elvis is a 39y/o male admitted for Schizoaffective Disorder F25.0 and Intellectual disability with a uds +cannabis. CTRS met with Pt 1:1, Elvis was cooperative but physically and verbally intrusive appearing to have low cognatic function and reported ability to perform his ADL's. CTRS educated Pt to TR group times and dates with Elvis agreeing to attend and participate in TR groups. Elvis reported his treatment goal as Stress reduction.       07/16/24 0908   General   Admit Date 07/16/24   Primary Diagnosis Schizoaffective Disorder  F25.0   Secondary Diagnosis Intellectual disability   Spiritism Confucianist   Number of Children 0   Children Living? 0   Occupation unemployed   Does the patient have dentures? No   If you were to take part in activities, which of the following would you prefer? Both   Do you feel like you have enough to keep you busy now? Yes   Do you believe that you have the opportunity for physical activity? Yes   Activity Capabilities Maximum   Subjective   Patient states I was feeling suicidal   Assessment   Mobility ambulates independently   Transfers independently   Musculoskeletal   (none)   Visual Acuity normal vision   Visual Perception depth perception;color perception;recognizes letters;recognizes numbers   Hearing normal   Speech/Communication normal   Cognitive Concerns oriented x4;slow learning ability;further evaluation may determine other cognitive concerns   Emotional Concerns appears anxious   Leisure Interest Survey   Leisure Interest Survey Yes   Social/Group Activities   Denominational/Worship Would like to learn/do   Solitary Activities   Watching TV Current Interest   Computer Activities Current Interest   Watching Videos Current Interest   Music Listening Current Interest   Reading Current Interest   Physical Activities   Track/Field Past Interest   Swimming Current Interest   Basketball Current Interest   Creative Activities   Drawing Current Interest   Painting Current  Interest   Creative Writing Current Interest   Outdoor Activities   Camping Would like to learn/do   Passive Games   Classic Board Games Current Interest   Goals   Additional Documentation yes   Goal Formulation With patient   Time For Goal Achievement 7 days   Goal 1 Stress reduction   Goal 1-Progress ongoing-   Plan   Planned Therapy Intervention Group Recreational Therapy   Expected Length of Stay 5-7days   PT Frequency Minimum of 3 visits per week

## 2024-07-16 NOTE — PLAN OF CARE
Problem: Adult Behavioral Health Plan of Care  Goal: Plan of Care Review  Outcome: Not Progressing  Flowsheets (Taken 7/16/2024 0803)  Patient Agreement with Plan of Care: unable to participate  Plan of Care Reviewed With: patient  Goal: Patient-Specific Goal (Individualization)  Outcome: Not Progressing  Flowsheets (Taken 7/16/2024 0803)  Patient Personal Strengths: independent living skills  Patient Vulnerabilities: limited social skills  Goal: Adheres to Safety Considerations for Self and Others  Outcome: Not Progressing  Flowsheets (Taken 7/16/2024 0803)  Adheres to Safety Considerations for Self and Others: unable to achieve outcome  Intervention: Develop and Maintain Individualized Safety Plan  Flowsheets (Taken 7/16/2024 0803)  Safety Measures: safety rounds completed  Goal: Absence of New-Onset Illness or Injury  Outcome: Not Progressing  Intervention: Identify and Manage Fall Risk  Flowsheets (Taken 7/16/2024 0803)  Safety Measures: safety rounds completed  Intervention: Prevent VTE (Venous Thromboembolism)  Flowsheets (Taken 7/16/2024 0803)  VTE Prevention/Management: fluids promoted  Intervention: Prevent Infection  Flowsheets (Taken 7/16/2024 0803)  Infection Prevention: hand hygiene promoted  Goal: Optimized Coping Skills in Response to Life Stressors  Outcome: Not Progressing  Flowsheets (Taken 7/16/2024 0803)  Optimized Coping Skills in Response to Life Stressors: unable to achieve outcome  Intervention: Promote Effective Coping Strategies  Flowsheets (Taken 7/16/2024 0803)  Supportive Measures: self-care encouraged  Goal: Develops/Participates in Therapeutic [place] to Support Successful Transition  Outcome: Not Progressing  Flowsheets (Taken 7/16/2024 0803)  Develops/Participates in Therapeutic [place] to Support Successful Transition: unable to achieve outcome  Intervention: Foster Therapeutic [place]  Flowsheets (Taken 7/16/2024 0803)  Trust Relationship/Rapport: care explained  Goal:  Rounds/Family Conference  Outcome: Not Progressing  Flowsheets (Taken 7/16/2024 0803)  Participants:   milieu/psych techs   nursing     Problem: Violence Risk or Actual  Goal: Anger and Impulse Control  Outcome: Not Progressing  Intervention: Minimize Safety Risk  Flowsheets (Taken 7/16/2024 0803)  Behavior Management: behavioral plan developed  Sensory Stimulation Regulation: quiet environment promoted  De-Escalation Techniques: quiet time facilitated  Intervention: Promote Self-Control  Flowsheets (Taken 7/16/2024 0803)  Supportive Measures: self-care encouraged  Environmental Support: calm environment promoted     Problem: Suicide Risk  Goal: Absence of Self-Harm  Outcome: Not Progressing  Intervention: Assess Risk to Self and Maintain Safety  Flowsheets (Taken 7/16/2024 0803)  Behavior Management: behavioral plan developed  Self-Harm Prevention: environmental self-harm risks assessed  Intervention: Promote Psychosocial Wellbeing  Flowsheets (Taken 7/16/2024 0803)  Sleep/Rest Enhancement: regular sleep/rest pattern promoted  Supportive Measures: self-care encouraged  Family/Support System Care: self-care encouraged  Intervention: Establish Safety Plan and Continuity of Care  Flowsheets (Taken 7/16/2024 0803)  Safe Transition Promotion: access to lethal means addressed     Problem: Depressive Signs/Symptoms  Goal: Optimized Energy Level (Depressive Signs/Symptoms)  Outcome: Not Progressing  Flowsheets (Taken 7/16/2024 0803)  Mutually Determined Action Steps (Optimized Energy Level): grooms self without prompting  Intervention: Optimize Energy Level  Flowsheets (Taken 7/16/2024 0803)  Activity (Behavioral Health): up ad fern  Patient Performed Hygiene: teeth brushed  Diversional Activity: television  Goal: Optimized Cognitive Function (Depressive Signs/Symptoms)  Outcome: Not Progressing  Flowsheets (Taken 7/16/2024 0803)  Mutually Determined Action Steps (Optimized Cognitive Function): participates in attention  training  Goal: Increased Participation and Engagement (Depressive Signs/Symptoms)  Outcome: Not Progressing  Flowsheets (Taken 7/16/2024 0803)  Mutually Determined Action Steps (Increased Participation and Engagement): identifies alternatives to withdrawal  Intervention: Facilitate Participation and Engagement  Flowsheets (Taken 7/16/2024 0803)  Supportive Measures: self-care encouraged  Diversional Activity: television  Goal: Enhanced Self-Esteem and Confidence (Depressive Signs/Symptoms)  Outcome: Not Progressing  Flowsheets (Taken 7/16/2024 0803)  Mutually Determined Action Steps (Enhanced Self-Esteem and Confidence): identifies judgmental thoughts  Intervention: Promote Confidence and Self-Esteem  Flowsheets (Taken 7/16/2024 0803)  Supportive Measures: self-care encouraged  Goal: Improved Mood Symptoms (Depressive Signs/Symptoms)  Outcome: Not Progressing  Flowsheets (Taken 7/16/2024 0803)  Mutually Determined Action Steps (Improved Mood Symptoms): acknowledges progress  Intervention: Promote Mood Improvement  Flowsheets (Taken 7/16/2024 0803)  Supportive Measures: self-care encouraged  Diversional Activity: television  Goal: Optimized Nutrition Intake (Depressive Signs/Symptoms)  Outcome: Not Progressing  Flowsheets (Taken 7/16/2024 0803)  Mutually Determined Action Steps (Optimized Nutrition Intake): eats at meal time  Intervention: Promote Optimal Nutrition Intake  Flowsheets (Taken 7/16/2024 0803)  Nutrition Interventions: food preferences provided  Bowel Elimination Promotion: adequate fluid intake promoted  Goal: Improved Psychomotor Symptoms (Depressive Signs/Symptoms)  Outcome: Not Progressing  Flowsheets (Taken 7/16/2024 0803)  Mutually Determined Action Steps (Improved Psychomotor Symptoms): adheres to medication regimen  Intervention: Manage Psychomotor Movement  Flowsheets (Taken 7/16/2024 0803)  Activity (Behavioral Health): up ad fern  Patient Performed Hygiene: teeth brushed  Diversional Activity:  television  Goal: Improved Sleep (Depressive Signs/Symptoms)  Outcome: Not Progressing  Flowsheets (Taken 7/16/2024 0803)  Mutually Determined Action Steps (Improved Sleep): sleeps 4-6 hours at night  Intervention: Promote Healthy Sleep Hygiene  Flowsheets (Taken 7/16/2024 0803)  Sleep Hygiene Promotion: regular sleep pattern promoted  Goal: Enhanced Social, Occupational or Functional Skills (Depressive Signs/Symptoms)  Outcome: Not Progressing  Flowsheets (Taken 7/16/2024 0803)  Mutually Determined Action Steps (Enhanced Social, Occupational or Functional Skills): participates in social skills training  Intervention: Promote Social, Occupational and Functional Ability  Flowsheets (Taken 7/16/2024 0803)  Social Functional Ability Promotion: autonomy promoted

## 2024-07-16 NOTE — NURSING
Patient had closed his door as reported by the MHT and when she told him he had to leave it open he got upset. He said I am entitled to privacy and I will close the door again. Ya'll cannot stop me from closing my fucking door. You just wait and see I will close the door. I need privacy.

## 2024-07-16 NOTE — NURSING
"Admission Note:    Elvis Puentes is a 38 y.o. male, : 1985, MRN: [id number], admitted on 2024 to [facility] ([facility]) for Chai Jackson MD with a diagnosis of Depression [F32.A]. Patient admitted on a status of Physician Emergency Certificate (PEC). Elvis reports the following allergies: Haldol, Geodon, Paroxetin, and Pineapple.    Patient demonstrated an affect that was sad, flat, anxious, irritable, agitated, angry, and  labile. Patient demonstrated mood during assessment that was angry, anxious, and swings. Patient had an appearance that was disheveled and poor hygiene.  Patient endorses suicidal ideation. Patient endorses suicide plan. Patient denies hallucinations.    Elvis's  height is 5' 6" (1.676 m) and weight is 67.9 kg (149 lb 12.8 oz). His temperature is 97.9 °F (36.6 °C). His blood pressure is 120/73 and his pulse is 77. His respiration is 20.     Elvis's last BM was noted on: 7/15/24.    Metal detector screening performed via security personnel. The result of the scan was no contraband found. Head-to-toe physical assessment completed with the following findings: no wounds found upon body screen. A full skin assessment was performed. [place] skin appeared warm, dry, and intact.  Elvis was oriented to unit, staff, peers, and room. Patient belongings/valuables stored in locked intake room cabinet and changes of clothing provided to patient. Elvis was placed on Q 15 min observations.       "

## 2024-07-16 NOTE — ED PROVIDER NOTES
"Encounter Date: 2024       History     Chief Complaint   Patient presents with    Psychiatric Evaluation     38-year-old male history of schizoaffective disorder, previous suicide attempts, depression brought in by EMS for suicidal ideation.  Patient says he is just tired of living.  His sister  recently and he does not want to see his mother die.  Patient says if he can get his hands on a gun or he is considered hanging himself.  Patient has had 2 previous attempts.    The history is provided by the patient.     Review of patient's allergies indicates:   Allergen Reactions    Haloperidol Swelling     Muscle stiffness  Has muscle spasms      Paroxetine Swelling, Other (See Comments) and Rash     "i don't remember"  "i don't remember"      Pineapple      Face swells up  Face swells up      Ziprasidone Other (See Comments)     Muscle twitching     Past Medical History:   Diagnosis Date    Anxiety     Depression     Hallucination     History of psychiatric hospitalization     Hx of psychiatric care     Psychiatric problem     Psychosis     Schizoaffective disorder     Sleep difficulties     Suicide attempt     Therapy      History reviewed. No pertinent surgical history.  No family history on file.  Social History     Tobacco Use    Smoking status: Every Day     Current packs/day: 1.00     Types: Cigarettes    Smokeless tobacco: Never   Substance Use Topics    Alcohol use: Yes     Alcohol/week: 4.0 standard drinks of alcohol     Types: 4 Cans of beer per week     Comment: uses alcohol monthly    Drug use: Not Currently     Types: Marijuana     Review of Systems   Constitutional:  Negative for chills, diaphoresis, fatigue and fever.   HENT:  Negative for congestion, drooling, ear discharge, ear pain, postnasal drip, rhinorrhea, sinus pressure, sinus pain, sore throat and tinnitus.    Eyes:  Negative for discharge.   Respiratory:  Negative for cough, chest tightness, shortness of breath and wheezing.  "   Cardiovascular:  Negative for chest pain and palpitations.   Gastrointestinal:  Negative for abdominal pain, diarrhea, nausea and vomiting.   Genitourinary:  Negative for frequency, hematuria and urgency.   Musculoskeletal:  Negative for back pain, neck pain and neck stiffness.   Skin:  Negative for rash.   Neurological:  Negative for syncope, weakness, light-headedness, numbness and headaches.   Psychiatric/Behavioral:  Negative for suicidal ideas.        Physical Exam     Initial Vitals [07/16/24 0246]   BP Pulse Resp Temp SpO2   119/72 82 18 98 °F (36.7 °C) 98 %      MAP       --         Physical Exam    Nursing note and vitals reviewed.  Constitutional: He appears well-developed and well-nourished. No distress.   Eyes: Conjunctivae are normal.   Cardiovascular:  Normal rate.           Pulmonary/Chest: Breath sounds normal. He is in respiratory distress.     Neurological: He is alert and oriented to person, place, and time. He has normal strength. No sensory deficit. GCS score is 15. GCS eye subscore is 4. GCS verbal subscore is 5. GCS motor subscore is 6.   Skin: Skin is warm and dry.   Psychiatric: He has a normal mood and affect. His speech is normal and behavior is normal. Cognition and memory are normal. He expresses suicidal ideation. He expresses suicidal plans.         ED Course   Procedures  Labs Reviewed   ACETAMINOPHEN LEVEL - Abnormal; Notable for the following components:       Result Value    Acetaminophen Level <10.0 (*)     All other components within normal limits   COMPREHENSIVE METABOLIC PANEL - Abnormal; Notable for the following components:    Glucose 108 (*)     Creatinine 1.20 (*)     Globulin 3.6 (*)     All other components within normal limits   DRUG SCREEN, URINE (BEAKER) - Abnormal; Notable for the following components:    Cannabinoids, Urine Positive (*)     All other components within normal limits    Narrative:     Cut off concentrations:    Amphetamines - 1000 ng/ml  Barbiturates  - 200 ng/ml  Benzodiazepine - 200 ng/ml  Cannabinoids (THC) - 50 ng/ml  Cocaine - 300 ng/ml  Fentanyl - 1.0 ng/ml  MDMA - 500 ng/ml  Opiates - 300 ng/ml   Phencyclidine (PCP) - 25 ng/ml    Specimen submitted for drug analysis and tested for pH and specific gravity in order to evaluate sample integrity. Suspect tampering if specific gravity is <1.003 and/or pH is not within the range of 4.5 - 8.0  False negatives may result form substances such as bleach added to urine.  False positives may result for the presence of a substance with similar chemical structure to the drug or its metabolite.    This test provides only a PRELIMINARY analytical test result. A more specific alternate chemical method must be used in order to obtain a confirmed analytical result. Gas chromatography/mass spectrometry (GC/MS) is the preferred confirmatory method. Other chemical confirmation methods are available. Clinical consideration and professional judgement should be applied to any drug of abuse test result, particularly when preliminary positive results are used.    Positive results will be confirmed only at the physicians request. Unconfirmed screening results are to be used only for medical purposes (treatment).        SALICYLATE LEVEL - Abnormal; Notable for the following components:    Salicylate Level <5.0 (*)     All other components within normal limits   CBC WITH DIFFERENTIAL - Abnormal; Notable for the following components:    WBC 11.80 (*)     MPV 10.5 (*)     All other components within normal limits   LITHIUM LEVEL - Abnormal; Notable for the following components:    Lithium Level 0.29 (*)     All other components within normal limits   URINALYSIS, REFLEX TO URINE CULTURE - Normal   ALCOHOL,MEDICAL (ETHANOL) - Normal   TSH - Normal   COVID/FLU A&B PCR - Normal    Narrative:     The Xpert Xpress SARS-CoV-2/FLU/RSV plus is a rapid, multiplexed real-time PCR test intended for the simultaneous qualitative detection and  differentiation of SARS-CoV-2, Influenza A, Influenza B, and respiratory syncytial virus (RSV) viral RNA in either nasopharyngeal swab or nasal swab specimens.         CBC W/ AUTO DIFFERENTIAL    Narrative:     The following orders were created for panel order CBC auto differential.  Procedure                               Abnormality         Status                     ---------                               -----------         ------                     CBC with Differential[[id number]]       Abnormal            Final result                 Please view results for these tests on the individual orders.          Imaging Results    None          Medications - No data to display  Medical Decision Making  Medical Decision Making  Problem list/ differential diagnosis including but not limited to:  Suicidal ideation      Patient's chronic illnesses impacting care:  Schizoaffective    Diagnostic test considered but not ordered:    My interpretations:      Radiology reports      Discussion of case with external qualified healthcare professionals:  Not applicable    Review of external notes( inpt, ems, NH, clinic):      Decision rules/scores:    Medications reviewed:  Medications ordered in the ER:  Discharge prescriptions:    Social variables possible impacting patient's healthcare:    Code status/discussion    Shared decision making:    Consideration for admission versus discharge:  Transfer for inpatient psych    Amount and/or Complexity of Data Reviewed  Labs: ordered.               ED Course as of 07/16/24 0422 Tue Jul 16, 2024   0345 Salicylate Level(!): <5.0 [WC]   0345 Acetaminophen Level(!): <10.0 [WC]   0346 Cannabinoids, Urine(!): Positive [WC]   0346 Alcohol, Serum: <10.0 [WC]   0346 WBC(!): 11.80 [WC]   0405 TSH: 4.863 [WC]   0405 SARS-CoV2 (COVID-19) Qualitative PCR: Not Detected [WC]      ED Course User Index  [WC] Jonathon Laws MD       Medically cleared for psychiatry placement: 7/16/2024  4:05  AM                   Clinical Impression:  Final diagnoses:  [R45.851] Suicidal ideation (Primary)          ED Disposition Condition    Transfer to Psych Facility Stable          ED Prescriptions    None       Follow-up Information    None          Jonathon Laws MD  07/16/24 0422

## 2024-07-16 NOTE — PROGRESS NOTES
07/16/24 1000   [facility] Group Therapy   Group Name Therapeutic Recreation   Specific Interventions Skilled Activity Mild Exercises   Participation Level Active;Supportive   Participation Quality Cooperative   Insight/Motivation Limited   Affect/Mood Display Impulsive;Restless;Bizarre  (intrusive)   Cognition Pre-Occupied   Psychomotor WNL

## 2024-07-16 NOTE — NURSING
Patient was given prn atarax for anxiety at 8:20 and at 9:20 he appeared to be less anxious. Will continue to monitor

## 2024-07-16 NOTE — H&P
"7/16/2024  Elvis Puentes   1985   [id number]            Psychiatry Inpatient Admission Note    Date of Admission: 7/16/2024  7:04 AM    Current Legal Status: Physician's Emergency Certificate    Chief Complaint: Suicidal ideation    SUBJECTIVE:   History of Present Illness:   Elvis Puentes is a 38 y.o. male placed under a P[facility] at [facility] for suicidal thoughts. Patient has a long history of schizoaffective disorder.     Patient today states that he is not well. He states that he has an issue with the understanding that everyone is eventually going to die. He states that he would rather just go ahead and get it over with. He states that he has tried to harm himself twice before but states "I guess I was just not micheal enough". Patient states that he lost his sister due to cancer about two months ago and does not want to see his mother die. Patient is requesting a change to his medication. He states that he wants to be placed on an antidepressant. I am amenable to adding this to his regimen. I will restart his home medications except Haldol as patient states he is "allergic" to it however, I believe he only experienced TD and not an actual allergy. Patient states that he has not had any hallucinations recently and believes the Thorazine is why. Patient continues to express thoughts of harming himself with a gun but states that he does not have access to a gun so he does not know how he would harm himself other than by this method. Will admit for medication management and safety monitoring.     Of note staff reported that while patient was being admitted he started masturbating in front of a female RN.         Past Psychiatric History:   Previous Psychiatric Hospitalizations: Multiple. 12 in past 6 months   Previous Medication Trials: Many  Previous Suicide Attempts: Twice. Once by cutting wrist and once by OD   Outpatient psychiatrist: ED    Current Medications:   Home Psychiatric Meds: Thorazine, " "Haldol, Lithium, Trazodone, Guanfacine    Past Medical/Surgical History:   Past Medical History:   Diagnosis Date    Anxiety     Depression     Hallucination     History of psychiatric hospitalization     Hx of psychiatric care     Psychiatric problem     Psychosis     Schizoaffective disorder     Sleep difficulties     Suicide attempt     Therapy      No past surgical history on file.      Family Psychiatric History:   Mother - schizophrenia     Allergies:   Review of patient's allergies indicates:   Allergen Reactions    Haloperidol Swelling     Muscle stiffness  Has muscle spasms      Paroxetine Swelling, Other (See Comments) and Rash     "i don't remember"  "i don't remember"      Pineapple      Face swells up  Face swells up      Ziprasidone Other (See Comments)     Muscle twitching       Substance Abuse History:   Tobacco: Yes  Alcohol: Denies  Illicit Substances: THC  Treatment: Denies        Scheduled Meds:    nicotine  1 patch Transdermal Daily      PRN Meds:   Current Facility-Administered Medications:     acetaminophen, 650 mg, Oral, Q6H PRN    aluminum-magnesium hydroxide-simethicone, 30 mL, Oral, Q6H PRN    diphenhydrAMINE, 50 mg, Oral, Q4H PRN **AND** LORazepam, 2 mg, Oral, Q4H PRN **AND** diphenhydrAMINE, 50 mg, Intramuscular, Q4H PRN **AND** lorazepam, 2 mg, Intramuscular, Q4H PRN    hydrOXYzine HCL, 50 mg, Oral, Q4H PRN    traZODone, 100 mg, Oral, Nightly PRN   Psychotherapeutics (From admission, onward)      Start     Stop Route Frequency Ordered    07/16/24 0706  LORazepam tablet 2 mg  (Med - Acute  Behavioral Management)        Placed in "And" Linked Group    -- Oral Every 4 hours PRN 07/16/24 0706    07/16/24 0706  LORazepam injection 2 mg  (Med - Acute  Behavioral Management)        Placed in "And" Linked Group    -- IM Every 4 hours PRN 07/16/24 0706    07/16/24 0706  traZODone tablet 100 mg         -- Oral Nightly PRN 07/16/24 0706              Social History:  Housing Status: Lives with " mother  Relationship Status/Sexual Orientation: Single   Children: Denies  Education: High school   Employment Status/Info: Disabled    history: Denies  History of physical/sexual abuse: Denies   Access to gun: Denies       OBJECTIVE:   Medical Review Of Systems:  A comprehensive review of systems was negative.    Vitals   Vitals:    07/16/24 0703   BP: 120/73   Pulse: 77   Resp: 20   Temp: 97.9 °F (36.6 °C)        Labs/Imaging/Studies:   Recent Results (from the past 48 hour(s))   Urinalysis, Reflex to Urine Culture    Collection Time: 07/16/24  3:16 AM    Specimen: Urine   Result Value Ref Range    Color, UA Straw Yellow, Light-Yellow, Dark Yellow, [place]orene, Straw    Appearance, UA Clear Clear    Specific Gravity, UA 1.015 1.005 - 1.030    pH, UA 6.5 5.0 - 8.5    Protein, UA Negative Negative    Glucose, UA Negative Negative, Normal    Ketones, UA Negative Negative    Blood, UA Negative Negative    Bilirubin, UA Negative Negative    Urobilinogen, UA 0.2 0.2, 1.0, Normal    Nitrites, UA Negative Negative    Leukocyte Esterase, UA Negative Negative   Ethanol    Collection Time: 07/16/24  3:16 AM   Result Value Ref Range    Ethanol Level <10.0 <=10.0 mg/dL   Acetaminophen level    Collection Time: 07/16/24  3:16 AM   Result Value Ref Range    Acetaminophen Level <10.0 (L) 10.0 - 30.0 ug/ml   Comprehensive metabolic panel    Collection Time: 07/16/24  3:16 AM   Result Value Ref Range    Sodium 138 136 - 145 mmol/L    Potassium 3.7 3.5 - 5.1 mmol/L    Chloride 102 98 - 107 mmol/L    CO2 28 22 - 29 mmol/L    Glucose 108 (H) 74 - 100 mg/dL    Blood Urea Nitrogen 9.7 8.9 - 20.6 mg/dL    Creatinine 1.20 (H) 0.73 - 1.18 mg/dL    Calcium 9.7 8.4 - 10.2 mg/dL    Protein Total 7.8 6.4 - 8.3 gm/dL    Albumin 4.2 3.5 - 5.0 g/dL    Globulin 3.6 (H) 2.4 - 3.5 gm/dL    Albumin/Globulin Ratio 1.2 1.1 - 2.0 ratio    Bilirubin Total 0.4 <=1.5 mg/dL    ALP 89 40 - 150 unit/L    ALT 30 0 - 55 unit/L    AST 23 5 - 34 unit/L    eGFR  >60 mL/min/1.73/m2    Anion Gap 8.0 mEq/L    BUN/Creatinine Ratio 8    TSH    Collection Time: 07/16/24  3:16 AM   Result Value Ref Range    TSH 4.863 0.350 - 4.940 uIU/mL   COVID/FLU A&B PCR    Collection Time: 07/16/24  3:16 AM   Result Value Ref Range    Influenza A PCR Not Detected Not Detected    Influenza B PCR Not Detected Not Detected    SARS-CoV-2 PCR Not Detected Not Detected, Negative   Drug Screen, Urine    Collection Time: 07/16/24  3:16 AM   Result Value Ref Range    Amphetamines, Urine Negative Negative    Barbiturates, Urine Negative Negative    Benzodiazepine, Urine Negative Negative    Cannabinoids, Urine Positive (A) Negative    Cocaine, Urine Negative Negative    Fentanyl, Urine Negative Negative    MDMA, Urine Negative Negative    Opiates, Urine Negative Negative    Phencyclidine, Urine Negative Negative    pH, Urine 6.5 3.0 - 11.0    Specific Gravity, Urine Auto 1.015 1.001 - 1.035   Salicylate Level    Collection Time: 07/16/24  3:16 AM   Result Value Ref Range    Salicylate Level <5.0 (L) 15.0 - 30.0 mg/dL   CBC with Differential    Collection Time: 07/16/24  3:16 AM   Result Value Ref Range    WBC 11.80 (H) 4.50 - 11.50 x10(3)/mcL    RBC 5.17 4.70 - 6.10 x10(6)/mcL    Hgb 15.5 14.0 - 18.0 g/dL    Hct 46.1 42.0 - 52.0 %    MCV 89.2 80.0 - 94.0 fL    MCH 30.0 27.0 - 31.0 pg    MCHC 33.6 33.0 - 36.0 g/dL    RDW 14.0 11.5 - 17.0 %    Platelet 292 130 - 400 x10(3)/mcL    MPV 10.5 (H) 7.4 - 10.4 fL    Neut % 54.0 %    Lymph % 32.7 %    Mono % 6.4 %    Eos % 5.8 %    Basophil % 0.8 %    Lymph # 3.86 0.6 - 4.6 x10(3)/mcL    Neut # 6.37 2.1 - 9.2 x10(3)/mcL    Mono # 0.75 0.1 - 1.3 x10(3)/mcL    Eos # 0.69 0 - 0.9 x10(3)/mcL    Baso # 0.10 <=0.2 x10(3)/mcL    IG# 0.03 0 - 0.04 x10(3)/mcL    IG% 0.3 %    NRBC% 0.0 %   Lithium Level    Collection Time: 07/16/24  3:16 AM   Result Value Ref Range    Lithium Level 0.29 (L) 0.50 - 1.20 mmol/L      Lab Results   Component Value Date    VALPROATE 39.8 (L)  04/19/2024           Psychiatric Mental Status Exam:  General Appearance: appears stated age, well developed and nourished, poorly groomed and appropriately dressed, in no acute distress  Arousal: alert with clear sensorium  Behavior: normal, cooperative, friendly, pleasant, polite, appropriate eye-contact, under good behavioral control  Movements and Motor Activity: no abnormal involuntary movements noted; no tics, no tremors, no akathisia, no dystonia, no evidence of tardive dyskinesia; no psychomotor agitation or retardation  Orientation: oriented to person, place, time, and situation  Speech: rapid  Mood: Anxious and Expansive  Affect: anxious, elevated  Thought Process: intact, linear, goal-directed, organized, logical  Associations: intact, no loosening of associations  Thought Content and Perceptions: + suicidal ideation, no homicidal ideation, no hallucinations, no paranoid ideation, no delusions  Recent and Remote Memory: intact; per interview/observation with patient  Attention and Concentration: intact; per interview/observation with patient  Fund of Knowledge: intact; based on history, vocabulary, fund of knowledge, syntax, grammar, and content  Insight: intact; based on understanding of severity of illness and HPI  Judgment: impaired due to intellectual disability ; based on patient's behavior and HPI      Patient Strengths:  Access to care, Able to verbalize needs, Stable physical health, and Motivation for treatment      Patient Liabilities:  Anxiety, Psychosis, May, and Chronic psychiatric illness      Discharge Criteria:  Improved mood, Improved thought process, Medication compliance, Overall functional improvement, and Decreased anxiety      Reason for Admission:  The patient poses a significant and immediate danger to self., The patient is gravely disabled due to a psychiatric condition., and The psychiatric disorder requires intensive treatment that necessitates 24 hour observation and  care.    ASSESSMENT/PLAN:   Diagnoses:  SCHIZOPHRENIA AND OTHER PSYCHOTIC DISORDERS; Schizoaffective Disorder  F25.0  Intellectual disability      Past Medical History:   Diagnosis Date    Anxiety     Depression     Hallucination     History of psychiatric hospitalization     Hx of psychiatric care     Psychiatric problem     Psychosis     Schizoaffective disorder     Sleep difficulties     Suicide attempt     Therapy           Problem lists and Management Plans:  -Admit to [facility]    Schizoaffective disorder  -Thorazine 25 mg TID  -Lithium 300 mg BID (Will obtain lithium level on 7/20)  -Zoloft 50 mg     -Will attempt to obtain outside psychiatric records if available  - to assist with aftercare planning and collateral  -Continue inpatient treatment as evidenced by significant psychotic thought disorder, danger to self, and suicidal ideation      Estimated length of stay: 5-7    Estimated Disposition: Home    Estimated Follow-up: Outpatient medication management      On this date, I have reviewed the medical history and Nursing Assessment, as well as records from referral source.  I have evaluated the mental status of the above named person and concur with the findings of all assessments.  I have provided medical direction for the development of the Treatment Plan.    I conclude that this patient meets admission criteria for inpatient treatment.  I certify that this patient poses a danger to self or others, or would otherwise be considered gravely disabled based on this assessment and/or provided collateral information.     I have provided medical direction for the development of the Treatment plan.  These services will be provided while this patient is under my care and will be based on an individualized plan of care.  The patient can demonstrate a reasonable expectation of improvement in his/her disorder as a result of the active treatment being provided.      Ankur Rodriguez [facility]

## 2024-07-16 NOTE — PLAN OF CARE
Behavioral Health Unit  Psychosocial History and Assessment  Progress Note      Patient Name: Elvis Puentes YOB: 1985 SW: Tatiana Espinal Date: 7/16/2024    Chief Complaint: suicidal ideation    Consent:     Did the patient consent for an interview with the ? Yes    Did the patient consent for the  to contact family/friend/caregiver?   Yes  Name: Holger Puentes, Relationship: mother, and Contact: [phone]    Did the patient give consent for the  to inform family/friend/caregiver of his/her whereabouts or to discuss discharge planning? Yes    Source of Information: Face to face with patient    Is information obtained from interviews considered reliable?   no    Reason for Admission:     There are no hospital problems to display for this patient.      History of Present Illness - (Patient Perception):   I was feeling very sad and suicidal. I just had recent deaths in my family.    Present biopsychosocial functioning: anxious    Past biopsychosocial functioning: unknown    Family and Marital/Relationship History:     Significant Other/Partner Relationships:  Single:  no children    Family Relationships: Intact      Childhood History:     Where was patient raised? [place], [place]    Who raised the patient? mom      How does patient describe their childhood? It was good, I had a bunch of video games      Who is patient's primary support person? mom      Culture and Evangelical:     Evangelical: Scientology    How strong of a role does Orthodoxy and spirituality play in patient's life? Hyper- Mandaeism    Bahai or spiritual concerns regarding treatment: alternative therapies , observation of holy days , family role identities , and spiritual concerns / distress    History of Abuse:   History of Abuse: Denies      Outcome: denies    Psychiatric and Medical History:     History of psychiatric illness or treatment: prior inpatient treatment, prior suicide  attempt(s), and pt states he goes to the ED to get refills on his medication    Medical history:   Past Medical History:   Diagnosis Date    Anxiety     Depression     Hallucination     History of psychiatric hospitalization     Hx of psychiatric care     Psychiatric problem     Psychosis     Schizoaffective disorder     Sleep difficulties     Suicide attempt     Therapy        Substance Abuse History:     Alcohol - (Patient Perspective): Pt states that he does not drink alcohol  Social History     Substance and Sexual Activity   Alcohol Use Yes    Alcohol/week: 4.0 standard drinks of alcohol    Types: 4 Cans of beer per week    Comment: uses alcohol monthly       Drugs - (Patient Perspective): Pt states that he smokes marijuana a couple times a month  Social History     Substance and Sexual Activity   Drug Use Not Currently    Types: Marijuana       Education:     Currently Enrolled? No  High School (9-12) or GED    Special Education? No    Interested in Completing Education/GED: No    Employment and Financial:     Currently employed? Disabled    Source of Income: SSD    Able to afford basic needs (food, shelter, utilities)? Yes    Who manages finances/personal affairs? mother      Service:     ? no    Combat Service? No     Community Resources:     Describe present use of community resources: inpatient mental health     Identify previously used community resources   (Include previous mental health treatment - outpatient and inpatient): inpatient mental health, emergency department    Environmental:     Current living situation:lives with mother    Social Evaluation:     Patient Assets: General fund of knowledge and Supportive family/friends    Patient Limitations: poor coping skills, limited cognition    High risk psychosocial issues that may impact discharge planning:   None at this time    Recommendations:     Anticipated discharge plan:   outpatient follow up  [address] [place]  [place]    High risk issues requiring early treatment planning and immediate intervention: none at this time    Community resources needed for discharge planning:  aftercare treatment sources    Anticipated social work role(s) in treatment and discharge planning: advice and [place], groups, individual as needed and referral to aftercare.    07/16/24 1227   Initial Information   Source of Information patient   Reason for Admission Suicidal Ideation   Patient Aware of Diagnosis yes   Arrived From emergency department   Spiritual Beliefs   Spiritual, Cultural Beliefs, [place] Practices, Values that Affect Care yes   Substance Use/Withdrawal   Substance Use Current, used prior to admission   Additional Tobacco Use   How many cigarettes do you typically have per day? 20   Abuse Screen (yes response referral indicated)   Feels Unsafe at Home or Work/School no   Feels Threatened by Someone no   Does anyone try to keep you from having contact with others or doing things outside your home? no   Physical Signs of Abuse Present no   Abuse Details   Physical Abuse No   Sexual Abuse No   Emotional Abuse No   If applicable, has the abuse been reported? No   AUDIT-C (Alcohol Use Disorders ID Test)   Alcohol Use In Past Year 0-->never   Alcohol Amount Per Day In Past Year 0-->none   More Than 6 Drinks On One Occasion In Past Year 0-->never   Total Audit C Score 0

## 2024-07-16 NOTE — PLAN OF CARE
Problem: Psychotic Signs/Symptoms  Goal: Improved Behavioral Control (Psychotic Signs/Symptoms)  Outcome: Not Progressing  Flowsheets (Taken 7/16/2024 0808)  Mutually Determined Action Steps (Improved Behavioral Control): identifies symptoms triggers  Intervention: Manage Behavior  Flowsheets (Taken 7/16/2024 0808)  De-Escalation Techniques: quiet time facilitated     Problem: Psychotic Signs/Symptoms  Goal: Improved Behavioral Control (Psychotic Signs/Symptoms)  Intervention: Manage Behavior  Flowsheets (Taken 7/16/2024 0808)  De-Escalation Techniques: quiet time facilitated     Problem: Psychotic Signs/Symptoms  Goal: Optimal Cognitive Function (Psychotic Signs/Symptoms)  Outcome: Not Progressing  Flowsheets (Taken 7/16/2024 0808)  Mutually Determined Action Steps (Optimal Cognitive Function): participates in attention training  Intervention: Support and Promote Cognitive Ability  Flowsheets (Taken 7/16/2024 0808)  Trust Relationship/Rapport: care explained     Problem: Psychotic Signs/Symptoms  Goal: Optimal Cognitive Function (Psychotic Signs/Symptoms)  Intervention: Support and Promote Cognitive Ability  Flowsheets (Taken 7/16/2024 0808)  Trust Relationship/Rapport: care explained     Problem: Psychotic Signs/Symptoms  Goal: Increased Participation and Engagement (Psychotic Signs/Symptoms)  Outcome: Not Progressing  Flowsheets (Taken 7/16/2024 0808)  Mutually Determined Action Steps (Increased Participation and Engagement): identifies symptoms triggers  Intervention: Facilitate Participation and Engagement  Flowsheets (Taken 7/16/2024 0808)  Supportive Measures: self-care encouraged  Diversional Activity: television     Problem: Psychotic Signs/Symptoms  Goal: Increased Participation and Engagement (Psychotic Signs/Symptoms)  Intervention: Facilitate Participation and Engagement  Flowsheets (Taken 7/16/2024 0808)  Supportive Measures: self-care encouraged  Diversional Activity: television     Problem:  Psychotic Signs/Symptoms  Goal: Improved Mood Symptoms (Psychotic Signs/Symptoms)  Outcome: Not Progressing  Flowsheets (Taken 7/16/2024 0808)  Mutually Determined Action Steps (Improved Mood Symptoms): acknowledges progress  Intervention: Optimize Emotion and Mood  Flowsheets (Taken 7/16/2024 0808)  Supportive Measures: self-care encouraged  Diversional Activity: television     Problem: Psychotic Signs/Symptoms  Goal: Improved Mood Symptoms (Psychotic Signs/Symptoms)  Intervention: Optimize Emotion and Mood  Flowsheets (Taken 7/16/2024 0808)  Supportive Measures: self-care encouraged  Diversional Activity: television     Problem: Psychotic Signs/Symptoms  Goal: Improved Psychomotor Symptoms (Psychotic Signs/Symptoms)  Outcome: Not Progressing  Flowsheets (Taken 7/16/2024 0808)  Mutually Determined Action Steps (Improved Psychomotor Symptoms): adheres to medication regimen  Intervention: Manage Psychomotor Movement  Flowsheets (Taken 7/16/2024 0808)  Activity (Behavioral Health): up ad fern  Patient Performed Hygiene: teeth brushed  Diversional Activity: television     Problem: Psychotic Signs/Symptoms  Goal: Improved Psychomotor Symptoms (Psychotic Signs/Symptoms)  Intervention: Manage Psychomotor Movement  Flowsheets (Taken 7/16/2024 0808)  Activity (Behavioral Health): up ad fern  Patient Performed Hygiene: teeth brushed  Diversional Activity: television     Problem: Psychotic Signs/Symptoms  Goal: Decreased Sensory Symptoms (Psychotic Signs/Symptoms)  Outcome: Not Progressing  Flowsheets (Taken 7/16/2024 0808)  Mutually Determined Action Steps (Decreased Sensory Symptoms): adheres to medication regimen  Intervention: Minimize and Manage Sensory Impairment  Flowsheets (Taken 7/16/2024 0808)  Sensory Stimulation Regulation: quiet environment promoted     Problem: Psychotic Signs/Symptoms  Goal: Decreased Sensory Symptoms (Psychotic Signs/Symptoms)  Intervention: Minimize and Manage Sensory Impairment  Flowsheets  (Taken 7/16/2024 0808)  Sensory Stimulation Regulation: quiet environment promoted     Problem: Psychotic Signs/Symptoms  Goal: Improved Sleep (Psychotic Signs/Symptoms)  Outcome: Not Progressing  Flowsheets (Taken 7/16/2024 0808)  Mutually Determined Action Steps (Improved Sleep): sleeps 4-6 hours at night  Intervention: Promote Healthy Sleep Hygiene  Flowsheets (Taken 7/16/2024 0808)  Sleep Hygiene Promotion: regular sleep pattern promoted     Problem: Psychotic Signs/Symptoms  Goal: Improved Sleep (Psychotic Signs/Symptoms)  Intervention: Promote Healthy Sleep Hygiene  Flowsheets (Taken 7/16/2024 0808)  Sleep Hygiene Promotion: regular sleep pattern promoted     Problem: Psychotic Signs/Symptoms  Goal: Enhanced Social, Occupational or Functional Skills (Psychotic Signs/Symptoms)  Outcome: Not Progressing  Flowsheets (Taken 7/16/2024 0808)  Mutually Determined Action Steps (Enhanced Social, Occupational or Functional Skills): participates in social skills training  Intervention: Promote Social, Occupational and Functional Ability  Flowsheets (Taken 7/16/2024 0808)  Trust Relationship/Rapport: care explained  Social Functional Ability Promotion: autonomy promoted     Problem: Psychotic Signs/Symptoms  Goal: Enhanced Social, Occupational or Functional Skills (Psychotic Signs/Symptoms)  Intervention: Promote Social, Occupational and Functional Ability  Flowsheets (Taken 7/16/2024 0808)  Trust Relationship/Rapport: care explained  Social Functional Ability Promotion: autonomy promoted

## 2024-07-16 NOTE — PROGRESS NOTES
07/16/24 [address] [place] [address] Group Therapy   Group Name Therapeutic Recreation   Specific Interventions Skilled Activity Creative Expression   Participation Level Active;Supportive;Attentive   Participation Quality Cooperative;Requires Prompting;Needs Redirection   Insight/Motivation Limited   Affect/Mood Display Impulsive;Restless  (intrusive)   Cognition Pre-Occupied   Psychomotor WNL

## 2024-07-17 PROBLEM — R45.851 SUICIDAL IDEATION: Status: ACTIVE | Noted: 2024-07-17

## 2024-07-17 PROBLEM — N28.9 RENAL INSUFFICIENCY: Status: ACTIVE | Noted: 2024-07-17

## 2024-07-17 LAB
ALBUMIN SERPL-MCNC: 4.6 G/DL (ref 3.5–5)
ALBUMIN/GLOB SERPL: 1.2 RATIO (ref 1.1–2)
ALP SERPL-CCNC: 99 UNIT/L (ref 40–150)
ALT SERPL-CCNC: 34 UNIT/L (ref 0–55)
ANION GAP SERPL CALC-SCNC: 9 MEQ/L
AST SERPL-CCNC: 25 UNIT/L (ref 5–34)
BASOPHILS # BLD AUTO: 0.08 X10(3)/MCL
BASOPHILS NFR BLD AUTO: 1 %
BILIRUB SERPL-MCNC: 0.8 MG/DL
BUN SERPL-MCNC: 9 MG/DL (ref 8.9–20.6)
CALCIUM SERPL-MCNC: 10.6 MG/DL (ref 8.4–10.2)
CHLORIDE SERPL-SCNC: 105 MMOL/L (ref 98–107)
CHOLEST SERPL-MCNC: 187 MG/DL
CHOLEST/HDLC SERPL: 3 {RATIO} (ref 0–5)
CO2 SERPL-SCNC: 27 MMOL/L (ref 22–29)
CREAT SERPL-MCNC: 1.37 MG/DL (ref 0.73–1.18)
CREAT/UREA NIT SERPL: 7
EOSINOPHIL # BLD AUTO: 0.66 X10(3)/MCL (ref 0–0.9)
EOSINOPHIL NFR BLD AUTO: 8 %
ERYTHROCYTE [DISTWIDTH] IN BLOOD BY AUTOMATED COUNT: 14.2 % (ref 11.5–17)
EST. AVERAGE GLUCOSE BLD GHB EST-MCNC: 102.5 MG/DL
GFR SERPLBLD CREATININE-BSD FMLA CKD-EPI: >60 ML/MIN/1.73/M2
GLOBULIN SER-MCNC: 3.7 GM/DL (ref 2.4–3.5)
GLUCOSE SERPL-MCNC: 84 MG/DL (ref 74–100)
HBA1C MFR BLD: 5.2 %
HCT VFR BLD AUTO: 52 % (ref 42–52)
HDLC SERPL-MCNC: 64 MG/DL (ref 35–60)
HGB BLD-MCNC: 17.1 G/DL (ref 14–18)
IMM GRANULOCYTES # BLD AUTO: 0.03 X10(3)/MCL (ref 0–0.04)
IMM GRANULOCYTES NFR BLD AUTO: 0.4 %
LDLC SERPL CALC-MCNC: 111 MG/DL (ref 50–140)
LITHIUM SERPL-MCNC: 0.61 MMOL/L (ref 0.5–1.2)
LYMPHOCYTES # BLD AUTO: 2.14 X10(3)/MCL (ref 0.6–4.6)
LYMPHOCYTES NFR BLD AUTO: 26.1 %
MCH RBC QN AUTO: 29.2 PG (ref 27–31)
MCHC RBC AUTO-ENTMCNC: 32.9 G/DL (ref 33–36)
MCV RBC AUTO: 88.7 FL (ref 80–94)
MONOCYTES # BLD AUTO: 0.62 X10(3)/MCL (ref 0.1–1.3)
MONOCYTES NFR BLD AUTO: 7.6 %
NEUTROPHILS # BLD AUTO: 4.68 X10(3)/MCL (ref 2.1–9.2)
NEUTROPHILS NFR BLD AUTO: 56.9 %
NRBC BLD AUTO-RTO: 0 %
PLATELET # BLD AUTO: 318 X10(3)/MCL (ref 130–400)
PMV BLD AUTO: 10.8 FL (ref 7.4–10.4)
POTASSIUM SERPL-SCNC: 4.7 MMOL/L (ref 3.5–5.1)
PROT SERPL-MCNC: 8.3 GM/DL (ref 6.4–8.3)
RBC # BLD AUTO: 5.86 X10(6)/MCL (ref 4.7–6.1)
SODIUM SERPL-SCNC: 141 MMOL/L (ref 136–145)
T PALLIDUM AB SER QL: NONREACTIVE
TRIGL SERPL-MCNC: 58 MG/DL (ref 34–140)
TSH SERPL-ACNC: 1.02 UIU/ML (ref 0.35–4.94)
VLDLC SERPL CALC-MCNC: 12 MG/DL
WBC # BLD AUTO: 8.21 X10(3)/MCL (ref 4.5–11.5)

## 2024-07-17 PROCEDURE — 11400000 HC PSYCH PRIVATE ROOM

## 2024-07-17 PROCEDURE — 25000003 PHARM REV CODE 250: Performed by: PSYCHIATRY & NEUROLOGY

## 2024-07-17 PROCEDURE — 80178 ASSAY OF LITHIUM: CPT

## 2024-07-17 PROCEDURE — 80061 LIPID PANEL: CPT | Performed by: PSYCHIATRY & NEUROLOGY

## 2024-07-17 PROCEDURE — 83036 HEMOGLOBIN GLYCOSYLATED A1C: CPT | Performed by: PSYCHIATRY & NEUROLOGY

## 2024-07-17 PROCEDURE — S4991 NICOTINE PATCH NONLEGEND: HCPCS | Performed by: PSYCHIATRY & NEUROLOGY

## 2024-07-17 PROCEDURE — 86780 TREPONEMA PALLIDUM: CPT | Performed by: PSYCHIATRY & NEUROLOGY

## 2024-07-17 PROCEDURE — 25000003 PHARM REV CODE 250

## 2024-07-17 PROCEDURE — 85025 COMPLETE CBC W/AUTO DIFF WBC: CPT | Performed by: PSYCHIATRY & NEUROLOGY

## 2024-07-17 PROCEDURE — 84443 ASSAY THYROID STIM HORMONE: CPT | Performed by: PSYCHIATRY & NEUROLOGY

## 2024-07-17 PROCEDURE — 80053 COMPREHEN METABOLIC PANEL: CPT | Performed by: PSYCHIATRY & NEUROLOGY

## 2024-07-17 RX ORDER — MIRTAZAPINE 7.5 MG/1
7.5 TABLET, FILM COATED ORAL NIGHTLY
Status: DISCONTINUED | OUTPATIENT
Start: 2024-07-17 | End: 2024-07-19 | Stop reason: HOSPADM

## 2024-07-17 RX ADMIN — HYDROXYZINE HYDROCHLORIDE 50 MG: 50 TABLET, FILM COATED ORAL at 08:07

## 2024-07-17 RX ADMIN — CHLORPROMAZINE HYDROCHLORIDE 25 MG: 25 TABLET, FILM COATED ORAL at 03:07

## 2024-07-17 RX ADMIN — NICOTINE 1 PATCH: 21 PATCH, EXTENDED RELEASE TRANSDERMAL at 08:07

## 2024-07-17 RX ADMIN — LITHIUM CARBONATE 300 MG: 300 TABLET, FILM COATED, EXTENDED RELEASE ORAL at 08:07

## 2024-07-17 RX ADMIN — CHLORPROMAZINE HYDROCHLORIDE 25 MG: 25 TABLET, FILM COATED ORAL at 08:07

## 2024-07-17 RX ADMIN — SERTRALINE HYDROCHLORIDE 50 MG: 50 TABLET ORAL at 08:07

## 2024-07-17 RX ADMIN — TRAZODONE HYDROCHLORIDE 100 MG: 100 TABLET ORAL at 08:07

## 2024-07-17 RX ADMIN — MIRTAZAPINE 7.5 MG: 7.5 TABLET, FILM COATED ORAL at 08:07

## 2024-07-17 NOTE — PROGRESS NOTES
"7/17/2024  Elvis Puentes   1985   [id number]        Psychiatry Progress Note     Chief Complaint: "I'm all right"    SUBJECTIVE:   lEvis Puentes is a 38 y.o. male placed under a PEC at [facility] for suicidal thoughts. Patient has a long history of schizoaffective disorder.     Sexually inappropriate, poor boundaries, intrusive.  Told staff that he uses the inpatient unit to refill his medications.  Staff states that he has been religiously preoccupied.  Patient states he had recent deaths in his family and has been depressed.  Reports poor appetite and poor sleep.  We discussed discontinuing Zolof tand starting Remeron, to which he is amenable.  Will make this adjustment and will monitor progress.    UDS: (+)cannabis  Blood alcohol: <10  Lithium: 0.29    Current Medications:   Scheduled Meds:    chlorproMAZINE  25 mg Oral TID    lithium  300 mg Oral Q12H    nicotine  1 patch Transdermal Daily    sertraline  50 mg Oral Daily      PRN Meds:   Current Facility-Administered Medications:     acetaminophen, 650 mg, Oral, Q6H PRN    aluminum-magnesium hydroxide-simethicone, 30 mL, Oral, Q6H PRN    diphenhydrAMINE, 50 mg, Oral, Q4H PRN **AND** LORazepam, 2 mg, Oral, Q4H PRN **AND** diphenhydrAMINE, 50 mg, Intramuscular, Q4H PRN **AND** lorazepam, 2 mg, Intramuscular, Q4H PRN    hydrOXYzine HCL, 50 mg, Oral, Q4H PRN    traZODone, 100 mg, Oral, Nightly PRN   Psychotherapeutics (From admission, onward)      Start     Stop Route Frequency Ordered    07/16/24 0930  lithium CR tablet 300 mg         -- Oral Every 12 hours 07/16/24 0823    07/16/24 0930  chlorproMAZINE tablet 25 mg         -- Oral 3 times daily 07/16/24 0823 07/16/24 0900  sertraline tablet 50 mg         -- Oral Daily 07/16/24 0823 07/16/24 0706  LORazepam tablet 2 mg  (Med - Acute  Behavioral Management)        Placed in "And" Linked Group    -- Oral Every 4 hours PRN 07/16/24 0706 07/16/24 0706  LORazepam injection 2 mg  (Med - Acute  " "Behavioral Management)        Placed in "And" Linked Group    -- IM Every 4 hours PRN 07/16/24 0706    07/16/24 0706  traZODone tablet 100 mg         -- Oral Nightly PRN 07/16/24 0706            Allergies:   Review of patient's allergies indicates:   Allergen Reactions    Haloperidol Swelling     Muscle stiffness  Has muscle spasms      Paroxetine Swelling, Other (See Comments) and Rash     "i don't remember"  "i don't remember"      Pineapple      Face swells up  Face swells up      Ziprasidone Other (See Comments)     Muscle twitching        OBJECTIVE:   Vitals   Vitals:    07/16/24 1600   BP: 133/75   Pulse: 70   Resp: 18   Temp: 97.9 °F (36.6 °C)        Labs/Imaging/Studies:   Recent Results (from the past 36 hour(s))   Urinalysis, Reflex to Urine Culture    Collection Time: 07/16/24  3:16 AM    Specimen: Urine   Result Value Ref Range    Color, UA Straw Yellow, Light-Yellow, Dark Yellow, [place], Straw    Appearance, UA Clear Clear    Specific Gravity, UA 1.015 1.005 - 1.030    pH, UA 6.5 5.0 - 8.5    Protein, UA Negative Negative    Glucose, UA Negative Negative, Normal    Ketones, UA Negative Negative    Blood, UA Negative Negative    Bilirubin, UA Negative Negative    Urobilinogen, UA 0.2 0.2, 1.0, Normal    Nitrites, UA Negative Negative    Leukocyte Esterase, UA Negative Negative   Ethanol    Collection Time: 07/16/24  3:16 AM   Result Value Ref Range    Ethanol Level <10.0 <=10.0 mg/dL   Acetaminophen level    Collection Time: 07/16/24  3:16 AM   Result Value Ref Range    Acetaminophen Level <10.0 (L) 10.0 - 30.0 ug/ml   Comprehensive metabolic panel    Collection Time: 07/16/24  3:16 AM   Result Value Ref Range    Sodium 138 136 - 145 mmol/L    Potassium 3.7 3.5 - 5.1 mmol/L    Chloride 102 98 - 107 mmol/L    CO2 28 22 - 29 mmol/L    Glucose 108 (H) 74 - 100 mg/dL    Blood Urea Nitrogen 9.7 8.9 - 20.6 mg/dL    Creatinine 1.20 (H) 0.73 - 1.18 mg/dL    Calcium 9.7 8.4 - 10.2 mg/dL    Protein Total 7.8 6.4 - " 8.3 gm/dL    Albumin 4.2 3.5 - 5.0 g/dL    Globulin 3.6 (H) 2.4 - 3.5 gm/dL    Albumin/Globulin Ratio 1.2 1.1 - 2.0 ratio    Bilirubin Total 0.4 <=1.5 mg/dL    ALP 89 40 - 150 unit/L    ALT 30 0 - 55 unit/L    AST 23 5 - 34 unit/L    eGFR >60 mL/min/1.73/m2    Anion Gap 8.0 mEq/L    BUN/Creatinine Ratio 8    TSH    Collection Time: 07/16/24  3:16 AM   Result Value Ref Range    TSH 4.863 0.350 - 4.940 uIU/mL   COVID/FLU A&B PCR    Collection Time: 07/16/24  3:16 AM   Result Value Ref Range    Influenza A PCR Not Detected Not Detected    Influenza B PCR Not Detected Not Detected    SARS-CoV-2 PCR Not Detected Not Detected, Negative   Drug Screen, Urine    Collection Time: 07/16/24  3:16 AM   Result Value Ref Range    Amphetamines, Urine Negative Negative    Barbiturates, Urine Negative Negative    Benzodiazepine, Urine Negative Negative    Cannabinoids, Urine Positive (A) Negative    Cocaine, Urine Negative Negative    Fentanyl, Urine Negative Negative    MDMA, Urine Negative Negative    Opiates, Urine Negative Negative    Phencyclidine, Urine Negative Negative    pH, Urine 6.5 3.0 - 11.0    Specific Gravity, Urine Auto 1.015 1.001 - 1.035   Salicylate Level    Collection Time: 07/16/24  3:16 AM   Result Value Ref Range    Salicylate Level <5.0 (L) 15.0 - 30.0 mg/dL   CBC with Differential    Collection Time: 07/16/24  3:16 AM   Result Value Ref Range    WBC 11.80 (H) 4.50 - 11.50 x10(3)/mcL    RBC 5.17 4.70 - 6.10 x10(6)/mcL    Hgb 15.5 14.0 - 18.0 g/dL    Hct 46.1 42.0 - 52.0 %    MCV 89.2 80.0 - 94.0 fL    MCH 30.0 27.0 - 31.0 pg    MCHC 33.6 33.0 - 36.0 g/dL    RDW 14.0 11.5 - 17.0 %    Platelet 292 130 - 400 x10(3)/mcL    MPV 10.5 (H) 7.4 - 10.4 fL    Neut % 54.0 %    Lymph % 32.7 %    Mono % 6.4 %    Eos % 5.8 %    Basophil % 0.8 %    Lymph # 3.86 0.6 - 4.6 x10(3)/mcL    Neut # 6.37 2.1 - 9.2 x10(3)/mcL    Mono # 0.75 0.1 - 1.3 x10(3)/mcL    Eos # 0.69 0 - 0.9 x10(3)/mcL    Baso # 0.10 <=0.2 x10(3)/mcL    IG#  0.03 0 - 0.04 x10(3)/mcL    IG% 0.3 %    NRBC% 0.0 %   Lithium Level    Collection Time: 07/16/24  3:16 AM   Result Value Ref Range    Lithium Level 0.29 (L) 0.50 - 1.20 mmol/L   Hemoglobin A1C    Collection Time: 07/17/24  7:04 AM   Result Value Ref Range    Hemoglobin A1c 5.2 <=7.0 %    Estimated Average Glucose 102.5 mg/dL   TSH    Collection Time: 07/17/24  7:04 AM   Result Value Ref Range    TSH 1.017 0.350 - 4.940 uIU/mL   Comprehensive Metabolic Panel    Collection Time: 07/17/24  7:04 AM   Result Value Ref Range    Sodium 141 136 - 145 mmol/L    Potassium 4.7 3.5 - 5.1 mmol/L    Chloride 105 98 - 107 mmol/L    CO2 27 22 - 29 mmol/L    Glucose 84 74 - 100 mg/dL    Blood Urea Nitrogen 9.0 8.9 - 20.6 mg/dL    Creatinine 1.37 (H) 0.73 - 1.18 mg/dL    Calcium 10.6 (H) 8.4 - 10.2 mg/dL    Protein Total 8.3 6.4 - 8.3 gm/dL    Albumin 4.6 3.5 - 5.0 g/dL    Globulin 3.7 (H) 2.4 - 3.5 gm/dL    Albumin/Globulin Ratio 1.2 1.1 - 2.0 ratio    Bilirubin Total 0.8 <=1.5 mg/dL    ALP 99 40 - 150 unit/L    ALT 34 0 - 55 unit/L    AST 25 5 - 34 unit/L    eGFR >60 mL/min/1.73/m2    Anion Gap 9.0 mEq/L    BUN/Creatinine Ratio 7    Lipid Panel    Collection Time: 07/17/24  7:04 AM   Result Value Ref Range    Cholesterol Total 187 <=200 mg/dL    HDL Cholesterol 64 (H) 35 - 60 mg/dL    Triglyceride 58 34 - 140 mg/dL    Cholesterol/HDL Ratio 3 0 - 5    Very Low Density Lipoprotein 12     LDL Cholesterol 111.00 50.00 - 140.00 mg/dL   CBC with Differential    Collection Time: 07/17/24  7:04 AM   Result Value Ref Range    WBC 8.21 4.50 - 11.50 x10(3)/mcL    RBC 5.86 4.70 - 6.10 x10(6)/mcL    Hgb 17.1 14.0 - 18.0 g/dL    Hct 52.0 42.0 - 52.0 %    MCV 88.7 80.0 - 94.0 fL    MCH 29.2 27.0 - 31.0 pg    MCHC 32.9 (L) 33.0 - 36.0 g/dL    RDW 14.2 11.5 - 17.0 %    Platelet 318 130 - 400 x10(3)/mcL    MPV 10.8 (H) 7.4 - 10.4 fL    Neut % 56.9 %    Lymph % 26.1 %    Mono % 7.6 %    Eos % 8.0 %    Basophil % 1.0 %    Lymph # 2.14 0.6 - 4.6  "x10(3)/mcL    Neut # 4.68 2.1 - 9.2 x10(3)/mcL    Mono # 0.62 0.1 - 1.3 x10(3)/mcL    Eos # 0.66 0 - 0.9 x10(3)/mcL    Baso # 0.08 <=0.2 x10(3)/mcL    IG# 0.03 0 - 0.04 x10(3)/mcL    IG% 0.4 %    NRBC% 0.0 %          Medical Review Of Systems:  Constitutional: negative  Respiratory: negative  Cardiovascular: negative  Gastrointestinal: negative  Genitourinary:negative  Musculoskeletal:negative  Neurological: negative       Psychiatric Mental Status Exam:  General Appearance: appears stated age, well-developed, well-nourished  Arousal: alert  Behavior: cooperative  Movements and Motor Activity: no abnormal involuntary movements noted  Orientation: oriented to person, place, time, and situation  Speech: normal rate, normal rhythm, normal volume, normal tone  Mood: "Depressed"  Affect: constricted  Thought Process: perseverative  Associations: fair  Thought Content and Perceptions: recent suicidal ideation, hypersexual, no homicidal ideation  Recent and Remote Memory: recent memory intact, remote memory intact; per interview/observation with patient  Attention and Concentration: intact, attentive to conversation; per interview/observation with patient  Fund of Knowledge: intact, aware of current events, vocabulary appropriate; based on history, vocabulary, fund of knowledge, syntax, grammar, and content  Insight: questionable; based on understanding of severity of illness and HPI  Judgment: questionable; based on patient's behavior and HPI     ASSESSMENT/PLAN:   Problems Addressed/Diagnoses:  Schizoaffective Disorder, bipolar type (F25.0)  Intellectual Disability (F79)    Past Medical History:   Diagnosis Date    Anxiety     Depression     Hallucination     History of psychiatric hospitalization     Hx of psychiatric care     Psychiatric problem     Psychosis     Schizoaffective disorder     Sleep difficulties     Suicide attempt     Therapy         Plan:  Schizoaffective disorder, chronic with acute " exacerbation  -Continue Thorazine  -Continue Lithium  -D/c Zoloft  -Remeron 7.5mg HS      Intellectual disability, chronic  -Group/Individual psychotherapy     Expected Disposition Plan: Home        Chai Jackson M.D.

## 2024-07-17 NOTE — NURSING
Patient came to the nurse's station and demanded that he get a shot to sleep tonight. I told him that he has trazodone for sleep and will get that at bedtime. He told me well I will start throwing chairs at people tonight and then maybe ya'll will given me a shot. I informed him that if he throws chairs at staff or other patients we will have to call the doctor and the police due to not allowing threatening or dangerous situations for patients or staff. He just walked away. About 10 minutes later the guard came report that the patient said go ahead and call the  on him but he is not going to throw chairs. Patient walked by window then walked off no incident at that time. Reported to Heriberto Carpenter charge nurse what he had said. Will continue to monitor

## 2024-07-17 NOTE — PLAN OF CARE
Problem: Adult Behavioral Health Plan of Care  Goal: Plan of Care Review  Outcome: Progressing  Flowsheets (Taken 7/17/2024 1037)  Patient Agreement with Plan of Care: agrees  Plan of Care Reviewed With: patient  Goal: Patient-Specific Goal (Individualization)  Outcome: Progressing  Flowsheets (Taken 7/17/2024 1037)  Patient Personal Strengths: independent living skills  Patient Vulnerabilities: limited social skills  Goal: Adheres to Safety Considerations for Self and Others  Outcome: Progressing  Flowsheets (Taken 7/17/2024 1037)  Adheres to Safety Considerations for Self and Others: making progress toward outcome  Intervention: Develop and Maintain Individualized Safety Plan  Flowsheets (Taken 7/17/2024 1037)  Safety Measures: safety rounds completed  Goal: Absence of New-Onset Illness or Injury  Outcome: Progressing  Intervention: Identify and Manage Fall Risk  Flowsheets (Taken 7/17/2024 1037)  Safety Measures: safety rounds completed  Intervention: Prevent VTE (Venous Thromboembolism)  Flowsheets (Taken 7/17/2024 1037)  VTE Prevention/Management: fluids promoted  Intervention: Prevent Infection  Flowsheets (Taken 7/17/2024 1037)  Infection Prevention: hand hygiene promoted  Goal: Optimized Coping Skills in Response to Life Stressors  Outcome: Progressing  Flowsheets (Taken 7/17/2024 1037)  Optimized Coping Skills in Response to Life Stressors: making progress toward outcome  Intervention: Promote Effective Coping Strategies  Flowsheets (Taken 7/17/2024 1037)  Supportive Measures: self-care encouraged  Goal: Develops/Participates in Therapeutic Grant to Support Successful Transition  Outcome: Progressing  Flowsheets (Taken 7/17/2024 1037)  Develops/Participates in Therapeutic Grant to Support Successful Transition: making progress toward outcome  Intervention: Foster Therapeutic Grant  Flowsheets (Taken 7/17/2024 1037)  Trust Relationship/Rapport: care explained  Goal: Rounds/Family  Conference  Outcome: Progressing  Flowsheets (Taken 7/17/2024 1037)  Participants:   milieu/psych techs   nursing     Problem: Violence Risk or Actual  Goal: Anger and Impulse Control  Outcome: Progressing  Intervention: Minimize Safety Risk  Flowsheets (Taken 7/17/2024 1037)  Behavior Management: behavioral plan reviewed  Sensory Stimulation Regulation: quiet environment promoted  De-Escalation Techniques: quiet time facilitated  Intervention: Promote Self-Control  Flowsheets (Taken 7/17/2024 1037)  Supportive Measures: self-care encouraged  Environmental Support: calm environment promoted     Problem: Suicide Risk  Goal: Absence of Self-Harm  Outcome: Progressing  Intervention: Assess Risk to Self and Maintain Safety  Flowsheets (Taken 7/17/2024 1037)  Behavior Management: behavioral plan reviewed  Self-Harm Prevention: environmental self-harm risks assessed  Intervention: Promote Psychosocial Wellbeing  Flowsheets (Taken 7/17/2024 1037)  Sleep/Rest Enhancement: regular sleep/rest pattern promoted  Supportive Measures: self-care encouraged  Family/Support System Care: self-care encouraged  Intervention: Establish Safety Plan and Continuity of Care  Flowsheets (Taken 7/17/2024 1037)  Safe Transition Promotion: access to lethal means addressed     Problem: Depressive Signs/Symptoms  Goal: Optimized Energy Level (Depressive Signs/Symptoms)  Outcome: Progressing  Flowsheets (Taken 7/17/2024 1037)  Mutually Determined Action Steps (Optimized Energy Level): grooms self without prompting  Intervention: Optimize Energy Level  Flowsheets (Taken 7/17/2024 1037)  Activity (Behavioral Health): up ad fern  Patient Performed Hygiene: teeth brushed  Diversional Activity: television  Goal: Optimized Cognitive Function (Depressive Signs/Symptoms)  Outcome: Progressing  Flowsheets (Taken 7/17/2024 1037)  Mutually Determined Action Steps (Optimized Cognitive Function): participates in attention training  Goal: Increased Participation  and Engagement (Depressive Signs/Symptoms)  Outcome: Progressing  Flowsheets (Taken 7/17/2024 1037)  Mutually Determined Action Steps (Increased Participation and Engagement): identifies alternatives to withdrawal  Intervention: Facilitate Participation and Engagement  Flowsheets (Taken 7/17/2024 1037)  Supportive Measures: self-care encouraged  Diversional Activity: television  Goal: Enhanced Self-Esteem and Confidence (Depressive Signs/Symptoms)  Outcome: Progressing  Flowsheets (Taken 7/17/2024 1037)  Mutually Determined Action Steps (Enhanced Self-Esteem and Confidence): identifies judgmental thoughts  Intervention: Promote Confidence and Self-Esteem  Flowsheets (Taken 7/17/2024 1037)  Supportive Measures: self-care encouraged  Goal: Improved Mood Symptoms (Depressive Signs/Symptoms)  Outcome: Progressing  Flowsheets (Taken 7/17/2024 1037)  Mutually Determined Action Steps (Improved Mood Symptoms): acknowledges progress  Intervention: Promote Mood Improvement  Flowsheets (Taken 7/17/2024 1037)  Supportive Measures: self-care encouraged  Diversional Activity: television  Goal: Optimized Nutrition Intake (Depressive Signs/Symptoms)  Outcome: Progressing  Flowsheets (Taken 7/17/2024 1037)  Mutually Determined Action Steps (Optimized Nutrition Intake): eats at meal time  Intervention: Promote Optimal Nutrition Intake  Flowsheets (Taken 7/17/2024 1037)  Nutrition Interventions: food preferences provided  Bowel Elimination Promotion: adequate fluid intake promoted  Goal: Improved Psychomotor Symptoms (Depressive Signs/Symptoms)  Outcome: Progressing  Flowsheets (Taken 7/17/2024 1037)  Mutually Determined Action Steps (Improved Psychomotor Symptoms): adheres to medication regimen  Intervention: Manage Psychomotor Movement  Flowsheets (Taken 7/17/2024 1037)  Activity (Behavioral Health): up ad fern  Patient Performed Hygiene: teeth brushed  Diversional Activity: television  Goal: Improved Sleep (Depressive  Signs/Symptoms)  Outcome: Progressing  Flowsheets (Taken 7/17/2024 1037)  Mutually Determined Action Steps (Improved Sleep): sleeps 4-6 hours at night  Intervention: Promote Healthy Sleep Hygiene  Flowsheets (Taken 7/17/2024 1037)  Sleep Hygiene Promotion: regular sleep pattern promoted  Goal: Enhanced Social, Occupational or Functional Skills (Depressive Signs/Symptoms)  Outcome: Progressing  Flowsheets (Taken 7/17/2024 1037)  Mutually Determined Action Steps (Enhanced Social, Occupational or Functional Skills): participates in social skills training  Intervention: Promote Social, Occupational and Functional Ability  Flowsheets (Taken 7/17/2024 1037)  Social Functional Ability Promotion: autonomy promoted     Problem: Psychotic Signs/Symptoms  Goal: Improved Behavioral Control (Psychotic Signs/Symptoms)  Outcome: Progressing  Flowsheets (Taken 7/17/2024 1037)  Mutually Determined Action Steps (Improved Behavioral Control): identifies symptoms triggers  Intervention: Manage Behavior  Flowsheets (Taken 7/17/2024 1037)  De-Escalation Techniques: quiet time facilitated  Goal: Optimal Cognitive Function (Psychotic Signs/Symptoms)  Outcome: Progressing  Flowsheets (Taken 7/17/2024 1037)  Mutually Determined Action Steps (Optimal Cognitive Function): participates in attention training  Intervention: Support and Promote Cognitive Ability  Flowsheets (Taken 7/17/2024 1037)  Trust Relationship/Rapport: care explained  Goal: Increased Participation and Engagement (Psychotic Signs/Symptoms)  Outcome: Progressing  Flowsheets (Taken 7/17/2024 1037)  Mutually Determined Action Steps (Increased Participation and Engagement): identifies symptoms triggers  Intervention: Facilitate Participation and Engagement  Flowsheets (Taken 7/17/2024 1037)  Supportive Measures: self-care encouraged  Diversional Activity: television  Goal: Improved Mood Symptoms (Psychotic Signs/Symptoms)  Outcome: Progressing  Flowsheets (Taken 7/17/2024  1037)  Mutually Determined Action Steps (Improved Mood Symptoms): acknowledges progress  Intervention: Optimize Emotion and Mood  Flowsheets (Taken 7/17/2024 1037)  Supportive Measures: self-care encouraged  Diversional Activity: television  Goal: Improved Psychomotor Symptoms (Psychotic Signs/Symptoms)  Outcome: Progressing  Flowsheets (Taken 7/17/2024 1037)  Mutually Determined Action Steps (Improved Psychomotor Symptoms): adheres to medication regimen  Intervention: Manage Psychomotor Movement  Flowsheets (Taken 7/17/2024 1037)  Activity (Behavioral Health): up ad fern  Patient Performed Hygiene: teeth brushed  Diversional Activity: television  Goal: Decreased Sensory Symptoms (Psychotic Signs/Symptoms)  Outcome: Progressing  Flowsheets (Taken 7/17/2024 1037)  Mutually Determined Action Steps (Decreased Sensory Symptoms): adheres to medication regimen  Intervention: Minimize and Manage Sensory Impairment  Flowsheets (Taken 7/17/2024 1037)  Sensory Stimulation Regulation: quiet environment promoted  Goal: Improved Sleep (Psychotic Signs/Symptoms)  Outcome: Progressing  Flowsheets (Taken 7/17/2024 1037)  Mutually Determined Action Steps (Improved Sleep): sleeps 4-6 hours at night  Intervention: Promote Healthy Sleep Hygiene  Flowsheets (Taken 7/17/2024 1037)  Sleep Hygiene Promotion: regular sleep pattern promoted  Goal: Enhanced Social, Occupational or Functional Skills (Psychotic Signs/Symptoms)  Outcome: Progressing  Flowsheets (Taken 7/17/2024 1037)  Mutually Determined Action Steps (Enhanced Social, Occupational or Functional Skills): participates in social skills training  Intervention: Promote Social, Occupational and Functional Ability  Flowsheets (Taken 7/17/2024 1037)  Trust Relationship/Rapport: care explained  Social Functional Ability Promotion: autonomy promoted    He is AAO X 4. Flat affect and blunted mood. Anxious, irritable, and easily agitated. Labile moods noted. States he is suicidal with no  plan. Sexually inappropriate towards female staff and patients. He is a no roommate due to his sexually inappropriate behavior. Repeats himself. Good eye contact noted. Religiously preoccupied. Continue plan of care and provide a safe and therapeutic environment. Continue to monitor every fifteen minutes for safety.

## 2024-07-17 NOTE — PLAN OF CARE
Carter attended treatment team, was verbose and expansive, reporting focus on food but not eating and being hungry, has no insight, and is slowly working towards his treatment goal. Carter attends TR groups, is intrusive both physically and verbally, displays low cognitive functioning, is sexually inappropriate, intrusive interactions with both peers and staff, has no insight, and attends his ADL's.

## 2024-07-17 NOTE — PLAN OF CARE
Problem: Adult Behavioral Health Plan of Care  Goal: Plan of Care Review  Outcome: Not Progressing  Goal: Patient-Specific Goal (Individualization)  Outcome: Not Progressing  Goal: Adheres to Safety Considerations for Self and Others  Outcome: Not Progressing  Goal: Absence of New-Onset Illness or Injury  Outcome: Not Progressing  Goal: Optimized Coping Skills in Response to Life Stressors  Outcome: Not Progressing  Goal: Develops/Participates in Therapeutic [place] to Support Successful Transition  Outcome: Not Progressing  Goal: Rounds/Family Conference  Outcome: Not Progressing     Problem: Violence Risk or Actual  Goal: Anger and Impulse Control  Outcome: Not Progressing     Problem: Suicide Risk  Goal: Absence of Self-Harm  Outcome: Not Progressing     Problem: Depressive Signs/Symptoms  Goal: Optimized Energy Level (Depressive Signs/Symptoms)  Outcome: Not Progressing  Goal: Optimized Cognitive Function (Depressive Signs/Symptoms)  Outcome: Not Progressing  Goal: Increased Participation and Engagement (Depressive Signs/Symptoms)  Outcome: Not Progressing  Goal: Enhanced Self-Esteem and Confidence (Depressive Signs/Symptoms)  Outcome: Not Progressing  Goal: Improved Mood Symptoms (Depressive Signs/Symptoms)  Outcome: Not Progressing  Goal: Optimized Nutrition Intake (Depressive Signs/Symptoms)  Outcome: Not Progressing  Goal: Improved Psychomotor Symptoms (Depressive Signs/Symptoms)  Outcome: Not Progressing  Goal: Improved Sleep (Depressive Signs/Symptoms)  Outcome: Not Progressing  Goal: Enhanced Social, Occupational or Functional Skills (Depressive Signs/Symptoms)  Outcome: Not Progressing     Problem: Psychotic Signs/Symptoms  Goal: Improved Behavioral Control (Psychotic Signs/Symptoms)  Outcome: Not Progressing  Goal: Optimal Cognitive Function (Psychotic Signs/Symptoms)  Outcome: Not Progressing  Goal: Increased Participation and Engagement (Psychotic Signs/Symptoms)  Outcome: Not Progressing  Goal:  Improved Mood Symptoms (Psychotic Signs/Symptoms)  Outcome: Not Progressing  Goal: Improved Psychomotor Symptoms (Psychotic Signs/Symptoms)  Outcome: Not Progressing  Goal: Decreased Sensory Symptoms (Psychotic Signs/Symptoms)  Outcome: Not Progressing  Goal: Improved Sleep (Psychotic Signs/Symptoms)  Outcome: Not Progressing  Goal: Enhanced Social, Occupational or Functional Skills (Psychotic Signs/Symptoms)  Outcome: Not Progressing   Remains anxious and irritable. Compliant with meds and cooperative with staff.

## 2024-07-17 NOTE — HPI
""Suicide. I had recent deaths in my family."  I have been depressed. I am ready for my life to be over.  I cut my wrist and overdosed on pills. I took the whole bottle of my mom's psych meds on the last occasion.  This time I checked myself in without the overdose or cutting self.  Denies AH or VH.  I have not hallucinated in few months.   "

## 2024-07-17 NOTE — PROGRESS NOTES
07/17/24 1400   [facility] Group Therapy   Group Name Therapeutic Recreation   Specific Interventions Skilled Activity Creative Expression   Participation Level Active;Supportive;Attentive;Sharing   Participation Quality Cooperative;Social   Insight/Motivation Limited   Affect/Mood Display Impulsive;Inappropriate   Cognition Pre-Occupied   Psychomotor WNL

## 2024-07-17 NOTE — PROGRESS NOTES
07/17/24 1500   [facility] Group Therapy   Group Name Therapeutic Recreation   Specific Interventions Skilled Activity Leisure Education and Awareness   Participation Level Active;Attentive   Participation Quality Cooperative;Social;Requires Prompting;Needs Redirection   Insight/Motivation Limited   Affect/Mood Display Impulsive  (intrusive)   Cognition Pre-Occupied   Psychomotor WNL

## 2024-07-17 NOTE — H&P
"[facility]  History & Physical    Subjective:      No chief complaint on file.       HPI:  Elvis Puentes is a 38 y.o. male.    "Suicide. I had recent deaths in my family."  I have been depressed. I am ready for my life to be over.  I cut my wrist and overdosed on pills. I took the whole bottle of my mom's psych meds on the last occasion.  This time I checked myself in without the overdose or cutting self.  Denies AH or VH.  I have not hallucinated in few months.     Past Medical History:   Diagnosis Date    Anxiety     Asthma     Depression     Hallucination     History of psychiatric hospitalization     Hx of psychiatric care     Psychiatric problem     Psychosis     Renal insufficiency 7/17/2024    Schizoaffective disorder     Sleep difficulties     Suicide attempt     Therapy      History reviewed. No pertinent surgical history.  Family History   Problem Relation Name Age of Onset    Coronary artery disease Mother          Stents    Heart attack Mother      Cervical cancer Sister       Social History     Tobacco Use    Smoking status: Every Day     Current packs/day: 1.00     Average packs/day: 1 pack/day for 23.0 years (23.0 ttl pk-yrs)     Types: Cigarettes     Start date: 7/17/2001    Smokeless tobacco: Never   Substance Use Topics    Alcohol use: Not Currently     Alcohol/week: 4.0 standard drinks of alcohol     Types: 4 Cans of beer per week     Comment: uses alcohol monthly    Drug use: Not Currently     Types: Marijuana       PTA Medications   Medication Sig    benztropine (COGENTIN) 0.5 MG tablet Take 1 tablet (0.5 mg total) by mouth 2 (two) times daily.    chlorproMAZINE (THORAZINE) 100 MG tablet Take 100 mg by mouth 3 (three) times daily.    chlorproMAZINE (THORAZINE) 25 MG tablet Take 1 tablet (25 mg total) by mouth 3 (three) times daily.    chlorproMAZINE (THORAZINE) 25 MG tablet Take 1 tablet (25 mg total) by mouth 3 (three) times daily.    " "cyproheptadine (PERIACTIN) 4 mg tablet Take 4 mg by mouth 3 (three) times daily.    divalproex 500 MG Tb24 Take 1,000 mg by mouth every evening.    guanFACINE (TENEX) 1 MG Tab Take 1 tablet (1 mg total) by mouth 2 (two) times a day.    guanFACINE (TENEX) 1 MG Tab Take 1 tablet (1 mg total) by mouth 2 (two) times a day.    haloperidoL (HALDOL) 10 MG tablet Take 1 tablet (10 mg total) by mouth 2 (two) times daily.    lithium (ESKALITH) 300 MG capsule Take 1 capsule (300 mg total) by mouth 2 (two) times daily.    lithium (LITHOTAB) 300 mg tablet Take 1 tablet (300 mg total) by mouth 2 (two) times a day.    OXcarbazepine (TRILEPTAL) 300 MG Tab Take 1 tablet (300 mg total) by mouth 2 (two) times daily.    propranoloL (INDERAL) 10 MG tablet Take 10 mg by mouth 3 (three) times daily.    QUEtiapine (SEROQUEL) 100 MG Tab Take 1 tablet (100 mg total) by mouth once daily.    risperiDONE (RISPERDAL) 4 MG tablet Take 4 mg by mouth 2 (two) times daily.    sertraline (ZOLOFT) 50 MG tablet Take 1 tablet (50 mg total) by mouth once daily.    traZODone (DESYREL) 50 MG tablet Take 1 tablet (50 mg total) by mouth every evening.    traZODone (DESYREL) 50 MG tablet Take 1 tablet (50 mg total) by mouth every evening.     Review of patient's allergies indicates:   Allergen Reactions    Haloperidol Swelling     Muscle stiffness  Has muscle spasms      Paroxetine Swelling, Other (See Comments) and Rash     "i don't remember"  "i don't remember"      Pineapple      Face swells up. I have eaten pineapple since then and had no problems.      Ziprasidone Other (See Comments)     Muscle twitching        Review of Systems   Constitutional: Negative.    HENT: Negative.     Respiratory: Negative.     Cardiovascular: Negative.    Gastrointestinal: Negative.    Genitourinary: Negative.    Musculoskeletal: Negative.    Skin: Negative.    Neurological: Negative.    Endo/Heme/Allergies: Negative.    Psychiatric/Behavioral:  Positive for depression and " suicidal ideas.        Objective:      Vital Signs (Most Recent)  Temp: 97.9 °F (36.6 °C) (07/16/24 1600)  Pulse: 70 (07/16/24 1600)  Resp: 18 (07/16/24 1600)  BP: 133/75 (07/16/24 1600)  SpO2: 100 % (07/16/24 1600)    Vital Signs Range (Last 24H):  Temp:  [97.9 °F (36.6 °C)]   Pulse:  [70]   Resp:  [18]   BP: (133)/(75)   SpO2:  [100 %]     Physical Exam  Vitals reviewed.   HENT:      Head: Normocephalic.      Nose: Nose normal.      Mouth/Throat:      Mouth: Mucous membranes are moist.      Pharynx: Oropharynx is clear.   Eyes:      Extraocular Movements: Extraocular movements intact.      Pupils: Pupils are equal, round, and reactive to light.   Cardiovascular:      Rate and Rhythm: Normal rate and regular rhythm.   Pulmonary:      Effort: Pulmonary effort is normal.      Breath sounds: Normal breath sounds.   Abdominal:      General: Abdomen is flat. Bowel sounds are normal.      Palpations: Abdomen is soft.   Musculoskeletal:         General: Normal range of motion.      Cervical back: Normal range of motion and neck supple.   Skin:     General: Skin is warm and dry.   Neurological:      General: No focal deficit present.      Mental Status: He is alert.         Data Review:    Recent Results (from the past 48 hour(s))   Urinalysis, Reflex to Urine Culture    Collection Time: 07/16/24  3:16 AM    Specimen: Urine   Result Value Ref Range    Color, UA Straw Yellow, Light-Yellow, Dark Yellow, [place]ore[place], Straw    Appearance, UA Clear Clear    Specific Gravity, UA 1.015 1.005 - 1.030    pH, UA 6.5 5.0 - 8.5    Protein, UA Negative Negative    Glucose, UA Negative Negative, Normal    Ketones, UA Negative Negative    Blood, UA Negative Negative    Bilirubin, UA Negative Negative    Urobilinogen, UA 0.2 0.2, 1.0, Normal    Nitrites, UA Negative Negative    Leukocyte Esterase, UA Negative Negative   Ethanol    Collection Time: 07/16/24  3:16 AM   Result Value Ref Range    Ethanol Level <10.0 <=10.0 mg/dL   Acetaminophen  level    Collection Time: 07/16/24  3:16 AM   Result Value Ref Range    Acetaminophen Level <10.0 (L) 10.0 - 30.0 ug/ml   Comprehensive metabolic panel    Collection Time: 07/16/24  3:16 AM   Result Value Ref Range    Sodium 138 136 - 145 mmol/L    Potassium 3.7 3.5 - 5.1 mmol/L    Chloride 102 98 - 107 mmol/L    CO2 28 22 - 29 mmol/L    Glucose 108 (H) 74 - 100 mg/dL    Blood Urea Nitrogen 9.7 8.9 - 20.6 mg/dL    Creatinine 1.20 (H) 0.73 - 1.18 mg/dL    Calcium 9.7 8.4 - 10.2 mg/dL    Protein Total 7.8 6.4 - 8.3 gm/dL    Albumin 4.2 3.5 - 5.0 g/dL    Globulin 3.6 (H) 2.4 - 3.5 gm/dL    Albumin/Globulin Ratio 1.2 1.1 - 2.0 ratio    Bilirubin Total 0.4 <=1.5 mg/dL    ALP 89 40 - 150 unit/L    ALT 30 0 - 55 unit/L    AST 23 5 - 34 unit/L    eGFR >60 mL/min/1.73/m2    Anion Gap 8.0 mEq/L    BUN/Creatinine Ratio 8    TSH    Collection Time: 07/16/24  3:16 AM   Result Value Ref Range    TSH 4.863 0.350 - 4.940 uIU/mL   COVID/FLU A&B PCR    Collection Time: 07/16/24  3:16 AM   Result Value Ref Range    Influenza A PCR Not Detected Not Detected    Influenza B PCR Not Detected Not Detected    SARS-CoV-2 PCR Not Detected Not Detected, Negative   Drug Screen, Urine    Collection Time: 07/16/24  3:16 AM   Result Value Ref Range    Amphetamines, Urine Negative Negative    Barbiturates, Urine Negative Negative    Benzodiazepine, Urine Negative Negative    Cannabinoids, Urine Positive (A) Negative    Cocaine, Urine Negative Negative    Fentanyl, Urine Negative Negative    MDMA, Urine Negative Negative    Opiates, Urine Negative Negative    Phencyclidine, Urine Negative Negative    pH, Urine 6.5 3.0 - 11.0    Specific Gravity, Urine Auto 1.015 1.001 - 1.035   Salicylate Level    Collection Time: 07/16/24  3:16 AM   Result Value Ref Range    Salicylate Level <5.0 (L) 15.0 - 30.0 mg/dL   CBC with Differential    Collection Time: 07/16/24  3:16 AM   Result Value Ref Range    WBC 11.80 (H) 4.50 - 11.50 x10(3)/mcL    RBC 5.17 4.70 -  6.10 x10(6)/mcL    Hgb 15.5 14.0 - 18.0 g/dL    Hct 46.1 42.0 - 52.0 %    MCV 89.2 80.0 - 94.0 fL    MCH 30.0 27.0 - 31.0 pg    MCHC 33.6 33.0 - 36.0 g/dL    RDW 14.0 11.5 - 17.0 %    Platelet 292 130 - 400 x10(3)/mcL    MPV 10.5 (H) 7.4 - 10.4 fL    Neut % 54.0 %    Lymph % 32.7 %    Mono % 6.4 %    Eos % 5.8 %    Basophil % 0.8 %    Lymph # 3.86 0.6 - 4.6 x10(3)/mcL    Neut # 6.37 2.1 - 9.2 x10(3)/mcL    Mono # 0.75 0.1 - 1.3 x10(3)/mcL    Eos # 0.69 0 - 0.9 x10(3)/mcL    Baso # 0.10 <=0.2 x10(3)/mcL    IG# 0.03 0 - 0.04 x10(3)/mcL    IG% 0.3 %    NRBC% 0.0 %   Lithium Level    Collection Time: 07/16/24  3:16 AM   Result Value Ref Range    Lithium Level 0.29 (L) 0.50 - 1.20 mmol/L   Hemoglobin A1C    Collection Time: 07/17/24  7:04 AM   Result Value Ref Range    Hemoglobin A1c 5.2 <=7.0 %    Estimated Average Glucose 102.5 mg/dL   TSH    Collection Time: 07/17/24  7:04 AM   Result Value Ref Range    TSH 1.017 0.350 - 4.940 uIU/mL   Comprehensive Metabolic Panel    Collection Time: 07/17/24  7:04 AM   Result Value Ref Range    Sodium 141 136 - 145 mmol/L    Potassium 4.7 3.5 - 5.1 mmol/L    Chloride 105 98 - 107 mmol/L    CO2 27 22 - 29 mmol/L    Glucose 84 74 - 100 mg/dL    Blood Urea Nitrogen 9.0 8.9 - 20.6 mg/dL    Creatinine 1.37 (H) 0.73 - 1.18 mg/dL    Calcium 10.6 (H) 8.4 - 10.2 mg/dL    Protein Total 8.3 6.4 - 8.3 gm/dL    Albumin 4.6 3.5 - 5.0 g/dL    Globulin 3.7 (H) 2.4 - 3.5 gm/dL    Albumin/Globulin Ratio 1.2 1.1 - 2.0 ratio    Bilirubin Total 0.8 <=1.5 mg/dL    ALP 99 40 - 150 unit/L    ALT 34 0 - 55 unit/L    AST 25 5 - 34 unit/L    eGFR >60 mL/min/1.73/m2    Anion Gap 9.0 mEq/L    BUN/Creatinine Ratio 7    Lipid Panel    Collection Time: 07/17/24  7:04 AM   Result Value Ref Range    Cholesterol Total 187 <=200 mg/dL    HDL Cholesterol 64 (H) 35 - 60 mg/dL    Triglyceride 58 34 - 140 mg/dL    Cholesterol/HDL Ratio 3 0 - 5    Very Low Density Lipoprotein 12     LDL Cholesterol 111.00 50.00 - 140.00  mg/dL   CBC with Differential    Collection Time: 07/17/24  7:04 AM   Result Value Ref Range    WBC 8.21 4.50 - 11.50 x10(3)/mcL    RBC 5.86 4.70 - 6.10 x10(6)/mcL    Hgb 17.1 14.0 - 18.0 g/dL    Hct 52.0 42.0 - 52.0 %    MCV 88.7 80.0 - 94.0 fL    MCH 29.2 27.0 - 31.0 pg    MCHC 32.9 (L) 33.0 - 36.0 g/dL    RDW 14.2 11.5 - 17.0 %    Platelet 318 130 - 400 x10(3)/mcL    MPV 10.8 (H) 7.4 - 10.4 fL    Neut % 56.9 %    Lymph % 26.1 %    Mono % 7.6 %    Eos % 8.0 %    Basophil % 1.0 %    Lymph # 2.14 0.6 - 4.6 x10(3)/mcL    Neut # 4.68 2.1 - 9.2 x10(3)/mcL    Mono # 0.62 0.1 - 1.3 x10(3)/mcL    Eos # 0.66 0 - 0.9 x10(3)/mcL    Baso # 0.08 <=0.2 x10(3)/mcL    IG# 0.03 0 - 0.04 x10(3)/mcL    IG% 0.4 %    NRBC% 0.0 %   Lithium Level    Collection Time: 07/17/24  7:04 AM   Result Value Ref Range    Lithium Level 0.61 0.50 - 1.20 mmol/L     ECG:   Results for orders placed or performed during the hospital encounter of 04/19/24   EKG 12-lead    Collection Time: 04/19/24 12:17 PM   Result Value Ref Range    QRS Duration 84 ms    [facility] QTC Calculation 423 ms    Narrative    Test Reason : Z00.8,    Vent. Rate : 086 BPM     Atrial Rate : 086 BPM     P-R Int : 138 ms          QRS Dur : 084 ms      QT Int : 354 ms       P-R-T Axes : 079 030 066 degrees     QTc Int : 423 ms    Normal sinus rhythm  Normal ECG  When compared with ECG of 24-NOV-2022 22:15,  No significant change was found  Confirmed by Jonathon Pinto MD ([id number]) on 4/19/2024 4:50:00 PM    Referred By: SEVEN   SELF           Confirmed By:Jonathon Pinto MD      IMAGING: No results found.     Assessment:      Active Hospital Problems    Diagnosis  POA    Renal insufficiency [N28.9]  Yes    Suicidal ideation [R45.851]  Not Applicable      Resolved Hospital Problems   No resolved problems to display.       Plan:      Plan per psychiatrist

## 2024-07-17 NOTE — NURSING
Treatment Team Note:      Behavior:    Patient (Elvis Puentes is a 38 y.o. male, : 1985, MRN: [id number]) demonstrating an affect that was sad, flat, anxious, irritable, and  labile. Elvis demonstrating mood that is depressed and anxious. Elvis had an appearance that was clean. Elvis endorses suicidal ideation. Elvis denies suicide plan. Elvis denies hallucinations.      Intervention:    Encourage Elvis to perform self-hygiene, grooming, and changing of clothing. Encourage Elvis to attend all scheduled groups. Monitor Elvis's behavior and program compliance. Monitor Elvis for suicidal ideation, homicidal ideation, sleep disturbance, and hallucinations. Encourage Elvis to eat all portions of meals and assess for meal preferences. Monitor Elvis for intake and output to ensure hydration. Notify the Physician/Physician Assistant/Advance Practice Registered Nurse (MD/PA/APRN) for any medication refusal and any change in patient condition.    Discussed with Elvis course of treatment. Discussed with Elvis medications ordered and schedule of medications. Discussed collateral contact with Elvis.      Response:    Elvis's response to treatment team meeting: cooperative. Repeats himself. States he wants Ensure for meals because he states he doesn't have an appetite. Sexually inappropriate towards female staff and patients.      Plan:     Continue to monitor per MD/PA/APRN orders; maintain patient safety.

## 2024-07-17 NOTE — CARE UPDATE
Treatment Team  Pt seen for treatment team today with interdisciplinary team. Pt was irritable and labile with Tx team. Per staff report pt was sexually inappropriate yesterday by pt wanting to close door to masturbate. Pt verbalized understanding of care plan and agreed to being discharged to home located at [address] [place] [address] Dr. Jasper Green with follow-up at [facility]. Tentative dc date is 7.23.2024.

## 2024-07-18 PROCEDURE — 25000003 PHARM REV CODE 250: Performed by: PSYCHIATRY & NEUROLOGY

## 2024-07-18 PROCEDURE — 25000003 PHARM REV CODE 250

## 2024-07-18 PROCEDURE — 11400000 HC PSYCH PRIVATE ROOM

## 2024-07-18 PROCEDURE — S4991 NICOTINE PATCH NONLEGEND: HCPCS | Performed by: PSYCHIATRY & NEUROLOGY

## 2024-07-18 RX ADMIN — HYDROXYZINE HYDROCHLORIDE 50 MG: 50 TABLET, FILM COATED ORAL at 01:07

## 2024-07-18 RX ADMIN — CHLORPROMAZINE HYDROCHLORIDE 25 MG: 25 TABLET, FILM COATED ORAL at 03:07

## 2024-07-18 RX ADMIN — LITHIUM CARBONATE 300 MG: 300 TABLET, FILM COATED, EXTENDED RELEASE ORAL at 08:07

## 2024-07-18 RX ADMIN — NICOTINE 1 PATCH: 21 PATCH, EXTENDED RELEASE TRANSDERMAL at 08:07

## 2024-07-18 RX ADMIN — CHLORPROMAZINE HYDROCHLORIDE 25 MG: 25 TABLET, FILM COATED ORAL at 08:07

## 2024-07-18 RX ADMIN — MIRTAZAPINE 7.5 MG: 7.5 TABLET, FILM COATED ORAL at 08:07

## 2024-07-18 RX ADMIN — TRAZODONE HYDROCHLORIDE 100 MG: 100 TABLET ORAL at 08:07

## 2024-07-18 NOTE — PLAN OF CARE
Problem: Adult Behavioral Health Plan of Care  Goal: Plan of Care Review  Outcome: Progressing  Flowsheets (Taken 7/18/2024 0836)  Patient Agreement with Plan of Care: agrees  Plan of Care Reviewed With: patient  Goal: Patient-Specific Goal (Individualization)  Outcome: Progressing  Flowsheets (Taken 7/18/2024 0836)  Patient Personal Strengths: independent living skills  Patient Vulnerabilities: limited social skills  Goal: Adheres to Safety Considerations for Self and Others  Outcome: Progressing  Flowsheets (Taken 7/18/2024 0836)  Adheres to Safety Considerations for Self and Others: making progress toward outcome  Intervention: Develop and Maintain Individualized Safety Plan  Flowsheets (Taken 7/18/2024 0836)  Safety Measures: safety rounds completed  Goal: Absence of New-Onset Illness or Injury  Outcome: Progressing  Intervention: Identify and Manage Fall Risk  Flowsheets (Taken 7/18/2024 0836)  Safety Measures: safety rounds completed  Intervention: Prevent VTE (Venous Thromboembolism)  Flowsheets (Taken 7/18/2024 0836)  VTE Prevention/Management: ambulation promoted  Intervention: Prevent Infection  Flowsheets (Taken 7/18/2024 0836)  Infection Prevention: hand hygiene promoted  Goal: Optimized Coping Skills in Response to Life Stressors  Outcome: Progressing  Flowsheets (Taken 7/18/2024 0836)  Optimized Coping Skills in Response to Life Stressors: making progress toward outcome  Intervention: Promote Effective Coping Strategies  Flowsheets (Taken 7/18/2024 0836)  Supportive Measures: active listening utilized  Goal: Develops/Participates in Therapeutic [place] to Support Successful Transition  Outcome: Progressing  Flowsheets (Taken 7/18/2024 0836)  Develops/Participates in Therapeutic [place] to Support Successful Transition: making progress toward outcome  Intervention: Foster Therapeutic [place]  Flowsheets (Taken 7/18/2024 0836)  Trust Relationship/Rapport: care explained  Goal: Rounds/Family  Conference  Outcome: Progressing  Flowsheets (Taken 7/18/2024 0836)  Participants:   milieu/psych techs   nursing     Problem: Violence Risk or Actual  Goal: Anger and Impulse Control  Outcome: Progressing  Intervention: Minimize Safety Risk  Flowsheets (Taken 7/18/2024 0836)  Behavior Management: behavioral plan reviewed  Sensory Stimulation Regulation: quiet environment promoted  De-Escalation Techniques: quiet time facilitated  Intervention: Promote Self-Control  Flowsheets (Taken 7/18/2024 0836)  Supportive Measures: active listening utilized  Environmental Support: calm environment promoted     Problem: Suicide Risk  Goal: Absence of Self-Harm  Outcome: Progressing  Intervention: Assess Risk to Self and Maintain Safety  Flowsheets (Taken 7/18/2024 0836)  Behavior Management: behavioral plan reviewed  Self-Harm Prevention: environmental self-harm risks assessed  Intervention: Promote Psychosocial Wellbeing  Flowsheets (Taken 7/18/2024 0836)  Sleep/Rest Enhancement: regular sleep/rest pattern promoted  Supportive Measures: active listening utilized  Family/Support System Care: self-care encouraged  Intervention: Establish Safety Plan and Continuity of Care  Flowsheets (Taken 7/18/2024 0836)  Safe Transition Promotion: access to lethal means addressed     Problem: Depressive Signs/Symptoms  Goal: Optimized Energy Level (Depressive Signs/Symptoms)  Outcome: Progressing  Flowsheets (Taken 7/18/2024 0836)  Mutually Determined Action Steps (Optimized Energy Level): grooms self without prompting  Intervention: Optimize Energy Level  Flowsheets (Taken 7/18/2024 0836)  Activity (Behavioral Health): up ad fern  Patient Performed Hygiene: teeth brushed  Diversional Activity: television  Goal: Optimized Cognitive Function (Depressive Signs/Symptoms)  Outcome: Progressing  Flowsheets (Taken 7/18/2024 0836)  Mutually Determined Action Steps (Optimized Cognitive Function): participates in attention training  Goal: Increased  Participation and Engagement (Depressive Signs/Symptoms)  Outcome: Progressing  Flowsheets (Taken 7/18/2024 0836)  Mutually Determined Action Steps (Increased Participation and Engagement): initiates interaction with others  Intervention: Facilitate Participation and Engagement  Flowsheets (Taken 7/18/2024 0836)  Supportive Measures: active listening utilized  Diversional Activity: television  Goal: Enhanced Self-Esteem and Confidence (Depressive Signs/Symptoms)  Outcome: Progressing  Flowsheets (Taken 7/18/2024 0836)  Mutually Determined Action Steps (Enhanced Self-Esteem and Confidence): identifies judgmental thoughts  Intervention: Promote Confidence and Self-Esteem  Flowsheets (Taken 7/18/2024 0836)  Supportive Measures: active listening utilized  Goal: Improved Mood Symptoms (Depressive Signs/Symptoms)  Outcome: Progressing  Flowsheets (Taken 7/18/2024 0836)  Mutually Determined Action Steps (Improved Mood Symptoms): verbalizes increased insight  Intervention: Promote Mood Improvement  Flowsheets (Taken 7/18/2024 0836)  Supportive Measures: active listening utilized  Diversional Activity: television  Goal: Optimized Nutrition Intake (Depressive Signs/Symptoms)  Outcome: Progressing  Flowsheets (Taken 7/18/2024 0836)  Mutually Determined Action Steps (Optimized Nutrition Intake): eats at meal time  Intervention: Promote Optimal Nutrition Intake  Flowsheets (Taken 7/18/2024 0836)  Nutrition Interventions: food preferences provided  Bowel Elimination Promotion: ambulation promoted  Goal: Improved Psychomotor Symptoms (Depressive Signs/Symptoms)  Outcome: Progressing  Flowsheets (Taken 7/18/2024 0836)  Mutually Determined Action Steps (Improved Psychomotor Symptoms): exhibits decrease in agitation  Intervention: Manage Psychomotor Movement  Flowsheets (Taken 7/18/2024 0836)  Activity (Behavioral Health): up ad fern  Patient Performed Hygiene: teeth brushed  Diversional Activity: television  Goal: Improved Sleep  (Depressive Signs/Symptoms)  Outcome: Progressing  Flowsheets (Taken 7/18/2024 0836)  Mutually Determined Action Steps (Improved Sleep): sleeps 4-6 hours at night  Intervention: Promote Healthy Sleep Hygiene  Flowsheets (Taken 7/18/2024 0836)  Sleep Hygiene Promotion: awakenings minimized  Goal: Enhanced Social, Occupational or Functional Skills (Depressive Signs/Symptoms)  Outcome: Progressing  Flowsheets (Taken 7/18/2024 0836)  Mutually Determined Action Steps (Enhanced Social, Occupational or Functional Skills): participates in social skills training  Intervention: Promote Social, Occupational and Functional Ability  Flowsheets (Taken 7/18/2024 0836)  Social Functional Ability Promotion: self-expression encouraged     Problem: Psychotic Signs/Symptoms  Goal: Improved Behavioral Control (Psychotic Signs/Symptoms)  Outcome: Progressing  Flowsheets (Taken 7/18/2024 0836)  Mutually Determined Action Steps (Improved Behavioral Control): identifies symptoms triggers  Intervention: Manage Behavior  Flowsheets (Taken 7/18/2024 0836)  De-Escalation Techniques: quiet time facilitated  Goal: Optimal Cognitive Function (Psychotic Signs/Symptoms)  Outcome: Progressing  Flowsheets (Taken 7/18/2024 0836)  Mutually Determined Action Steps (Optimal Cognitive Function): participates in attention training  Intervention: Support and Promote Cognitive Ability  Flowsheets (Taken 7/18/2024 0836)  Trust Relationship/Rapport: care explained  Goal: Increased Participation and Engagement (Psychotic Signs/Symptoms)  Outcome: Progressing  Flowsheets (Taken 7/18/2024 0836)  Mutually Determined Action Steps (Increased Participation and Engagement): identifies symptoms triggers  Intervention: Facilitate Participation and Engagement  Flowsheets (Taken 7/18/2024 0836)  Supportive Measures: active listening utilized  Diversional Activity: television  Goal: Improved Mood Symptoms (Psychotic Signs/Symptoms)  Outcome: Progressing  Flowsheets (Taken  7/18/2024 0836)  Mutually Determined Action Steps (Improved Mood Symptoms): verbalizes increased insight  Intervention: Optimize Emotion and Mood  Flowsheets (Taken 7/18/2024 0836)  Supportive Measures: active listening utilized  Diversional Activity: television  Goal: Improved Psychomotor Symptoms (Psychotic Signs/Symptoms)  Outcome: Progressing  Flowsheets (Taken 7/18/2024 0836)  Mutually Determined Action Steps (Improved Psychomotor Symptoms): exhibits decrease in agitation  Intervention: Manage Psychomotor Movement  Flowsheets (Taken 7/18/2024 0836)  Activity (Behavioral Health): up ad fern  Patient Performed Hygiene: teeth brushed  Diversional Activity: television  Goal: Decreased Sensory Symptoms (Psychotic Signs/Symptoms)  Outcome: Progressing  Flowsheets (Taken 7/18/2024 0836)  Mutually Determined Action Steps (Decreased Sensory Symptoms): adheres to medication regimen  Intervention: Minimize and Manage Sensory Impairment  Flowsheets (Taken 7/18/2024 0836)  Sensory Stimulation Regulation: quiet environment promoted  Goal: Improved Sleep (Psychotic Signs/Symptoms)  Outcome: Progressing  Flowsheets (Taken 7/18/2024 0836)  Mutually Determined Action Steps (Improved Sleep): sleeps 4-6 hours at night  Intervention: Promote Healthy Sleep Hygiene  Flowsheets (Taken 7/18/2024 0836)  Sleep Hygiene Promotion: awakenings minimized  Goal: Enhanced Social, Occupational or Functional Skills (Psychotic Signs/Symptoms)  Outcome: Progressing  Flowsheets (Taken 7/18/2024 0836)  Mutually Determined Action Steps (Enhanced Social, Occupational or Functional Skills): participates in social skills training  Intervention: Promote Social, Occupational and Functional Ability  Flowsheets (Taken 7/18/2024 0836)  Trust Relationship/Rapport: care explained  Social Functional Ability Promotion: self-expression encouraged    He is AAO X 4. Sad affect and depressed mood. Anxious, irritable, and easily agitated. Labile moods noted. Redirected  for him stating that he was going to turn over tables and act up because he wanted to get an IM injection. Slowed thought processes. Interacts with selected patients and staff. Good eye contact noted. Speech is low and slowed. Continue plan of care and provide a safe and therapeutic environment. Continue to monitor every fifteen minutes for safety.

## 2024-07-18 NOTE — PLAN OF CARE
Problem: Adult Behavioral Health Plan of Care  Goal: Plan of Care Review  Outcome: Not Progressing  Flowsheets (Taken 7/17/2024 2236)  Patient Agreement with Plan of Care: agrees  Plan of Care Reviewed With: patient  Goal: Patient-Specific Goal (Individualization)  Outcome: Not Progressing  Flowsheets (Taken 7/17/2024 2236)  Patient Personal Strengths: ability to maintain sobriety  Patient Vulnerabilities:   limited support system   lacks insight into illness   poor impulse control   history of unsuccessful treatment  Goal: Adheres to Safety Considerations for Self and Others  Outcome: Not Progressing  Flowsheets (Taken 7/17/2024 2236)  Adheres to Safety Considerations for Self and Others: unable to achieve outcome  Intervention: Develop and Maintain Individualized Safety Plan  Flowsheets (Taken 7/17/2024 2236)  Safety Measures:   safety rounds completed   monitored by video  Goal: Absence of New-Onset Illness or Injury  Outcome: Not Progressing  Intervention: Identify and Manage Fall Risk  Flowsheets (Taken 7/17/2024 2236)  Safety Measures:   safety rounds completed   monitored by video  Intervention: Prevent VTE (Venous Thromboembolism)  Flowsheets (Taken 7/17/2024 2236)  VTE Prevention/Management:   ambulation promoted   fluids promoted  Intervention: Prevent Infection  Flowsheets (Taken 7/17/2024 2236)  Infection Prevention:   hand hygiene promoted   rest/sleep promoted  Goal: Optimized Coping Skills in Response to Life Stressors  Outcome: Not Progressing  Flowsheets (Taken 7/17/2024 2236)  Optimized Coping Skills in Response to Life Stressors: unable to achieve outcome  Intervention: Promote Effective Coping Strategies  Flowsheets (Taken 7/17/2024 2236)  Supportive Measures:   active listening utilized   verbalization of feelings encouraged  Goal: Develops/Participates in Therapeutic [place] to Support Successful Transition  Outcome: Not Progressing  Flowsheets (Taken 7/17/2024 2236)  Develops/Participates  in Therapeutic [place] to Support Successful Transition: unable to achieve outcome  Intervention: Foster Therapeutic [place]  Flowsheets (Taken 7/17/2024 2236)  Trust Relationship/Rapport:   care explained   thoughts/feelings acknowledged  Intervention: Mutually Develop Transition Plan  Flowsheets (Taken 7/17/2024 2236)  Current Discharge Risk: psychiatric illness  Readmission Within the Last 30 Days: no previous admission in last 30 days  Outpatient/Agency/Support Group Needs:   outpatient counseling   outpatient medication management  Patient/Family Anticipated Services at Transition:   mental health services   outpatient care  Concerns to be Addressed:   medication   mental health  Goal: Rounds/Family Conference  Outcome: Not Progressing  Flowsheets (Taken 7/17/2024 2236)  Participants:   milieu/psych techs   nursing     Problem: Violence Risk or Actual  Goal: Anger and Impulse Control  Outcome: Not Progressing  Intervention: Minimize Safety Risk  Flowsheets (Taken 7/17/2024 2236)  Behavior Management:   behavioral plan reviewed   boundaries reinforced   impulse control promoted  Sensory Stimulation Regulation: quiet environment promoted  De-Escalation Techniques:   medication offered   medication administered   quiet time facilitated  Enhanced Safety Measures: monitored by video  Intervention: Promote Self-Control  Flowsheets (Taken 7/17/2024 2236)  Supportive Measures:   active listening utilized   verbalization of feelings encouraged  Environmental Support:   calm environment promoted   rest periods encouraged     Problem: Suicide Risk  Goal: Absence of Self-Harm  Outcome: Not Progressing  Intervention: Assess Risk to Self and Maintain Safety  Flowsheets (Taken 7/17/2024 2236)  Behavior Management:   behavioral plan reviewed   boundaries reinforced   impulse control promoted  Enhanced Safety Measures: monitored by video  Self-Harm Prevention: environmental self-harm risks assessed  Intervention: Promote  Psychosocial Wellbeing  Flowsheets (Taken 7/17/2024 2236)  Sleep/Rest Enhancement:   awakenings minimized   regular sleep/rest pattern promoted  Supportive Measures:   active listening utilized   verbalization of feelings encouraged  Family/Support System Care: self-care encouraged  Intervention: Establish Safety Plan and Continuity of Care  Flowsheets (Taken 7/17/2024 2236)  Safe Transition Promotion: access to lethal means addressed     Problem: Depressive Signs/Symptoms  Goal: Optimized Energy Level (Depressive Signs/Symptoms)  Outcome: Not Progressing  Flowsheets (Taken 7/17/2024 2236)  Mutually Determined Action Steps (Optimized Energy Level): grooms self without prompting  Intervention: Optimize Energy Level  Flowsheets (Taken 7/17/2024 2236)  Activity (Behavioral Health): up ad fern  Diversional Activity: television  Goal: Optimized Cognitive Function (Depressive Signs/Symptoms)  Outcome: Not Progressing  Goal: Increased Participation and Engagement (Depressive Signs/Symptoms)  Outcome: Not Progressing  Flowsheets (Taken 7/17/2024 2236)  Mutually Determined Action Steps (Increased Participation and Engagement): initiates interaction with others  Intervention: Facilitate Participation and Engagement  Flowsheets (Taken 7/17/2024 2236)  Supportive Measures:   active listening utilized   verbalization of feelings encouraged  Diversional Activity: television  Goal: Enhanced Self-Esteem and Confidence (Depressive Signs/Symptoms)  Outcome: Not Progressing  Intervention: Promote Confidence and Self-Esteem  Flowsheets (Taken 7/17/2024 2236)  Supportive Measures:   active listening utilized   verbalization of feelings encouraged  Goal: Improved Mood Symptoms (Depressive Signs/Symptoms)  Outcome: Not Progressing  Flowsheets (Taken 7/17/2024 2236)  Mutually Determined Action Steps (Improved Mood Symptoms): verbalizes increased insight  Intervention: Promote Mood Improvement  Flowsheets (Taken 7/17/2024 2236)  Supportive  Measures:   active listening utilized   verbalization of feelings encouraged  Diversional Activity: television  Goal: Optimized Nutrition Intake (Depressive Signs/Symptoms)  Outcome: Not Progressing  Flowsheets (Taken 7/17/2024 2236)  Mutually Determined Action Steps (Optimized Nutrition Intake): eats at meal time  Intervention: Promote Optimal Nutrition Intake  Flowsheets (Taken 7/17/2024 2236)  Nutrition Interventions: food preferences provided  Bowel Elimination Promotion:   ambulation promoted   adequate fluid intake promoted  Goal: Improved Psychomotor Symptoms (Depressive Signs/Symptoms)  Outcome: Not Progressing  Flowsheets (Taken 7/17/2024 2236)  Mutually Determined Action Steps (Improved Psychomotor Symptoms): exhibits decrease in agitation  Intervention: Manage Psychomotor Movement  Flowsheets (Taken 7/17/2024 2236)  Activity (Behavioral Health): up ad fern  Diversional Activity: television  Goal: Improved Sleep (Depressive Signs/Symptoms)  Outcome: Not Progressing  Flowsheets (Taken 7/17/2024 2236)  Mutually Determined Action Steps (Improved Sleep): sleeps 4-6 hours at night  Intervention: Promote Healthy Sleep Hygiene  Flowsheets (Taken 7/17/2024 2236)  Sleep Hygiene Promotion:   awakenings minimized   regular sleep pattern promoted  Goal: Enhanced Social, Occupational or Functional Skills (Depressive Signs/Symptoms)  Outcome: Not Progressing  Intervention: Promote Social, Occupational and Functional Ability  Flowsheets (Taken 7/17/2024 2236)  Social Functional Ability Promotion: self-expression encouraged     Problem: Psychotic Signs/Symptoms  Goal: Improved Behavioral Control (Psychotic Signs/Symptoms)  Outcome: Not Progressing  Intervention: Manage Behavior  Flowsheets (Taken 7/17/2024 2236)  De-Escalation Techniques:   medication offered   medication administered   quiet time facilitated  Goal: Optimal Cognitive Function (Psychotic Signs/Symptoms)  Outcome: Not Progressing  Intervention: Support and  Promote Cognitive Ability  Flowsheets (Taken 7/17/2024 2236)  Trust Relationship/Rapport:   care explained   thoughts/feelings acknowledged  Goal: Increased Participation and Engagement (Psychotic Signs/Symptoms)  Outcome: Not Progressing  Intervention: Facilitate Participation and Engagement  Flowsheets (Taken 7/17/2024 2236)  Supportive Measures:   active listening utilized   verbalization of feelings encouraged  Diversional Activity: television  Goal: Improved Mood Symptoms (Psychotic Signs/Symptoms)  Outcome: Not Progressing  Flowsheets (Taken 7/17/2024 2236)  Mutually Determined Action Steps (Improved Mood Symptoms): verbalizes increased insight  Intervention: Optimize Emotion and Mood  Flowsheets (Taken 7/17/2024 2236)  Supportive Measures:   active listening utilized   verbalization of feelings encouraged  Diversional Activity: television  Goal: Improved Psychomotor Symptoms (Psychotic Signs/Symptoms)  Outcome: Not Progressing  Flowsheets (Taken 7/17/2024 2236)  Mutually Determined Action Steps (Improved Psychomotor Symptoms): exhibits decrease in agitation  Intervention: Manage Psychomotor Movement  Flowsheets (Taken 7/17/2024 2236)  Activity (Behavioral Health): up ad fern  Diversional Activity: television  Goal: Decreased Sensory Symptoms (Psychotic Signs/Symptoms)  Outcome: Not Progressing  Flowsheets (Taken 7/17/2024 2236)  Mutually Determined Action Steps (Decreased Sensory Symptoms): shares insight re: need for meds  Intervention: Minimize and Manage Sensory Impairment  Flowsheets (Taken 7/17/2024 2236)  Sensory Stimulation Regulation: quiet environment promoted  Goal: Improved Sleep (Psychotic Signs/Symptoms)  Outcome: Not Progressing  Flowsheets (Taken 7/17/2024 2236)  Mutually Determined Action Steps (Improved Sleep): sleeps 4-6 hours at night  Intervention: Promote Healthy Sleep Hygiene  Flowsheets (Taken 7/17/2024 2236)  Sleep Hygiene Promotion:   awakenings minimized   regular sleep pattern  promoted  Goal: Enhanced Social, Occupational or Functional Skills (Psychotic Signs/Symptoms)  Outcome: Not Progressing  Intervention: Promote Social, Occupational and Functional Ability  Flowsheets (Taken 7/17/2024 2231)  Trust Relationship/Rapport:   care explained   thoughts/feelings acknowledged  Social Functional Ability Promotion: self-expression encouraged   AAOx4. Flat affect. Anxious,depressed, guarded, labile, suspicious and paranoid. Pt demanding a shot so that he can go to sleep. Informed patient the we don't give shots for sleep but that he could have trazodone. Pt states that he doesn't want to have to start throwing tables and chairs. Informed pt that that behavior would not be tolerated and that if he did threatening behaviors then the police would be called. Encouraged patient to try oral medications and if they were ineffective then we can notify the doctor. Pt verbalizes understanding. Pt is guarded and not wanting to answer questions one minute and rambling the next. Pt endorses suicidal ideations but states that he knows that he can't harm himself her because he signed and agreement. Reminded patient that he signed an agreement to refrain from violent and threatening behaviors as well. Pt apologizes and states that he has been sad since his sister passed. Denies current auditory and visual hallucinations. States that when he doesn't take his medications his hallucinations are bad. Compliant with medications. Atarax 50 mg and Trazodone 100 mg po given. Pt tolerated well. Reports that he has not been eating or sleeping. Per MHT patient slept 12 las night. No aggression noted. Continue with plan of care and q 15 minute safety checks.

## 2024-07-18 NOTE — PROGRESS NOTES
"7/18/2024  Elvis Puentes   1985   [id number]        Psychiatry Progress Note     Chief Complaint: "I'm all right"    SUBJECTIVE:   Elvis Puentes is a 38 y.o. male placed under a [facility] at [facility] for suicidal thoughts. Patient has a long history of schizoaffective disorder.     Patient today walked in with is nicotine patch on is forehead. He stated that this is where it bothers him the least. I educated him on appropriate use and he replaced on his shoulder as directed. He states that he's upset with the Dr because he changed his medication but after educating him on the change he was amenable to continuing this regimen. Slept 12 hours last night according to staff. Patient is tolerating his medication well without issue. He stated today that he understands that harming himself will hurt his mother and this is a reason for him not to do this. This was based on the conversation we had on Tuesday lending to the fact that he is returning to his baseline status. Will continue current POC and will monitor progress.    Staff reported that patient was threatening to throw chairs at patients yesterday after being denied a PRN injection request. He was apparently redirectable and no incident occurred.     UDS: (+)cannabis  Blood alcohol: <10  Lithium: 0.29    Current Medications:   Scheduled Meds:    chlorproMAZINE  25 mg Oral TID    lithium  300 mg Oral Q12H    mirtazapine  7.5 mg Oral QHS    nicotine  1 patch Transdermal Daily      PRN Meds:   Current Facility-Administered Medications:     acetaminophen, 650 mg, Oral, Q6H PRN    aluminum-magnesium hydroxide-simethicone, 30 mL, Oral, Q6H PRN    diphenhydrAMINE, 50 mg, Oral, Q4H PRN **AND** LORazepam, 2 mg, Oral, Q4H PRN **AND** diphenhydrAMINE, 50 mg, Intramuscular, Q4H PRN **AND** lorazepam, 2 mg, Intramuscular, Q4H PRN    hydrOXYzine HCL, 50 mg, Oral, Q4H PRN    traZODone, 100 mg, Oral, Nightly PRN   Psychotherapeutics (From admission, onward)      Start     " "Stop Route Frequency Ordered    07/17/24 2100  mirtazapine tablet 7.5 mg         -- Oral Nightly 07/17/24 1017    07/16/24 0930  lithium CR tablet 300 mg         -- Oral Every 12 hours 07/16/24 0823    07/16/24 0930  chlorproMAZINE tablet 25 mg         -- Oral 3 times daily 07/16/24 0823    07/16/24 0706  LORazepam tablet 2 mg  (Med - Acute  Behavioral Management)        Placed in "And" Linked Group    -- Oral Every 4 hours PRN 07/16/24 0706    07/16/24 0706  LORazepam injection 2 mg  (Med - Acute  Behavioral Management)        Placed in "And" Linked Group    -- IM Every 4 hours PRN 07/16/24 0706    07/16/24 0706  traZODone tablet 100 mg         -- Oral Nightly PRN 07/16/24 0706            Allergies:   Review of patient's allergies indicates:   Allergen Reactions    Haloperidol Swelling     Muscle stiffness  Has muscle spasms      Paroxetine Swelling, Other (See Comments) and Rash     "i don't remember"  "i don't remember"      Pineapple      Face swells up. I have eaten pineapple since then and had no problems.      Ziprasidone Other (See Comments)     Muscle twitching        OBJECTIVE:   Vitals   Vitals:    07/17/24 1915   BP: 130/84   Pulse: 77   Resp: 18   Temp: 98.1 °F (36.7 °C)        Labs/Imaging/Studies:   Recent Results (from the past 36 hour(s))   Hemoglobin A1C    Collection Time: 07/17/24  7:04 AM   Result Value Ref Range    Hemoglobin A1c 5.2 <=7.0 %    Estimated Average Glucose 102.5 mg/dL   TSH    Collection Time: 07/17/24  7:04 AM   Result Value Ref Range    TSH 1.017 0.350 - 4.940 uIU/mL   Comprehensive Metabolic Panel    Collection Time: 07/17/24  7:04 AM   Result Value Ref Range    Sodium 141 136 - 145 mmol/L    Potassium 4.7 3.5 - 5.1 mmol/L    Chloride 105 98 - 107 mmol/L    CO2 27 22 - 29 mmol/L    Glucose 84 74 - 100 mg/dL    Blood Urea Nitrogen 9.0 8.9 - 20.6 mg/dL    Creatinine 1.37 (H) 0.73 - 1.18 mg/dL    Calcium 10.6 (H) 8.4 - 10.2 mg/dL    Protein Total 8.3 6.4 - 8.3 gm/dL    Albumin 4.6 " 3.5 - 5.0 g/dL    Globulin 3.7 (H) 2.4 - 3.5 gm/dL    Albumin/Globulin Ratio 1.2 1.1 - 2.0 ratio    Bilirubin Total 0.8 <=1.5 mg/dL    ALP 99 40 - 150 unit/L    ALT 34 0 - 55 unit/L    AST 25 5 - 34 unit/L    eGFR >60 mL/min/1.73/m2    Anion Gap 9.0 mEq/L    BUN/Creatinine Ratio 7    SYPHILIS ANTIBODY (WITH REFLEX RPR)    Collection Time: 07/17/24  7:04 AM   Result Value Ref Range    Syphilis Antibody Nonreactive Nonreactive, Equivocal   Lipid Panel    Collection Time: 07/17/24  7:04 AM   Result Value Ref Range    Cholesterol Total 187 <=200 mg/dL    HDL Cholesterol 64 (H) 35 - 60 mg/dL    Triglyceride 58 34 - 140 mg/dL    Cholesterol/HDL Ratio 3 0 - 5    Very Low Density Lipoprotein 12     LDL Cholesterol 111.00 50.00 - 140.00 mg/dL   CBC with Differential    Collection Time: 07/17/24  7:04 AM   Result Value Ref Range    WBC 8.21 4.50 - 11.50 x10(3)/mcL    RBC 5.86 4.70 - 6.10 x10(6)/mcL    Hgb 17.1 14.0 - 18.0 g/dL    Hct 52.0 42.0 - 52.0 %    MCV 88.7 80.0 - 94.0 fL    MCH 29.2 27.0 - 31.0 pg    MCHC 32.9 (L) 33.0 - 36.0 g/dL    RDW 14.2 11.5 - 17.0 %    Platelet 318 130 - 400 x10(3)/mcL    MPV 10.8 (H) 7.4 - 10.4 fL    Neut % 56.9 %    Lymph % 26.1 %    Mono % 7.6 %    Eos % 8.0 %    Basophil % 1.0 %    Lymph # 2.14 0.6 - 4.6 x10(3)/mcL    Neut # 4.68 2.1 - 9.2 x10(3)/mcL    Mono # 0.62 0.1 - 1.3 x10(3)/mcL    Eos # 0.66 0 - 0.9 x10(3)/mcL    Baso # 0.08 <=0.2 x10(3)/mcL    IG# 0.03 0 - 0.04 x10(3)/mcL    IG% 0.4 %    NRBC% 0.0 %   Lithium Level    Collection Time: 07/17/24  7:04 AM   Result Value Ref Range    Lithium Level 0.61 0.50 - 1.20 mmol/L          Medical Review Of Systems:  Constitutional: negative  Respiratory: negative  Cardiovascular: negative  Gastrointestinal: negative  Genitourinary:negative  Musculoskeletal:negative  Neurological: negative       Psychiatric Mental Status Exam:  General Appearance: appears stated age, well-developed, well-nourished  Arousal: alert  Behavior:  "cooperative  Movements and Motor Activity: no abnormal involuntary movements noted  Orientation: oriented to person, place, time, and situation  Speech: normal rate, normal rhythm, normal volume, normal tone  Mood: "Depressed"  Affect: constricted  Thought Process: perseverative  Associations: fair  Thought Content and Perceptions: recent suicidal ideation, hypersexual, no homicidal ideation  Recent and Remote Memory: recent memory intact, remote memory intact; per interview/observation with patient  Attention and Concentration: intact, attentive to conversation; per interview/observation with patient  Fund of Knowledge: intact, aware of current events, vocabulary appropriate; based on history, vocabulary, fund of knowledge, syntax, grammar, and content  Insight: questionable; based on understanding of severity of illness and HPI  Judgment: questionable; based on patient's behavior and HPI     ASSESSMENT/PLAN:   Problems Addressed/Diagnoses:  Schizoaffective Disorder, bipolar type (F25.0)  Intellectual Disability (F79)    Past Medical History:   Diagnosis Date    Anxiety     Asthma     Depression     Hallucination     History of psychiatric hospitalization     Hx of psychiatric care     Psychiatric problem     Psychosis     Renal insufficiency 7/17/2024    Schizoaffective disorder     Sleep difficulties     Suicide attempt     Therapy         Plan:  Schizoaffective disorder, chronic with acute exacerbation  -Continue Thorazine  -Continue Lithium  -Continue Remeron 7.5mg HS      Intellectual disability, chronic  -Group/Individual psychotherapy     Expected Disposition Plan: Home        SHANNAN Vieira-BC  "

## 2024-07-18 NOTE — PROGRESS NOTES
07/18/24 1000   [facility] Group Therapy   Group Name Therapeutic Recreation   Specific Interventions Skilled Activity Mild Exercises   Participation Level Active;Supportive;Appropriate;Attentive;Sharing   Participation Quality Cooperative;Social;Requires Prompting;Needs Redirection   Insight/Motivation Limited   Affect/Mood Display Impulsive;Inappropriate   Cognition Pre-Occupied   Psychomotor WNL

## 2024-07-18 NOTE — PROGRESS NOTES
07/18/24 1500   [facility] Group Therapy   Group Name Therapeutic Recreation   Specific Interventions Skilled Activity Creative Expression   Participation Level None   Participation Quality Refused;Lack of Interest  (despite encouragement)

## 2024-07-19 VITALS
WEIGHT: 149.81 LBS | HEIGHT: 66 IN | OXYGEN SATURATION: 100 % | DIASTOLIC BLOOD PRESSURE: 82 MMHG | BODY MASS INDEX: 24.08 KG/M2 | TEMPERATURE: 99 F | RESPIRATION RATE: 18 BRPM | SYSTOLIC BLOOD PRESSURE: 122 MMHG | HEART RATE: 81 BPM

## 2024-07-19 PROCEDURE — S4991 NICOTINE PATCH NONLEGEND: HCPCS | Performed by: PSYCHIATRY & NEUROLOGY

## 2024-07-19 PROCEDURE — 25000003 PHARM REV CODE 250: Performed by: PSYCHIATRY & NEUROLOGY

## 2024-07-19 PROCEDURE — 25000003 PHARM REV CODE 250

## 2024-07-19 RX ORDER — LITHIUM CARBONATE 300 MG/1
300 TABLET, FILM COATED, EXTENDED RELEASE ORAL EVERY 12 HOURS
Qty: 60 TABLET | Refills: 0 | Status: SHIPPED | OUTPATIENT
Start: 2024-07-19 | End: 2024-08-18

## 2024-07-19 RX ORDER — IBUPROFEN 200 MG
1 TABLET ORAL DAILY
Qty: 28 PATCH | Refills: 0 | Status: SHIPPED | OUTPATIENT
Start: 2024-07-20 | End: 2024-08-17

## 2024-07-19 RX ORDER — MIRTAZAPINE 7.5 MG/1
7.5 TABLET, FILM COATED ORAL NIGHTLY
Qty: 30 TABLET | Refills: 0 | Status: SHIPPED | OUTPATIENT
Start: 2024-07-19 | End: 2024-08-18

## 2024-07-19 RX ORDER — CHLORPROMAZINE HYDROCHLORIDE 25 MG/1
25 TABLET, FILM COATED ORAL 3 TIMES DAILY
Qty: 90 TABLET | Refills: 0 | Status: SHIPPED | OUTPATIENT
Start: 2024-07-19 | End: 2024-08-18

## 2024-07-19 RX ADMIN — LITHIUM CARBONATE 300 MG: 300 TABLET, FILM COATED, EXTENDED RELEASE ORAL at 08:07

## 2024-07-19 RX ADMIN — NICOTINE 1 PATCH: 21 PATCH, EXTENDED RELEASE TRANSDERMAL at 08:07

## 2024-07-19 RX ADMIN — CHLORPROMAZINE HYDROCHLORIDE 25 MG: 25 TABLET, FILM COATED ORAL at 02:07

## 2024-07-19 RX ADMIN — CHLORPROMAZINE HYDROCHLORIDE 25 MG: 25 TABLET, FILM COATED ORAL at 08:07

## 2024-07-19 NOTE — NURSING
Discharge Note:    Elvis Puentes is a 38 y.o. male, : 1985, MRN: [id number], admitted on 2024 for Chai Jackson MD with a diagnosis of Depression [F32.A].    Patient discharged on 2024 per physician orders in stable condition. Patient denied suicidal ideation, homicidal ideation, or hallucinations. Patient was discharged with valuables, personal belongings, prescriptions, discharge instructions, and an educational handout explaining the diagnosis and prescribed medications. Patient verbalized understanding of the discharge instructions and importance of follow-up visits. Patient was escorted out of the facility by [facility] and placed into a cab to be transported to home.     Patient discharged on the following medications:     Medication List        START taking these medications      lithium 300 MG CR tablet  Commonly known as: LITHOBID  Take 1 tablet (300 mg total) by mouth every 12 (twelve) hours.  Replaces: lithium 300 MG capsule     mirtazapine 7.5 MG Tab  Commonly known as: REMERON  Take 1 tablet (7.5 mg total) by mouth every evening.     nicotine 21 mg/24 hr  Commonly known as: NICODERM CQ  Place 1 patch onto the skin once daily.  Start taking on: 2024            CHANGE how you take these medications      chlorproMAZINE 25 MG tablet  Commonly known as: THORAZINE  Take 1 tablet (25 mg total) by mouth 3 (three) times daily.  What changed: Another medication with the same name was removed. Continue taking this medication, and follow the directions you see here.            STOP taking these medications      benztropine 0.5 MG tablet  Commonly known as: COGENTIN     cyproheptadine 4 mg tablet  Commonly known as: PERIACTIN     divalproex  MG Tb24 24 hr tablet  Commonly known as: DEPAKOTE ER     guanFACINE 1 MG Tab  Commonly known as: TENEX     haloperidoL 10 MG tablet  Commonly known as: HALDOL     lithium 300 MG capsule  Commonly known as:  ESKALITH  Replaced by: lithium 300 MG CR tablet     lithium 300 mg tablet  Commonly known as: LITHOTAB     OXcarbazepine 300 MG Tab  Commonly known as: TRILEPTAL     propranoloL 10 MG tablet  Commonly known as: INDERAL     QUEtiapine 100 MG Tab  Commonly known as: SEROQUEL     risperiDONE 4 MG tablet  Commonly known as: RISPERDAL     sertraline 50 MG tablet  Commonly known as: ZOLOFT     traZODone 50 MG tablet  Commonly known as: DESYREL               Where to Get Your Medications        You can get these medications from any pharmacy    Bring a paper prescription for each of these medications  chlorproMAZINE 25 MG tablet  lithium 300 MG CR tablet  mirtazapine 7.5 MG Tab  nicotine 21 mg/24 hr

## 2024-07-19 NOTE — PROGRESS NOTES
"7/19/2024  Elvis Puentes   1985   [id number]        Psychiatry Progress Note     Chief Complaint: "I'm all right"    SUBJECTIVE:   Elvis Puentes is a 38 y.o. male placed under a PEC at [facility] for suicidal thoughts. Patient has a long history of schizoaffective disorder.     Patient today states that he is feeling great and ready to go. Patient has shown great improvement since his arrival and denies any SI/HI. Patient does have some significant intellectual issues but does not appear to be depressed or any danger to himself. I believe continuing his stay any further would not be beneficial and could possibly lead to negative behavioral changes. No overt behavioral issues reported. Staff report that he has been cooperative and making origami for staff. Patient is tolerating his medication well without issue. Will continue current POC and will proceed with discharge today.    UDS: (+)cannabis  Blood alcohol: <10  Lithium: 0.29    Current Medications:   Scheduled Meds:    chlorproMAZINE  25 mg Oral TID    lithium  300 mg Oral Q12H    mirtazapine  7.5 mg Oral QHS    nicotine  1 patch Transdermal Daily      PRN Meds:   Current Facility-Administered Medications:     acetaminophen, 650 mg, Oral, Q6H PRN    aluminum-magnesium hydroxide-simethicone, 30 mL, Oral, Q6H PRN    diphenhydrAMINE, 50 mg, Oral, Q4H PRN **AND** LORazepam, 2 mg, Oral, Q4H PRN **AND** diphenhydrAMINE, 50 mg, Intramuscular, Q4H PRN **AND** lorazepam, 2 mg, Intramuscular, Q4H PRN    hydrOXYzine HCL, 50 mg, Oral, Q4H PRN    traZODone, 100 mg, Oral, Nightly PRN   Psychotherapeutics (From admission, onward)      Start     Stop Route Frequency Ordered    07/17/24 2100  mirtazapine tablet 7.5 mg         -- Oral Nightly 07/17/24 1017    07/16/24 0930  lithium CR tablet 300 mg         -- Oral Every 12 hours 07/16/24 0823    07/16/24 0930  chlorproMAZINE tablet 25 mg         -- Oral 3 times daily 07/16/24 0823    07/16/24 0706  LORazepam tablet 2 " "mg  (Med - Acute  Behavioral Management)        Placed in "And" Linked Group    -- Oral Every 4 hours PRN 07/16/24 0706    07/16/24 0706  LORazepam injection 2 mg  (Med - Acute  Behavioral Management)        Placed in "And" Linked Group    -- IM Every 4 hours PRN 07/16/24 0706    07/16/24 0706  traZODone tablet 100 mg         -- Oral Nightly PRN 07/16/24 0706            Allergies:   Review of patient's allergies indicates:   Allergen Reactions    Haloperidol Swelling     Muscle stiffness  Has muscle spasms      Paroxetine Swelling, Other (See Comments) and Rash     "i don't remember"  "i don't remember"      Pineapple      Face swells up. I have eaten pineapple since then and had no problems.      Ziprasidone Other (See Comments)     Muscle twitching        OBJECTIVE:   Vitals   Vitals:    07/19/24 0701   BP: 122/82   Pulse: 81   Resp: 18   Temp: 98.6 °F (37 °C)        Labs/Imaging/Studies:   No results found for this or any previous visit (from the past 36 hour(s)).         Medical Review Of Systems:  Constitutional: negative  Respiratory: negative  Cardiovascular: negative  Gastrointestinal: negative  Genitourinary:negative  Musculoskeletal:negative  Neurological: negative       Psychiatric Mental Status Exam:  General Appearance: appears stated age, well-developed, well-nourished  Arousal: alert  Behavior: cooperative  Movements and Motor Activity: no abnormal involuntary movements noted  Orientation: oriented to person, place, time, and situation  Speech: normal rate, normal rhythm, normal volume, normal tone  Mood: "Great"  Affect: Mood congruent  Thought Process: perseverative  Associations: fair  Thought Content and Perceptions: denies suicidal ideation, hypersexual, no homicidal ideation  Recent and Remote Memory: recent memory intact, remote memory intact; per interview/observation with patient  Attention and Concentration: intact, attentive to conversation; per interview/observation with patient  Fund of " Knowledge: intact, aware of current events, vocabulary appropriate; based on history, vocabulary, fund of knowledge, syntax, grammar, and content  Insight: questionable; based on understanding of severity of illness and HPI  Judgment: questionable; based on patient's behavior and HPI     ASSESSMENT/PLAN:   Problems Addressed/Diagnoses:  Schizoaffective Disorder, bipolar type (F25.0)  Intellectual Disability (F79)    Past Medical History:   Diagnosis Date    Anxiety     Asthma     Depression     Hallucination     History of psychiatric hospitalization     Hx of psychiatric care     Psychiatric problem     Psychosis     Renal insufficiency 7/17/2024    Schizoaffective disorder     Sleep difficulties     Suicide attempt     Therapy         Plan:  Schizoaffective disorder, chronic with acute exacerbation  -Continue Thorazine  -Continue Lithium  -Continue Remeron 7.5mg HS      Intellectual disability, chronic  -Group/Individual psychotherapy     Expected Disposition Plan: Home        SHANNAN Vieira-BC

## 2024-07-19 NOTE — NURSING
Pt requested PRN sleep medications with night medications. Administered trazodone 100 mg po @ 2030. Noted @ 2200 safety checks pt in bed, eyes closed, resting quietly.

## 2024-07-19 NOTE — PLAN OF CARE
Problem: Adult Behavioral Health Plan of Care  Goal: Plan of Care Review  Outcome: Progressing  Flowsheets (Taken 7/18/2024 2234)  Patient Agreement with Plan of Care: agrees  Plan of Care Reviewed With: patient  Goal: Patient-Specific Goal (Individualization)  Outcome: Progressing  Flowsheets (Taken 7/18/2024 2234)  Patient Personal Strengths: medication/treatment adherence  Goal: Adheres to Safety Considerations for Self and Others  Outcome: Progressing  Flowsheets (Taken 7/18/2024 2234)  Adheres to Safety Considerations for Self and Others: making progress toward outcome  Goal: Absence of New-Onset Illness or Injury  Outcome: Progressing  Goal: Optimized Coping Skills in Response to Life Stressors  Outcome: Progressing  Flowsheets (Taken 7/18/2024 2234)  Optimized Coping Skills in Response to Life Stressors: making progress toward outcome  Goal: Develops/Participates in Therapeutic [place] to Support Successful Transition  Outcome: Progressing  Flowsheets (Taken 7/18/2024 2234)  Develops/Participates in Therapeutic [place] to Support Successful Transition: making progress toward outcome  Goal: Rounds/Family Conference  Outcome: Progressing     Problem: Violence Risk or Actual  Goal: Anger and Impulse Control  Outcome: Progressing     Problem: Suicide Risk  Goal: Absence of Self-Harm  Outcome: Progressing     Problem: Depressive Signs/Symptoms  Goal: Optimized Energy Level (Depressive Signs/Symptoms)  Outcome: Progressing  Goal: Optimized Cognitive Function (Depressive Signs/Symptoms)  Outcome: Progressing  Goal: Increased Participation and Engagement (Depressive Signs/Symptoms)  Outcome: Progressing  Goal: Enhanced Self-Esteem and Confidence (Depressive Signs/Symptoms)  Outcome: Progressing  Goal: Improved Mood Symptoms (Depressive Signs/Symptoms)  Outcome: Progressing  Goal: Optimized Nutrition Intake (Depressive Signs/Symptoms)  Outcome: Progressing  Goal: Improved Psychomotor Symptoms (Depressive  Signs/Symptoms)  Outcome: Progressing  Goal: Improved Sleep (Depressive Signs/Symptoms)  Outcome: Progressing  Goal: Enhanced Social, Occupational or Functional Skills (Depressive Signs/Symptoms)  Outcome: Progressing     Problem: Psychotic Signs/Symptoms  Goal: Improved Behavioral Control (Psychotic Signs/Symptoms)  Outcome: Progressing  Goal: Optimal Cognitive Function (Psychotic Signs/Symptoms)  Outcome: Progressing  Goal: Increased Participation and Engagement (Psychotic Signs/Symptoms)  Outcome: Progressing  Goal: Improved Mood Symptoms (Psychotic Signs/Symptoms)  Outcome: Progressing  Goal: Improved Psychomotor Symptoms (Psychotic Signs/Symptoms)  Outcome: Progressing  Goal: Decreased Sensory Symptoms (Psychotic Signs/Symptoms)  Outcome: Progressing  Goal: Improved Sleep (Psychotic Signs/Symptoms)  Outcome: Progressing  Goal: Enhanced Social, Occupational or Functional Skills (Psychotic Signs/Symptoms)  Outcome: Progressing   Remains anxious and flat.  Suspicious.  Compliant with meds.  Cooperative with staff.

## 2024-07-19 NOTE — PLAN OF CARE
lEvis met reported treatment goals Stress reduction.  CTRS Discharge Recommendations:  Encouraged Pt. to actively utilize available community resources to increase leisure involvement to decrease signs and symptoms of illness.  Encouraged Pt. to utilize coping skills on a regular basis to reduce the risk of decomposition and re-hospitalization.

## 2024-07-19 NOTE — PLAN OF CARE
Problem: Adult Behavioral Health Plan of Care  Goal: Plan of Care Review  Outcome: Met  Goal: Patient-Specific Goal (Individualization)  Outcome: Met  Goal: Adheres to Safety Considerations for Self and Others  Outcome: Met  Goal: Absence of New-Onset Illness or Injury  Outcome: Met  Goal: Optimized Coping Skills in Response to Life Stressors  Outcome: Met  Goal: Develops/Participates in Therapeutic [place] to Support Successful Transition  Outcome: Met  Goal: Rounds/Family Conference  Outcome: Met     Problem: Violence Risk or Actual  Goal: Anger and Impulse Control  Outcome: Met     Problem: Suicide Risk  Goal: Absence of Self-Harm  Outcome: Met     Problem: Depressive Signs/Symptoms  Goal: Optimized Energy Level (Depressive Signs/Symptoms)  Outcome: Met  Goal: Optimized Cognitive Function (Depressive Signs/Symptoms)  Outcome: Met  Goal: Increased Participation and Engagement (Depressive Signs/Symptoms)  Outcome: Met  Goal: Enhanced Self-Esteem and Confidence (Depressive Signs/Symptoms)  Outcome: Met  Goal: Improved Mood Symptoms (Depressive Signs/Symptoms)  Outcome: Met  Goal: Optimized Nutrition Intake (Depressive Signs/Symptoms)  Outcome: Met  Goal: Improved Psychomotor Symptoms (Depressive Signs/Symptoms)  Outcome: Met  Goal: Improved Sleep (Depressive Signs/Symptoms)  Outcome: Met  Goal: Enhanced Social, Occupational or Functional Skills (Depressive Signs/Symptoms)  Outcome: Met     Problem: Psychotic Signs/Symptoms  Goal: Improved Behavioral Control (Psychotic Signs/Symptoms)  Outcome: Met  Goal: Optimal Cognitive Function (Psychotic Signs/Symptoms)  Outcome: Met  Goal: Increased Participation and Engagement (Psychotic Signs/Symptoms)  Outcome: Met  Goal: Improved Mood Symptoms (Psychotic Signs/Symptoms)  Outcome: Met  Goal: Improved Psychomotor Symptoms (Psychotic Signs/Symptoms)  Outcome: Met  Goal: Decreased Sensory Symptoms (Psychotic Signs/Symptoms)  Outcome: Met  Goal: Improved Sleep (Psychotic  Signs/Symptoms)  Outcome: Met  Goal: Enhanced Social, Occupational or Functional Skills (Psychotic Signs/Symptoms)  Outcome: Met

## 2024-07-19 NOTE — PROGRESS NOTES
07/19/24 1000   [facility] Group Therapy   Group Name Therapeutic Recreation   Specific Interventions Skilled Activity Mild Exercises   Participation Level None   Participation Quality Refused  (despite encouragement)

## 2024-08-25 ENCOUNTER — HOSPITAL ENCOUNTER (EMERGENCY)
Facility: HOSPITAL | Age: 39
Discharge: HOME | End: 2024-08-25
Attending: EMERGENCY MEDICINE
Payer: COMMERCIAL

## 2024-08-25 VITALS
TEMPERATURE: 99 F | WEIGHT: 160 LBS | BODY MASS INDEX: 22.9 KG/M2 | RESPIRATION RATE: 18 BRPM | OXYGEN SATURATION: 98 % | DIASTOLIC BLOOD PRESSURE: 87 MMHG | HEIGHT: 70 IN | HEART RATE: 100 BPM | SYSTOLIC BLOOD PRESSURE: 135 MMHG

## 2024-08-25 DIAGNOSIS — F25.9 SCHIZOAFFECTIVE DISORDER, UNSPECIFIED TYPE: Primary | ICD-10-CM

## 2024-08-25 PROCEDURE — 99284 EMERGENCY DEPT VISIT MOD MDM: CPT | Mod: 25

## 2024-08-25 PROCEDURE — 25000003 PHARM REV CODE 250: Performed by: EMERGENCY MEDICINE

## 2024-08-25 PROCEDURE — 96372 THER/PROPH/DIAG INJ SC/IM: CPT | Performed by: EMERGENCY MEDICINE

## 2024-08-25 PROCEDURE — 63600175 PHARM REV CODE 636 W HCPCS: Performed by: EMERGENCY MEDICINE

## 2024-08-25 RX ORDER — LITHIUM CARBONATE 300 MG/1
300 TABLET, FILM COATED, EXTENDED RELEASE ORAL 2 TIMES DAILY
Qty: 60 TABLET | Refills: 1 | Status: SHIPPED | OUTPATIENT
Start: 2024-08-25

## 2024-08-25 RX ORDER — CHLORPROMAZINE HYDROCHLORIDE 25 MG/1
25 TABLET, FILM COATED ORAL 3 TIMES DAILY
Qty: 90 TABLET | Refills: 1 | Status: SHIPPED | OUTPATIENT
Start: 2024-08-25

## 2024-08-25 RX ORDER — LITHIUM CARBONATE 300 MG/1
300 CAPSULE ORAL ONCE
Status: COMPLETED | OUTPATIENT
Start: 2024-08-25 | End: 2024-08-25

## 2024-08-25 RX ORDER — CHLORPROMAZINE HYDROCHLORIDE 25 MG/ML
25 INJECTION INTRAMUSCULAR
Status: COMPLETED | OUTPATIENT
Start: 2024-08-25 | End: 2024-08-25

## 2024-08-25 RX ORDER — LITHIUM CARBONATE 300 MG/1
300 CAPSULE ORAL EVERY 8 HOURS
Status: DISCONTINUED | OUTPATIENT
Start: 2024-08-25 | End: 2024-08-25

## 2024-08-25 RX ORDER — MIRTAZAPINE 7.5 MG/1
7.5 TABLET, FILM COATED ORAL ONCE
Status: COMPLETED | OUTPATIENT
Start: 2024-08-25 | End: 2024-08-25

## 2024-08-25 RX ORDER — MIRTAZAPINE 7.5 MG/1
7.5 TABLET, FILM COATED ORAL NIGHTLY
Qty: 30 TABLET | Refills: 1 | Status: SHIPPED | OUTPATIENT
Start: 2024-08-25

## 2024-08-25 RX ADMIN — LITHIUM CARBONATE 300 MG: 300 CAPSULE, GELATIN COATED ORAL at 09:08

## 2024-08-25 RX ADMIN — CHLORPROMAZINE HYDROCHLORIDE 25 MG: 25 INJECTION INTRAMUSCULAR at 09:08

## 2024-08-25 RX ADMIN — MIRTAZAPINE 7.5 MG: 7.5 TABLET, FILM COATED ORAL at 09:08

## 2024-08-25 NOTE — ED PROVIDER NOTES
"Encounter Date: 8/25/2024       History     Chief Complaint   Patient presents with    Medication Refill     Pt needs Rx refill      39-year-old male with a history of anxiety, depression, asthma, schizoaffective disorder presents to the emergency room with his mom requesting medication refills.  They ran out of medications last week and since that time he has not been able to sleep and is getting agitated.  No suicidal or homicidal ideation.  No dangerous behavior.  His mom just believes he needs prescription refills.  According to the discharge instructions from 07/19/2024 he is supposed to be taking Lithobid 300 mg twice daily, Remeron 7.5 mg nightly, and chlorpromazine 25 mg 3 times a day.  His mom would like to give for me to give him doses of his medications in the emergency room before they leave.  She states that they will be able to  his medications.  I asked if she felt like he was safe at home and she states that he is safe inside the house.  She says he does go outside and sits on the curb and make hand gestures at the passing vehicles and she worries a bit about that. She will ask him to come in and he will comply.  He is not being aggressive and she does not believe he would step in front of a car or do anything dangerous in the home.     The history is provided by the patient.     Review of patient's allergies indicates:   Allergen Reactions    Haloperidol Swelling     Muscle stiffness  Has muscle spasms      Paroxetine Swelling, Other (See Comments) and Rash     "i don't remember"  "i don't remember"      Pineapple      Face swells up. I have eaten pineapple since then and had no problems.      Ziprasidone Other (See Comments)     Muscle twitching     Past Medical History:   Diagnosis Date    Anxiety     Asthma     Depression     Hallucination     History of psychiatric hospitalization     Hx of psychiatric care     Psychiatric problem     Psychosis     Renal insufficiency 7/17/2024    " Schizoaffective disorder     Sleep difficulties     Suicide attempt     Therapy      History reviewed. No pertinent surgical history.  Family History   Problem Relation Name Age of Onset    Coronary artery disease Mother          Stents    Heart attack Mother      Cervical cancer Sister       Social History     Tobacco Use    Smoking status: Every Day     Current packs/day: 1.00     Average packs/day: 1 pack/day for 23.1 years (23.1 ttl pk-yrs)     Types: Cigarettes     Start date: 7/17/2001    Smokeless tobacco: Never   Substance Use Topics    Alcohol use: Not Currently     Alcohol/week: 4.0 standard drinks of alcohol     Types: 4 Cans of beer per week     Comment: uses alcohol monthly    Drug use: Not Currently     Types: Marijuana     Review of Systems   Psychiatric/Behavioral:  Positive for agitation and sleep disturbance.    All other systems reviewed and are negative.      Physical Exam     Initial Vitals [08/25/24 0824]   BP Pulse Resp Temp SpO2   135/87 100 18 98.6 °F (37 °C) 98 %      MAP       --         Physical Exam    Nursing note and vitals reviewed.  Constitutional: He appears well-developed and well-nourished.   Pulmonary/Chest: No respiratory distress.     Neurological: He is alert and oriented to person, place, and time. GCS score is 15. GCS eye subscore is 4. GCS verbal subscore is 5. GCS motor subscore is 6.   Skin: Skin is warm and dry.   Psychiatric:   Patient was calm and relaxed sitting and holding his Bible and cross.  He answers questions politely saying yes ma'am and no ma'am.  He is agreeable to taking his medications.  He denies suicidal ideation.  He is not agitated at this time.         ED Course   Procedures  Labs Reviewed - No data to display       Imaging Results    None          Medications   chlorproMAZINE injection 25 mg (has no administration in time range)   mirtazapine tablet 7.5 mg (has no administration in time range)   lithium capsule 300 mg (has no administration in time  range)     Medical Decision Making  See HPI for narrative    Differential diagnosis includes but is not limited to psychiatric disorder    Risk  Prescription drug management.                                      Clinical Impression:  Final diagnoses:  [F25.9] Schizoaffective disorder, unspecified type (Primary)          ED Disposition Condition    Discharge Stable          ED Prescriptions       Medication Sig Dispense Start Date End Date Auth. Provider    chlorproMAZINE (THORAZINE) 25 MG tablet Take 1 tablet (25 mg total) by mouth 3 (three) times daily. 90 tablet 8/25/2024 -- Trudy Alvarado MD    mirtazapine (REMERON) 7.5 MG Tab Take 1 tablet (7.5 mg total) by mouth every evening. 30 tablet 8/25/2024 -- Trudy Alvarado MD    lithium (LITHOBID) 300 MG CR tablet Take 1 tablet (300 mg total) by mouth 2 (two) times daily. 60 tablet 8/25/2024 -- Trudy Alvarado MD          Follow-up Information       Follow up With Specialties Details Why Contact Info    Your psychiatrist  Schedule an appointment as soon as possible for a visit                Trudy Alvarado MD  08/25/24 9145

## 2024-08-25 NOTE — DISCHARGE INSTRUCTIONS
Return to the emergency room or call 911 should he develop any dangerous behaviors or refused to take his medications.  Medications were sent to your pharmacy.    Medication list:  Lithium CR 300mg twice daily  Remeron (mirtazapine) 7.5mg nightly  Chlorpromazine 25mg 3 times a day

## 2024-08-26 ENCOUNTER — HOSPITAL ENCOUNTER (EMERGENCY)
Facility: HOSPITAL | Age: 39
Discharge: PSYCHIATRIC OR SUBSTANCE USE FACILITY | End: 2024-08-26
Attending: EMERGENCY MEDICINE
Payer: COMMERCIAL

## 2024-08-26 VITALS
HEIGHT: 70 IN | BODY MASS INDEX: 22.9 KG/M2 | SYSTOLIC BLOOD PRESSURE: 130 MMHG | RESPIRATION RATE: 16 BRPM | HEART RATE: 98 BPM | DIASTOLIC BLOOD PRESSURE: 83 MMHG | OXYGEN SATURATION: 99 % | WEIGHT: 160 LBS | TEMPERATURE: 98 F

## 2024-08-26 DIAGNOSIS — F25.0 SCHIZOAFFECTIVE DISORDER, BIPOLAR TYPE: Primary | ICD-10-CM

## 2024-08-26 DIAGNOSIS — F12.10 CANNABIS ABUSE: ICD-10-CM

## 2024-08-26 LAB
ALBUMIN SERPL-MCNC: 4.6 G/DL (ref 3.5–5)
ALBUMIN/GLOB SERPL: 1.2 RATIO (ref 1.1–2)
ALP SERPL-CCNC: 105 UNIT/L (ref 40–150)
ALT SERPL-CCNC: 15 UNIT/L (ref 0–55)
AMPHET UR QL SCN: NEGATIVE
ANION GAP SERPL CALC-SCNC: 12 MEQ/L
AST SERPL-CCNC: 26 UNIT/L (ref 5–34)
BARBITURATE SCN PRESENT UR: NEGATIVE
BASOPHILS # BLD AUTO: 0.07 X10(3)/MCL
BASOPHILS NFR BLD AUTO: 0.4 %
BENZODIAZ UR QL SCN: NEGATIVE
BILIRUB SERPL-MCNC: 1.1 MG/DL
BILIRUB UR QL STRIP.AUTO: NEGATIVE
BUN SERPL-MCNC: 3.9 MG/DL (ref 8.9–20.6)
CALCIUM SERPL-MCNC: 10.4 MG/DL (ref 8.4–10.2)
CANNABINOIDS UR QL SCN: POSITIVE
CHLORIDE SERPL-SCNC: 100 MMOL/L (ref 98–107)
CLARITY UR: CLEAR
CO2 SERPL-SCNC: 24 MMOL/L (ref 22–29)
COCAINE UR QL SCN: NEGATIVE
COLOR UR AUTO: YELLOW
CREAT SERPL-MCNC: 1.12 MG/DL (ref 0.73–1.18)
CREAT/UREA NIT SERPL: 3
EOSINOPHIL # BLD AUTO: 0.23 X10(3)/MCL (ref 0–0.9)
EOSINOPHIL NFR BLD AUTO: 1.5 %
ERYTHROCYTE [DISTWIDTH] IN BLOOD BY AUTOMATED COUNT: 13.9 % (ref 11.5–17)
ETHANOL SERPL-MCNC: <10 MG/DL
FENTANYL UR QL SCN: NEGATIVE
FLUAV AG UPPER RESP QL IA.RAPID: NOT DETECTED
FLUBV AG UPPER RESP QL IA.RAPID: NOT DETECTED
GFR SERPLBLD CREATININE-BSD FMLA CKD-EPI: >60 ML/MIN/1.73/M2
GLOBULIN SER-MCNC: 3.7 GM/DL (ref 2.4–3.5)
GLUCOSE SERPL-MCNC: 109 MG/DL (ref 74–100)
GLUCOSE UR QL STRIP: NEGATIVE
HCT VFR BLD AUTO: 46.3 % (ref 42–52)
HGB BLD-MCNC: 15.6 G/DL (ref 14–18)
HGB UR QL STRIP: NEGATIVE
IMM GRANULOCYTES # BLD AUTO: 0.07 X10(3)/MCL (ref 0–0.04)
IMM GRANULOCYTES NFR BLD AUTO: 0.4 %
KETONES UR QL STRIP: NEGATIVE
LEUKOCYTE ESTERASE UR QL STRIP: NEGATIVE
LITHIUM SERPL-MCNC: 0.65 MMOL/L (ref 0.5–1.2)
LYMPHOCYTES # BLD AUTO: 1.93 X10(3)/MCL (ref 0.6–4.6)
LYMPHOCYTES NFR BLD AUTO: 12.4 %
MCH RBC QN AUTO: 29.4 PG (ref 27–31)
MCHC RBC AUTO-ENTMCNC: 33.7 G/DL (ref 33–36)
MCV RBC AUTO: 87.2 FL (ref 80–94)
MDMA UR QL SCN: NEGATIVE
MONOCYTES # BLD AUTO: 1.12 X10(3)/MCL (ref 0.1–1.3)
MONOCYTES NFR BLD AUTO: 7.2 %
NEUTROPHILS # BLD AUTO: 12.19 X10(3)/MCL (ref 2.1–9.2)
NEUTROPHILS NFR BLD AUTO: 78.1 %
NITRITE UR QL STRIP: NEGATIVE
NRBC BLD AUTO-RTO: 0 %
OPIATES UR QL SCN: NEGATIVE
PCP UR QL: NEGATIVE
PH UR STRIP: 6 [PH]
PH UR: 6 [PH] (ref 3–11)
PLATELET # BLD AUTO: 281 X10(3)/MCL (ref 130–400)
PMV BLD AUTO: 10.2 FL (ref 7.4–10.4)
POTASSIUM SERPL-SCNC: 3.6 MMOL/L (ref 3.5–5.1)
PROT SERPL-MCNC: 8.3 GM/DL (ref 6.4–8.3)
PROT UR QL STRIP: NEGATIVE
RBC # BLD AUTO: 5.31 X10(6)/MCL (ref 4.7–6.1)
SARS-COV-2 RNA RESP QL NAA+PROBE: NOT DETECTED
SODIUM SERPL-SCNC: 136 MMOL/L (ref 136–145)
SP GR UR STRIP.AUTO: <=1.005 (ref 1–1.03)
SPECIFIC GRAVITY, URINE AUTO (.000) (OHS): <1.005 (ref 1–1.03)
TSH SERPL-ACNC: 0.62 UIU/ML (ref 0.35–4.94)
UROBILINOGEN UR STRIP-ACNC: 0.2
WBC # BLD AUTO: 15.61 X10(3)/MCL (ref 4.5–11.5)

## 2024-08-26 PROCEDURE — 0240U COVID/FLU A&B PCR: CPT | Performed by: EMERGENCY MEDICINE

## 2024-08-26 PROCEDURE — 80307 DRUG TEST PRSMV CHEM ANLYZR: CPT | Performed by: EMERGENCY MEDICINE

## 2024-08-26 PROCEDURE — 99285 EMERGENCY DEPT VISIT HI MDM: CPT

## 2024-08-26 PROCEDURE — 81003 URINALYSIS AUTO W/O SCOPE: CPT | Performed by: EMERGENCY MEDICINE

## 2024-08-26 PROCEDURE — 80053 COMPREHEN METABOLIC PANEL: CPT | Performed by: EMERGENCY MEDICINE

## 2024-08-26 PROCEDURE — 82077 ASSAY SPEC XCP UR&BREATH IA: CPT | Performed by: EMERGENCY MEDICINE

## 2024-08-26 PROCEDURE — 80178 ASSAY OF LITHIUM: CPT | Performed by: EMERGENCY MEDICINE

## 2024-08-26 PROCEDURE — 85025 COMPLETE CBC W/AUTO DIFF WBC: CPT | Performed by: EMERGENCY MEDICINE

## 2024-08-26 PROCEDURE — 84443 ASSAY THYROID STIM HORMONE: CPT | Performed by: EMERGENCY MEDICINE

## 2024-08-26 NOTE — ED PROVIDER NOTES
"Encounter Date: 8/26/2024       History     Chief Complaint   Patient presents with    Psychiatric Evaluation     Mother called ems for psych eval; was seen here yesterday for same; currently calm, cooperative     The history is provided by the patient, a parent and the EMS personnel.   Mental Health Problem  The primary symptoms include bizarre behavior, delusions, depressed mood, hallucinations, negative symptoms and paranoia. The current episode started today. This is a recurrent problem.   The degree of incapacity that he is experiencing as a consequence of his illness is moderate. Sequelae of the illness include harmed interpersonal relations. Additional symptoms of the illness include insomnia, appetite change, unexpected weight change, decreased need for sleep and poor judgment. He does not admit to suicidal ideas. He does not have a plan to attempt suicide. He does not contemplate harming himself. He has not already injured self. He does not contemplate injuring another person. He has not already  injured another person. Risk factors that are present for mental illness include a history of mental illness.   Seen here yesterday for medication refill.  Mother filled Rx but states his behavior has worsened.  Reports jumping on table, hallucinating, banging on walls.    Review of patient's allergies indicates:   Allergen Reactions    Haloperidol Swelling     Muscle stiffness  Has muscle spasms      Paroxetine Swelling, Other (See Comments) and Rash     "i don't remember"  "i don't remember"      Pineapple      Face swells up. I have eaten pineapple since then and had no problems.      Ziprasidone Other (See Comments)     Muscle twitching     Past Medical History:   Diagnosis Date    Anxiety     Asthma     Depression     Hallucination     History of psychiatric hospitalization     Hx of psychiatric care     Psychiatric problem     Psychosis     Renal insufficiency 7/17/2024    Schizoaffective disorder     Sleep " difficulties     Suicide attempt     Therapy      History reviewed. No pertinent surgical history.  Family History   Problem Relation Name Age of Onset    Coronary artery disease Mother          Stents    Heart attack Mother      Cervical cancer Sister       Social History     Tobacco Use    Smoking status: Every Day     Current packs/day: 1.00     Average packs/day: 1 pack/day for 23.1 years (23.1 ttl pk-yrs)     Types: Cigarettes     Start date: 7/17/2001    Smokeless tobacco: Never   Substance Use Topics    Alcohol use: Not Currently     Alcohol/week: 4.0 standard drinks of alcohol     Types: 4 Cans of beer per week     Comment: uses alcohol monthly    Drug use: Not Currently     Types: Marijuana     Review of Systems   Constitutional:  Positive for appetite change and unexpected weight change. Negative for fever.   HENT:  Negative for sore throat.    Respiratory:  Negative for shortness of breath.    Cardiovascular:  Negative for chest pain.   Gastrointestinal:  Negative for nausea.   Genitourinary:  Negative for dysuria.   Musculoskeletal:  Negative for back pain.   Skin:  Negative for rash.   Neurological:  Negative for weakness.   Hematological:  Does not bruise/bleed easily.   Psychiatric/Behavioral:  Positive for hallucinations and paranoia. The patient has insomnia.        Physical Exam     Initial Vitals [08/26/24 1242]   BP Pulse Resp Temp SpO2   (!) 140/88 97 18 99 °F (37.2 °C) 99 %      MAP       --         Physical Exam    Nursing note and vitals reviewed.  Constitutional: He appears well-developed and well-nourished.   HENT:   Head: Normocephalic and atraumatic.   Right Ear: External ear normal.   Left Ear: External ear normal.   Nose: Nose normal.   Eyes: Conjunctivae and EOM are normal. Pupils are equal, round, and reactive to light.   Neck: Neck supple.   Normal range of motion.  Cardiovascular:  Normal rate, regular rhythm, normal heart sounds and intact distal pulses.           Pulmonary/Chest:  Breath sounds normal.   Abdominal: Abdomen is soft. Bowel sounds are normal.   Musculoskeletal:         General: Normal range of motion.      Cervical back: Normal range of motion and neck supple.     Neurological: He is alert and oriented to person, place, and time. He has normal strength. GCS score is 15. GCS eye subscore is 4. GCS verbal subscore is 5. GCS motor subscore is 6.   Skin: Skin is warm and dry. Capillary refill takes less than 2 seconds.   Psychiatric: His speech is delayed. He is slowed and withdrawn. Thought content is paranoid and delusional. Cognition and memory are impaired. He expresses impulsivity. He exhibits a depressed mood.   Flat affect         ED Course   Procedures  Labs Reviewed   COMPREHENSIVE METABOLIC PANEL - Abnormal       Result Value    Sodium 136      Potassium 3.6      Chloride 100      CO2 24      Glucose 109 (*)     Blood Urea Nitrogen 3.9 (*)     Creatinine 1.12      Calcium 10.4 (*)     Protein Total 8.3      Albumin 4.6      Globulin 3.7 (*)     Albumin/Globulin Ratio 1.2      Bilirubin Total 1.1            ALT 15      AST 26      eGFR >60      Anion Gap 12.0      BUN/Creatinine Ratio 3     DRUG SCREEN, URINE (BEAKER) - Abnormal    Amphetamines, Urine Negative      Barbiturates, Urine Negative      Benzodiazepine, Urine Negative      Cannabinoids, Urine Positive (*)     Cocaine, Urine Negative      Fentanyl, Urine Negative      MDMA, Urine Negative      Opiates, Urine Negative      Phencyclidine, Urine Negative      pH, Urine 6.0      Specific Gravity, Urine Auto <1.005      Narrative:     Cut off concentrations:    Amphetamines - 1000 ng/ml  Barbiturates - 200 ng/ml  Benzodiazepine - 200 ng/ml  Cannabinoids (THC) - 50 ng/ml  Cocaine - 300 ng/ml  Fentanyl - 1.0 ng/ml  MDMA - 500 ng/ml  Opiates - 300 ng/ml   Phencyclidine (PCP) - 25 ng/ml    Specimen submitted for drug analysis and tested for pH and specific gravity in order to evaluate sample integrity. Suspect  tampering if specific gravity is <1.003 and/or pH is not within the range of 4.5 - 8.0  False negatives may result form substances such as bleach added to urine.  False positives may result for the presence of a substance with similar chemical structure to the drug or its metabolite.    This test provides only a PRELIMINARY analytical test result. A more specific alternate chemical method must be used in order to obtain a confirmed analytical result. Gas chromatography/mass spectrometry (GC/MS) is the preferred confirmatory method. Other chemical confirmation methods are available. Clinical consideration and professional judgement should be applied to any drug of abuse test result, particularly when preliminary positive results are used.    Positive results will be confirmed only at the physicians request. Unconfirmed screening results are to be used only for medical purposes (treatment).        CBC WITH DIFFERENTIAL - Abnormal    WBC 15.61 (*)     RBC 5.31      Hgb 15.6      Hct 46.3      MCV 87.2      MCH 29.4      MCHC 33.7      RDW 13.9      Platelet 281      MPV 10.2      Neut % 78.1      Lymph % 12.4      Mono % 7.2      Eos % 1.5      Basophil % 0.4      Lymph # 1.93      Neut # 12.19 (*)     Mono # 1.12      Eos # 0.23      Baso # 0.07      IG# 0.07 (*)     IG% 0.4      NRBC% 0.0     TSH - Normal    TSH 0.625     URINALYSIS, REFLEX TO URINE CULTURE - Normal    Color, UA Yellow      Appearance, UA Clear      Specific Gravity, UA <=1.005      pH, UA 6.0      Protein, UA Negative      Glucose, UA Negative      Ketones, UA Negative      Blood, UA Negative      Bilirubin, UA Negative      Urobilinogen, UA 0.2      Nitrites, UA Negative      Leukocyte Esterase, UA Negative     ALCOHOL,MEDICAL (ETHANOL) - Normal    Ethanol Level <10.0     COVID/FLU A&B PCR - Normal    Influenza A PCR Not Detected      Influenza B PCR Not Detected      SARS-CoV-2 PCR Not Detected      Narrative:     The Xpert Xpress  "SARS-CoV-2/FLU/RSV plus is a rapid, multiplexed real-time PCR test intended for the simultaneous qualitative detection and differentiation of SARS-CoV-2, Influenza A, Influenza B, and respiratory syncytial virus (RSV) viral RNA in either nasopharyngeal swab or nasal swab specimens.         LITHIUM LEVEL - Normal    Lithium Level 0.65     CBC W/ AUTO DIFFERENTIAL    Narrative:     The following orders were created for panel order CBC auto differential.  Procedure                               Abnormality         Status                     ---------                               -----------         ------                     CBC with Differential[[id number]]       Abnormal            Final result                 Please view results for these tests on the individual orders.          Imaging Results    None          Medications - No data to display  Medical Decision Making  Amount and/or Complexity of Data Reviewed  Labs: ordered. Decision-making details documented in ED Course.    Risk  Decision regarding hospitalization.    Differential includes:  schizoaffective disorder, major depression with psychotic features, schizophrenia.  He meets PEC criteria for "gravely disabled."  I suspect he had been off of his medications just long enough to decompensate and will need a brief inpatient stay to regain symptom control.              Medically cleared for psychiatry placement: 8/26/2024  2:27 PM                   Clinical Impression:  Final diagnoses:  [F25.0] Schizoaffective disorder, bipolar type (Primary)  [F12.10] Cannabis abuse          ED Disposition Condition    Transfer to Psych Facility Stable          ED Prescriptions    None       Follow-up Information    None          Chai Fontenot MD  08/26/24 1356    "

## 2024-09-05 ENCOUNTER — HOSPITAL ENCOUNTER (EMERGENCY)
Facility: HOSPITAL | Age: 39
Discharge: HOME | End: 2024-09-05
Attending: INTERNAL MEDICINE
Payer: COMMERCIAL

## 2024-09-05 VITALS
WEIGHT: 143.31 LBS | HEART RATE: 92 BPM | DIASTOLIC BLOOD PRESSURE: 74 MMHG | SYSTOLIC BLOOD PRESSURE: 152 MMHG | OXYGEN SATURATION: 100 % | HEIGHT: 70 IN | RESPIRATION RATE: 20 BRPM | TEMPERATURE: 100 F | BODY MASS INDEX: 20.52 KG/M2

## 2024-09-05 DIAGNOSIS — M70.21 OLECRANON BURSITIS OF RIGHT ELBOW: ICD-10-CM

## 2024-09-05 DIAGNOSIS — F29 PSYCHOSIS, UNSPECIFIED PSYCHOSIS TYPE: Primary | ICD-10-CM

## 2024-09-05 PROCEDURE — 99284 EMERGENCY DEPT VISIT MOD MDM: CPT

## 2024-09-05 PROCEDURE — 25000003 PHARM REV CODE 250: Performed by: INTERNAL MEDICINE

## 2024-09-05 RX ORDER — LITHIUM CARBONATE 300 MG/1
300 TABLET, FILM COATED, EXTENDED RELEASE ORAL EVERY 12 HOURS
Qty: 60 TABLET | Refills: 2 | Status: SHIPPED | OUTPATIENT
Start: 2024-09-05 | End: 2024-09-05

## 2024-09-05 RX ORDER — OLANZAPINE 5 MG/1
10 TABLET, ORALLY DISINTEGRATING ORAL NIGHTLY
Status: DISCONTINUED | OUTPATIENT
Start: 2024-09-05 | End: 2024-09-05

## 2024-09-05 RX ORDER — MIRTAZAPINE 7.5 MG/1
7.5 TABLET, FILM COATED ORAL NIGHTLY
Qty: 30 TABLET | Refills: 2 | Status: SHIPPED | OUTPATIENT
Start: 2024-09-05

## 2024-09-05 RX ORDER — CHLORPROMAZINE HYDROCHLORIDE 25 MG/1
25 TABLET, FILM COATED ORAL 3 TIMES DAILY
Qty: 90 TABLET | Refills: 2 | Status: SHIPPED | OUTPATIENT
Start: 2024-09-05

## 2024-09-05 RX ORDER — MIRTAZAPINE 7.5 MG/1
7.5 TABLET, FILM COATED ORAL NIGHTLY
Qty: 30 TABLET | Refills: 2 | Status: SHIPPED | OUTPATIENT
Start: 2024-09-05 | End: 2024-09-05

## 2024-09-05 RX ORDER — OLANZAPINE 5 MG/1
10 TABLET, ORALLY DISINTEGRATING ORAL
Status: COMPLETED | OUTPATIENT
Start: 2024-09-05 | End: 2024-09-05

## 2024-09-05 RX ORDER — IBUPROFEN 400 MG/1
400 TABLET ORAL 3 TIMES DAILY
Qty: 90 TABLET | Refills: 0 | Status: SHIPPED | OUTPATIENT
Start: 2024-09-05 | End: 2024-10-05

## 2024-09-05 RX ORDER — IBUPROFEN 400 MG/1
400 TABLET ORAL 3 TIMES DAILY
Qty: 90 TABLET | Refills: 0 | Status: SHIPPED | OUTPATIENT
Start: 2024-09-05 | End: 2024-09-05

## 2024-09-05 RX ORDER — CHLORPROMAZINE HYDROCHLORIDE 25 MG/1
25 TABLET, FILM COATED ORAL 3 TIMES DAILY
Qty: 90 TABLET | Refills: 2 | Status: SHIPPED | OUTPATIENT
Start: 2024-09-05 | End: 2024-09-05

## 2024-09-05 RX ORDER — LITHIUM CARBONATE 300 MG/1
300 TABLET, FILM COATED, EXTENDED RELEASE ORAL EVERY 12 HOURS
Qty: 60 TABLET | Refills: 2 | Status: SHIPPED | OUTPATIENT
Start: 2024-09-05

## 2024-09-05 RX ADMIN — OLANZAPINE 10 MG: 5 TABLET, ORALLY DISINTEGRATING ORAL at 04:09

## 2024-09-05 NOTE — ED PROVIDER NOTES
"Date: 9/5/2024  Time of Note: 4:34 PM   Source of History:  History obtained from the patient and mother.   Chief complaint:  Psychiatric Evaluation (Mother reports patient released from mental hospital today and "he came back worse then he went". Reports "he's been talking out of his head and the evil spirits are talking out of his head". "There's a Protestant  put a curse on me". Denies JASMIN SALMERON. Calm in triage. )      HPI:  Elvis Puentes is a 39 y.o. year old male with history of  has a past medical history of Anxiety, Asthma, Depression, Hallucination, History of psychiatric hospitalization, psychiatric care, Psychiatric problem, Psychosis, Renal insufficiency (7/17/2024), Schizoaffective disorder, Sleep difficulties, Suicide attempt, and Therapy. presenting with Psychiatric Evaluation (Mother reports patient released from mental hospital today and "he came back worse then he went". Reports "he's been talking out of his head and the evil spirits are talking out of his head". "There's a Protestant  put a curse on me". Denies JASMIN SALMERON. Calm in triage. )    Review of Systems:    As per HPI and below:  Constitutional: No Fever.  No Chills.  Eyes: No Visual Changes.  ENT: None voiced by patient.    Cardiovascular: No Chest Pain.  Respiratory: No Shortness of breath. No Cough  GastrointestinaI: No Abdominal Pain.  No Nausea No Vomiting. No Diarrhea, No Constipation.  Genitourinary: No Dysuria  Neurologic: No Headache. No New Focal Weakness.  Musculoskeletal: No Back Pain.  Right elbow swelling and no particular pain  Psychiatric:  Hallucinations mainly hearing voices    Review of patient's allergies indicates:   Allergen Reactions    Haloperidol Swelling     Muscle stiffness  Has muscle spasms      Paroxetine Swelling, Other (See Comments) and Rash     "i don't remember"  "i don't remember"      Pineapple      Face swells up. I have eaten pineapple since then and had no problems.      Ziprasidone Other (See " Comments)     Muscle twitching       Current Outpatient Medications   Medication Instructions    chlorproMAZINE (THORAZINE) 25 mg, Oral, 3 times daily    ibuprofen (ADVIL,MOTRIN) 400 mg, Oral, 3 times daily    lithium (LITHOBID) 300 mg, Oral, Every 12 hours    mirtazapine (REMERON) 7.5 mg, Oral, Nightly       Past Medical History:   Diagnosis Date    Anxiety     Asthma     Depression     Hallucination     History of psychiatric hospitalization     Hx of psychiatric care     Psychiatric problem     Psychosis     Renal insufficiency 7/17/2024    Schizoaffective disorder     Sleep difficulties     Suicide attempt     Therapy        No past surgical history on file.    Social History     Tobacco Use    Smoking status: Every Day     Current packs/day: 1.00     Average packs/day: 1 pack/day for 23.1 years (23.1 ttl pk-yrs)     Types: Cigarettes     Start date: 7/17/2001    Smokeless tobacco: Never   Substance Use Topics    Alcohol use: Not Currently     Alcohol/week: 4.0 standard drinks of alcohol     Types: 4 Cans of beer per week     Comment: uses alcohol monthly    Drug use: Not Currently     Types: Marijuana       Family History   Problem Relation Name Age of Onset    Coronary artery disease Mother          Stents    Heart attack Mother      Cervical cancer Sister          Patient Active Problem List    Diagnosis Date Noted    Renal insufficiency 07/17/2024    Suicidal ideation 07/17/2024    Chest pain     Hallucination     Localized swelling of left upper extremity 06/15/2022    Non-traumatic rhabdomyolysis 06/13/2022    Schizophrenia 06/13/2022    Depression 02/14/2020       Physical Exam:    Vitals:    09/05/24 1610   BP: (!) 152/74   Pulse: 92   Resp: 20   Temp: 99.5 °F (37.5 °C)       Nursing note and vital signs reviewed.    Constitutional: No acute distress.  Nontoxic  Eyes: No conjunctival injection.  Extraocular muscles are intact.  ENT: Oropharynx clear.    Cardiovascular: Regular rate and rhythm.  No  "murmurs.   Respiratory: No Respiratory Distress. Good Bilateral air movement.  No rhonchi. No rales.  Abdomen: Soft.  Not distended.  Nontender.  No guarding.  No rebound. Bowel Sounds Normal.  Musculoskeletal: Good range of motion all joints.  No Gross deformities Noted.  Olecranon bursa is swollen on the right side.  No erythema, no signs of inflammation.  Skin: No Obvious Rashes seen.    Neurologic:  Awake and Alert.  Cranial Nerves Grossly intact. No focal neurological deficits.  Psychiatry:  Hallucinations,    Labs that have been ordered have been independently reviewed and interpreted by myself.    MEDICAL DECISION MAKIN y.o. year old male with history of  has a past medical history of Anxiety, Asthma, Depression, Hallucination, History of psychiatric hospitalization, psychiatric care, Psychiatric problem, Psychosis, Renal insufficiency (2024), Schizoaffective disorder, Sleep difficulties, Suicide attempt, and Therapy. presenting with Psychiatric Evaluation (Mother reports patient released from mental hospital today and "he came back worse then he went". Reports "he's been talking out of his head and the evil spirits are talking out of his head". "There's a Catholic  put a curse on me". Denies SI,HI. Calm in triage. )    Patient's mother says that they sent him home and they did not prescribe the medicines to him and he is out of his medicines and she feels if I can prescribe the medicines and she can pick them up and give it to him he usually does good.  I advised her that I am going to give him a dose of medicine in the emergency room and I will refill his medicines and let him go.  She agrees with the plan.    For his right elbow I will advise him to use an Ace and Motrin 3 times a day and give it time to get better.  Reviewed Nurses Notes and Vitals.  Labs ordered AND reviewed interpreted independently.  Medical Decision Making  Risk  Prescription drug management.       No orders of the " defined types were placed in this encounter.    Medications   OLANZapine zydis disintegrating tablet 10 mg (has no administration in time range)         No orders to display                         Diagnostic Impression:      ICD-10-CM ICD-9-CM   1. Psychosis, unspecified psychosis type  F29 298.9   2. Olecranon bursitis of right elbow  M70.21 726.33        Medication List        START taking these medications      ibuprofen 400 MG tablet  Commonly known as: ADVIL,MOTRIN  Take 1 tablet (400 mg total) by mouth 3 (three) times daily.            CHANGE how you take these medications      lithium 300 MG CR tablet  Commonly known as: LITHOBID  Take 1 tablet (300 mg total) by mouth every 12 (twelve) hours.  What changed: when to take this     mirtazapine 7.5 MG Tab  Commonly known as: REMERON  Take 1 tablet (7.5 mg total) by mouth nightly.  What changed: when to take this            CONTINUE taking these medications      chlorproMAZINE 25 MG tablet  Commonly known as: THORAZINE  Take 1 tablet (25 mg total) by mouth 3 (three) times daily.               Where to Get Your Medications        These medications were sent to [address] [address]      Phone: [phone]   chlorproMAZINE 25 MG tablet  ibuprofen 400 MG tablet  lithium 300 MG CR tablet  mirtazapine 7.5 MG Tab        ED Disposition Condition    Discharge Stable          ED Prescriptions       Medication Sig Dispense Start Date End Date Auth. Provider    mirtazapine (REMERON) 7.5 MG Tab Take 1 tablet (7.5 mg total) by mouth nightly. 30 tablet 9/5/2024 -- Glenn Leija MD    lithium (LITHOBID) 300 MG CR tablet Take 1 tablet (300 mg total) by mouth every 12 (twelve) hours. 60 tablet 9/5/2024 -- Glenn Leija MD    chlorproMAZINE (THORAZINE) 25 MG tablet Take 1 tablet (25 mg total) by mouth 3 (three) times daily. 90 tablet 9/5/2024 -- Glenn Leija MD     ibuprofen (ADVIL,MOTRIN) 400 MG tablet Take 1 tablet (400 mg total) by mouth 3 (three) times daily. 90 tablet 9/5/2024 10/5/2024 Glenn Leija MD          Follow-up Information       Follow up With Specialties Details Why Contact Info    PMD  In 2 days               Medication List        START taking these medications      ibuprofen 400 MG tablet  Commonly known as: ADVIL,MOTRIN  Take 1 tablet (400 mg total) by mouth 3 (three) times daily.            CHANGE how you take these medications      lithium 300 MG CR tablet  Commonly known as: LITHOBID  Take 1 tablet (300 mg total) by mouth every 12 (twelve) hours.  What changed: when to take this     mirtazapine 7.5 MG Tab  Commonly known as: REMERON  Take 1 tablet (7.5 mg total) by mouth nightly.  What changed: when to take this            CONTINUE taking these medications      chlorproMAZINE 25 MG tablet  Commonly known as: THORAZINE  Take 1 tablet (25 mg total) by mouth 3 (three) times daily.               Where to Get Your Medications        These medications were sent to [facility] - [address] [address]      Phone: [phone]   chlorproMAZINE 25 MG tablet  ibuprofen 400 MG tablet  lithium 300 MG CR tablet  mirtazapine 7.5 MG Tab          Glenn Leija MD  09/05/24 3674

## 2024-10-07 ENCOUNTER — HOSPITAL ENCOUNTER (EMERGENCY)
Facility: HOSPITAL | Age: 39
Discharge: PSYCHIATRIC OR SUBSTANCE USE FACILITY | End: 2024-10-07
Attending: EMERGENCY MEDICINE
Payer: COMMERCIAL

## 2024-10-07 VITALS
BODY MASS INDEX: 23.7 KG/M2 | HEIGHT: 69 IN | WEIGHT: 160 LBS | SYSTOLIC BLOOD PRESSURE: 130 MMHG | OXYGEN SATURATION: 98 % | HEART RATE: 89 BPM | DIASTOLIC BLOOD PRESSURE: 76 MMHG | RESPIRATION RATE: 16 BRPM | TEMPERATURE: 98 F

## 2024-10-07 DIAGNOSIS — F29 PSYCHOSIS, UNSPECIFIED PSYCHOSIS TYPE: Primary | ICD-10-CM

## 2024-10-07 LAB
ALBUMIN SERPL-MCNC: 3.9 G/DL (ref 3.5–5)
ALBUMIN/GLOB SERPL: 1.1 RATIO (ref 1.1–2)
ALP SERPL-CCNC: 85 UNIT/L (ref 40–150)
ALT SERPL-CCNC: 15 UNIT/L (ref 0–55)
AMPHET UR QL SCN: NEGATIVE
ANION GAP SERPL CALC-SCNC: 8 MEQ/L
APAP SERPL-MCNC: <3 UG/ML (ref 10–30)
AST SERPL-CCNC: 18 UNIT/L (ref 5–34)
BACTERIA #/AREA URNS AUTO: NORMAL /HPF
BARBITURATE SCN PRESENT UR: NEGATIVE
BASOPHILS # BLD AUTO: 0.07 X10(3)/MCL
BASOPHILS NFR BLD AUTO: 0.6 %
BENZODIAZ UR QL SCN: NEGATIVE
BILIRUB SERPL-MCNC: 0.3 MG/DL
BILIRUB UR QL STRIP.AUTO: NEGATIVE
BUN SERPL-MCNC: 3.9 MG/DL (ref 8.9–20.6)
CALCIUM SERPL-MCNC: 9.6 MG/DL (ref 8.4–10.2)
CANNABINOIDS UR QL SCN: NEGATIVE
CHLORIDE SERPL-SCNC: 106 MMOL/L (ref 98–107)
CLARITY UR: CLEAR
CO2 SERPL-SCNC: 23 MMOL/L (ref 22–29)
COCAINE UR QL SCN: NEGATIVE
COLOR UR AUTO: NORMAL
CREAT SERPL-MCNC: 1.05 MG/DL (ref 0.73–1.18)
CREAT/UREA NIT SERPL: 4
EOSINOPHIL # BLD AUTO: 0.48 X10(3)/MCL (ref 0–0.9)
EOSINOPHIL NFR BLD AUTO: 3.9 %
ERYTHROCYTE [DISTWIDTH] IN BLOOD BY AUTOMATED COUNT: 14.8 % (ref 11.5–17)
ETHANOL SERPL-MCNC: <10 MG/DL
FENTANYL UR QL SCN: NEGATIVE
GFR SERPLBLD CREATININE-BSD FMLA CKD-EPI: >60 ML/MIN/1.73/M2
GLOBULIN SER-MCNC: 3.5 GM/DL (ref 2.4–3.5)
GLUCOSE SERPL-MCNC: 94 MG/DL (ref 74–100)
GLUCOSE UR QL STRIP: NORMAL
HCT VFR BLD AUTO: 44.1 % (ref 42–52)
HGB BLD-MCNC: 14.7 G/DL (ref 14–18)
HGB UR QL STRIP: NEGATIVE
HYALINE CASTS #/AREA URNS LPF: NORMAL /LPF
IMM GRANULOCYTES # BLD AUTO: 0.04 X10(3)/MCL (ref 0–0.04)
IMM GRANULOCYTES NFR BLD AUTO: 0.3 %
KETONES UR QL STRIP: NEGATIVE
LEUKOCYTE ESTERASE UR QL STRIP: NEGATIVE
LYMPHOCYTES # BLD AUTO: 2.04 X10(3)/MCL (ref 0.6–4.6)
LYMPHOCYTES NFR BLD AUTO: 16.4 %
MCH RBC QN AUTO: 29.3 PG (ref 27–31)
MCHC RBC AUTO-ENTMCNC: 33.3 G/DL (ref 33–36)
MCV RBC AUTO: 88 FL (ref 80–94)
MDMA UR QL SCN: NEGATIVE
MONOCYTES # BLD AUTO: 0.58 X10(3)/MCL (ref 0.1–1.3)
MONOCYTES NFR BLD AUTO: 4.7 %
NEUTROPHILS # BLD AUTO: 9.2 X10(3)/MCL (ref 2.1–9.2)
NEUTROPHILS NFR BLD AUTO: 74.1 %
NITRITE UR QL STRIP: NEGATIVE
NRBC BLD AUTO-RTO: 0 %
OPIATES UR QL SCN: NEGATIVE
PCP UR QL: NEGATIVE
PH UR STRIP: 6 [PH]
PH UR: 6 [PH] (ref 3–11)
PLATELET # BLD AUTO: 290 X10(3)/MCL (ref 130–400)
PMV BLD AUTO: 10.2 FL (ref 7.4–10.4)
POTASSIUM SERPL-SCNC: 3.9 MMOL/L (ref 3.5–5.1)
PROT SERPL-MCNC: 7.4 GM/DL (ref 6.4–8.3)
PROT UR QL STRIP: NEGATIVE
RBC # BLD AUTO: 5.01 X10(6)/MCL (ref 4.7–6.1)
RBC #/AREA URNS AUTO: NORMAL /HPF
SALICYLATES SERPL-MCNC: <5 MG/DL (ref 15–30)
SODIUM SERPL-SCNC: 137 MMOL/L (ref 136–145)
SP GR UR STRIP.AUTO: 1.01 (ref 1–1.03)
SPECIFIC GRAVITY, URINE AUTO (.000) (OHS): 1.01 (ref 1–1.03)
SQUAMOUS #/AREA URNS LPF: NORMAL /HPF
UROBILINOGEN UR STRIP-ACNC: NORMAL
WBC # BLD AUTO: 12.41 X10(3)/MCL (ref 4.5–11.5)
WBC #/AREA URNS AUTO: NORMAL /HPF

## 2024-10-07 PROCEDURE — 85025 COMPLETE CBC W/AUTO DIFF WBC: CPT | Performed by: EMERGENCY MEDICINE

## 2024-10-07 PROCEDURE — 80053 COMPREHEN METABOLIC PANEL: CPT | Performed by: EMERGENCY MEDICINE

## 2024-10-07 PROCEDURE — 80179 DRUG ASSAY SALICYLATE: CPT | Performed by: EMERGENCY MEDICINE

## 2024-10-07 PROCEDURE — 99285 EMERGENCY DEPT VISIT HI MDM: CPT

## 2024-10-07 PROCEDURE — 82077 ASSAY SPEC XCP UR&BREATH IA: CPT | Performed by: EMERGENCY MEDICINE

## 2024-10-07 PROCEDURE — 80143 DRUG ASSAY ACETAMINOPHEN: CPT | Performed by: EMERGENCY MEDICINE

## 2024-10-07 PROCEDURE — 80307 DRUG TEST PRSMV CHEM ANLYZR: CPT | Performed by: EMERGENCY MEDICINE

## 2024-10-07 PROCEDURE — 81001 URINALYSIS AUTO W/SCOPE: CPT | Performed by: EMERGENCY MEDICINE

## 2024-10-07 RX ORDER — OXCARBAZEPINE 300 MG/1
300 TABLET, FILM COATED ORAL 2 TIMES DAILY
COMMUNITY
Start: 2024-09-06

## 2024-10-07 RX ORDER — TRAZODONE HYDROCHLORIDE 150 MG/1
150 TABLET ORAL NIGHTLY
COMMUNITY
Start: 2024-09-05

## 2024-10-07 RX ORDER — PALIPERIDONE PALMITATE 117 MG/.75ML
0.75 INJECTION INTRAMUSCULAR
COMMUNITY
Start: 2024-09-05

## 2024-10-07 NOTE — ED PROVIDER NOTES
"Encounter Date: 10/7/2024       History     Chief Complaint   Patient presents with    Psychiatric Evaluation     Mother in [place] reports he was walking in high speed traffic preaching the bible. Also having hallucinations where he saw a giant in his room yesterday. 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  It is reported he was yesterday talking to guidance at home.  Today he was witnessed running from the house caring his vital, running through traffic without apparent response to oncoming vehicles.  On arrival in the ED the patient is alert, hypervigilant, reporting he does not understand why his mother called the EMS.  He states he was" just preaching the word".        Review of patient's allergies indicates:   Allergen Reactions    Haloperidol Swelling     Muscle stiffness  Has muscle spasms      Paroxetine Swelling, Other (See Comments) and Rash     "i don't remember"  "i don't remember"      Pineapple      Face swells up. I have eaten pineapple since then and had no problems.      Ziprasidone Other (See Comments)     Muscle twitching     Past Medical History:   Diagnosis Date    Anxiety     Asthma     Depression     Hallucination     History of psychiatric hospitalization     Hx of psychiatric care     Psychiatric problem     Psychosis     Renal insufficiency 7/17/2024    Schizoaffective disorder     Sleep difficulties     Suicide attempt     Therapy      History reviewed. No pertinent surgical history.  Family History   Problem Relation Name Age of Onset    Coronary artery disease Mother          Stents    Heart attack Mother      Cervical cancer Sister       Social History     Tobacco Use    Smoking status: Every Day     Current packs/day: 1.00     Average packs/day: 1 pack/day for 23.2 years (23.2 ttl pk-yrs)     Types: Cigarettes     Start date: 7/17/2001    Smokeless tobacco: Never   Substance Use Topics    " Alcohol use: Not Currently     Alcohol/week: 4.0 standard drinks of alcohol     Types: 4 Cans of beer per week     Comment: uses alcohol monthly    Drug use: Not Currently     Types: Marijuana     Review of Systems    Physical Exam     Initial Vitals [10/07/24 1248]   BP Pulse Resp Temp SpO2   139/60 108 20 98.6 °F (37 °C) 99 %      MAP       --         Physical Exam    Nursing note and vitals reviewed.  Constitutional: He appears well-developed and well-nourished. He is not diaphoretic. No distress.   HENT:   Head: Normocephalic and atraumatic.   Eyes: EOM are normal. Pupils are equal, round, and reactive to light. Right eye exhibits no discharge. Left eye exhibits no discharge.   Neck: Neck supple. No thyromegaly present. No tracheal deviation present. No JVD present.   Normal range of motion.  Cardiovascular:  Normal rate, regular rhythm, normal heart sounds and intact distal pulses.           No murmur heard.  Pulmonary/Chest: Breath sounds normal. No stridor. No respiratory distress. He has no wheezes. He has no rhonchi. He has no rales.   Abdominal: Abdomen is soft. He exhibits no distension. There is no abdominal tenderness. There is no rebound and no guarding.   Musculoskeletal:         General: No tenderness or edema. Normal range of motion.      Cervical back: Normal range of motion and neck supple.     Neurological: He is alert and oriented to person, place, and time. He has normal strength. No cranial nerve deficit. GCS score is 15. GCS eye subscore is 4. GCS verbal subscore is 5. GCS motor subscore is 6.   Skin: Skin is warm and dry. Capillary refill takes less than 2 seconds. No rash and no abscess noted. No erythema. No pallor.   Psychiatric:   Behavior quite disorganized, thought quite disorganized; significantly impaired insight, judgment;         ED Course   Procedures  Labs Reviewed   SALICYLATE LEVEL - Abnormal       Result Value    Salicylate Level <5.0 (*)    ACETAMINOPHEN LEVEL - Abnormal     Acetaminophen Level <3.0 (*)    COMPREHENSIVE METABOLIC PANEL - Abnormal    Sodium 137      Potassium 3.9      Chloride 106      CO2 23      Glucose 94      Blood Urea Nitrogen 3.9 (*)     Creatinine 1.05      Calcium 9.6      Protein Total 7.4      Albumin 3.9      Globulin 3.5      Albumin/Globulin Ratio 1.1      Bilirubin Total 0.3      ALP 85      ALT 15      AST 18      eGFR >60      Anion Gap 8.0      BUN/Creatinine Ratio 4     CBC WITH DIFFERENTIAL - Abnormal    WBC 12.41 (*)     RBC 5.01      Hgb 14.7      Hct 44.1      MCV 88.0      MCH 29.3      MCHC 33.3      RDW 14.8      Platelet 290      MPV 10.2      Neut % 74.1      Lymph % 16.4      Mono % 4.7      Eos % 3.9      Basophil % 0.6      Lymph # 2.04      Neut # 9.20      Mono # 0.58      Eos # 0.48      Baso # 0.07      IG# 0.04      IG% 0.3      NRBC% 0.0     DRUG SCREEN, URINE (BEAKER) - Normal    Amphetamines, Urine Negative      Barbiturates, Urine Negative      Benzodiazepine, Urine Negative      Cannabinoids, Urine Negative      Cocaine, Urine Negative      Fentanyl, Urine Negative      MDMA, Urine Negative      Opiates, Urine Negative      Phencyclidine, Urine Negative      pH, Urine 6.0      Specific Gravity, Urine Auto 1.010      Narrative:     Cut off concentrations:    Amphetamines - 1000 ng/ml  Barbiturates - 200 ng/ml  Benzodiazepine - 200 ng/ml  Cannabinoids (THC) - 50 ng/ml  Cocaine - 300 ng/ml  Fentanyl - 1.0 ng/ml  MDMA - 500 ng/ml  Opiates - 300 ng/ml   Phencyclidine (PCP) - 25 ng/ml    Specimen submitted for drug analysis and tested for pH and specific gravity in order to evaluate sample integrity. Suspect tampering if specific gravity is <1.003 and/or pH is not within the range of 4.5 - 8.0  False negatives may result form substances such as bleach added to urine.  False positives may result for the presence of a substance with similar chemical structure to the drug or its metabolite.    This test provides only a PRELIMINARY  analytical test result. A more specific alternate chemical method must be used in order to obtain a confirmed analytical result. Gas chromatography/mass spectrometry (GC/MS) is the preferred confirmatory method. Other chemical confirmation methods are available. Clinical consideration and professional judgement should be applied to any drug of abuse test result, particularly when preliminary positive results are used.    Positive results will be confirmed only at the physicians request. Unconfirmed screening results are to be used only for medical purposes (treatment).        URINALYSIS, REFLEX TO URINE CULTURE - Normal    Color, UA Light-Yellow      Appearance, UA Clear      Specific Gravity, UA 1.010      pH, UA 6.0      Protein, UA Negative      Glucose, UA Normal      Ketones, UA Negative      Blood, UA Negative      Bilirubin, UA Negative      Urobilinogen, UA Normal      Nitrites, UA Negative      Leukocyte Esterase, UA Negative      RBC, UA 0-5      WBC, UA 0-5      Bacteria, UA None Seen      Squamous Epithelial Cells, UA None Seen      Hyaline Casts, UA None Seen     ALCOHOL,MEDICAL (ETHANOL) - Normal    Ethanol Level <10.0     CBC W/ AUTO DIFFERENTIAL    Narrative:     The following orders were created for panel order CBC Auto Differential.  Procedure                               Abnormality         Status                     ---------                               -----------         ------                     CBC with Differential[[id number]]       Abnormal            Final result                 Please view results for these tests on the individual orders.          Imaging Results    None          Medications - No data to display  Medical Decision Making  Differential diagnosis includes Decompensated psychosis versus intoxicated delirium versus organic ....  Delirium versus others    Amount and/or Complexity of Data Reviewed  External Data Reviewed: notes.  Labs: ordered. Decision-making details  documented in ED Course.     Details: As above;  Discussion of management or test interpretation with external provider(s): Formal mental health evaluation;    Risk  Decision regarding hospitalization.  Risk Details: Risk found sufficient to warrant formal mental health evaluation.  Patient achieves medical clearance and is for psychiatry services.               ED Course as of 10/07/24 2212   Mon Oct 07, 2024   1408 Negative Tylenol, salicylate, ethanol levels; [CT]   1409 Reassuring chemistries; [CT]   1409 Reassuring hemogram; [CT]   1628 Negative urine toxicology; [CT]   1628 Reassuring urinalysis; [CT]      ED Course User Index  [CT] Maldonado William MD       Medically cleared for psychiatry placement: 10/7/2024  2:12 PM                   Clinical Impression:  Final diagnoses:  [F29] Psychosis, unspecified psychosis type (Primary)          ED Disposition Condition    Transfer to Psych Facility Stable          ED Prescriptions    None       Follow-up Information    None          Maldonado William MD  10/07/24 1416       Maldonado William MD  10/07/24 2212

## 2024-11-05 ENCOUNTER — HOSPITAL ENCOUNTER (EMERGENCY)
Facility: HOSPITAL | Age: 39
Discharge: PSYCHIATRIC OR SUBSTANCE USE FACILITY | End: 2024-11-05
Attending: EMERGENCY MEDICINE
Payer: COMMERCIAL

## 2024-11-05 VITALS
OXYGEN SATURATION: 99 % | DIASTOLIC BLOOD PRESSURE: 76 MMHG | TEMPERATURE: 98 F | RESPIRATION RATE: 18 BRPM | HEART RATE: 98 BPM | WEIGHT: 160 LBS | BODY MASS INDEX: 22.9 KG/M2 | HEIGHT: 70 IN | SYSTOLIC BLOOD PRESSURE: 115 MMHG

## 2024-11-05 DIAGNOSIS — Z00.8 MEDICAL CLEARANCE FOR PSYCHIATRIC ADMISSION: Primary | ICD-10-CM

## 2024-11-05 DIAGNOSIS — F22 DELUSIONS: ICD-10-CM

## 2024-11-05 DIAGNOSIS — F11.10 MILD FENTANYL ABUSE: ICD-10-CM

## 2024-11-05 DIAGNOSIS — F25.9 SCHIZOAFFECTIVE DISORDER, UNSPECIFIED TYPE: ICD-10-CM

## 2024-11-05 DIAGNOSIS — Z91.199 MEDICALLY NONCOMPLIANT: ICD-10-CM

## 2024-11-05 LAB
ALBUMIN SERPL-MCNC: 3.8 G/DL (ref 3.5–5)
ALBUMIN/GLOB SERPL: 1.1 RATIO (ref 1.1–2)
ALP SERPL-CCNC: 95 UNIT/L (ref 40–150)
ALT SERPL-CCNC: 10 UNIT/L (ref 0–55)
AMPHET UR QL SCN: NEGATIVE
ANION GAP SERPL CALC-SCNC: 6 MEQ/L
APAP SERPL-MCNC: <3 UG/ML (ref 10–30)
AST SERPL-CCNC: 14 UNIT/L (ref 5–34)
BACTERIA #/AREA URNS AUTO: ABNORMAL /HPF
BARBITURATE SCN PRESENT UR: NEGATIVE
BASOPHILS # BLD AUTO: 0.06 X10(3)/MCL
BASOPHILS NFR BLD AUTO: 0.7 %
BENZODIAZ UR QL SCN: NEGATIVE
BILIRUB SERPL-MCNC: 0.3 MG/DL
BILIRUB UR QL STRIP.AUTO: NEGATIVE
BUN SERPL-MCNC: 3.3 MG/DL (ref 8.9–20.6)
CALCIUM SERPL-MCNC: 9.5 MG/DL (ref 8.4–10.2)
CANNABINOIDS UR QL SCN: NEGATIVE
CHLORIDE SERPL-SCNC: 107 MMOL/L (ref 98–107)
CLARITY UR: CLEAR
CO2 SERPL-SCNC: 24 MMOL/L (ref 22–29)
COCAINE UR QL SCN: NEGATIVE
COLOR UR AUTO: ABNORMAL
CREAT SERPL-MCNC: 1.08 MG/DL (ref 0.72–1.25)
CREAT/UREA NIT SERPL: 3
EOSINOPHIL # BLD AUTO: 0.69 X10(3)/MCL (ref 0–0.9)
EOSINOPHIL NFR BLD AUTO: 8.3 %
ERYTHROCYTE [DISTWIDTH] IN BLOOD BY AUTOMATED COUNT: 14.4 % (ref 11.5–17)
ETHANOL SERPL-MCNC: <10 MG/DL
FENTANYL UR QL SCN: POSITIVE
GFR SERPLBLD CREATININE-BSD FMLA CKD-EPI: >60 ML/MIN/1.73/M2
GLOBULIN SER-MCNC: 3.5 GM/DL (ref 2.4–3.5)
GLUCOSE SERPL-MCNC: 117 MG/DL (ref 74–100)
GLUCOSE UR QL STRIP: NORMAL
HCT VFR BLD AUTO: 44.8 % (ref 42–52)
HGB BLD-MCNC: 15.5 G/DL (ref 14–18)
HGB UR QL STRIP: NEGATIVE
HOLD SPECIMEN: NORMAL
HYALINE CASTS #/AREA URNS LPF: ABNORMAL /LPF
IMM GRANULOCYTES # BLD AUTO: 0.02 X10(3)/MCL (ref 0–0.04)
IMM GRANULOCYTES NFR BLD AUTO: 0.2 %
KETONES UR QL STRIP: NEGATIVE
LEUKOCYTE ESTERASE UR QL STRIP: NEGATIVE
LYMPHOCYTES # BLD AUTO: 2.13 X10(3)/MCL (ref 0.6–4.6)
LYMPHOCYTES NFR BLD AUTO: 25.7 %
MCH RBC QN AUTO: 29.5 PG (ref 27–31)
MCHC RBC AUTO-ENTMCNC: 34.6 G/DL (ref 33–36)
MCV RBC AUTO: 85.3 FL (ref 80–94)
MDMA UR QL SCN: NEGATIVE
MONOCYTES # BLD AUTO: 0.52 X10(3)/MCL (ref 0.1–1.3)
MONOCYTES NFR BLD AUTO: 6.3 %
MUCOUS THREADS URNS QL MICRO: ABNORMAL /LPF
NEUTROPHILS # BLD AUTO: 4.88 X10(3)/MCL (ref 2.1–9.2)
NEUTROPHILS NFR BLD AUTO: 58.8 %
NITRITE UR QL STRIP: NEGATIVE
NRBC BLD AUTO-RTO: 0 %
OPIATES UR QL SCN: NEGATIVE
PCP UR QL: NEGATIVE
PH UR STRIP: 5.5 [PH]
PH UR: 5.5 [PH] (ref 3–11)
PLATELET # BLD AUTO: 283 X10(3)/MCL (ref 130–400)
PMV BLD AUTO: 10.2 FL (ref 7.4–10.4)
POTASSIUM SERPL-SCNC: 4.4 MMOL/L (ref 3.5–5.1)
PROT SERPL-MCNC: 7.3 GM/DL (ref 6.4–8.3)
PROT UR QL STRIP: NEGATIVE
RBC # BLD AUTO: 5.25 X10(6)/MCL (ref 4.7–6.1)
RBC #/AREA URNS AUTO: ABNORMAL /HPF
SALICYLATES SERPL-MCNC: <5 MG/DL (ref 15–30)
SODIUM SERPL-SCNC: 137 MMOL/L (ref 136–145)
SP GR UR STRIP.AUTO: 1.01 (ref 1–1.03)
SPECIFIC GRAVITY, URINE AUTO (.000) (OHS): 1.01 (ref 1–1.03)
SQUAMOUS #/AREA URNS LPF: ABNORMAL /HPF
TSH SERPL-ACNC: 0.85 UIU/ML (ref 0.35–4.94)
UROBILINOGEN UR STRIP-ACNC: NORMAL
WBC # BLD AUTO: 8.3 X10(3)/MCL (ref 4.5–11.5)
WBC #/AREA URNS AUTO: ABNORMAL /HPF

## 2024-11-05 PROCEDURE — 80179 DRUG ASSAY SALICYLATE: CPT | Performed by: EMERGENCY MEDICINE

## 2024-11-05 PROCEDURE — 81015 MICROSCOPIC EXAM OF URINE: CPT | Performed by: EMERGENCY MEDICINE

## 2024-11-05 PROCEDURE — 99285 EMERGENCY DEPT VISIT HI MDM: CPT

## 2024-11-05 PROCEDURE — 84443 ASSAY THYROID STIM HORMONE: CPT | Performed by: EMERGENCY MEDICINE

## 2024-11-05 PROCEDURE — 82077 ASSAY SPEC XCP UR&BREATH IA: CPT | Performed by: EMERGENCY MEDICINE

## 2024-11-05 PROCEDURE — 85025 COMPLETE CBC W/AUTO DIFF WBC: CPT | Performed by: EMERGENCY MEDICINE

## 2024-11-05 PROCEDURE — 80307 DRUG TEST PRSMV CHEM ANLYZR: CPT | Performed by: EMERGENCY MEDICINE

## 2024-11-05 PROCEDURE — 80053 COMPREHEN METABOLIC PANEL: CPT | Performed by: EMERGENCY MEDICINE

## 2024-11-05 PROCEDURE — 80143 DRUG ASSAY ACETAMINOPHEN: CPT | Performed by: EMERGENCY MEDICINE

## 2024-11-05 NOTE — ED PROVIDER NOTES
"Encounter Date: 11/5/2024       History     Chief Complaint   Patient presents with    Hallucinations    Psychiatric Evaluation     Patient reports  the ed (via ems) secondary to auditory and visual hallucinations "for months". Also states he is currently off of his meds and that "evil spirits" are bothering him. Cooperative at this time. Cleared by [facility] security upon arrival. Tamar oden     Patient with multiple psychiatric disorders and frequent inpatient psychiatric stays presents with medication noncompliance and overt floridly hyperreligious delusions, states he can not tell if he is having hallucinations or if the spirits are real.  States he needs repeat inpatient evaluation.      The history is provided by the patient, the EMS personnel and medical records. The history is limited by the condition of the patient. No  was used.     Review of patient's allergies indicates:   Allergen Reactions    Haloperidol Swelling     Muscle stiffness  Has muscle spasms      Paroxetine Swelling, Other (See Comments) and Rash     "i don't remember"  "i don't remember"      Pineapple      Face swells up. I have eaten pineapple since then and had no problems.      Ziprasidone Other (See Comments)     Muscle twitching     Past Medical History:   Diagnosis Date    Anxiety     Asthma     Depression     Hallucination     History of psychiatric hospitalization     Hx of psychiatric care     Psychiatric problem     Psychosis     Renal insufficiency 7/17/2024    Schizoaffective disorder     Sleep difficulties     Suicide attempt     Therapy      History reviewed. No pertinent surgical history.  Family History   Problem Relation Name Age of Onset    Coronary artery disease Mother          Stents    Heart attack Mother      Cervical cancer Sister       Social History     Tobacco Use    Smoking status: Every Day     Current packs/day: 1.00     Average packs/day: 1 pack/day for 23.3 years (23.3 ttl pk-yrs)     Types: " Cigarettes     Start date: 7/17/2001    Smokeless tobacco: Never   Substance Use Topics    Alcohol use: Not Currently     Alcohol/week: 4.0 standard drinks of alcohol     Types: 4 Cans of beer per week     Comment: uses alcohol monthly    Drug use: Not Currently     Types: Marijuana     Review of Systems   Unable to perform ROS: Psychiatric disorder       Physical Exam     Initial Vitals [11/05/24 1517]   BP Pulse Resp Temp SpO2   124/86 109 20 98.3 °F (36.8 °C) 99 %      MAP       --         Physical Exam    Nursing note and vitals reviewed.  Constitutional: He appears well-developed and well-nourished. He is not diaphoretic. No distress.   HENT:   Head: Normocephalic and atraumatic. Mouth/Throat: Oropharynx is clear and moist. No oropharyngeal exudate.   Eyes: Conjunctivae and EOM are normal. Pupils are equal, round, and reactive to light. Right eye exhibits no discharge. Left eye exhibits no discharge. No scleral icterus.   Neck: Neck supple. No thyromegaly present. No tracheal deviation present. No JVD present.   Normal range of motion.  Cardiovascular:  Normal rate, regular rhythm and normal heart sounds.     Exam reveals no gallop and no friction rub.       No murmur heard.  Pulmonary/Chest: Breath sounds normal. No respiratory distress. He has no wheezes. He has no rhonchi. He has no rales. He exhibits no tenderness.   Abdominal: Abdomen is soft. Bowel sounds are normal. He exhibits no distension and no mass. There is no abdominal tenderness. There is no rebound and no guarding.   Musculoskeletal:         General: No tenderness or edema. Normal range of motion.      Cervical back: Normal range of motion and neck supple.     Lymphadenopathy:     He has no cervical adenopathy.   Neurological: He is alert and oriented to person, place, and time. He has normal strength. No cranial nerve deficit.   Skin: Skin is warm and dry. No rash noted. No erythema.   Psychiatric:   A&O x4, bizarre affect, pleasant,  cooperative, appears truthful, can not tell if the evil spirits are real or hallucinations and has as much.  Tangential, unusual affect, gravely disabled.  No evidence of suicidal or homicidal ideation.         ED Course   Procedures  Labs Reviewed   COMPREHENSIVE METABOLIC PANEL - Abnormal       Result Value    Sodium 137      Potassium 4.4      Chloride 107      CO2 24      Glucose 117 (*)     Blood Urea Nitrogen 3.3 (*)     Creatinine 1.08      Calcium 9.5      Protein Total 7.3      Albumin 3.8      Globulin 3.5      Albumin/Globulin Ratio 1.1      Bilirubin Total 0.3      ALP 95      ALT 10      AST 14      eGFR >60      Anion Gap 6.0      BUN/Creatinine Ratio 3     URINALYSIS, REFLEX TO URINE CULTURE - Abnormal    Color, UA Light-Yellow      Appearance, UA Clear      Specific Gravity, UA 1.008      pH, UA 5.5      Protein, UA Negative      Glucose, UA Normal      Ketones, UA Negative      Blood, UA Negative      Bilirubin, UA Negative      Urobilinogen, UA Normal      Nitrites, UA Negative      Leukocyte Esterase, UA Negative      RBC, UA 0-5      WBC, UA 0-5      Bacteria, UA None Seen      Squamous Epithelial Cells, UA None Seen      Mucous, UA Trace (*)     Hyaline Casts, UA None Seen     DRUG SCREEN, URINE (BEAKER) - Abnormal    Amphetamines, Urine Negative      Barbiturates, Urine Negative      Benzodiazepine, Urine Negative      Cannabinoids, Urine Negative      Cocaine, Urine Negative      Fentanyl, Urine Positive (*)     MDMA, Urine Negative      Opiates, Urine Negative      Phencyclidine, Urine Negative      pH, Urine 5.5      Specific Gravity, Urine Auto 1.008      Narrative:     Cut off concentrations:    Amphetamines - 1000 ng/ml  Barbiturates - 200 ng/ml  Benzodiazepine - 200 ng/ml  Cannabinoids (THC) - 50 ng/ml  Cocaine - 300 ng/ml  Fentanyl - 1.0 ng/ml  MDMA - 500 ng/ml  Opiates - 300 ng/ml   Phencyclidine (PCP) - 25 ng/ml    Specimen submitted for drug analysis and tested for pH and specific  gravity in order to evaluate sample integrity. Suspect tampering if specific gravity is <1.003 and/or pH is not within the range of 4.5 - 8.0  False negatives may result form substances such as bleach added to urine.  False positives may result for the presence of a substance with similar chemical structure to the drug or its metabolite.    This test provides only a PRELIMINARY analytical test result. A more specific alternate chemical method must be used in order to obtain a confirmed analytical result. Gas chromatography/mass spectrometry (GC/MS) is the preferred confirmatory method. Other chemical confirmation methods are available. Clinical consideration and professional judgement should be applied to any drug of abuse test result, particularly when preliminary positive results are used.    Positive results will be confirmed only at the physicians request. Unconfirmed screening results are to be used only for medical purposes (treatment).        ACETAMINOPHEN LEVEL - Abnormal    Acetaminophen Level <3.0 (*)    SALICYLATE LEVEL - Abnormal    Salicylate Level <5.0 (*)    ALCOHOL,MEDICAL (ETHANOL) - Normal    Ethanol Level <10.0     CBC W/ AUTO DIFFERENTIAL    Narrative:     The following orders were created for panel order CBC auto differential.  Procedure                               Abnormality         Status                     ---------                               -----------         ------                     CBC with Differential[[id number]]                           Final result                 Please view results for these tests on the individual orders.   CBC WITH DIFFERENTIAL    WBC 8.30      RBC 5.25      Hgb 15.5      Hct 44.8      MCV 85.3      MCH 29.5      MCHC 34.6      RDW 14.4      Platelet 283      MPV 10.2      Neut % 58.8      Lymph % 25.7      Mono % 6.3      Eos % 8.3      Basophil % 0.7      Lymph # 2.13      Neut # 4.88      Mono # 0.52      Eos # 0.69      Baso # 0.06      IG# 0.02       IG% 0.2      NRBC% 0.0     TSH   EXTRA TUBES    Narrative:     The following orders were created for panel order EXTRA TUBES.  Procedure                               Abnormality         Status                     ---------                               -----------         ------                     Gold Top Hold[[id number]]                                   In process                   Please view results for these tests on the individual orders.   GOLD TOP HOLD          Imaging Results    None          Medications - No data to display  Medical Decision Making  Patient with multiple psychiatric disorders and frequent inpatient psychiatric stays presents with medication noncompliance and overt floridly hyper Judaism delusions, states he can not tell if he is having hallucinations or if the spirits are real.  Needs repeat inpatient evaluation.    Problems Addressed:  Delusions: chronic illness or injury with exacerbation, progression, or side effects of treatment  Medical clearance for psychiatric admission: acute illness or injury    Amount and/or Complexity of Data Reviewed  Labs: ordered. Decision-making details documented in ED Course.    Risk  Decision regarding hospitalization.      Additional MDM:   Differential Diagnosis:   Exacerbation of underlying psychiatric disorders, substance abuse, medication noncompliance among many others                Medically cleared for psychiatry placement: 11/5/2024  3:30 PM                   Clinical Impression:  Final diagnoses:  [Z00.8] Medical clearance for psychiatric admission (Primary)  [F25.9] Schizoaffective disorder, unspecified type  [F22] Delusions  [Z91.199] Medically noncompliant  [F11.10] Mild fentanyl abuse          ED Disposition Condition    Transfer to Psych Facility Stable          ED Prescriptions    None       Follow-up Information    None          Zachariah Rincon MD  11/05/24 5363       Zachariah Rincon MD  11/05/24 8856

## 2024-12-30 ENCOUNTER — HOSPITAL ENCOUNTER (EMERGENCY)
Facility: HOSPITAL | Age: 39
Discharge: HOME | End: 2024-12-30
Attending: EMERGENCY MEDICINE
Payer: COMMERCIAL

## 2024-12-30 VITALS
SYSTOLIC BLOOD PRESSURE: 122 MMHG | RESPIRATION RATE: 18 BRPM | DIASTOLIC BLOOD PRESSURE: 78 MMHG | WEIGHT: 158.75 LBS | BODY MASS INDEX: 20.37 KG/M2 | HEART RATE: 88 BPM | OXYGEN SATURATION: 99 % | HEIGHT: 74 IN | TEMPERATURE: 98 F

## 2024-12-30 VITALS
TEMPERATURE: 98 F | OXYGEN SATURATION: 99 % | HEART RATE: 104 BPM | SYSTOLIC BLOOD PRESSURE: 138 MMHG | HEIGHT: 69 IN | DIASTOLIC BLOOD PRESSURE: 90 MMHG | WEIGHT: 158.75 LBS | BODY MASS INDEX: 23.51 KG/M2 | RESPIRATION RATE: 20 BRPM

## 2024-12-30 DIAGNOSIS — F48.9 MENTAL HEALTH PROBLEM: Primary | ICD-10-CM

## 2024-12-30 DIAGNOSIS — Z76.0 ENCOUNTER FOR MEDICATION REFILL: Primary | ICD-10-CM

## 2024-12-30 PROCEDURE — 99283 EMERGENCY DEPT VISIT LOW MDM: CPT | Mod: 27

## 2024-12-30 PROCEDURE — 99283 EMERGENCY DEPT VISIT LOW MDM: CPT

## 2024-12-30 RX ORDER — CHLORPROMAZINE HYDROCHLORIDE 25 MG/1
25 TABLET, FILM COATED ORAL 3 TIMES DAILY
Qty: 90 TABLET | Refills: 2 | Status: SHIPPED | OUTPATIENT
Start: 2024-12-30

## 2024-12-30 RX ORDER — TRAZODONE HYDROCHLORIDE 150 MG/1
150 TABLET ORAL NIGHTLY
Qty: 30 TABLET | Refills: 0 | Status: SHIPPED | OUTPATIENT
Start: 2024-12-30 | End: 2025-01-29

## 2024-12-30 RX ORDER — MIRTAZAPINE 7.5 MG/1
7.5 TABLET, FILM COATED ORAL NIGHTLY
Qty: 30 TABLET | Refills: 2 | Status: SHIPPED | OUTPATIENT
Start: 2024-12-30

## 2024-12-30 NOTE — ED PROVIDER NOTES
ED PROVIDER NOTE  12/30/2024    CHIEF COMPLAINT:   Chief Complaint   Patient presents with    Psychiatric Evaluation     Mother called [facility] due to patient non-compliance with psychiatric medications. Pt denies SI,HI,a/v hallucinations.        HISTORY OF PRESENT ILLNESS:   Elvis Puentes is a 39 y.o. male who presents with chief complaint Medication refill.  Patient brought in by EMS at the request of his mom to get his medications refilled.  He was seen earlier today by me and released but it was not meet aware that he needed his medications refill.  The patient's mother did speak with the charge nurse and made us aware of refilling the meds and I sent in the prescriptions but we were not able to get back in touch with the mother to let her know that the prescription refills were sent in and she went ahead and had him brought back to the hospital so that he could get his medication refilled.    The history is provided by the patient.         REVIEW OF SYSTEMS: as noted in the HPI.  NURSING NOTES REVIEWED      PAST MEDICAL/SURGICAL HISTORY:   Past Medical History:   Diagnosis Date    Anxiety     Asthma     Depression     Hallucination     History of psychiatric hospitalization     Hx of psychiatric care     Psychiatric problem     Psychosis     Renal insufficiency 7/17/2024    Schizoaffective disorder     Sleep difficulties     Suicide attempt     Therapy     No past surgical history on file.    FAMILY HISTORY:   Family History   Problem Relation Name Age of Onset    Coronary artery disease Mother          Stents    Heart attack Mother      Cervical cancer Sister         SOCIAL HISTORY:   Social History     Tobacco Use    Smoking status: Every Day     Current packs/day: 1.00     Average packs/day: 1 pack/day for 23.5 years (23.5 ttl pk-yrs)     Types: Cigarettes     Start date: 7/17/2001    Smokeless tobacco: Never   Substance Use Topics    Alcohol use: Not Currently     Alcohol/week: 4.0 standard drinks of  "alcohol     Types: 4 Cans of beer per week     Comment: uses alcohol monthly    Drug use: Not Currently     Types: Marijuana       ALLERGIES:   Review of patient's allergies indicates:   Allergen Reactions    Haloperidol Swelling     Muscle stiffness  Has muscle spasms      Paroxetine Swelling, Other (See Comments) and Rash     "i don't remember"  "i don't remember"      Pineapple      Face swells up. I have eaten pineapple since then and had no problems.      Ziprasidone Other (See Comments)     Muscle twitching       PHYSICAL EXAM:  Initial Vitals [12/30/24 1539]   BP Pulse Resp Temp SpO2   (!) 138/90 104 20 97.9 °F (36.6 °C) 99 %      MAP       --         Physical Exam    Nursing note and vitals reviewed.  Constitutional: He appears well-developed and well-nourished. No distress.   HENT:   Head: Normocephalic and atraumatic.   Nose: Nose normal. Mouth/Throat: Oropharynx is clear and moist and mucous membranes are normal.   Eyes: Conjunctivae and EOM are normal. Pupils are equal, round, and reactive to light.   Neck: Neck supple. No tracheal deviation present.   Cardiovascular:  Normal rate, regular rhythm, normal heart sounds, intact distal pulses and normal pulses.           Pulmonary/Chest: Effort normal and breath sounds normal. No respiratory distress.   Abdominal: Abdomen is soft. There is no abdominal tenderness. There is no rebound and no guarding.   Musculoskeletal:         General: Normal range of motion.      Cervical back: Neck supple.     Neurological: He is alert and oriented to person, place, and time. GCS eye subscore is 4. GCS verbal subscore is 5. GCS motor subscore is 6.   CN II-XII intact. Moves all extremities. No gross sensory or motor deficits.   Skin: Skin is warm, dry and intact.   Psychiatric: He has a normal mood and affect. His speech is normal and behavior is normal. Cognition and memory are normal. He expresses no homicidal and no suicidal ideation.         RESULTS:  Labs Reviewed - " No data to display  Imaging Results    None         PROCEDURES:  Procedures    ECG:       ED COURSE AND MEDICAL DECISION MAKING:  Medications - No data to display        Medical Decision Making  39-year-old male who presents for medication refill.  I have already provide a refill for his medications.  Given strict ED return precautions. I have spoken with the patient and/or caregivers. I have explained the patient's condition, diagnoses and treatment plan based on the information available to me at this time. I have answered the patient's and/or caregiver's questions and addressed any concerns. The patient and/or caregivers have as good an understanding of the patient's diagnosis, condition and treatment plan as can be expected at this point. The vital signs have been stable. The patient's condition is stable and appropriate for discharge from the emergency department.     The patient will pursue further outpatient evaluation with the primary care physician or other designated or consulting physician as outlined in the discharge instructions. The patient and/or caregivers are agreeable to this plan of care and follow-up instructions have been explained in detail. The patient and/or caregivers have received these instructions in written format and have expressed an understanding of the discharge instructions. The patient and/or caregivers are aware that any significant change in condition or worsening of symptoms should prompt an immediate return to this or the closest emergency department or a call to 911.        CLINICAL IMPRESSION:  1. Encounter for medication refill        DISPOSITION:   ED Disposition Condition    Discharge Stable            ED Prescriptions    None       Follow-up Information       Follow up With Specialties Details Why Contact Info [facility] Emergency Dept Emergency Medicine  If symptoms worsen [address] [facility] [address] [phone]                Eron Alonzo, DO  12/30/24 1544

## 2024-12-30 NOTE — ED PROVIDER NOTES
"ED PROVIDER NOTE  12/30/2024    CHIEF COMPLAINT:   Chief Complaint   Patient presents with    Psychiatric Evaluation     Mother called [facility] to transport the patient for a mental evaluation. 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        HISTORY OF PRESENT ILLNESS:   Elvis Puentes is a 39 y.o. male who presents with chief complaint Mental health evaluation.  Patient brought in by EMS after reportedly his mother called to have him come and have a mental health evaluation.  Patient states he was just minding his own business reading the Bible walking down the street because he was bored of sitting at home.  He states that he was talking out loud but was not threatening to harm himself or anyone else.  Denies having suicidal or homicidal ideation or hallucinations.  He states it does not feel that he needs to be admitted into psychiatric facility.    The history is provided by the patient.         REVIEW OF SYSTEMS: as noted in the HPI.  NURSING NOTES REVIEWED      PAST MEDICAL/SURGICAL HISTORY:   Past Medical History:   Diagnosis Date    Anxiety     Asthma     Depression     Hallucination     History of psychiatric hospitalization     Hx of psychiatric care     Psychiatric problem     Psychosis     Renal insufficiency 7/17/2024    Schizoaffective disorder     Sleep difficulties     Suicide attempt     Therapy     History reviewed. No pertinent surgical history.    FAMILY HISTORY:   Family History   Problem Relation Name Age of Onset    Coronary artery disease Mother          Stents    Heart attack Mother      Cervical cancer Sister         SOCIAL HISTORY:   Social History     Tobacco Use    Smoking status: Every Day     Current packs/day: 1.00     Average packs/day: 1 pack/day for 23.5 years (23.5 ttl pk-yrs)     Types: Cigarettes     Start date: 7/17/2001    Smokeless tobacco: Never   Substance Use " "Topics    Alcohol use: Not Currently     Alcohol/week: 4.0 standard drinks of alcohol     Types: 4 Cans of beer per week     Comment: uses alcohol monthly    Drug use: Not Currently     Types: Marijuana       ALLERGIES:   Review of patient's allergies indicates:   Allergen Reactions    Haloperidol Swelling     Muscle stiffness  Has muscle spasms      Paroxetine Swelling, Other (See Comments) and Rash     "i don't remember"  "i don't remember"      Pineapple      Face swells up. I have eaten pineapple since then and had no problems.      Ziprasidone Other (See Comments)     Muscle twitching       PHYSICAL EXAM:  Initial Vitals [12/30/24 1314]   BP Pulse Resp Temp SpO2   122/78 88 18 98.4 °F (36.9 °C) 99 %      MAP       --         Physical Exam    Nursing note and vitals reviewed.  Constitutional: He appears well-developed and well-nourished. No distress.   HENT:   Head: Normocephalic and atraumatic.   Nose: Nose normal. Mouth/Throat: Oropharynx is clear and moist and mucous membranes are normal.   Eyes: Conjunctivae and EOM are normal. Pupils are equal, round, and reactive to light.   Neck: Neck supple. No tracheal deviation present.   Cardiovascular:  Normal rate, regular rhythm, normal heart sounds, intact distal pulses and normal pulses.           Pulmonary/Chest: Effort normal and breath sounds normal. No respiratory distress.   Abdominal: Abdomen is soft. There is no abdominal tenderness. There is no rebound and no guarding.   Musculoskeletal:         General: Normal range of motion.      Cervical back: Neck supple.     Neurological: He is alert and oriented to person, place, and time. GCS eye subscore is 4. GCS verbal subscore is 5. GCS motor subscore is 6.   CN II-XII intact. Moves all extremities. No gross sensory or motor deficits.   Skin: Skin is warm, dry and intact.   Psychiatric: He has a normal mood and affect. His speech is normal and behavior is normal. Cognition and memory are normal. He expresses " no homicidal and no suicidal ideation.         RESULTS:  Labs Reviewed - No data to display  Imaging Results    None         PROCEDURES:  Procedures    ECG:       ED COURSE AND MEDICAL DECISION MAKING:  Medications - No data to display        Medical Decision Making  39-year-old male who presents for mental health evaluation.  Patient is calm and cooperative.  States he was just reading the Bible and walking down the street.  He denies any thoughts to harm himself or anyone else or any hallucinations.  I do not see any evidence to indicate a need for involuntary commitment at this time.    Risk  Prescription drug management.  Decision regarding hospitalization.  Diagnosis or treatment significantly limited by social determinants of health.        CLINICAL IMPRESSION:  1. Mental health problem        DISPOSITION:   ED Disposition Condition    Discharge Stable            ED Prescriptions    None       Follow-up Information       Follow up With Specialties Details Why Contact Info    [facility] Emergency Medicine  If symptoms worsen [address] [facility] [id number] [phone]               Eron Alonzo,   12/30/24 1546

## 2025-01-06 ENCOUNTER — HOSPITAL ENCOUNTER (EMERGENCY)
Facility: HOSPITAL | Age: 40
Discharge: PSYCHIATRIC OR SUBSTANCE USE FACILITY | End: 2025-01-06
Attending: EMERGENCY MEDICINE
Payer: COMMERCIAL

## 2025-01-06 VITALS
SYSTOLIC BLOOD PRESSURE: 127 MMHG | DIASTOLIC BLOOD PRESSURE: 85 MMHG | OXYGEN SATURATION: 98 % | BODY MASS INDEX: 19.26 KG/M2 | WEIGHT: 130 LBS | HEART RATE: 96 BPM | TEMPERATURE: 96 F | HEIGHT: 69 IN | RESPIRATION RATE: 16 BRPM

## 2025-01-06 DIAGNOSIS — Z00.8 MEDICAL CLEARANCE FOR PSYCHIATRIC ADMISSION: Primary | ICD-10-CM

## 2025-01-06 LAB
ALBUMIN SERPL-MCNC: 4.1 G/DL (ref 3.5–5)
ALBUMIN/GLOB SERPL: 1.1 RATIO (ref 1.1–2)
ALP SERPL-CCNC: 88 UNIT/L (ref 40–150)
ALT SERPL-CCNC: 12 UNIT/L (ref 0–55)
ANION GAP SERPL CALC-SCNC: 6 MEQ/L
APAP SERPL-MCNC: <3 UG/ML (ref 10–30)
AST SERPL-CCNC: 16 UNIT/L (ref 5–34)
BASOPHILS # BLD AUTO: 0.06 X10(3)/MCL
BASOPHILS NFR BLD AUTO: 0.8 %
BILIRUB SERPL-MCNC: 0.4 MG/DL
BUN SERPL-MCNC: 5.5 MG/DL (ref 8.9–20.6)
CALCIUM SERPL-MCNC: 9.8 MG/DL (ref 8.4–10.2)
CHLORIDE SERPL-SCNC: 106 MMOL/L (ref 98–107)
CO2 SERPL-SCNC: 28 MMOL/L (ref 22–29)
CREAT SERPL-MCNC: 0.89 MG/DL (ref 0.72–1.25)
CREAT/UREA NIT SERPL: 6
EOSINOPHIL # BLD AUTO: 0.3 X10(3)/MCL (ref 0–0.9)
EOSINOPHIL NFR BLD AUTO: 4.2 %
ERYTHROCYTE [DISTWIDTH] IN BLOOD BY AUTOMATED COUNT: 14.4 % (ref 11.5–17)
ETHANOL SERPL-MCNC: <10 MG/DL
GFR SERPLBLD CREATININE-BSD FMLA CKD-EPI: >60 ML/MIN/1.73/M2
GLOBULIN SER-MCNC: 3.6 GM/DL (ref 2.4–3.5)
GLUCOSE SERPL-MCNC: 81 MG/DL (ref 74–100)
HCT VFR BLD AUTO: 44.2 % (ref 42–52)
HGB BLD-MCNC: 14.8 G/DL (ref 14–18)
HOLD SPECIMEN: NORMAL
IMM GRANULOCYTES # BLD AUTO: 0.02 X10(3)/MCL (ref 0–0.04)
IMM GRANULOCYTES NFR BLD AUTO: 0.3 %
LYMPHOCYTES # BLD AUTO: 2.3 X10(3)/MCL (ref 0.6–4.6)
LYMPHOCYTES NFR BLD AUTO: 32.3 %
MCH RBC QN AUTO: 27.7 PG (ref 27–31)
MCHC RBC AUTO-ENTMCNC: 33.5 G/DL (ref 33–36)
MCV RBC AUTO: 82.6 FL (ref 80–94)
MONOCYTES # BLD AUTO: 0.41 X10(3)/MCL (ref 0.1–1.3)
MONOCYTES NFR BLD AUTO: 5.8 %
NEUTROPHILS # BLD AUTO: 4.04 X10(3)/MCL (ref 2.1–9.2)
NEUTROPHILS NFR BLD AUTO: 56.6 %
NRBC BLD AUTO-RTO: 0 %
PLATELET # BLD AUTO: 306 X10(3)/MCL (ref 130–400)
PMV BLD AUTO: 9.4 FL (ref 7.4–10.4)
POTASSIUM SERPL-SCNC: 4.4 MMOL/L (ref 3.5–5.1)
PROT SERPL-MCNC: 7.7 GM/DL (ref 6.4–8.3)
RBC # BLD AUTO: 5.35 X10(6)/MCL (ref 4.7–6.1)
SODIUM SERPL-SCNC: 140 MMOL/L (ref 136–145)
TSH SERPL-ACNC: 0.45 UIU/ML (ref 0.35–4.94)
WBC # BLD AUTO: 7.13 X10(3)/MCL (ref 4.5–11.5)

## 2025-01-06 PROCEDURE — 80053 COMPREHEN METABOLIC PANEL: CPT | Performed by: EMERGENCY MEDICINE

## 2025-01-06 PROCEDURE — 80143 DRUG ASSAY ACETAMINOPHEN: CPT | Performed by: EMERGENCY MEDICINE

## 2025-01-06 PROCEDURE — 84443 ASSAY THYROID STIM HORMONE: CPT | Performed by: EMERGENCY MEDICINE

## 2025-01-06 PROCEDURE — 99282 EMERGENCY DEPT VISIT SF MDM: CPT

## 2025-01-06 PROCEDURE — 82077 ASSAY SPEC XCP UR&BREATH IA: CPT | Performed by: EMERGENCY MEDICINE

## 2025-01-06 PROCEDURE — 85025 COMPLETE CBC W/AUTO DIFF WBC: CPT | Performed by: EMERGENCY MEDICINE

## 2025-01-06 NOTE — ED PROVIDER NOTES
"ED PROVIDER NOTE  1/6/2025    CHIEF COMPLAINT:   Chief Complaint   Patient presents with    Mental Health Problem     Patient presents via [facility] for manic behavior.  Patient is being sexually inappropriate in triage.  Patient "I got into an argument with my mom and need to get somewhere safe." Denies SI/HI       HISTORY OF PRESENT ILLNESS:   Elvis Puentes is a 39 y.o. male who presents with chief complaint Mental health evaluation.  Patient states he wants to go inpatient psych treatment.  He states that he has twin brothers and he thinks his mother loves them more than him and that she thinks he has a "runt." Denies having suicidal or homicidal ideation or hallucinations.    The history is provided by the patient.         REVIEW OF SYSTEMS: as noted in the HPI.  NURSING NOTES REVIEWED      PAST MEDICAL/SURGICAL HISTORY:   Past Medical History:   Diagnosis Date    Anxiety     Asthma     Depression     Hallucination     History of psychiatric hospitalization     Hx of psychiatric care     Psychiatric problem     Psychosis     Renal insufficiency 7/17/2024    Schizoaffective disorder     Sleep difficulties     Suicide attempt     Therapy     History reviewed. No pertinent surgical history.    FAMILY HISTORY:   Family History   Problem Relation Name Age of Onset    Coronary artery disease Mother          Stents    Heart attack Mother      Cervical cancer Sister         SOCIAL HISTORY:   Social History     Tobacco Use    Smoking status: Every Day     Current packs/day: 1.00     Average packs/day: 1 pack/day for 23.5 years (23.5 ttl pk-yrs)     Types: Cigarettes     Start date: 7/17/2001    Smokeless tobacco: Never   Substance Use Topics    Alcohol use: Not Currently     Alcohol/week: 4.0 standard drinks of alcohol     Types: 4 Cans of beer per week     Comment: uses alcohol monthly    Drug use: Not Currently     Types: Marijuana       ALLERGIES:   Review of patient's allergies indicates:   Allergen " "Reactions    Haloperidol Swelling     Muscle stiffness  Has muscle spasms      Paroxetine Swelling, Other (See Comments) and Rash     "i don't remember"  "i don't remember"      Pineapple      Face swells up. I have eaten pineapple since then and had no problems.      Ziprasidone Other (See Comments)     Muscle twitching       PHYSICAL EXAM:  Initial Vitals [01/06/25 1457]   BP Pulse Resp Temp SpO2   131/87 97 18 96.4 °F (35.8 °C) 100 %      MAP       --         Physical Exam    Nursing note and vitals reviewed.  Constitutional: He appears well-developed and well-nourished. No distress.   HENT:   Head: Normocephalic and atraumatic.   Nose: Nose normal. Mouth/Throat: Oropharynx is clear and moist and mucous membranes are normal.   Eyes: Conjunctivae and EOM are normal. Pupils are equal, round, and reactive to light.   Neck: Neck supple. No tracheal deviation present.   Cardiovascular:  Normal rate, regular rhythm, normal heart sounds, intact distal pulses and normal pulses.           Pulmonary/Chest: Effort normal and breath sounds normal. No respiratory distress.   Abdominal: Abdomen is soft. There is no abdominal tenderness. There is no rebound and no guarding.   Musculoskeletal:         General: Normal range of motion.      Cervical back: Neck supple.     Neurological: He is alert and oriented to person, place, and time. GCS eye subscore is 4. GCS verbal subscore is 5. GCS motor subscore is 6.   CN II-XII intact. Moves all extremities. No gross sensory or motor deficits.   Skin: Skin is warm, dry and intact.   Psychiatric: He has a normal mood and affect. His behavior is normal. His speech is rapid and/or pressured. Thought content is delusional. Cognition and memory are normal. He expresses inappropriate judgment. He expresses no homicidal and no suicidal ideation.   Hyper [employer]ism         RESULTS:  Labs Reviewed   COMPREHENSIVE METABOLIC PANEL - Abnormal       Result Value    Sodium 140      Potassium 4.4      " Chloride 106      CO2 28      Glucose 81      Blood Urea Nitrogen 5.5 (*)     Creatinine 0.89      Calcium 9.8      Protein Total 7.7      Albumin 4.1      Globulin 3.6 (*)     Albumin/Globulin Ratio 1.1      Bilirubin Total 0.4      ALP 88      ALT 12      AST 16      eGFR >60      Anion Gap 6.0      BUN/Creatinine Ratio 6     ACETAMINOPHEN LEVEL - Abnormal    Acetaminophen Level <3.0 (*)    TSH - Normal    TSH 0.448     ALCOHOL,MEDICAL (ETHANOL) - Normal    Ethanol Level <10.0     CBC W/ AUTO DIFFERENTIAL    Narrative:     The following orders were created for panel order CBC auto differential.  Procedure                               Abnormality         Status                     ---------                               -----------         ------                     CBC with Differential[[id number]]                           Final result                 Please view results for these tests on the individual orders.   CBC WITH DIFFERENTIAL    WBC 7.13      RBC 5.35      Hgb 14.8      Hct 44.2      MCV 82.6      MCH 27.7      MCHC 33.5      RDW 14.4      Platelet 306      MPV 9.4      Neut % 56.6      Lymph % 32.3      Mono % 5.8      Eos % 4.2      Basophil % 0.8      Lymph # 2.30      Neut # 4.04      Mono # 0.41      Eos # 0.30      Baso # 0.06      IG# 0.02      IG% 0.3      NRBC% 0.0     URINALYSIS, REFLEX TO URINE CULTURE   DRUG SCREEN, URINE (BEAKER)   EXTRA TUBES    Narrative:     The following orders were created for panel order EXTRA TUBES.  Procedure                               Abnormality         Status                     ---------                               -----------         ------                     Gold Top Hold[[id number]]                                   In process                   Please view results for these tests on the individual orders.   GOLD TOP HOLD     Imaging Results    None         PROCEDURES:  Procedures    ECG:       ED COURSE AND MEDICAL DECISION MAKING:  Medications - No  data to display  ED Course as of 01/06/25 1613   Mon Jan 06, 2025   1535 WBC: 7.13 [IB]   1535 Hemoglobin: 14.8 [IB]   1535 Platelet Count: 306 [IB]   1552 Creatinine: 0.89 [IB]   1552 Alcohol, Serum: <10.0 [IB]   1552 Acetaminophen Level(!): <3.0 [IB]   1611 TSH: 0.448 [IB]      ED Course User Index  [IB] Eron Alonzo DO        Medical Decision Making  [age] old male who presents for mental health evaluation requesting inpatient psychiatric treatment.  Routine labs obtained for medical clearance.  He is medically cleared for psychiatric admission.    Amount and/or Complexity of Data Reviewed  Labs: ordered. Decision-making details documented in ED Course.        CLINICAL IMPRESSION:  1. Medical clearance for psychiatric admission        DISPOSITION:   ED Disposition Condition    Transfer to Psych Facility Stable            ED Prescriptions    None       Follow-up Information    None            Eron Alonzo DO  01/06/25 1613

## 2025-01-06 NOTE — ED NOTES
[facility] facilities [facility] accept without urine. If no acceptance then will need to get urine and try elsewhere

## 2025-02-02 ENCOUNTER — HOSPITAL ENCOUNTER (EMERGENCY)
Facility: HOSPITAL | Age: 40
Discharge: HOME | End: 2025-02-02
Attending: EMERGENCY MEDICINE
Payer: COMMERCIAL

## 2025-02-02 VITALS
BODY MASS INDEX: 19.99 KG/M2 | DIASTOLIC BLOOD PRESSURE: 67 MMHG | SYSTOLIC BLOOD PRESSURE: 107 MMHG | WEIGHT: 135 LBS | TEMPERATURE: 99 F | OXYGEN SATURATION: 96 % | HEIGHT: 69 IN | HEART RATE: 93 BPM | RESPIRATION RATE: 18 BRPM

## 2025-02-02 DIAGNOSIS — F69 BEHAVIOR CONCERN IN ADULT: Primary | ICD-10-CM

## 2025-02-02 LAB
ACCEPTIBLE SP GR UR QL: 1.01 (ref 1–1.03)
ALBUMIN SERPL-MCNC: 4 G/DL (ref 3.5–5)
ALBUMIN/GLOB SERPL: 1.3 RATIO (ref 1.1–2)
ALP SERPL-CCNC: 82 UNIT/L (ref 40–150)
ALT SERPL-CCNC: 19 UNIT/L (ref 0–55)
AMPHET UR QL SCN: NEGATIVE
ANION GAP SERPL CALC-SCNC: 9 MEQ/L
APAP SERPL-MCNC: <10 UG/ML (ref 10–30)
AST SERPL-CCNC: 22 UNIT/L (ref 5–34)
BARBITURATE SCN PRESENT UR: NEGATIVE
BASOPHILS # BLD AUTO: 0.06 X10(3)/MCL
BASOPHILS NFR BLD AUTO: 0.7 %
BENZODIAZ UR QL SCN: NEGATIVE
BILIRUB SERPL-MCNC: 0.7 MG/DL
BILIRUB UR QL STRIP.AUTO: NEGATIVE
BUN SERPL-MCNC: 8 MG/DL (ref 8.9–20.6)
CALCIUM SERPL-MCNC: 9.1 MG/DL (ref 8.4–10.2)
CANNABINOIDS UR QL SCN: NEGATIVE
CHLORIDE SERPL-SCNC: 109 MMOL/L (ref 98–107)
CLARITY UR: CLEAR
CO2 SERPL-SCNC: 23 MMOL/L (ref 22–29)
COCAINE UR QL SCN: NEGATIVE
COLOR UR AUTO: NORMAL
CREAT SERPL-MCNC: 1.14 MG/DL (ref 0.72–1.25)
CREAT/UREA NIT SERPL: 7
EOSINOPHIL # BLD AUTO: 0.13 X10(3)/MCL (ref 0–0.9)
EOSINOPHIL NFR BLD AUTO: 1.5 %
ERYTHROCYTE [DISTWIDTH] IN BLOOD BY AUTOMATED COUNT: 15.4 % (ref 11.5–17)
ETHANOL SERPL-MCNC: <10 MG/DL
FENTANYL UR QL SCN: POSITIVE
GFR SERPLBLD CREATININE-BSD FMLA CKD-EPI: >60 ML/MIN/1.73/M2
GLOBULIN SER-MCNC: 3.2 GM/DL (ref 2.4–3.5)
GLUCOSE SERPL-MCNC: 123 MG/DL (ref 74–100)
GLUCOSE UR QL STRIP: NEGATIVE
HCT VFR BLD AUTO: 43.5 % (ref 42–52)
HGB BLD-MCNC: 14.7 G/DL (ref 14–18)
HGB UR QL STRIP: NEGATIVE
IMM GRANULOCYTES # BLD AUTO: 0.03 X10(3)/MCL (ref 0–0.04)
IMM GRANULOCYTES NFR BLD AUTO: 0.3 %
KETONES UR QL STRIP: NEGATIVE
LEUKOCYTE ESTERASE UR QL STRIP: NEGATIVE
LYMPHOCYTES # BLD AUTO: 1.56 X10(3)/MCL (ref 0.6–4.6)
LYMPHOCYTES NFR BLD AUTO: 18 %
MCH RBC QN AUTO: 27.6 PG (ref 27–31)
MCHC RBC AUTO-ENTMCNC: 33.8 G/DL (ref 33–36)
MCV RBC AUTO: 81.6 FL (ref 80–94)
MDMA UR QL SCN: NEGATIVE
MONOCYTES # BLD AUTO: 0.61 X10(3)/MCL (ref 0.1–1.3)
MONOCYTES NFR BLD AUTO: 7 %
NEUTROPHILS # BLD AUTO: 6.3 X10(3)/MCL (ref 2.1–9.2)
NEUTROPHILS NFR BLD AUTO: 72.5 %
NITRITE UR QL STRIP: NEGATIVE
NRBC BLD AUTO-RTO: 0 %
OPIATES UR QL SCN: NEGATIVE
PCP UR QL: NEGATIVE
PH UR STRIP: 6 [PH]
PH UR: 6 [PH] (ref 3–11)
PLATELET # BLD AUTO: 281 X10(3)/MCL (ref 130–400)
PMV BLD AUTO: 10.1 FL (ref 7.4–10.4)
POTASSIUM SERPL-SCNC: 3.9 MMOL/L (ref 3.5–5.1)
PROT SERPL-MCNC: 7.2 GM/DL (ref 6.4–8.3)
PROT UR QL STRIP: NEGATIVE
RBC # BLD AUTO: 5.33 X10(6)/MCL (ref 4.7–6.1)
SODIUM SERPL-SCNC: 141 MMOL/L (ref 136–145)
SP GR UR STRIP.AUTO: 1.01 (ref 1–1.03)
UROBILINOGEN UR STRIP-ACNC: 0.2
WBC # BLD AUTO: 8.69 X10(3)/MCL (ref 4.5–11.5)

## 2025-02-02 PROCEDURE — 99284 EMERGENCY DEPT VISIT MOD MDM: CPT | Mod: 25

## 2025-02-02 PROCEDURE — 85025 COMPLETE CBC W/AUTO DIFF WBC: CPT | Performed by: EMERGENCY MEDICINE

## 2025-02-02 PROCEDURE — 80307 DRUG TEST PRSMV CHEM ANLYZR: CPT | Performed by: EMERGENCY MEDICINE

## 2025-02-02 PROCEDURE — 81003 URINALYSIS AUTO W/O SCOPE: CPT | Performed by: EMERGENCY MEDICINE

## 2025-02-02 PROCEDURE — 80143 DRUG ASSAY ACETAMINOPHEN: CPT | Performed by: EMERGENCY MEDICINE

## 2025-02-02 PROCEDURE — 80053 COMPREHEN METABOLIC PANEL: CPT | Performed by: EMERGENCY MEDICINE

## 2025-02-02 PROCEDURE — 82077 ASSAY SPEC XCP UR&BREATH IA: CPT | Performed by: EMERGENCY MEDICINE

## 2025-02-02 PROCEDURE — 63600175 PHARM REV CODE 636 W HCPCS: Performed by: EMERGENCY MEDICINE

## 2025-02-02 PROCEDURE — 96372 THER/PROPH/DIAG INJ SC/IM: CPT | Performed by: EMERGENCY MEDICINE

## 2025-02-02 RX ORDER — HYDROXYZINE HYDROCHLORIDE 50 MG/1
50 TABLET, FILM COATED ORAL 4 TIMES DAILY PRN
Qty: 40 TABLET | Refills: 0 | Status: SHIPPED | OUTPATIENT
Start: 2025-02-02

## 2025-02-02 RX ORDER — HYDROXYZINE 50 MG/ML
50 INJECTION, SOLUTION INTRAMUSCULAR ONCE
Status: COMPLETED | OUTPATIENT
Start: 2025-02-02 | End: 2025-02-02

## 2025-02-02 RX ADMIN — HYDROXYZINE HYDROCHLORIDE 50 MG: 50 INJECTION, SOLUTION INTRAMUSCULAR at 09:02

## 2025-02-03 NOTE — ED PROVIDER NOTES
"Encounter Date: 2/2/2025       History     Chief Complaint   Patient presents with    Mental Health Problem     In per [facility] hallucinations for few days outside speaking to people who are not there parent wants him evaluated.      39-year-old male with a history of anxiety, depression, psychosis, schizoaffective disorder who lives with his mom presents to the emergency room due to behavior difficulty.  His mom states that he went outside of their apartment and was yelling in the police was called.  He has been more agitated lately, saying he is going to get his own place, took his new clothes and put them in a dumpster.  His mom states that he is currently out of his Vistaril and she thinks that is causing the problem.  Patient has rambling speech, mostly cooperative, a bit loud, tangential.  He is mostly talking about drugs and Jain issues.  I have seen this patient before and his behavior is unchanged from previous visit.    The history is provided by the patient and a relative. No  was used.     Review of patient's allergies indicates:   Allergen Reactions    Haloperidol Swelling     Muscle stiffness  Has muscle spasms      Paroxetine Swelling, Other (See Comments) and Rash     "i don't remember"  "i don't remember"      Pineapple      Face swells up. I have eaten pineapple since then and had no problems.      Ziprasidone Other (See Comments)     Muscle twitching     Past Medical History:   Diagnosis Date    Anxiety     Asthma     Depression     Hallucination     History of psychiatric hospitalization     Hx of psychiatric care     Psychiatric problem     Psychosis     Renal insufficiency 7/17/2024    Schizoaffective disorder     Sleep difficulties     Suicide attempt     Therapy      No past surgical history on file.  Family History   Problem Relation Name Age of Onset    Coronary artery disease Mother          Stents    Heart attack Mother      Cervical cancer Sister       Social History "     Tobacco Use    Smoking status: Every Day     Current packs/day: 1.00     Average packs/day: 1 pack/day for 23.6 years (23.6 ttl pk-yrs)     Types: Cigarettes     Start date: 7/17/2001    Smokeless tobacco: Never   Substance Use Topics    Alcohol use: Not Currently     Alcohol/week: 4.0 standard drinks of alcohol     Types: 4 Cans of beer per week     Comment: uses alcohol monthly    Drug use: Not Currently     Types: Marijuana     Review of Systems   Psychiatric/Behavioral:  Positive for agitation and behavioral problems.    All other systems reviewed and are negative.      Physical Exam     Initial Vitals [02/02/25 2059]   BP Pulse Resp Temp SpO2   107/67 93 18 98.8 °F (37.1 °C) 96 %      MAP       --         Physical Exam    Nursing note and vitals reviewed.  Constitutional: Vital signs are normal. He appears well-developed and well-nourished.   HENT:   Head: Normocephalic and atraumatic. Mouth/Throat: Oropharynx is clear and moist.   Eyes: Pupils are equal, round, and reactive to light.   Neck: Neck supple. No JVD present.   Cardiovascular:  Normal rate, regular rhythm and normal heart sounds.           Pulmonary/Chest: Breath sounds normal. No respiratory distress.   Abdominal: Abdomen is soft. There is no abdominal tenderness.   Musculoskeletal:         General: No edema.      Cervical back: Neck supple. No edema or erythema.     Lymphadenopathy:     He has no cervical adenopathy.   Neurological: He is alert and oriented to person, place, and time. No cranial nerve deficit. GCS score is 15. GCS eye subscore is 4. GCS verbal subscore is 5. GCS motor subscore is 6.   Skin: Skin is warm and dry. Capillary refill takes less than 2 seconds.   Psychiatric:   No suicidal or homicidal ideation, no obvious hallucinations.  Delusional regarding Oriental orthodox matters, drugs, intentions of others.  Cooperative with requests and not aggressive.  Speech is a bit loud and tangential, with concern over street drugs and  Pentecostal ideas.         ED Course   Procedures  Labs Reviewed   COMPREHENSIVE METABOLIC PANEL - Abnormal       Result Value    Sodium 141      Potassium 3.9      Chloride 109 (*)     CO2 23      Glucose 123 (*)     Blood Urea Nitrogen 8.0 (*)     Creatinine 1.14      Calcium 9.1      Protein Total 7.2      Albumin 4.0      Globulin 3.2      Albumin/Globulin Ratio 1.3      Bilirubin Total 0.7      ALP 82      ALT 19      AST 22      eGFR >60      Anion Gap 9.0      BUN/Creatinine Ratio 7     DRUG SCREEN, URINE (BEAKER) - Abnormal    Amphetamines, Urine Negative      Barbiturates, Urine Negative      Benzodiazepine, Urine Negative      Cannabinoids, Urine Negative      Cocaine, Urine Negative      Fentanyl, Urine Positive (*)     MDMA, Urine Negative      Opiates, Urine Negative      Phencyclidine, Urine Negative      pH, Urine 6.0      Specific Gravity, Urine Auto 1.010      Narrative:     Cut off concentrations:    Amphetamines - 1000 ng/ml  Barbiturates - 200 ng/ml  Benzodiazepine - 200 ng/ml  Cannabinoids (THC) - 50 ng/ml  Cocaine - 300 ng/ml  Fentanyl - 1.0 ng/ml  MDMA - 500 ng/ml  Opiates - 300 ng/ml   Phencyclidine (PCP) - 25 ng/ml    Specimen submitted for drug analysis and tested for pH and specific gravity in order to evaluate sample integrity. Suspect tampering if specific gravity is <1.003 and/or pH is not within the range of 4.5 - 8.0  False negatives may result form substances such as bleach added to urine.  False positives may result for the presence of a substance with similar chemical structure to the drug or its metabolite.    This test provides only a PRELIMINARY analytical test result. A more specific alternate chemical method must be used in order to obtain a confirmed analytical result. Gas chromatography/mass spectrometry (GC/MS) is the preferred confirmatory method. Other chemical confirmation methods are available. Clinical consideration and professional judgement should be applied to any  drug of abuse test result, particularly when preliminary positive results are used.    Positive results will be confirmed only at the physicians request. Unconfirmed screening results are to be used only for medical purposes (treatment).        ACETAMINOPHEN LEVEL - Abnormal    Acetaminophen Level <10.0 (*)    URINALYSIS, REFLEX TO URINE CULTURE - Normal    Color, UA Light-Yellow      Appearance, UA Clear      Specific Gravity, UA 1.010      pH, UA 6.0      Protein, UA Negative      Glucose, UA Negative      Ketones, UA Negative      Blood, UA Negative      Bilirubin, UA Negative      Urobilinogen, UA 0.2      Nitrites, UA Negative      Leukocyte Esterase, UA Negative     ALCOHOL,MEDICAL (ETHANOL) - Normal    Ethanol Level <10.0     CBC W/ AUTO DIFFERENTIAL    Narrative:     The following orders were created for panel order CBC auto differential.  Procedure                               Abnormality         Status                     ---------                               -----------         ------                     CBC with Differential[[id number]]                           Final result                 Please view results for these tests on the individual orders.   CBC WITH DIFFERENTIAL    WBC 8.69      RBC 5.33      Hgb 14.7      Hct 43.5      MCV 81.6      MCH 27.6      MCHC 33.8      RDW 15.4      Platelet 281      MPV 10.1      Neut % 72.5      Lymph % 18.0      Mono % 7.0      Eos % 1.5      Basophil % 0.7      Imm Grans % 0.3      Neut # 6.30      Lymph # 1.56      Mono # 0.61      Eos # 0.13      Baso # 0.06      Imm Gran # 0.03      NRBC% 0.0            Imaging Results    None          Medications   hydrOXYzine injection 50 mg (50 mg Intramuscular Given 2/2/25 2133)     Medical Decision Making  See HPI for narrative    Differential diagnosis includes but is not limited to psychiatric disorder, medication noncompliance, substance abuse, medical illness    Problems Addressed:  Behavior concern in adult:      Details: Patient was seen and evaluated in the emergency department with history, physical exam, chart review.  Lab work was performed in case I needed to send him to a psychiatric facility.  I gave him a dose of Vistaril IM which is his home medication.  Shortly after receiving the Vistaril he calmed down and walked out of the room and said he was ready to go home.  His mom states that she does not believe he needs to be PEC'd and just wanted to him to have some medicine to calm down.  I advised the patient that he can not go outside of his apartment and yell at in his neighbors and disturb others and he is agreeable.  His mom is comfortable bringing him home.  I will send a new prescription for the hydroxyzine to the pharmacy.    Amount and/or Complexity of Data Reviewed  Labs: ordered. Decision-making details documented in ED Course.    Risk  Prescription drug management.  Risk Details: Drug screen positive for fentanyl for unknown reason.  Mom says he does not have drugs or do any drugs that she is aware of.               ED Course as of 02/03/25 0407   Sun Feb 02, 2025 2143 Sodium: 141 [SH]   2143 Potassium: 3.9 [SH]   2143 Chloride(!): 109 [SH]   2143 CO2: 23 [SH]   2143 Glucose(!): 123 [SH]   2143 BUN(!): 8.0 [SH]   2143 Creatinine: 1.14 [SH]   2143 WBC: 8.69 [SH]   2143 Hemoglobin: 14.7 [SH]   2143 Hematocrit: 43.5 [SH]   2143 Platelet Count: 281 [SH]   2143 Alcohol, Serum: <10.0 [SH]   2143 Acetaminophen Level(!): <10.0 [SH]   2143 Leukocyte Esterase, UA: Negative [SH]   2150 Patient states he is feeling much better.  He is speaking a little bit more calmly after his hydroxyzine shot.  He says he just wants to go home and live a good life.  I explained to him that he can not go outside and yell and disturbed the neighbors and he says okay I will not do that.  His mom is comfortable bringing him home.  No indication for hospital admission at this time. [SH]      ED Course User Index  [SH] Trudy Alvarado  MD CAROLE                           Clinical Impression:  Final diagnoses:  [F69] Behavior concern in adult (Primary)          ED Disposition Condition    Discharge Stable          ED Prescriptions       Medication Sig Dispense Start Date End Date Auth. Provider    hydrOXYzine (ATARAX) 50 MG tablet Take 1 tablet (50 mg total) by mouth 4 (four) times daily as needed for Anxiety. 40 tablet 2/2/2025 -- Trudy Alvarado MD          Follow-up Information       Follow up With Specialties Details Why Contact Info    Your psychiatrist  Schedule an appointment as soon as possible for a visit                Trudy Alvarado MD  02/03/25 2316

## 2025-02-04 ENCOUNTER — HOSPITAL ENCOUNTER (EMERGENCY)
Facility: HOSPITAL | Age: 40
Discharge: PSYCHIATRIC OR SUBSTANCE USE FACILITY | End: 2025-02-04
Attending: FAMILY MEDICINE
Payer: COMMERCIAL

## 2025-02-04 VITALS
OXYGEN SATURATION: 100 % | TEMPERATURE: 98 F | HEART RATE: 90 BPM | DIASTOLIC BLOOD PRESSURE: 80 MMHG | SYSTOLIC BLOOD PRESSURE: 140 MMHG | RESPIRATION RATE: 18 BRPM

## 2025-02-04 DIAGNOSIS — F25.9 SCHIZOAFFECTIVE DISORDER, UNSPECIFIED TYPE: ICD-10-CM

## 2025-02-04 DIAGNOSIS — Z00.8 MEDICAL CLEARANCE FOR PSYCHIATRIC ADMISSION: Primary | ICD-10-CM

## 2025-02-04 LAB
ACCEPTIBLE SP GR UR QL: 1.03 (ref 1–1.03)
ALBUMIN SERPL-MCNC: 4.4 G/DL (ref 3.5–5)
ALBUMIN/GLOB SERPL: 1.1 RATIO (ref 1.1–2)
ALP SERPL-CCNC: 92 UNIT/L (ref 40–150)
ALT SERPL-CCNC: 17 UNIT/L (ref 0–55)
AMPHET UR QL SCN: NEGATIVE
ANION GAP SERPL CALC-SCNC: 8 MEQ/L
AST SERPL-CCNC: 23 UNIT/L (ref 5–34)
BACTERIA #/AREA URNS AUTO: ABNORMAL /HPF
BARBITURATE SCN PRESENT UR: NEGATIVE
BASOPHILS # BLD AUTO: 0.08 X10(3)/MCL
BASOPHILS NFR BLD AUTO: 0.7 %
BENZODIAZ UR QL SCN: NEGATIVE
BILIRUB SERPL-MCNC: 0.7 MG/DL
BILIRUB UR QL STRIP.AUTO: NEGATIVE
BUN SERPL-MCNC: 12.6 MG/DL (ref 8.9–20.6)
CALCIUM SERPL-MCNC: 9.8 MG/DL (ref 8.4–10.2)
CANNABINOIDS UR QL SCN: NEGATIVE
CHLORIDE SERPL-SCNC: 108 MMOL/L (ref 98–107)
CLARITY UR: CLEAR
CO2 SERPL-SCNC: 24 MMOL/L (ref 22–29)
COCAINE UR QL SCN: NEGATIVE
COLOR UR AUTO: YELLOW
CREAT SERPL-MCNC: 1.01 MG/DL (ref 0.72–1.25)
CREAT/UREA NIT SERPL: 12
EOSINOPHIL # BLD AUTO: 0.79 X10(3)/MCL (ref 0–0.9)
EOSINOPHIL NFR BLD AUTO: 7.1 %
ERYTHROCYTE [DISTWIDTH] IN BLOOD BY AUTOMATED COUNT: 15.4 % (ref 11.5–17)
ETHANOL SERPL-MCNC: <10 MG/DL
FENTANYL UR QL SCN: POSITIVE
GFR SERPLBLD CREATININE-BSD FMLA CKD-EPI: >60 ML/MIN/1.73/M2
GLOBULIN SER-MCNC: 3.9 GM/DL (ref 2.4–3.5)
GLUCOSE SERPL-MCNC: 126 MG/DL (ref 74–100)
GLUCOSE UR QL STRIP: NORMAL
GRAN CASTS #/AREA URNS LPF: ABNORMAL /LPF
HCT VFR BLD AUTO: 44.3 % (ref 42–52)
HGB BLD-MCNC: 15 G/DL (ref 14–18)
HGB UR QL STRIP: NEGATIVE
HYALINE CASTS #/AREA URNS LPF: ABNORMAL /LPF
IMM GRANULOCYTES # BLD AUTO: 0.02 X10(3)/MCL (ref 0–0.04)
IMM GRANULOCYTES NFR BLD AUTO: 0.2 %
KETONES UR QL STRIP: ABNORMAL
LEUKOCYTE ESTERASE UR QL STRIP: NEGATIVE
LYMPHOCYTES # BLD AUTO: 2.09 X10(3)/MCL (ref 0.6–4.6)
LYMPHOCYTES NFR BLD AUTO: 18.7 %
MCH RBC QN AUTO: 27.9 PG (ref 27–31)
MCHC RBC AUTO-ENTMCNC: 33.9 G/DL (ref 33–36)
MCV RBC AUTO: 82.3 FL (ref 80–94)
MDMA UR QL SCN: NEGATIVE
MONOCYTES # BLD AUTO: 0.98 X10(3)/MCL (ref 0.1–1.3)
MONOCYTES NFR BLD AUTO: 8.8 %
MUCOUS THREADS URNS QL MICRO: ABNORMAL /LPF
NEUTROPHILS # BLD AUTO: 7.23 X10(3)/MCL (ref 2.1–9.2)
NEUTROPHILS NFR BLD AUTO: 64.5 %
NITRITE UR QL STRIP: NEGATIVE
NRBC BLD AUTO-RTO: 0 %
OPIATES UR QL SCN: NEGATIVE
PCP UR QL: NEGATIVE
PH UR STRIP: 6 [PH]
PH UR: 6 [PH] (ref 3–11)
PLATELET # BLD AUTO: 281 X10(3)/MCL (ref 130–400)
PMV BLD AUTO: 10 FL (ref 7.4–10.4)
POTASSIUM SERPL-SCNC: 4 MMOL/L (ref 3.5–5.1)
PROT SERPL-MCNC: 8.3 GM/DL (ref 6.4–8.3)
PROT UR QL STRIP: ABNORMAL
RBC # BLD AUTO: 5.38 X10(6)/MCL (ref 4.7–6.1)
RBC #/AREA URNS AUTO: ABNORMAL /HPF
SODIUM SERPL-SCNC: 140 MMOL/L (ref 136–145)
SP GR UR STRIP.AUTO: 1.03 (ref 1–1.03)
SQUAMOUS #/AREA URNS LPF: ABNORMAL /HPF
TSH SERPL-ACNC: 0.57 UIU/ML (ref 0.35–4.94)
UROBILINOGEN UR STRIP-ACNC: ABNORMAL
WBC # BLD AUTO: 11.19 X10(3)/MCL (ref 4.5–11.5)
WBC #/AREA URNS AUTO: ABNORMAL /HPF

## 2025-02-04 PROCEDURE — 85025 COMPLETE CBC W/AUTO DIFF WBC: CPT | Performed by: FAMILY MEDICINE

## 2025-02-04 PROCEDURE — 84443 ASSAY THYROID STIM HORMONE: CPT | Performed by: FAMILY MEDICINE

## 2025-02-04 PROCEDURE — 81003 URINALYSIS AUTO W/O SCOPE: CPT | Performed by: FAMILY MEDICINE

## 2025-02-04 PROCEDURE — 82077 ASSAY SPEC XCP UR&BREATH IA: CPT | Performed by: FAMILY MEDICINE

## 2025-02-04 PROCEDURE — 80307 DRUG TEST PRSMV CHEM ANLYZR: CPT | Performed by: FAMILY MEDICINE

## 2025-02-04 PROCEDURE — 80053 COMPREHEN METABOLIC PANEL: CPT | Performed by: FAMILY MEDICINE

## 2025-02-04 PROCEDURE — 99285 EMERGENCY DEPT VISIT HI MDM: CPT

## 2025-02-04 NOTE — ED PROVIDER NOTES
"Encounter Date: 2/4/2025       History     Chief Complaint   Patient presents with    Psychiatric Evaluation     Brought in on OPC, PT UNCOOPERATIVE W TRIAGE.  WILL NOT ANSWER ANY QUESTIONS.  PT WANDED.      39-year-old gentleman brought to emergency room by police under an OPC.  Mother reports patient becoming more aggressive, pastas mother down to the floor this morning screaming and disturbing other tenants in the complex.  Patient currently reports that his mother is "taking his check" in his very upset.  Currently redirectable.    The history is provided by the patient and the police.     Review of patient's allergies indicates:   Allergen Reactions    Haloperidol Swelling     Muscle stiffness  Has muscle spasms      Paroxetine Swelling, Other (See Comments) and Rash     "i don't remember"  "i don't remember"      Pineapple      Face swells up. I have eaten pineapple since then and had no problems.      Ziprasidone Other (See Comments)     Muscle twitching     Past Medical History:   Diagnosis Date    Anxiety     Asthma     Depression     Hallucination     History of psychiatric hospitalization     Hx of psychiatric care     Psychiatric problem     Psychosis     Renal insufficiency 7/17/2024    Schizoaffective disorder     Sleep difficulties     Suicide attempt     Therapy      History reviewed. No pertinent surgical history.  Family History   Problem Relation Name Age of Onset    Coronary artery disease Mother          Stents    Heart attack Mother      Cervical cancer Sister       Social History     Tobacco Use    Smoking status: Every Day     Current packs/day: 1.00     Average packs/day: 1 pack/day for 23.6 years (23.6 ttl pk-yrs)     Types: Cigarettes     Start date: 7/17/2001    Smokeless tobacco: Never   Substance Use Topics    Alcohol use: Not Currently     Alcohol/week: 4.0 standard drinks of alcohol     Types: 4 Cans of beer per week     Comment: uses alcohol monthly    Drug use: Not Currently     " Types: Marijuana     Review of Systems   Constitutional:  Negative for chills, fatigue and fever.   HENT:  Negative for ear pain, rhinorrhea and sore throat.    Eyes:  Negative for photophobia and pain.   Respiratory:  Negative for cough, shortness of breath and wheezing.    Cardiovascular:  Negative for chest pain.   Gastrointestinal:  Negative for abdominal pain, diarrhea, nausea and vomiting.   Genitourinary:  Negative for dysuria.   Neurological:  Negative for dizziness, weakness and headaches.   All other systems reviewed and are negative.      Physical Exam     Initial Vitals [02/04/25 1036]   BP Pulse Resp Temp SpO2   (!) 160/90 (!) 127 18 98.8 °F (37.1 °C) 99 %      MAP       --         Physical Exam    Nursing note and vitals reviewed.  Constitutional: He appears well-developed and well-nourished.   HENT:   Head: Normocephalic and atraumatic.   Eyes: EOM are normal. Pupils are equal, round, and reactive to light.   Neck: Neck supple.   Normal range of motion.  Cardiovascular:  Normal rate, regular rhythm and normal heart sounds.     Exam reveals no gallop and no friction rub.       No murmur heard.  Pulmonary/Chest: Breath sounds normal. No respiratory distress.   Abdominal: Abdomen is soft. Bowel sounds are normal. He exhibits no distension. There is no abdominal tenderness.   Musculoskeletal:         General: Normal range of motion.      Cervical back: Normal range of motion and neck supple.     Neurological: He is alert and oriented to person, place, and time. He has normal strength.   Skin: Skin is warm and dry.   Psychiatric: Thought content normal. His mood appears anxious. His speech is rapid and/or pressured. He is agitated and hyperactive. He is not aggressive. Cognition and memory are impaired. He expresses impulsivity.         ED Course   Procedures  Labs Reviewed   COMPREHENSIVE METABOLIC PANEL - Abnormal       Result Value    Sodium 140      Potassium 4.0      Chloride 108 (*)     CO2 24       Glucose 126 (*)     Blood Urea Nitrogen 12.6      Creatinine 1.01      Calcium 9.8      Protein Total 8.3      Albumin 4.4      Globulin 3.9 (*)     Albumin/Globulin Ratio 1.1      Bilirubin Total 0.7      ALP 92      ALT 17      AST 23      eGFR >60      Anion Gap 8.0      BUN/Creatinine Ratio 12     TSH - Normal    TSH 0.568     ALCOHOL,MEDICAL (ETHANOL) - Normal    Ethanol Level <10.0     CBC W/ AUTO DIFFERENTIAL    Narrative:     The following orders were created for panel order CBC auto differential.  Procedure                               Abnormality         Status                     ---------                               -----------         ------                     CBC with Differential[[id number]]                           Final result                 Please view results for these tests on the individual orders.   CBC WITH DIFFERENTIAL    WBC 11.19      RBC 5.38      Hgb 15.0      Hct 44.3      MCV 82.3      MCH 27.9      MCHC 33.9      RDW 15.4      Platelet 281      MPV 10.0      Neut % 64.5      Lymph % 18.7      Mono % 8.8      Eos % 7.1      Basophil % 0.7      Imm Grans % 0.2      Neut # 7.23      Lymph # 2.09      Mono # 0.98      Eos # 0.79      Baso # 0.08      Imm Gran # 0.02      NRBC% 0.0     URINALYSIS, REFLEX TO URINE CULTURE   DRUG SCREEN, URINE (BEAKER)          Imaging Results    None          Medications - No data to display  Medical Decision Making  39-year-old gentleman well-known in the emergency room presents emergency room currently agitated, long psychiatric history.  Patient currently OPC by his mother.  Will place under physician emergency certificate for inpatient psychiatric evaluation treatment.  Will obtain clearance laboratory evaluation and continue to monitor     Differential diagnosis:  Family conflict, agitation, electrolyte abnormality, schizoaffective disorder    Amount and/or Complexity of Data Reviewed  Labs: ordered.               ED Course as of 02/04/25 8823    Tue Feb 04, 2025   1350 No acute lab abnormalities; medically cleared for psychiatric placement. [MW]      ED Course User Index  [MW] Shahbaz Gooden MD       Medically cleared for psychiatry placement: 2/4/2025  1:52 PM                   Clinical Impression:  Final diagnoses:  [Z00.8] Medical clearance for psychiatric admission (Primary)  [F25.9] Schizoaffective disorder, unspecified type          ED Disposition Condition    Transfer to Psych Facility Stable          ED Prescriptions    None       Follow-up Information    None          Shahbaz Gooden MD  02/04/25 9313

## 2025-03-06 ENCOUNTER — HOSPITAL ENCOUNTER (EMERGENCY)
Facility: HOSPITAL | Age: 40
Discharge: PSYCHIATRIC OR SUBSTANCE USE FACILITY | End: 2025-03-06
Attending: FAMILY MEDICINE
Payer: COMMERCIAL

## 2025-03-06 VITALS
TEMPERATURE: 98 F | HEART RATE: 98 BPM | DIASTOLIC BLOOD PRESSURE: 76 MMHG | WEIGHT: 145 LBS | SYSTOLIC BLOOD PRESSURE: 128 MMHG | BODY MASS INDEX: 21.48 KG/M2 | OXYGEN SATURATION: 100 % | HEIGHT: 69 IN | RESPIRATION RATE: 18 BRPM

## 2025-03-06 DIAGNOSIS — R44.1 VISUAL HALLUCINATIONS: ICD-10-CM

## 2025-03-06 DIAGNOSIS — Z00.8 MEDICAL CLEARANCE FOR PSYCHIATRIC ADMISSION: ICD-10-CM

## 2025-03-06 DIAGNOSIS — F19.10 SUBSTANCE ABUSE: ICD-10-CM

## 2025-03-06 DIAGNOSIS — R44.0 AUDITORY HALLUCINATIONS: ICD-10-CM

## 2025-03-06 DIAGNOSIS — F23 ACUTE PSYCHOSIS: Primary | ICD-10-CM

## 2025-03-06 LAB
ACCEPTIBLE SP GR UR QL: >=1.03 (ref 1–1.03)
ALBUMIN SERPL-MCNC: 4.5 G/DL (ref 3.5–5)
ALBUMIN/GLOB SERPL: 1.3 RATIO (ref 1.1–2)
ALP SERPL-CCNC: 91 UNIT/L (ref 40–150)
ALT SERPL-CCNC: 12 UNIT/L (ref 0–55)
AMPHET UR QL SCN: NEGATIVE
ANION GAP SERPL CALC-SCNC: 16 MEQ/L
APAP SERPL-MCNC: <10 UG/ML (ref 10–30)
AST SERPL-CCNC: 19 UNIT/L (ref 5–34)
BARBITURATE SCN PRESENT UR: NEGATIVE
BASOPHILS # BLD AUTO: 0.09 X10(3)/MCL
BASOPHILS NFR BLD AUTO: 0.9 %
BENZODIAZ UR QL SCN: NEGATIVE
BILIRUB SERPL-MCNC: 0.8 MG/DL
BILIRUB UR QL STRIP.AUTO: ABNORMAL
BUN SERPL-MCNC: 7.6 MG/DL (ref 8.9–20.6)
CALCIUM SERPL-MCNC: 9.6 MG/DL (ref 8.4–10.2)
CANNABINOIDS UR QL SCN: NEGATIVE
CHLORIDE SERPL-SCNC: 106 MMOL/L (ref 98–107)
CLARITY UR: CLEAR
CO2 SERPL-SCNC: 19 MMOL/L (ref 22–29)
COCAINE UR QL SCN: NEGATIVE
COLOR UR AUTO: YELLOW
CREAT SERPL-MCNC: 1.04 MG/DL (ref 0.72–1.25)
CREAT/UREA NIT SERPL: 7
EOSINOPHIL # BLD AUTO: 0.23 X10(3)/MCL (ref 0–0.9)
EOSINOPHIL NFR BLD AUTO: 2.2 %
ERYTHROCYTE [DISTWIDTH] IN BLOOD BY AUTOMATED COUNT: 16.8 % (ref 11.5–17)
ETHANOL SERPL-MCNC: <10 MG/DL
FENTANYL UR QL SCN: POSITIVE
GFR SERPLBLD CREATININE-BSD FMLA CKD-EPI: >60 ML/MIN/1.73/M2
GLOBULIN SER-MCNC: 3.4 GM/DL (ref 2.4–3.5)
GLUCOSE SERPL-MCNC: 52 MG/DL (ref 74–100)
GLUCOSE UR QL STRIP: NEGATIVE
HCT VFR BLD AUTO: 45 % (ref 42–52)
HGB BLD-MCNC: 15.5 G/DL (ref 14–18)
HGB UR QL STRIP: NEGATIVE
IMM GRANULOCYTES # BLD AUTO: 0.04 X10(3)/MCL (ref 0–0.04)
IMM GRANULOCYTES NFR BLD AUTO: 0.4 %
KETONES UR QL STRIP: >=80
LEUKOCYTE ESTERASE UR QL STRIP: NEGATIVE
LYMPHOCYTES # BLD AUTO: 3.09 X10(3)/MCL (ref 0.6–4.6)
LYMPHOCYTES NFR BLD AUTO: 30 %
MCH RBC QN AUTO: 28.3 PG (ref 27–31)
MCHC RBC AUTO-ENTMCNC: 34.4 G/DL (ref 33–36)
MCV RBC AUTO: 82.1 FL (ref 80–94)
MDMA UR QL SCN: NEGATIVE
MONOCYTES # BLD AUTO: 0.77 X10(3)/MCL (ref 0.1–1.3)
MONOCYTES NFR BLD AUTO: 7.5 %
NEUTROPHILS # BLD AUTO: 6.08 X10(3)/MCL (ref 2.1–9.2)
NEUTROPHILS NFR BLD AUTO: 59 %
NITRITE UR QL STRIP: NEGATIVE
NRBC BLD AUTO-RTO: 0 %
OPIATES UR QL SCN: NEGATIVE
PCP UR QL: NEGATIVE
PH UR STRIP: 6 [PH]
PH UR: 6 [PH] (ref 3–11)
PLATELET # BLD AUTO: 291 X10(3)/MCL (ref 130–400)
PMV BLD AUTO: 9.9 FL (ref 7.4–10.4)
POCT GLUCOSE: 69 MG/DL (ref 70–110)
POTASSIUM SERPL-SCNC: 4.2 MMOL/L (ref 3.5–5.1)
PROT SERPL-MCNC: 7.9 GM/DL (ref 6.4–8.3)
PROT UR QL STRIP: NEGATIVE
RBC # BLD AUTO: 5.48 X10(6)/MCL (ref 4.7–6.1)
SALICYLATES SERPL-MCNC: <5 MG/DL (ref 15–30)
SODIUM SERPL-SCNC: 141 MMOL/L (ref 136–145)
SP GR UR STRIP.AUTO: >=1.03 (ref 1–1.03)
UROBILINOGEN UR STRIP-ACNC: 1
WBC # BLD AUTO: 10.3 X10(3)/MCL (ref 4.5–11.5)

## 2025-03-06 PROCEDURE — 82077 ASSAY SPEC XCP UR&BREATH IA: CPT | Performed by: FAMILY MEDICINE

## 2025-03-06 PROCEDURE — 80143 DRUG ASSAY ACETAMINOPHEN: CPT | Performed by: FAMILY MEDICINE

## 2025-03-06 PROCEDURE — 80179 DRUG ASSAY SALICYLATE: CPT | Performed by: FAMILY MEDICINE

## 2025-03-06 PROCEDURE — 93005 ELECTROCARDIOGRAM TRACING: CPT

## 2025-03-06 PROCEDURE — 99285 EMERGENCY DEPT VISIT HI MDM: CPT | Mod: 25

## 2025-03-06 PROCEDURE — 80307 DRUG TEST PRSMV CHEM ANLYZR: CPT | Performed by: FAMILY MEDICINE

## 2025-03-06 PROCEDURE — 85025 COMPLETE CBC W/AUTO DIFF WBC: CPT | Performed by: FAMILY MEDICINE

## 2025-03-06 PROCEDURE — 93010 ELECTROCARDIOGRAM REPORT: CPT | Mod: ,,, | Performed by: INTERNAL MEDICINE

## 2025-03-06 PROCEDURE — 81003 URINALYSIS AUTO W/O SCOPE: CPT | Performed by: FAMILY MEDICINE

## 2025-03-06 PROCEDURE — 80053 COMPREHEN METABOLIC PANEL: CPT | Performed by: FAMILY MEDICINE

## 2025-03-06 NOTE — ED PROVIDER NOTES
"Encounter Date: 3/6/2025       History     Chief Complaint   Patient presents with    Hallucinations     Auditory and visual hallucinations      The history is provided by the patient. No  was used.   Mental Health Problem  The primary symptoms include delusions and hallucinations (auditory and visual). The primary symptoms do not include aggression, agitation, bizarre behavior, depressed mood, disorganized speech, disorganized thinking, dysphoric mood, homicidal ideas, paranoia, self-injury, somatic symptoms, suicidal ideas, suicidal threats or suicide attempt. Illness onset: gradually a few days ago.   The hallucinations began this week. The hallucinations appear to have been unchanged since their onset. He has auditory and visual hallucinations.   The degree of incapacity that he is experiencing as a consequence of his illness is severe. Additional symptoms of the illness do not include anhedonia, insomnia, hypersomnia, appetite change, unexpected weight change, fatigue, agitation, psychomotor retardation, feelings of worthlessness, attention impairment, euphoric mood, increased goal-directed activity, flight of ideas, inflated self-esteem, decreased need for sleep, distractible, poor judgment, visual change, headaches, abdominal pain or seizures. He does not admit to suicidal ideas. He does not have a plan to attempt suicide. He does not contemplate harming themself. He has not already injured self. He does not contemplate injuring another person. He has not already  injured another person. Risk factors that are present for mental illness include a history of mental illness.   Seeing people with fake guns and hearing voices "being mean to me."  Review of patient's allergies indicates:   Allergen Reactions    Haloperidol Swelling     Muscle stiffness  Has muscle spasms      Paroxetine Swelling, Other (See Comments) and Rash     "i don't remember"  "i don't remember"      Pineapple      Face " swells up. I have eaten pineapple since then and had no problems.      Ziprasidone Other (See Comments)     Muscle twitching     Past Medical History:   Diagnosis Date    Anxiety     Asthma     Depression     Hallucination     History of psychiatric hospitalization     Hx of psychiatric care     Psychiatric problem     Psychosis     Renal insufficiency 7/17/2024    Schizoaffective disorder     Sleep difficulties     Suicide attempt     Therapy      History reviewed. No pertinent surgical history.  Family History   Problem Relation Name Age of Onset    Coronary artery disease Mother          Stents    Heart attack Mother      Cervical cancer Sister       Social History[1]  Review of Systems   Constitutional:  Negative for appetite change, fatigue and unexpected weight change.   Gastrointestinal:  Negative for abdominal pain.   Neurological:  Negative for seizures and headaches.   Psychiatric/Behavioral:  Positive for hallucinations (auditory and visual). Negative for agitation, dysphoric mood, homicidal ideas, paranoia, self-injury and suicidal ideas. The patient does not have insomnia.    All other systems reviewed and are negative.      Physical Exam     Initial Vitals [03/06/25 1447]   BP Pulse Resp Temp SpO2   (!) 136/91 (!) 135 18 98.2 °F (36.8 °C) 100 %      MAP       --         Physical Exam    Nursing note and vitals reviewed.  Constitutional: He appears well-developed and well-nourished.   HENT:   Head: Normocephalic and atraumatic.   Eyes: Conjunctivae and EOM are normal. Pupils are equal, round, and reactive to light.   Neck: Neck supple.   Normal range of motion.  Cardiovascular:  Regular rhythm, normal heart sounds and intact distal pulses.           Tachycardic   Pulmonary/Chest: Breath sounds normal. No respiratory distress. He has no wheezes. He has no rhonchi. He has no rales.   Abdominal: Abdomen is soft. Bowel sounds are normal. He exhibits no distension. There is no abdominal tenderness. There is  no rebound.   Musculoskeletal:         General: Normal range of motion.      Cervical back: Normal range of motion and neck supple.     Neurological: He is alert and oriented to person, place, and time. He has normal strength. No cranial nerve deficit. GCS score is 15. GCS eye subscore is 4. GCS verbal subscore is 5. GCS motor subscore is 6.   Skin: Skin is warm and dry. Capillary refill takes less than 2 seconds.   Psychiatric: His speech is normal and behavior is normal. Judgment normal. His mood appears not anxious. His affect is not angry, not blunt, not labile and not inappropriate. He is actively hallucinating. Thought content is delusional. He does not exhibit a depressed mood. He expresses no homicidal and no suicidal ideation. He expresses no suicidal plans and no homicidal plans. He is attentive.         ED Course   Procedures  Labs Reviewed   COMPREHENSIVE METABOLIC PANEL - Abnormal       Result Value    Sodium 141      Potassium 4.2      Chloride 106      CO2 19 (*)     Glucose 52 (*)     Blood Urea Nitrogen 7.6 (*)     Creatinine 1.04      Calcium 9.6      Protein Total 7.9      Albumin 4.5      Globulin 3.4      Albumin/Globulin Ratio 1.3      Bilirubin Total 0.8      ALP 91      ALT 12      AST 19      eGFR >60      Anion Gap 16.0      BUN/Creatinine Ratio 7     URINALYSIS, REFLEX TO URINE CULTURE - Abnormal    Color, UA Yellow      Appearance, UA Clear      Specific Gravity, UA >=1.030      pH, UA 6.0      Protein, UA Negative      Glucose, UA Negative      Ketones, UA >=80 (*)     Blood, UA Negative      Bilirubin, UA Small (*)     Urobilinogen, UA 1.0      Nitrites, UA Negative      Leukocyte Esterase, UA Negative     DRUG SCREEN, URINE (BEAKER) - Abnormal    Amphetamines, Urine Negative      Barbiturates, Urine Negative      Benzodiazepine, Urine Negative      Cannabinoids, Urine Negative      Cocaine, Urine Negative      Fentanyl, Urine Positive (*)     MDMA, Urine Negative      Opiates, Urine  Negative      Phencyclidine, Urine Negative      pH, Urine 6.0      Specific Gravity, Urine Auto >=1.030      Narrative:     Cut off concentrations:    Amphetamines - 1000 ng/ml  Barbiturates - 200 ng/ml  Benzodiazepine - 200 ng/ml  Cannabinoids (THC) - 50 ng/ml  Cocaine - 300 ng/ml  Fentanyl - 1.0 ng/ml  MDMA - 500 ng/ml  Opiates - 300 ng/ml   Phencyclidine (PCP) - 25 ng/ml    Specimen submitted for drug analysis and tested for pH and specific gravity in order to evaluate sample integrity. Suspect tampering if specific gravity is <1.003 and/or pH is not within the range of 4.5 - 8.0  False negatives may result form substances such as bleach added to urine.  False positives may result for the presence of a substance with similar chemical structure to the drug or its metabolite.    This test provides only a PRELIMINARY analytical test result. A more specific alternate chemical method must be used in order to obtain a confirmed analytical result. Gas chromatography/mass spectrometry (GC/MS) is the preferred confirmatory method. Other chemical confirmation methods are available. Clinical consideration and professional judgement should be applied to any drug of abuse test result, particularly when preliminary positive results are used.    Positive results will be confirmed only at the physicians request. Unconfirmed screening results are to be used only for medical purposes (treatment).        ACETAMINOPHEN LEVEL - Abnormal    Acetaminophen Level <10.0 (*)    SALICYLATE LEVEL - Abnormal    Salicylate Level <5.0 (*)    POCT GLUCOSE - Abnormal    POCT Glucose 69 (*)    ALCOHOL,MEDICAL (ETHANOL) - Normal    Ethanol Level <10.0     CBC W/ AUTO DIFFERENTIAL    Narrative:     The following orders were created for panel order CBC auto differential.  Procedure                               Abnormality         Status                     ---------                               -----------         ------                     CBC  with Differential[[id number]]                           Final result                 Please view results for these tests on the individual orders.   CBC WITH DIFFERENTIAL    WBC 10.30      RBC 5.48      Hgb 15.5      Hct 45.0      MCV 82.1      MCH 28.3      MCHC 34.4      RDW 16.8      Platelet 291      MPV 9.9      Neut % 59.0      Lymph % 30.0      Mono % 7.5      Eos % 2.2      Basophil % 0.9      Imm Grans % 0.4      Neut # 6.08      Lymph # 3.09      Mono # 0.77      Eos # 0.23      Baso # 0.09      Imm Gran # 0.04      NRBC% 0.0       EKG Readings: (Independently Interpreted)   Initial Reading: No STEMI. Rhythm: Sinus Tachycardia. Heart Rate: 100. Ectopy: No Ectopy. Conduction: Normal. ST Segments: Normal ST Segments. T Waves: Normal. Axis: Normal. Clinical Impression: Sinus Tachycardia       Imaging Results    None          Medications - No data to display  Medical Decision Making  39-year-old male who presents to the ED for auditory hallucinations, visual hallucinations, and delusions.  Reports he is hearing voices and seeing people with fake guns.  Does not know what medications he is supposed to be taking.  Does not know his last inpatient psychiatric hospitalization.  Reports no suicidal ideations or homicidal ideations.  Differential diagnoses including but not limited to schizophrenia, bipolar disorder, mood disorder, electrolyte abnormality, substance abuse.    Amount and/or Complexity of Data Reviewed  Labs: ordered. Decision-making details documented in ED Course.  ECG/medicine tests: ordered and independent interpretation performed.               ED Course as of 03/06/25 1652   Thu Mar 06, 2025   1556 Fentanyl, Urine(!): Positive [CHAVO]   1556 Reviewed labs and ECG.  Patient is medically cleared for psychiatric placement. [CHAVO]   1652 Patient accepted by Dr. Quezada at [facility] [CHAVO]      ED Course User Index  [CHAVO] Cortes Garcia MD                           Clinical Impression:  Final  diagnoses:  [Z00.8] Medical clearance for psychiatric admission  [F23] Acute psychosis (Primary)  [R44.0] Auditory hallucinations  [R44.1] Visual hallucinations  [F19.10] Substance abuse          ED Disposition Condition    Transfer to Psych Facility Stable          ED Prescriptions    None       Follow-up Information    None              Cortes Garcia MD  03/06/25 1600       [1]   Social History  Tobacco Use    Smoking status: Every Day     Current packs/day: 1.00     Average packs/day: 1 pack/day for 23.6 years (23.6 ttl pk-yrs)     Types: Cigarettes     Start date: 7/17/2001    Smokeless tobacco: Never   Substance Use Topics    Alcohol use: Not Currently     Alcohol/week: 4.0 standard drinks of alcohol     Types: 4 Cans of beer per week     Comment: uses alcohol monthly    Drug use: Not Currently     Types: Marijuana        Cortes Garcia MD  03/06/25 5426

## 2025-03-10 LAB
OHS QRS DURATION: 88 MS
OHS QTC CALCULATION: 441 MS

## 2025-03-29 ENCOUNTER — HOSPITAL ENCOUNTER (EMERGENCY)
Facility: HOSPITAL | Age: 40
Discharge: PSYCHIATRIC OR SUBSTANCE USE FACILITY | End: 2025-03-29
Attending: EMERGENCY MEDICINE
Payer: COMMERCIAL

## 2025-03-29 VITALS
TEMPERATURE: 98 F | HEIGHT: 67 IN | BODY MASS INDEX: 25.9 KG/M2 | DIASTOLIC BLOOD PRESSURE: 81 MMHG | HEART RATE: 80 BPM | RESPIRATION RATE: 19 BRPM | WEIGHT: 165 LBS | SYSTOLIC BLOOD PRESSURE: 124 MMHG | OXYGEN SATURATION: 99 %

## 2025-03-29 DIAGNOSIS — F25.9 SCHIZOAFFECTIVE DISORDER, UNSPECIFIED TYPE: ICD-10-CM

## 2025-03-29 DIAGNOSIS — F23 ACUTE PSYCHOSIS: ICD-10-CM

## 2025-03-29 DIAGNOSIS — R46.89 AGGRESSIVE BEHAVIOR: ICD-10-CM

## 2025-03-29 DIAGNOSIS — F19.10 SUBSTANCE ABUSE: ICD-10-CM

## 2025-03-29 DIAGNOSIS — Z00.8 EVALUATION BY PSYCHIATRIC SERVICE REQUIRED: Primary | ICD-10-CM

## 2025-03-29 DIAGNOSIS — R44.0 AUDITORY HALLUCINATIONS: ICD-10-CM

## 2025-03-29 DIAGNOSIS — Z00.8 MEDICAL CLEARANCE FOR PSYCHIATRIC ADMISSION: ICD-10-CM

## 2025-03-29 LAB
ACCEPTIBLE SP GR UR QL: 1.01 (ref 1–1.03)
ALBUMIN SERPL-MCNC: 3.7 G/DL (ref 3.5–5)
ALBUMIN/GLOB SERPL: 1.2 RATIO (ref 1.1–2)
ALP SERPL-CCNC: 73 UNIT/L (ref 40–150)
ALT SERPL-CCNC: 11 UNIT/L (ref 0–55)
AMPHET UR QL SCN: NEGATIVE
ANION GAP SERPL CALC-SCNC: 6 MEQ/L
APAP SERPL-MCNC: <3 UG/ML (ref 10–30)
AST SERPL-CCNC: 15 UNIT/L (ref 11–45)
BACTERIA #/AREA URNS AUTO: ABNORMAL /HPF
BARBITURATE SCN PRESENT UR: NEGATIVE
BASOPHILS # BLD AUTO: 0.08 X10(3)/MCL
BASOPHILS NFR BLD AUTO: 0.7 %
BENZODIAZ UR QL SCN: NEGATIVE
BILIRUB SERPL-MCNC: 0.3 MG/DL
BILIRUB UR QL STRIP.AUTO: NEGATIVE
BUN SERPL-MCNC: 8 MG/DL (ref 8.9–20.6)
CALCIUM SERPL-MCNC: 8.6 MG/DL (ref 8.4–10.2)
CANNABINOIDS UR QL SCN: NEGATIVE
CHLORIDE SERPL-SCNC: 108 MMOL/L (ref 98–107)
CLARITY UR: CLEAR
CO2 SERPL-SCNC: 25 MMOL/L (ref 22–29)
COCAINE UR QL SCN: NEGATIVE
COLOR UR AUTO: ABNORMAL
CREAT SERPL-MCNC: 0.95 MG/DL (ref 0.72–1.25)
CREAT/UREA NIT SERPL: 8
EOSINOPHIL # BLD AUTO: 0.81 X10(3)/MCL (ref 0–0.9)
EOSINOPHIL NFR BLD AUTO: 7.6 %
ERYTHROCYTE [DISTWIDTH] IN BLOOD BY AUTOMATED COUNT: 16.6 % (ref 11.5–17)
ETHANOL SERPL-MCNC: <10 MG/DL
FENTANYL UR QL SCN: POSITIVE
GFR SERPLBLD CREATININE-BSD FMLA CKD-EPI: >60 ML/MIN/1.73/M2
GLOBULIN SER-MCNC: 3.1 GM/DL (ref 2.4–3.5)
GLUCOSE SERPL-MCNC: 93 MG/DL (ref 74–100)
GLUCOSE UR QL STRIP: NORMAL
HCT VFR BLD AUTO: 39.9 % (ref 42–52)
HGB BLD-MCNC: 13.2 G/DL (ref 14–18)
HGB UR QL STRIP: NEGATIVE
IMM GRANULOCYTES # BLD AUTO: 0.05 X10(3)/MCL (ref 0–0.04)
IMM GRANULOCYTES NFR BLD AUTO: 0.5 %
KETONES UR QL STRIP: NEGATIVE
LEUKOCYTE ESTERASE UR QL STRIP: 25
LYMPHOCYTES # BLD AUTO: 3.48 X10(3)/MCL (ref 0.6–4.6)
LYMPHOCYTES NFR BLD AUTO: 32.5 %
MCH RBC QN AUTO: 27.8 PG (ref 27–31)
MCHC RBC AUTO-ENTMCNC: 33.1 G/DL (ref 33–36)
MCV RBC AUTO: 84 FL (ref 80–94)
MDMA UR QL SCN: NEGATIVE
MONOCYTES # BLD AUTO: 0.8 X10(3)/MCL (ref 0.1–1.3)
MONOCYTES NFR BLD AUTO: 7.5 %
NEUTROPHILS # BLD AUTO: 5.49 X10(3)/MCL (ref 2.1–9.2)
NEUTROPHILS NFR BLD AUTO: 51.2 %
NITRITE UR QL STRIP: NEGATIVE
NRBC BLD AUTO-RTO: 0 %
OHS QRS DURATION: 86 MS
OHS QTC CALCULATION: 434 MS
OPIATES UR QL SCN: NEGATIVE
PCP UR QL: NEGATIVE
PH UR STRIP: 6 [PH]
PH UR: 6 [PH] (ref 3–11)
PLATELET # BLD AUTO: 259 X10(3)/MCL (ref 130–400)
PMV BLD AUTO: 9.6 FL (ref 7.4–10.4)
POTASSIUM SERPL-SCNC: 3.7 MMOL/L (ref 3.5–5.1)
PROT SERPL-MCNC: 6.8 GM/DL (ref 6.4–8.3)
PROT UR QL STRIP: NEGATIVE
RBC # BLD AUTO: 4.75 X10(6)/MCL (ref 4.7–6.1)
RBC #/AREA URNS AUTO: ABNORMAL /HPF
SODIUM SERPL-SCNC: 139 MMOL/L (ref 136–145)
SP GR UR STRIP.AUTO: 1.01 (ref 1–1.03)
SQUAMOUS #/AREA URNS LPF: ABNORMAL /HPF
TSH SERPL-ACNC: 2.14 UIU/ML (ref 0.35–4.94)
UROBILINOGEN UR STRIP-ACNC: 2
WBC # BLD AUTO: 10.71 X10(3)/MCL (ref 4.5–11.5)
WBC #/AREA URNS AUTO: ABNORMAL /HPF

## 2025-03-29 PROCEDURE — 85025 COMPLETE CBC W/AUTO DIFF WBC: CPT | Performed by: EMERGENCY MEDICINE

## 2025-03-29 PROCEDURE — 80307 DRUG TEST PRSMV CHEM ANLYZR: CPT | Performed by: EMERGENCY MEDICINE

## 2025-03-29 PROCEDURE — 80053 COMPREHEN METABOLIC PANEL: CPT | Performed by: EMERGENCY MEDICINE

## 2025-03-29 PROCEDURE — 93010 ELECTROCARDIOGRAM REPORT: CPT | Mod: ,,, | Performed by: INTERNAL MEDICINE

## 2025-03-29 PROCEDURE — 80143 DRUG ASSAY ACETAMINOPHEN: CPT | Performed by: EMERGENCY MEDICINE

## 2025-03-29 PROCEDURE — 99285 EMERGENCY DEPT VISIT HI MDM: CPT | Mod: 25

## 2025-03-29 PROCEDURE — 81001 URINALYSIS AUTO W/SCOPE: CPT | Performed by: EMERGENCY MEDICINE

## 2025-03-29 PROCEDURE — 84443 ASSAY THYROID STIM HORMONE: CPT | Performed by: EMERGENCY MEDICINE

## 2025-03-29 PROCEDURE — 82077 ASSAY SPEC XCP UR&BREATH IA: CPT | Performed by: EMERGENCY MEDICINE

## 2025-03-29 PROCEDURE — 93005 ELECTROCARDIOGRAM TRACING: CPT

## 2025-03-29 RX ORDER — LORAZEPAM 2 MG/ML
2 INJECTION INTRAMUSCULAR
Status: DISCONTINUED | OUTPATIENT
Start: 2025-03-29 | End: 2025-03-29 | Stop reason: HOSPADM

## 2025-03-29 RX ORDER — DROPERIDOL 2.5 MG/ML
5 INJECTION, SOLUTION INTRAMUSCULAR; INTRAVENOUS
Status: DISCONTINUED | OUTPATIENT
Start: 2025-03-29 | End: 2025-03-29 | Stop reason: HOSPADM

## 2025-03-29 RX ORDER — DIPHENHYDRAMINE HYDROCHLORIDE 50 MG/ML
25 INJECTION, SOLUTION INTRAMUSCULAR; INTRAVENOUS
Status: DISCONTINUED | OUTPATIENT
Start: 2025-03-29 | End: 2025-03-29 | Stop reason: HOSPADM

## 2025-03-29 NOTE — ED PROVIDER NOTES
"Encounter Date: 3/29/2025       History     Chief Complaint   Patient presents with    Psychiatric Evaluation     EMS states pt combative and aggressive at home and with EMS. EMS gave 5mg droperidol IM     39-year-old male with a history of schizoaffective disorder presents to the emergency department for evaluation of combativeness, aggressiveness reported by his mother.  She called 911.  EMS reports verbally threatening on their assessment, given 5 mg IM droperidol prior to arrival in the ED, calmer at this time however still appears to be responding to internal stimuli, argumentative, threatening.    The history is provided by the patient, the EMS personnel and medical records.     Review of patient's allergies indicates:   Allergen Reactions    Haloperidol Swelling     Muscle stiffness  Has muscle spasms      Paroxetine Swelling, Other (See Comments) and Rash     "i don't remember"  "i don't remember"      Pineapple      Face swells up. I have eaten pineapple since then and had no problems.      Ziprasidone Other (See Comments)     Muscle twitching     Past Medical History:   Diagnosis Date    Anxiety     Asthma     Depression     Hallucination     History of psychiatric hospitalization     Hx of psychiatric care     Psychiatric problem     Psychosis     Renal insufficiency 7/17/2024    Schizoaffective disorder     Sleep difficulties     Suicide attempt     Therapy      No past surgical history on file.  Family History   Problem Relation Name Age of Onset    Coronary artery disease Mother          Stents    Heart attack Mother      Cervical cancer Sister       Social History[1]  Review of Systems   Constitutional:  Negative for fever.   Psychiatric/Behavioral:  Positive for hallucinations. Negative for self-injury and suicidal ideas.        Physical Exam     Initial Vitals [03/29/25 0522]   BP Pulse Resp Temp SpO2   (!) 135/96 110 16 98 °F (36.7 °C) 100 %      MAP       --         Physical Exam    Nursing note " and vitals reviewed.  Constitutional: He appears well-developed and well-nourished. No distress.   In 4 point restraints per EMS, agitated, verbally threatening however not physically combative at time of my assessment   HENT:   Head: Normocephalic and atraumatic. Mouth/Throat: Oropharynx is clear and moist.   Eyes: No scleral icterus.   Neck: Neck supple.   Normal range of motion.  Cardiovascular:  Normal rate and regular rhythm.           Pulmonary/Chest: Breath sounds normal. No respiratory distress.   Abdominal: Abdomen is soft. He exhibits no distension. There is no abdominal tenderness.   Musculoskeletal:      Cervical back: Normal range of motion and neck supple.     Neurological: He is alert and oriented to person, place, and time. GCS score is 15.   Skin: Skin is warm and dry.   Psychiatric:   Having conversation with self, appears to be responding to internal stimuli, denies any thoughts of harming himself, will not answer thoughts of harming others.         ED Course   Procedures  Labs Reviewed   COMPREHENSIVE METABOLIC PANEL - Abnormal       Result Value    Sodium 139      Potassium 3.7      Chloride 108 (*)     CO2 25      Glucose 93      Blood Urea Nitrogen 8.0 (*)     Creatinine 0.95      Calcium 8.6      Protein Total 6.8      Albumin 3.7      Globulin 3.1      Albumin/Globulin Ratio 1.2      Bilirubin Total 0.3      ALP 73      ALT 11      AST 15      eGFR >60      Anion Gap 6.0      BUN/Creatinine Ratio 8     URINALYSIS, REFLEX TO URINE CULTURE - Abnormal    Color, UA Light-Yellow      Appearance, UA Clear      Specific Gravity, UA 1.012      pH, UA 6.0      Protein, UA Negative      Glucose, UA Normal      Ketones, UA Negative      Blood, UA Negative      Bilirubin, UA Negative      Urobilinogen, UA 2.0 (*)     Nitrites, UA Negative      Leukocyte Esterase, UA 25 (*)     RBC, UA 0-5      WBC, UA 0-5      Bacteria, UA None Seen      Squamous Epithelial Cells, UA None Seen     DRUG SCREEN, URINE  (BEAKER) - Abnormal    Amphetamines, Urine Negative      Barbiturates, Urine Negative      Benzodiazepine, Urine Negative      Cannabinoids, Urine Negative      Cocaine, Urine Negative      Fentanyl, Urine Positive (*)     MDMA, Urine Negative      Opiates, Urine Negative      Phencyclidine, Urine Negative      pH, Urine 6.0      Specific Gravity, Urine Auto 1.012      Narrative:     Cut off concentrations:    Amphetamines - 1000 ng/ml  Barbiturates - 200 ng/ml  Benzodiazepine - 200 ng/ml  Cannabinoids (THC) - 50 ng/ml  Cocaine - 300 ng/ml  Fentanyl - 1.0 ng/ml  MDMA - 500 ng/ml  Opiates - 300 ng/ml   Phencyclidine (PCP) - 25 ng/ml    Specimen submitted for drug analysis and tested for pH and specific gravity in order to evaluate sample integrity. Suspect tampering if specific gravity is <1.003 and/or pH is not within the range of 4.5 - 8.0  False negatives may result form substances such as bleach added to urine.  False positives may result for the presence of a substance with similar chemical structure to the drug or its metabolite.    This test provides only a PRELIMINARY analytical test result. A more specific alternate chemical method must be used in order to obtain a confirmed analytical result. Gas chromatography/mass spectrometry (GC/MS) is the preferred confirmatory method. Other chemical confirmation methods are available. Clinical consideration and professional judgement should be applied to any drug of abuse test result, particularly when preliminary positive results are used.    Positive results will be confirmed only at the physicians request. Unconfirmed screening results are to be used only for medical purposes (treatment).        ACETAMINOPHEN LEVEL - Abnormal    Acetaminophen Level <3.0 (*)    CBC WITH DIFFERENTIAL - Abnormal    WBC 10.71      RBC 4.75      Hgb 13.2 (*)     Hct 39.9 (*)     MCV 84.0      MCH 27.8      MCHC 33.1      RDW 16.6      Platelet 259      MPV 9.6      Neut % 51.2       Lymph % 32.5      Mono % 7.5      Eos % 7.6      Basophil % 0.7      Imm Grans % 0.5      Neut # 5.49      Lymph # 3.48      Mono # 0.80      Eos # 0.81      Baso # 0.08      Imm Gran # 0.05 (*)     NRBC% 0.0     TSH - Normal    TSH 2.140     ALCOHOL,MEDICAL (ETHANOL) - Normal    Ethanol Level <10.0     CBC W/ AUTO DIFFERENTIAL    Narrative:     The following orders were created for panel order CBC auto differential.  Procedure                               Abnormality         Status                     ---------                               -----------         ------                     CBC with Differential[[id number]]       Abnormal            Final result                 Please view results for these tests on the individual orders.     EKG Readings: (Independently Interpreted)   Initial Reading: No STEMI. Rhythm: Normal Sinus Rhythm. Heart Rate: 81. Clinical Impression: Normal Sinus Rhythm and Left Ventricular Hypertrophy (LVH)   03/29/2025 @ 0536       Imaging Results    None          Medications   LORazepam injection 2 mg (has no administration in time range)   droPERidol injection 5 mg (has no administration in time range)   diphenhydrAMINE injection 25 mg (has no administration in time range)     Medical Decision Making  Amount and/or Complexity of Data Reviewed  Labs: ordered.    Risk  Prescription drug management.      ED assessment:    Physician's Emergency certificate filed for danger to others given the patient's reported violent and aggressive outburst at home, calmer at time of my evaluation.  Long history of presenting with similar issues.  Laboratory studies obtained as part of psychiatric clearance protocol demonstrate no clinically significant acute findings that would preclude transfer for inpatient psychiatric evaluation and treatment.  Medications ordered here as suspected the patient would become aggressive again once removed from 4 point restraints; however, he was able to be verbally  redirected and medications did not have to be administered.    Differential diagnosis (including but not limited to):   Acute psychosis, decompensated chronic psychiatric condition, substance abuse, medication noncompliance    Amount and/or Complexity of Data Reviewed  Independent historian: EMS   Summary of history:  History provided by EMS as documented in HPI above  External data reviewed: notes from previous admissions and notes from previous ED visits  Summary of data reviewed:  Most recent ED encounter 03/06/2025 with the acute psychosis, auditory and visual hallucinations, substance abuse.  Admitted for inpatient psychiatric treatment at that time.  Risk and benefits of testing: discussed   Labs: ordered and reviewed    ECG/medicine tests: ordered and independent interpretation performed (see above or ED course)      Risk  Decision regarding transfer   Shared decision making     Critical Care  none    I, Niharika De Jesus MD personally performed the history, PE, MDM, and procedures as documented above and agree with the scribe's documentation.                 Medically cleared for psychiatry placement: 3/29/2025  7:24 AM                   Clinical Impression:  Final diagnoses:  [R46.89] Aggressive behavior  [R44.0] Auditory hallucinations  [F23] Acute psychosis  [F25.9] Schizoaffective disorder, unspecified type  [F19.10] Substance abuse  [Z00.8] Medical clearance for psychiatric admission          ED Disposition Condition    Transfer to Psych Facility Stable          ED Prescriptions    None       Follow-up Information    None              [1]   Social History  Tobacco Use    Smoking status: Every Day     Current packs/day: 1.00     Average packs/day: 1 pack/day for 23.7 years (23.7 ttl pk-yrs)     Types: Cigarettes     Start date: 7/17/2001    Smokeless tobacco: Never   Substance Use Topics    Alcohol use: Not Currently     Alcohol/week: 4.0 standard drinks of alcohol     Types: 4 Cans of beer per week      Comment: uses alcohol monthly    Drug use: Not Currently     Types: Marijuana        Niharika De Jesus MD  03/29/25 0725

## 2025-03-29 NOTE — ED NOTES
Assumed care, patient is sleeping, easily wakes to voice. Patient is in paper psych scrubs, 1:1 sitter, skin intact. Aaox4, gcs 15. VS stable. Denies SI/HI; denies AH/VH.

## 2025-04-18 ENCOUNTER — HOSPITAL ENCOUNTER (EMERGENCY)
Facility: HOSPITAL | Age: 40
Discharge: PSYCHIATRIC OR SUBSTANCE USE FACILITY | End: 2025-04-18
Attending: EMERGENCY MEDICINE
Payer: COMMERCIAL

## 2025-04-18 VITALS
SYSTOLIC BLOOD PRESSURE: 117 MMHG | TEMPERATURE: 98 F | OXYGEN SATURATION: 100 % | HEIGHT: 67 IN | WEIGHT: 150 LBS | BODY MASS INDEX: 23.54 KG/M2 | DIASTOLIC BLOOD PRESSURE: 80 MMHG | HEART RATE: 87 BPM | RESPIRATION RATE: 20 BRPM

## 2025-04-18 DIAGNOSIS — F29 PSYCHOSIS, UNSPECIFIED PSYCHOSIS TYPE: Primary | ICD-10-CM

## 2025-04-18 LAB
ACCEPTIBLE SP GR UR QL: 1.03 (ref 1–1.03)
ALBUMIN SERPL-MCNC: 4 G/DL (ref 3.5–5)
ALBUMIN/GLOB SERPL: 1.2 RATIO (ref 1.1–2)
ALP SERPL-CCNC: 84 UNIT/L (ref 40–150)
ALT SERPL-CCNC: 11 UNIT/L (ref 0–55)
AMPHET UR QL SCN: NEGATIVE
ANION GAP SERPL CALC-SCNC: 9 MEQ/L
APAP SERPL-MCNC: <3 UG/ML (ref 10–30)
AST SERPL-CCNC: 18 UNIT/L (ref 11–45)
BACTERIA #/AREA URNS AUTO: ABNORMAL /HPF
BARBITURATE SCN PRESENT UR: NEGATIVE
BASOPHILS # BLD AUTO: 0.05 X10(3)/MCL
BASOPHILS NFR BLD AUTO: 0.8 %
BENZODIAZ UR QL SCN: NEGATIVE
BILIRUB SERPL-MCNC: 0.4 MG/DL
BILIRUB UR QL STRIP.AUTO: NEGATIVE
BUN SERPL-MCNC: 7.6 MG/DL (ref 8.9–20.6)
CALCIUM SERPL-MCNC: 9.2 MG/DL (ref 8.4–10.2)
CANNABINOIDS UR QL SCN: NEGATIVE
CHLORIDE SERPL-SCNC: 104 MMOL/L (ref 98–107)
CLARITY UR: CLEAR
CO2 SERPL-SCNC: 25 MMOL/L (ref 22–29)
COCAINE UR QL SCN: NEGATIVE
COLOR UR AUTO: YELLOW
CREAT SERPL-MCNC: 0.91 MG/DL (ref 0.72–1.25)
CREAT/UREA NIT SERPL: 8
EOSINOPHIL # BLD AUTO: 0.59 X10(3)/MCL (ref 0–0.9)
EOSINOPHIL NFR BLD AUTO: 9.9 %
ERYTHROCYTE [DISTWIDTH] IN BLOOD BY AUTOMATED COUNT: 15 % (ref 11.5–17)
ETHANOL SERPL-MCNC: <10 MG/DL
FENTANYL UR QL SCN: NEGATIVE
GFR SERPLBLD CREATININE-BSD FMLA CKD-EPI: >60 ML/MIN/1.73/M2
GLOBULIN SER-MCNC: 3.3 GM/DL (ref 2.4–3.5)
GLUCOSE SERPL-MCNC: 66 MG/DL (ref 74–100)
GLUCOSE UR QL STRIP: NORMAL
HCT VFR BLD AUTO: 42.6 % (ref 42–52)
HGB BLD-MCNC: 14.3 G/DL (ref 14–18)
HGB UR QL STRIP: NEGATIVE
IMM GRANULOCYTES # BLD AUTO: 0.01 X10(3)/MCL (ref 0–0.04)
IMM GRANULOCYTES NFR BLD AUTO: 0.2 %
KETONES UR QL STRIP: ABNORMAL
LEUKOCYTE ESTERASE UR QL STRIP: 75
LYMPHOCYTES # BLD AUTO: 1.71 X10(3)/MCL (ref 0.6–4.6)
LYMPHOCYTES NFR BLD AUTO: 28.7 %
MCH RBC QN AUTO: 28.2 PG (ref 27–31)
MCHC RBC AUTO-ENTMCNC: 33.6 G/DL (ref 33–36)
MCV RBC AUTO: 84 FL (ref 80–94)
MDMA UR QL SCN: NEGATIVE
MONOCYTES # BLD AUTO: 0.74 X10(3)/MCL (ref 0.1–1.3)
MONOCYTES NFR BLD AUTO: 12.4 %
NEUTROPHILS # BLD AUTO: 2.85 X10(3)/MCL (ref 2.1–9.2)
NEUTROPHILS NFR BLD AUTO: 48 %
NITRITE UR QL STRIP: NEGATIVE
NRBC BLD AUTO-RTO: 0 %
OPIATES UR QL SCN: NEGATIVE
PCP UR QL: NEGATIVE
PH UR STRIP: 6 [PH]
PH UR: 6 [PH] (ref 3–11)
PLATELET # BLD AUTO: 231 X10(3)/MCL (ref 130–400)
PMV BLD AUTO: 10.8 FL (ref 7.4–10.4)
POTASSIUM SERPL-SCNC: 4.4 MMOL/L (ref 3.5–5.1)
PROT SERPL-MCNC: 7.3 GM/DL (ref 6.4–8.3)
PROT UR QL STRIP: ABNORMAL
RBC # BLD AUTO: 5.07 X10(6)/MCL (ref 4.7–6.1)
RBC #/AREA URNS AUTO: ABNORMAL /HPF
SODIUM SERPL-SCNC: 138 MMOL/L (ref 136–145)
SP GR UR STRIP.AUTO: 1.03 (ref 1–1.03)
SQUAMOUS #/AREA URNS LPF: ABNORMAL /HPF
UROBILINOGEN UR STRIP-ACNC: 4
WBC # BLD AUTO: 5.95 X10(3)/MCL (ref 4.5–11.5)
WBC #/AREA URNS AUTO: ABNORMAL /HPF

## 2025-04-18 PROCEDURE — 80143 DRUG ASSAY ACETAMINOPHEN: CPT | Performed by: EMERGENCY MEDICINE

## 2025-04-18 PROCEDURE — 81001 URINALYSIS AUTO W/SCOPE: CPT | Performed by: EMERGENCY MEDICINE

## 2025-04-18 PROCEDURE — 80053 COMPREHEN METABOLIC PANEL: CPT | Performed by: EMERGENCY MEDICINE

## 2025-04-18 PROCEDURE — 82077 ASSAY SPEC XCP UR&BREATH IA: CPT | Performed by: EMERGENCY MEDICINE

## 2025-04-18 PROCEDURE — 85025 COMPLETE CBC W/AUTO DIFF WBC: CPT | Performed by: EMERGENCY MEDICINE

## 2025-04-18 PROCEDURE — 99285 EMERGENCY DEPT VISIT HI MDM: CPT

## 2025-04-18 PROCEDURE — 80307 DRUG TEST PRSMV CHEM ANLYZR: CPT | Performed by: EMERGENCY MEDICINE

## 2025-04-18 PROCEDURE — 25000003 PHARM REV CODE 250: Performed by: EMERGENCY MEDICINE

## 2025-04-18 RX ORDER — LORAZEPAM 1 MG/1
2 TABLET ORAL
Status: COMPLETED | OUTPATIENT
Start: 2025-04-18 | End: 2025-04-18

## 2025-04-18 RX ORDER — OLANZAPINE 5 MG/1
10 TABLET, FILM COATED ORAL
Status: COMPLETED | OUTPATIENT
Start: 2025-04-18 | End: 2025-04-18

## 2025-04-18 RX ORDER — DIPHENHYDRAMINE HCL 25 MG
50 CAPSULE ORAL
Status: COMPLETED | OUTPATIENT
Start: 2025-04-18 | End: 2025-04-18

## 2025-04-18 RX ADMIN — DIPHENHYDRAMINE HYDROCHLORIDE 50 MG: 25 CAPSULE ORAL at 04:04

## 2025-04-18 RX ADMIN — LORAZEPAM 2 MG: 1 TABLET ORAL at 04:04

## 2025-04-18 RX ADMIN — OLANZAPINE 10 MG: 5 TABLET, FILM COATED ORAL at 04:04

## 2025-04-18 NOTE — ED NOTES
Pt. Arrives via [facility]. By pd. Upon arrival noted disorganized speech with [employer] preoccupation repeating phrases and poverty of content. Cooperative at this time and follows commands . Unable to get accurate history from pt. Due to mental state at this time. Will continue to monitor.

## 2025-04-18 NOTE — ED PROVIDER NOTES
"Encounter Date: 4/18/2025    SCRIBE #1 NOTE: I, Adriana Santos, am scribing for, and in the presence of,  Troy Whitney MD. I have scribed the following portions of the note - Other sections scribed: HPI, ROS, PE.       History     Chief Complaint   Patient presents with    Psychiatric Evaluation     Arrives under OPC with [employer] with reports of patient running in and out of traffic. Patient with thoughts of grandeur in triage.      The patient is a 39-year-old male with a history of anxiety, asthma, depression, schizoaffective disorder, and renal insufficiency presenting to the ED via police transport under an OPC. The patient was put under an OPC for running in and out of traffic. The patient claims that he was not running in and out of traffic, but he was "walking around with his bible and teaching the scriptures." The patient states that people are "making false accusations like Harry" and makes other Anabaptism statements, then switches to talking about football, repeatedly stating "[place] numbers on the helmet."     The history is provided by the patient and medical records. No  was used.     Review of patient's allergies indicates:   Allergen Reactions    Haloperidol Swelling     Muscle stiffness  Has muscle spasms      Paroxetine Swelling, Other (See Comments) and Rash     "i don't remember"  "i don't remember"      Pineapple      Face swells up. I have eaten pineapple since then and had no problems.      Ziprasidone Other (See Comments)     Muscle twitching     Past Medical History:   Diagnosis Date    Anxiety     Asthma     Depression     Hallucination     History of psychiatric hospitalization     Hx of psychiatric care     Psychiatric problem     Psychosis     Renal insufficiency 7/17/2024    Schizoaffective disorder     Sleep difficulties     Suicide attempt     Therapy      No past surgical history on file.  Family History   Problem Relation Name Age of Onset    Coronary artery " disease Mother          Stents    Heart attack Mother      Cervical cancer Sister       Social History[1]  Review of Systems   Unable to perform ROS: Psychiatric disorder       Physical Exam     Initial Vitals [04/18/25 1455]   BP Pulse Resp Temp SpO2   123/85 99 16 98 °F (36.7 °C) 98 %      MAP       --         Physical Exam    Constitutional: He appears well-developed and well-nourished. No distress.   HENT:   Head: Normocephalic and atraumatic.   Eyes: Conjunctivae and EOM are normal. Pupils are equal, round, and reactive to light. Right eye exhibits no discharge. Left eye exhibits no discharge. No scleral icterus.   Neck: No tracheal deviation present.   Cardiovascular:  Normal rate, regular rhythm, normal heart sounds and intact distal pulses.           No murmur heard.  Pulmonary/Chest: Breath sounds normal. No stridor. No respiratory distress. He has no wheezes. He has no rales.   Abdominal: Abdomen is soft. He exhibits no distension. There is no abdominal tenderness. There is no rebound and no guarding.   Musculoskeletal:         General: No tenderness or edema. Normal range of motion.     Neurological: He is alert and oriented to person, place, and time. He has normal strength and normal reflexes. No cranial nerve deficit.   Skin: Skin is warm and dry. No rash noted. No erythema. No pallor.   Psychiatric: His speech is tangential.   Flight of ideas; Sabianism preoccupation; delusions of grandeur. The patient thinks about heaven then immediately talks about [place] football.          ED Course   Procedures  Labs Reviewed   COMPREHENSIVE METABOLIC PANEL - Abnormal       Result Value    Sodium 138      Potassium 4.4      Chloride 104      CO2 25      Glucose 66 (*)     Blood Urea Nitrogen 7.6 (*)     Creatinine 0.91      Calcium 9.2      Protein Total 7.3      Albumin 4.0      Globulin 3.3      Albumin/Globulin Ratio 1.2      Bilirubin Total 0.4      ALP 84      ALT 11      AST 18      eGFR >60      Anion Gap  9.0      BUN/Creatinine Ratio 8     URINALYSIS, REFLEX TO URINE CULTURE - Abnormal    Color, UA Yellow      Appearance, UA Clear      Specific Gravity, UA 1.027      pH, UA 6.0      Protein, UA Trace (*)     Glucose, UA Normal      Ketones, UA 1+ (*)     Blood, UA Negative      Bilirubin, UA Negative      Urobilinogen, UA 4.0 (*)     Nitrites, UA Negative      Leukocyte Esterase, UA 75 (*)     RBC, UA 0-5      WBC, UA 0-5      Bacteria, UA None Seen      Squamous Epithelial Cells, UA None Seen     ACETAMINOPHEN LEVEL - Abnormal    Acetaminophen Level <3.0 (*)    CBC WITH DIFFERENTIAL - Abnormal    WBC 5.95      RBC 5.07      Hgb 14.3      Hct 42.6      MCV 84.0      MCH 28.2      MCHC 33.6      RDW 15.0      Platelet 231      MPV 10.8 (*)     Neut % 48.0      Lymph % 28.7      Mono % 12.4      Eos % 9.9      Basophil % 0.8      Imm Grans % 0.2      Neut # 2.85      Lymph # 1.71      Mono # 0.74      Eos # 0.59      Baso # 0.05      Imm Gran # 0.01      NRBC% 0.0     DRUG SCREEN, URINE (BEAKER) - Normal    Amphetamines, Urine Negative      Barbiturates, Urine Negative      Benzodiazepine, Urine Negative      Cannabinoids, Urine Negative      Cocaine, Urine Negative      Fentanyl, Urine Negative      MDMA, Urine Negative      Opiates, Urine Negative      Phencyclidine, Urine Negative      pH, Urine 6.0      Specific Gravity, Urine Auto 1.027      Narrative:     Cut off concentrations:    Amphetamines - 1000 ng/ml  Barbiturates - 200 ng/ml  Benzodiazepine - 200 ng/ml  Cannabinoids (THC) - 50 ng/ml  Cocaine - 300 ng/ml  Fentanyl - 1.0 ng/ml  MDMA - 500 ng/ml  Opiates - 300 ng/ml   Phencyclidine (PCP) - 25 ng/ml    Specimen submitted for drug analysis and tested for pH and specific gravity in order to evaluate sample integrity. Suspect tampering if specific gravity is <1.003 and/or pH is not within the range of 4.5 - 8.0  False negatives may result form substances such as bleach added to urine.  False positives may  result for the presence of a substance with similar chemical structure to the drug or its metabolite.    This test provides only a PRELIMINARY analytical test result. A more specific alternate chemical method must be used in order to obtain a confirmed analytical result. Gas chromatography/mass spectrometry (GC/MS) is the preferred confirmatory method. Other chemical confirmation methods are available. Clinical consideration and professional judgement should be applied to any drug of abuse test result, particularly when preliminary positive results are used.    Positive results will be confirmed only at the physicians request. Unconfirmed screening results are to be used only for medical purposes (treatment).        ALCOHOL,MEDICAL (ETHANOL) - Normal    Ethanol Level <10.0     CBC W/ AUTO DIFFERENTIAL    Narrative:     The following orders were created for panel order CBC auto differential.  Procedure                               Abnormality         Status                     ---------                               -----------         ------                     CBC with Differential[[id number]]       Abnormal            Final result                 Please view results for these tests on the individual orders.   SARS CORONAVIRUS 2 ANTIGEN POCT, MANUAL READ          Imaging Results    None          Medications   OLANZapine tablet 10 mg (10 mg Oral Given 4/18/25 1629)   diphenhydrAMINE capsule 50 mg (50 mg Oral Given 4/18/25 1629)   LORazepam tablet 2 mg (2 mg Oral Given 4/18/25 1629)     Medical Decision Making  The differential diagnosis includes, but is not limited to, psychosis, schizophrenia, and substance abuse.       Patient PEC initiated at 1507.     Amount and/or Complexity of Data Reviewed  External Data Reviewed: notes.     Details: The patient was put under an OPC for running in and out of traffic.  Labs: ordered.    Risk  OTC drugs.  Prescription drug management.            Scribe Attestation:   Scribe  #1: I performed the above scribed service and the documentation accurately describes the services I performed. I attest to the accuracy of the note.    Attending Attestation:           Physician Attestation for Scribe:  Physician Attestation Statement for Scribe #1: I, Troy Whitney MD, reviewed documentation, as scribed by Adriana Santos in my presence, and it is both accurate and complete.                Medically cleared for psychiatry placement: 4/18/2025  6:54 PM                   Clinical Impression:  Final diagnoses:  [F29] Psychosis, unspecified psychosis type (Primary)          ED Disposition Condition    Transfer to Psych Facility Stable          ED Prescriptions    None       Follow-up Information    None              [1]   Social History  Tobacco Use    Smoking status: Every Day     Current packs/day: 1.00     Average packs/day: 1 pack/day for 23.8 years (23.8 ttl pk-yrs)     Types: Cigarettes     Start date: 7/17/2001    Smokeless tobacco: Never   Substance Use Topics    Alcohol use: Not Currently     Alcohol/week: 4.0 standard drinks of alcohol     Types: 4 Cans of beer per week     Comment: uses alcohol monthly    Drug use: Not Currently     Types: Marijuana        Troy Whitney MD  04/18/25 7709

## 2025-05-09 ENCOUNTER — HOSPITAL ENCOUNTER (EMERGENCY)
Facility: HOSPITAL | Age: 40
Discharge: PSYCHIATRIC OR SUBSTANCE USE FACILITY | End: 2025-05-10
Attending: FAMILY MEDICINE
Payer: COMMERCIAL

## 2025-05-09 DIAGNOSIS — Z00.8 MEDICAL CLEARANCE FOR PSYCHIATRIC ADMISSION: ICD-10-CM

## 2025-05-09 DIAGNOSIS — F22 PARANOID DELUSION: ICD-10-CM

## 2025-05-09 DIAGNOSIS — R44.0 AUDITORY HALLUCINATIONS: Primary | ICD-10-CM

## 2025-05-09 LAB
ALBUMIN SERPL-MCNC: 4.1 G/DL (ref 3.5–5)
ALBUMIN/GLOB SERPL: 1.2 RATIO (ref 1.1–2)
ALP SERPL-CCNC: 81 UNIT/L (ref 40–150)
ALT SERPL-CCNC: 29 UNIT/L (ref 0–55)
ANION GAP SERPL CALC-SCNC: 11 MEQ/L
AST SERPL-CCNC: 35 UNIT/L (ref 11–45)
BASOPHILS # BLD AUTO: 0.04 X10(3)/MCL
BASOPHILS NFR BLD AUTO: 0.5 %
BILIRUB SERPL-MCNC: 0.4 MG/DL
BUN SERPL-MCNC: 4.1 MG/DL (ref 8.9–20.6)
CALCIUM SERPL-MCNC: 9.1 MG/DL (ref 8.4–10.2)
CHLORIDE SERPL-SCNC: 99 MMOL/L (ref 98–107)
CO2 SERPL-SCNC: 23 MMOL/L (ref 22–29)
CREAT SERPL-MCNC: 0.9 MG/DL (ref 0.72–1.25)
CREAT/UREA NIT SERPL: 5
EOSINOPHIL # BLD AUTO: 0.23 X10(3)/MCL (ref 0–0.9)
EOSINOPHIL NFR BLD AUTO: 2.8 %
ERYTHROCYTE [DISTWIDTH] IN BLOOD BY AUTOMATED COUNT: 14.8 % (ref 11.5–17)
ETHANOL SERPL-MCNC: <10 MG/DL
GFR SERPLBLD CREATININE-BSD FMLA CKD-EPI: >60 ML/MIN/1.73/M2
GLOBULIN SER-MCNC: 3.3 GM/DL (ref 2.4–3.5)
GLUCOSE SERPL-MCNC: 74 MG/DL (ref 74–100)
HCT VFR BLD AUTO: 40.1 % (ref 42–52)
HGB BLD-MCNC: 14.1 G/DL (ref 14–18)
IMM GRANULOCYTES # BLD AUTO: 0.04 X10(3)/MCL (ref 0–0.04)
IMM GRANULOCYTES NFR BLD AUTO: 0.5 %
LYMPHOCYTES # BLD AUTO: 2.28 X10(3)/MCL (ref 0.6–4.6)
LYMPHOCYTES NFR BLD AUTO: 27.6 %
MCH RBC QN AUTO: 28.4 PG (ref 27–31)
MCHC RBC AUTO-ENTMCNC: 35.2 G/DL (ref 33–36)
MCV RBC AUTO: 80.7 FL (ref 80–94)
MONOCYTES # BLD AUTO: 0.72 X10(3)/MCL (ref 0.1–1.3)
MONOCYTES NFR BLD AUTO: 8.7 %
NEUTROPHILS # BLD AUTO: 4.96 X10(3)/MCL (ref 2.1–9.2)
NEUTROPHILS NFR BLD AUTO: 59.9 %
NRBC BLD AUTO-RTO: 0 %
PLATELET # BLD AUTO: 235 X10(3)/MCL (ref 130–400)
PMV BLD AUTO: 9.7 FL (ref 7.4–10.4)
POTASSIUM SERPL-SCNC: 3.6 MMOL/L (ref 3.5–5.1)
PROT SERPL-MCNC: 7.4 GM/DL (ref 6.4–8.3)
RBC # BLD AUTO: 4.97 X10(6)/MCL (ref 4.7–6.1)
SODIUM SERPL-SCNC: 133 MMOL/L (ref 136–145)
WBC # BLD AUTO: 8.27 X10(3)/MCL (ref 4.5–11.5)

## 2025-05-09 PROCEDURE — 82077 ASSAY SPEC XCP UR&BREATH IA: CPT | Performed by: FAMILY MEDICINE

## 2025-05-09 PROCEDURE — 80307 DRUG TEST PRSMV CHEM ANLYZR: CPT | Performed by: FAMILY MEDICINE

## 2025-05-09 PROCEDURE — 80053 COMPREHEN METABOLIC PANEL: CPT | Performed by: FAMILY MEDICINE

## 2025-05-09 PROCEDURE — 81003 URINALYSIS AUTO W/O SCOPE: CPT | Performed by: FAMILY MEDICINE

## 2025-05-09 PROCEDURE — 85025 COMPLETE CBC W/AUTO DIFF WBC: CPT | Performed by: FAMILY MEDICINE

## 2025-05-09 PROCEDURE — 84443 ASSAY THYROID STIM HORMONE: CPT | Performed by: FAMILY MEDICINE

## 2025-05-09 PROCEDURE — 99285 EMERGENCY DEPT VISIT HI MDM: CPT

## 2025-05-09 RX ORDER — RISPERIDONE 2 MG/1
4 TABLET ORAL 2 TIMES DAILY
COMMUNITY
Start: 2025-05-07

## 2025-05-10 VITALS
HEART RATE: 95 BPM | OXYGEN SATURATION: 100 % | RESPIRATION RATE: 18 BRPM | TEMPERATURE: 99 F | HEIGHT: 70 IN | SYSTOLIC BLOOD PRESSURE: 115 MMHG | DIASTOLIC BLOOD PRESSURE: 79 MMHG | WEIGHT: 150 LBS | BODY MASS INDEX: 21.47 KG/M2

## 2025-05-10 LAB
ACCEPTIBLE SP GR UR QL: 1.02 (ref 1–1.03)
AMPHET UR QL SCN: NEGATIVE
BARBITURATE SCN PRESENT UR: NEGATIVE
BENZODIAZ UR QL SCN: NEGATIVE
BILIRUB UR QL STRIP.AUTO: NEGATIVE
CANNABINOIDS UR QL SCN: POSITIVE
CLARITY UR: CLEAR
COCAINE UR QL SCN: NEGATIVE
COLOR UR AUTO: YELLOW
FENTANYL UR QL SCN: NEGATIVE
GLUCOSE UR QL STRIP: NEGATIVE
HGB UR QL STRIP: NEGATIVE
KETONES UR QL STRIP: NEGATIVE
LEUKOCYTE ESTERASE UR QL STRIP: NEGATIVE
MDMA UR QL SCN: NEGATIVE
NITRITE UR QL STRIP: NEGATIVE
OPIATES UR QL SCN: NEGATIVE
PCP UR QL: NEGATIVE
PH UR STRIP: 6 [PH]
PH UR: 6 [PH] (ref 3–11)
PROT UR QL STRIP: NEGATIVE
SP GR UR STRIP.AUTO: 1.02 (ref 1–1.03)
TSH SERPL-ACNC: 0.62 UIU/ML (ref 0.35–4.94)
UROBILINOGEN UR STRIP-ACNC: 0.2

## 2025-05-10 RX ORDER — DIVALPROEX SODIUM 500 MG/1
500 TABLET, FILM COATED, EXTENDED RELEASE ORAL DAILY
COMMUNITY

## 2025-05-10 NOTE — ED TRIAGE NOTES
Spoke with patient's Mother who states he became upset because she would not give him money for marijuana so he called police. He is alert cooperative rambling  denies suicidal or homicidal ideation but wants to go to [facility] hospital for help

## 2025-05-10 NOTE — ED PROVIDER NOTES
"Encounter Date: 5/9/2025       History     Chief Complaint   Patient presents with    Psychiatric Evaluation     To ER states his mother is possessed and trying to harm him. He states he wants to check himself into [facility] for help. Also hearing voices from rude girls     39-year-old gentleman presents emergency room with complaints of auditory hallucinations, had an argument with his mother tonight.  Mother reports the patient wanted to use money to buy marijuana, which resulted in an argument.  Mother reports that she is concerned because he has not been sleeping at nighttime, and having increased auditory hallucinations.  Patient is slightly agitated, but redirectable.  Denies suicidal or homicidal ideations.    The history is provided by the patient and a relative.     Review of patient's allergies indicates:   Allergen Reactions    Haloperidol Swelling     Muscle stiffness  Has muscle spasms      Paroxetine Swelling, Other (See Comments) and Rash     "i don't remember"  "i don't remember"      Pineapple      Face swells up. I have eaten pineapple since then and had no problems.      Ziprasidone Other (See Comments)     Muscle twitching     Past Medical History:   Diagnosis Date    Anxiety     Asthma     Depression     Hallucination     History of psychiatric hospitalization     Hx of psychiatric care     Psychiatric problem     Psychosis     Renal insufficiency 7/17/2024    Schizoaffective disorder     Sleep difficulties     Suicide attempt     Therapy      History reviewed. No pertinent surgical history.  Family History   Problem Relation Name Age of Onset    Coronary artery disease Mother          Stents    Heart attack Mother      Cervical cancer Sister       Social History[1]  Review of Systems   Constitutional:  Negative for chills, fatigue and fever.   HENT:  Negative for ear pain, rhinorrhea and sore throat.    Eyes:  Negative for photophobia and pain.   Respiratory:  Negative for " cough, shortness of breath and wheezing.    Cardiovascular:  Negative for chest pain.   Gastrointestinal:  Negative for abdominal pain, diarrhea, nausea and vomiting.   Genitourinary:  Negative for dysuria.   Neurological:  Negative for dizziness, weakness and headaches.   All other systems reviewed and are negative.      Physical Exam     Initial Vitals [05/09/25 2305]   BP Pulse Resp Temp SpO2   125/74 (!) 111 18 98.5 °F (36.9 °C) 100 %      MAP       --         Physical Exam    Nursing note and vitals reviewed.  Constitutional: He appears well-developed and well-nourished.   HENT:   Head: Normocephalic and atraumatic.   Eyes: EOM are normal. Pupils are equal, round, and reactive to light.   Neck: Neck supple.   Normal range of motion.  Cardiovascular:  Normal rate, regular rhythm and normal heart sounds.     Exam reveals no gallop and no friction rub.       No murmur heard.  Pulmonary/Chest: Breath sounds normal. No respiratory distress.   Abdominal: Abdomen is soft. Bowel sounds are normal. He exhibits no distension. There is no abdominal tenderness.   Musculoskeletal:         General: Normal range of motion.      Cervical back: Normal range of motion and neck supple.     Neurological: He is alert and oriented to person, place, and time. He has normal strength.   Skin: Skin is warm and dry.   Psychiatric: His behavior is normal. Judgment normal. His mood appears anxious. His speech is rapid and/or pressured and tangential. He is actively hallucinating. Thought content is delusional.   Auditory hallucinations, Cheondoism delusions, reports mother is possessed.         ED Course   Procedures  Labs Reviewed   COMPREHENSIVE METABOLIC PANEL - Abnormal       Result Value    Sodium 133 (*)     Potassium 3.6      Chloride 99      CO2 23      Glucose 74      Blood Urea Nitrogen 4.1 (*)     Creatinine 0.90      Calcium 9.1      Protein Total 7.4      Albumin 4.1      Globulin 3.3      Albumin/Globulin Ratio 1.2       Bilirubin Total 0.4      ALP 81      ALT 29      AST 35      eGFR >60      Anion Gap 11.0      BUN/Creatinine Ratio 5     DRUG SCREEN, URINE (BEAKER) - Abnormal    Amphetamines, Urine Negative      Barbiturates, Urine Negative      Benzodiazepine, Urine Negative      Cannabinoids, Urine Positive (*)     Cocaine, Urine Negative      Fentanyl, Urine Negative      MDMA, Urine Negative      Opiates, Urine Negative      Phencyclidine, Urine Negative      pH, Urine 6.0      Specific Gravity, Urine Auto 1.020      Narrative:     Cut off concentrations:    Amphetamines - 1000 ng/ml  Barbiturates - 200 ng/ml  Benzodiazepine - 200 ng/ml  Cannabinoids (THC) - 50 ng/ml  Cocaine - 300 ng/ml  Fentanyl - 1.0 ng/ml  MDMA - 500 ng/ml  Opiates - 300 ng/ml   Phencyclidine (PCP) - 25 ng/ml    Specimen submitted for drug analysis and tested for pH and specific gravity in order to evaluate sample integrity. Suspect tampering if specific gravity is <1.003 and/or pH is not within the range of 4.5 - 8.0  False negatives may result form substances such as bleach added to urine.  False positives may result for the presence of a substance with similar chemical structure to the drug or its metabolite.    This test provides only a PRELIMINARY analytical test result. A more specific alternate chemical method must be used in order to obtain a confirmed analytical result. Gas chromatography/mass spectrometry (GC/MS) is the preferred confirmatory method. Other chemical confirmation methods are available. Clinical consideration and professional judgement should be applied to any drug of abuse test result, particularly when preliminary positive results are used.    Positive results will be confirmed only at the physicians request. Unconfirmed screening results are to be used only for medical purposes (treatment).        CBC WITH DIFFERENTIAL - Abnormal    WBC 8.27      RBC 4.97      Hgb 14.1      Hct 40.1 (*)     MCV 80.7      MCH 28.4      MCHC 35.2       RDW 14.8      Platelet 235      MPV 9.7      Neut % 59.9      Lymph % 27.6      Mono % 8.7      Eos % 2.8      Basophil % 0.5      Imm Grans % 0.5      Neut # 4.96      Lymph # 2.28      Mono # 0.72      Eos # 0.23      Baso # 0.04      Imm Gran # 0.04      NRBC% 0.0     TSH - Normal    TSH 0.619     URINALYSIS, REFLEX TO URINE CULTURE - Normal    Color, UA Yellow      Appearance, UA Clear      Specific Gravity, UA 1.020      pH, UA 6.0      Protein, UA Negative      Glucose, UA Negative      Ketones, UA Negative      Blood, UA Negative      Bilirubin, UA Negative      Urobilinogen, UA 0.2      Nitrites, UA Negative      Leukocyte Esterase, UA Negative     ALCOHOL,MEDICAL (ETHANOL) - Normal    Ethanol Level <10.0     CBC W/ AUTO DIFFERENTIAL    Narrative:     The following orders were created for panel order CBC auto differential.  Procedure                               Abnormality         Status                     ---------                               -----------         ------                     CBC with Differential[[id number]]       Abnormal            Final result                 Please view results for these tests on the individual orders.          Imaging Results    None          Medications - No data to display  Medical Decision Making  39-year-old gentleman brought in the emergency room by mother for evaluation due to argument, patient having auditory hallucinations, concerned that mother maybe possessed.  Patient currently agitated, but redirectable.  Mother reports the patient has not been sleeping at night either.  Due to worsening agitation and hallucinations, will place under physician emergency certificate for inpatient psychiatric evaluation treatment.  Will obtain clearance laboratory evaluation     Independent historian: Mother     Differential diagnosis:  Family conflict, auditory hallucinations, schizoaffective disorder    Amount and/or Complexity of Data Reviewed  Labs: ordered.  Decision-making details documented in ED Course.               ED Course as of 05/10/25 0037   Sat May 10, 2025   0035 Cannabinoids, Urine(!): Positive  Medically cleared for psychiatric placement. [MW]      ED Course User Index  [MW] Shahbaz Gooden MD       Medically cleared for psychiatry placement: 5/10/2025 12:36 AM                   Clinical Impression:  Final diagnoses:  [R44.0] Auditory hallucinations (Primary)  [F22] Paranoid delusion  [Z00.8] Medical clearance for psychiatric admission          ED Disposition Condition    Transfer to Psych Facility Stable          ED Prescriptions    None       Follow-up Information    None              [1]   Social History  Tobacco Use    Smoking status: Every Day     Current packs/day: 1.00     Average packs/day: 1 pack/day for 23.8 years (23.8 ttl pk-yrs)     Types: Cigarettes     Start date: 7/17/2001    Smokeless tobacco: Never   Substance Use Topics    Alcohol use: Not Currently     Alcohol/week: 4.0 standard drinks of alcohol     Types: 4 Cans of beer per week     Comment: uses alcohol monthly    Drug use: Not Currently     Types: Marijuana        Shahbaz Gooden MD  05/10/25 0037

## 2025-05-17 ENCOUNTER — HOSPITAL ENCOUNTER (EMERGENCY)
Facility: HOSPITAL | Age: 40
Discharge: PSYCHIATRIC OR SUBSTANCE USE FACILITY | End: 2025-05-18
Attending: EMERGENCY MEDICINE
Payer: COMMERCIAL

## 2025-05-17 VITALS
TEMPERATURE: 99 F | BODY MASS INDEX: 22.96 KG/M2 | DIASTOLIC BLOOD PRESSURE: 70 MMHG | HEART RATE: 78 BPM | SYSTOLIC BLOOD PRESSURE: 136 MMHG | OXYGEN SATURATION: 100 % | RESPIRATION RATE: 16 BRPM | WEIGHT: 160 LBS

## 2025-05-17 DIAGNOSIS — F25.9 SCHIZOAFFECTIVE DISORDER, UNSPECIFIED TYPE: Primary | ICD-10-CM

## 2025-05-17 LAB
ACCEPTIBLE SP GR UR QL: 1.01 (ref 1–1.03)
ALBUMIN SERPL-MCNC: 3.9 G/DL (ref 3.5–5)
ALBUMIN/GLOB SERPL: 1.1 RATIO (ref 1.1–2)
ALP SERPL-CCNC: 72 UNIT/L (ref 40–150)
ALT SERPL-CCNC: 23 UNIT/L (ref 0–55)
AMPHET UR QL SCN: NEGATIVE
ANION GAP SERPL CALC-SCNC: 11 MEQ/L
APAP SERPL-MCNC: <10 UG/ML (ref 10–30)
AST SERPL-CCNC: 24 UNIT/L (ref 11–45)
BARBITURATE SCN PRESENT UR: NEGATIVE
BASOPHILS # BLD AUTO: 0.05 X10(3)/MCL
BASOPHILS NFR BLD AUTO: 0.5 %
BENZODIAZ UR QL SCN: NEGATIVE
BILIRUB SERPL-MCNC: 0.3 MG/DL
BILIRUB UR QL STRIP.AUTO: NEGATIVE
BUN SERPL-MCNC: 7.7 MG/DL (ref 8.9–20.6)
CALCIUM SERPL-MCNC: 9.4 MG/DL (ref 8.4–10.2)
CANNABINOIDS UR QL SCN: POSITIVE
CHLORIDE SERPL-SCNC: 104 MMOL/L (ref 98–107)
CK SERPL-CCNC: 813 U/L (ref 30–200)
CLARITY UR: CLEAR
CO2 SERPL-SCNC: 24 MMOL/L (ref 22–29)
COCAINE UR QL SCN: NEGATIVE
COLOR UR AUTO: NORMAL
CREAT SERPL-MCNC: 0.92 MG/DL (ref 0.72–1.25)
CREAT/UREA NIT SERPL: 8
EOSINOPHIL # BLD AUTO: 0.47 X10(3)/MCL (ref 0–0.9)
EOSINOPHIL NFR BLD AUTO: 4.8 %
ERYTHROCYTE [DISTWIDTH] IN BLOOD BY AUTOMATED COUNT: 15.2 % (ref 11.5–17)
ETHANOL SERPL-MCNC: <10 MG/DL
FENTANYL UR QL SCN: NEGATIVE
GFR SERPLBLD CREATININE-BSD FMLA CKD-EPI: >60 ML/MIN/1.73/M2
GLOBULIN SER-MCNC: 3.6 GM/DL (ref 2.4–3.5)
GLUCOSE SERPL-MCNC: 107 MG/DL (ref 74–100)
GLUCOSE UR QL STRIP: NEGATIVE
HCT VFR BLD AUTO: 39.5 % (ref 42–52)
HGB BLD-MCNC: 13.7 G/DL (ref 14–18)
HGB UR QL STRIP: NEGATIVE
IMM GRANULOCYTES # BLD AUTO: 0.06 X10(3)/MCL (ref 0–0.04)
IMM GRANULOCYTES NFR BLD AUTO: 0.6 %
KETONES UR QL STRIP: NEGATIVE
LEUKOCYTE ESTERASE UR QL STRIP: NEGATIVE
LYMPHOCYTES # BLD AUTO: 2.46 X10(3)/MCL (ref 0.6–4.6)
LYMPHOCYTES NFR BLD AUTO: 25.4 %
MCH RBC QN AUTO: 28.8 PG (ref 27–31)
MCHC RBC AUTO-ENTMCNC: 34.7 G/DL (ref 33–36)
MCV RBC AUTO: 83.2 FL (ref 80–94)
MDMA UR QL SCN: NEGATIVE
MONOCYTES # BLD AUTO: 0.88 X10(3)/MCL (ref 0.1–1.3)
MONOCYTES NFR BLD AUTO: 9.1 %
NEUTROPHILS # BLD AUTO: 5.78 X10(3)/MCL (ref 2.1–9.2)
NEUTROPHILS NFR BLD AUTO: 59.6 %
NITRITE UR QL STRIP: NEGATIVE
NRBC BLD AUTO-RTO: 0 %
OPIATES UR QL SCN: NEGATIVE
PCP UR QL: NEGATIVE
PH UR STRIP: 7 [PH]
PH UR: 7 [PH] (ref 3–11)
PLATELET # BLD AUTO: 267 X10(3)/MCL (ref 130–400)
PMV BLD AUTO: 9.9 FL (ref 7.4–10.4)
POTASSIUM SERPL-SCNC: 4.4 MMOL/L (ref 3.5–5.1)
PROT SERPL-MCNC: 7.5 GM/DL (ref 6.4–8.3)
PROT UR QL STRIP: NEGATIVE
RBC # BLD AUTO: 4.75 X10(6)/MCL (ref 4.7–6.1)
SODIUM SERPL-SCNC: 139 MMOL/L (ref 136–145)
SP GR UR STRIP.AUTO: 1.01 (ref 1–1.03)
UROBILINOGEN UR STRIP-ACNC: 0.2
WBC # BLD AUTO: 9.7 X10(3)/MCL (ref 4.5–11.5)

## 2025-05-17 PROCEDURE — 96372 THER/PROPH/DIAG INJ SC/IM: CPT | Performed by: INTERNAL MEDICINE

## 2025-05-17 PROCEDURE — 99285 EMERGENCY DEPT VISIT HI MDM: CPT | Mod: 25

## 2025-05-17 PROCEDURE — 81003 URINALYSIS AUTO W/O SCOPE: CPT | Performed by: INTERNAL MEDICINE

## 2025-05-17 PROCEDURE — 80053 COMPREHEN METABOLIC PANEL: CPT | Performed by: INTERNAL MEDICINE

## 2025-05-17 PROCEDURE — 85025 COMPLETE CBC W/AUTO DIFF WBC: CPT | Performed by: INTERNAL MEDICINE

## 2025-05-17 PROCEDURE — 80143 DRUG ASSAY ACETAMINOPHEN: CPT | Performed by: INTERNAL MEDICINE

## 2025-05-17 PROCEDURE — 80307 DRUG TEST PRSMV CHEM ANLYZR: CPT | Performed by: INTERNAL MEDICINE

## 2025-05-17 PROCEDURE — 82077 ASSAY SPEC XCP UR&BREATH IA: CPT | Performed by: INTERNAL MEDICINE

## 2025-05-17 PROCEDURE — 82550 ASSAY OF CK (CPK): CPT | Performed by: INTERNAL MEDICINE

## 2025-05-17 PROCEDURE — 63600175 PHARM REV CODE 636 W HCPCS: Performed by: INTERNAL MEDICINE

## 2025-05-17 RX ORDER — OLANZAPINE 10 MG/2ML
10 INJECTION, POWDER, FOR SOLUTION INTRAMUSCULAR ONCE AS NEEDED
Status: DISCONTINUED | OUTPATIENT
Start: 2025-05-17 | End: 2025-05-17

## 2025-05-17 RX ORDER — OLANZAPINE 10 MG/2ML
10 INJECTION, POWDER, FOR SOLUTION INTRAMUSCULAR ONCE AS NEEDED
Status: COMPLETED | OUTPATIENT
Start: 2025-05-17 | End: 2025-05-17

## 2025-05-17 RX ADMIN — OLANZAPINE 10 MG: 10 INJECTION, POWDER, FOR SOLUTION INTRAMUSCULAR at 07:05

## 2025-05-18 NOTE — ED PROVIDER NOTES
"05/18/2025         7:22 PM    Source of History:  History obtained from patient, mother and EMS.     Chief complaint:  From Nurse Triage:  Psychiatric Evaluation (Pt to er with manic behavior after finding his neighbors drugs and smoking them. Mom states that pt is not acting like himself)    HISTORY OF PRESENT ILLNES:  Elvis Puentes is a 39 y.o. male  has a past medical history of Anxiety, Asthma, Depression, Hallucination, History of psychiatric hospitalization, psychiatric care, Psychiatric problem, Psychosis, Renal insufficiency (7/17/2024), Schizoaffective disorder, Sleep difficulties, Suicide attempt, and Therapy. presenting with Psychiatric Evaluation (Pt to er with manic behavior after finding his neighbors drugs and smoking them. Mom states that pt is not acting like himself) patient was discharged from a psych facility this morning.  Mother states he was fine until he smokes some synthetic marijuana.      REVIEW OF SYSTEMS:   Constitutional symptoms:     Skin symptoms:      Eye symptoms:     ENMT symptoms:      Respiratory symptoms:      Cardiovascular symptoms:     Gastrointestinal symptoms:      Genitourinary symptoms:     Musculoskeletal symptoms:      Neurologic symptoms:      Psychiatric symptoms:               Additional review of systems information: Patient Denies Any Other Complaints.    All Other Systems Reviewed With Patient And Negative.    ALLEGIES:  Review of patient's allergies indicates:   Allergen Reactions    Haloperidol Swelling     Muscle stiffness  Has muscle spasms      Paroxetine Swelling, Other (See Comments) and Rash     "i don't remember"  "i don't remember"      Pineapple      Face swells up. I have eaten pineapple since then and had no problems.      Ziprasidone Other (See Comments)     Muscle twitching       MEDICINE LIST:  Current Outpatient Medications   Medication Instructions    chlorproMAZINE (THORAZINE) 25 mg, Oral, 3 times daily    divalproex ER (DEPAKOTE ER) " 500 mg, Daily    hydrOXYzine (ATARAX) 50 mg, Oral, 4 times daily PRN    INVEGA SUSTENNA 117 mg/0.75 mL Syrg injection 0.75 mLs, Every 30 days    lithium (LITHOBID) 300 mg, Oral, Every 12 hours    mirtazapine (REMERON) 7.5 mg, Oral, Nightly    OXcarbazepine (TRILEPTAL) 300 mg, 2 times daily    risperiDONE (RISPERDAL) 4 mg, 2 times daily    traZODone (DESYREL) 150 mg, Oral, Nightly        PMH:  As per HPI and below:    Reviewed and updated in chart.    PAST MEDICAL HISTORY:  Past Medical History:   Diagnosis Date    Anxiety     Asthma     Depression     Hallucination     History of psychiatric hospitalization     Hx of psychiatric care     Psychiatric problem     Psychosis     Renal insufficiency 7/17/2024    Schizoaffective disorder     Sleep difficulties     Suicide attempt     Therapy         PAST SURGICAL HISTORY:  History reviewed. No pertinent surgical history.    SOCIAL HISTORY:  Social History[1]    FAMILY HISTORY:  Family History   Problem Relation Name Age of Onset    Coronary artery disease Mother          Stents    Heart attack Mother      Cervical cancer Sister          PROBLEM LIST:  Problem List[2]     PHYSICAL EXAM:      ED Triage Vitals [05/17/25 1916]   /85   Pulse (!) 152   Resp (!) 23   Temp    SpO2 98 %        Vital Signs: Reviewed As In Chart.  General:  Alert, No Cardiorespiratory Distress Noted.  Skin: warm and dry  Eye:   Extraocular Movements Are Intact.   ENT: Mucus membranes are moist.   Cardiovascular:  Normal peripheral perfusion     Respiratory:  Normal respiratory rate    Gastrointestinal:  No distention    Neurological:  Alert And Oriented To Person, Place, Time, And Situation, Normal Motor Observed, Normal Speech Observed.  Musculoskeletal:  No Gross Deformity Noted.     Psychiatric:  Reading loudly from the Bible.  Patient having to be redirected.  Denies suicide or homicidal ideation.  ED WORKUP FOR MEDICAL DECISION MAKING:    ED ORDERS:  Orders Placed This Encounter    Procedures    CBC auto differential    Comprehensive metabolic panel    Urinalysis, Reflex to Urine Culture Urine, Clean Catch    Drug Screen, Urine    Ethanol    Acetaminophen level    CBC with Differential    CPK    Vital signs while awake    Undress patient and allow them to wear facility provided apparel.    Direct Psych Observation    PEC/Psych Hold - Physicians Emergency Certificate / 72 Hour Psych Hold       ED MEDICINES:  Medications   OLANZapine injection 10 mg (10 mg Intramuscular Given 5/17/25 1940)       Medically cleared for psychiatry placement: 5/17/2025  9:21 PM        ED LABS ORDERED AND REVIEWED:  Admission on 05/17/2025, Discharged on 05/18/2025   Component Date Value Ref Range Status    Sodium 05/17/2025 139  136 - 145 mmol/L Final    Potassium 05/17/2025 4.4  3.5 - 5.1 mmol/L Final    Chloride 05/17/2025 104  98 - 107 mmol/L Final    CO2 05/17/2025 24  22 - 29 mmol/L Final    Glucose 05/17/2025 107 (H)  74 - 100 mg/dL Final    Blood Urea Nitrogen 05/17/2025 7.7 (L)  8.9 - 20.6 mg/dL Final    Creatinine 05/17/2025 0.92  0.72 - 1.25 mg/dL Final    Calcium 05/17/2025 9.4  8.4 - 10.2 mg/dL Final    Protein Total 05/17/2025 7.5  6.4 - 8.3 gm/dL Final    Albumin 05/17/2025 3.9  3.5 - 5.0 g/dL Final    Globulin 05/17/2025 3.6 (H)  2.4 - 3.5 gm/dL Final    Albumin/Globulin Ratio 05/17/2025 1.1  1.1 - 2.0 ratio Final    Bilirubin Total 05/17/2025 0.3  <=1.5 mg/dL Final    ALP 05/17/2025 72  40 - 150 unit/L Final    ALT 05/17/2025 23  0 - 55 unit/L Final    AST 05/17/2025 24  11 - 45 unit/L Final    eGFR 05/17/2025 >60  mL/min/1.73/m2 Final    Anion Gap 05/17/2025 11.0  mEq/L Final    BUN/Creatinine Ratio 05/17/2025 8   Final    Color, UA 05/17/2025 Straw  Yellow, Light-Yellow, Dark Yellow, [place], Straw Final    Appearance, UA 05/17/2025 Clear  Clear Final    Specific Gravity,  05/17/2025 1.015  1.005 - 1.030 Final    pH,  05/17/2025 7.0  5.0 - 8.5 Final    Protein,  05/17/2025 Negative  Negative  Final    Glucose, UA 05/17/2025 Negative  Negative, Normal Final    Ketones, UA 05/17/2025 Negative  Negative Final    Blood, UA 05/17/2025 Negative  Negative Final    Bilirubin, UA 05/17/2025 Negative  Negative Final    Urobilinogen, UA 05/17/2025 0.2  0.2, 1.0, Normal Final    Nitrites, UA 05/17/2025 Negative  Negative Final    Leukocyte Esterase, UA 05/17/2025 Negative  Negative Final    Amphetamines, Urine 05/17/2025 Negative  Negative Final    Barbiturates, Urine 05/17/2025 Negative  Negative Final    Benzodiazepine, Urine 05/17/2025 Negative  Negative Final    Cannabinoids, Urine 05/17/2025 Positive (A)  Negative Final    Cocaine, Urine 05/17/2025 Negative  Negative Final    Fentanyl, Urine 05/17/2025 Negative  Negative Final    MDMA, Urine 05/17/2025 Negative  Negative Final    Opiates, Urine 05/17/2025 Negative  Negative Final    Phencyclidine, Urine 05/17/2025 Negative  Negative Final    pH, Urine 05/17/2025 7.0  3.0 - 11.0 Final    Specific Gravity, Urine Auto 05/17/2025 1.015  1.001 - 1.035 Final    Ethanol Level 05/17/2025 <10.0  <=10.0 mg/dL Final    Acetaminophen Level 05/17/2025 <10.0 (L)  10.0 - 30.0 ug/ml Final    WBC 05/17/2025 9.70  4.50 - 11.50 x10(3)/mcL Final    RBC 05/17/2025 4.75  4.70 - 6.10 x10(6)/mcL Final    Hgb 05/17/2025 13.7 (L)  14.0 - 18.0 g/dL Final    Hct 05/17/2025 39.5 (L)  42.0 - 52.0 % Final    MCV 05/17/2025 83.2  80.0 - 94.0 fL Final    MCH 05/17/2025 28.8  27.0 - 31.0 pg Final    MCHC 05/17/2025 34.7  33.0 - 36.0 g/dL Final    RDW 05/17/2025 15.2  11.5 - 17.0 % Final    Platelet 05/17/2025 267  130 - 400 x10(3)/mcL Final    MPV 05/17/2025 9.9  7.4 - 10.4 fL Final    Neut % 05/17/2025 59.6  % Final    Lymph % 05/17/2025 25.4  % Final    Mono % 05/17/2025 9.1  % Final    Eos % 05/17/2025 4.8  % Final    Basophil % 05/17/2025 0.5  % Final    Imm Grans % 05/17/2025 0.6  % Final    Neut # 05/17/2025 5.78  2.1 - 9.2 x10(3)/mcL Final    Lymph # 05/17/2025 2.46  0.6 - 4.6 x10(3)/mcL  Final    Mono # 05/17/2025 0.88  0.1 - 1.3 x10(3)/mcL Final    Eos # 05/17/2025 0.47  0 - 0.9 x10(3)/mcL Final    Baso # 05/17/2025 0.05  <=0.2 x10(3)/mcL Final    Imm Gran # 05/17/2025 0.06 (H)  0.00 - 0.04 x10(3)/mcL Final    NRBC% 05/17/2025 0.0  % Final    Creatine Kinase 05/17/2025 813 (H)  30 - 200 U/L Final       RADIOLOGY STUDIES ORDERED AND REVIEWED:  Imaging Results    None         MEDICAL DECISION MAKING:    Elvis Puentes is 39 y.o. male who  has a past medical history of Anxiety, Asthma, Depression, Hallucination, History of psychiatric hospitalization, psychiatric care, Psychiatric problem, Psychosis, Renal insufficiency (7/17/2024), Schizoaffective disorder, Sleep difficulties, Suicide attempt, and Therapy. arrives in ER with c/o Psychiatric Evaluation (Pt to er with manic behavior after finding his neighbors drugs and smoking them. Mom states that pt is not acting like himself)      Reviewed Nurses Note. Reviewed Vital Signs.     Reviewed Pertinent old records, History and updated as necessary.    Vitals:    05/17/25 2259   BP: 136/70   Pulse: 78   Resp: 16   Temp: 98.8 °F (37.1 °C)        Medical Decision Making  Differential diagnosis includes but is not limited to depression, schizophrenia, bipolar disorder, schizoaffective disorder, psychosis and malingering    Amount and/or Complexity of Data Reviewed  Labs: ordered. Decision-making details documented in ED Course.     Details: Urine drug screen positive for cannabinoids otherwise unremarkable  ECG/medicine tests: ordered and independent interpretation performed.     Details: Sinus tachycardia rate of 136 left atrial enlargement no ST-T changes no ectopy normal axis normal intervals    Risk  Prescription drug management.              ED Course as of 05/18/25 0036   Sat May 17, 2025   2214 CPK(!): 813 [KK]      ED Course User Index  [KK] Matty Toro MD            PROCEDURES PERFORMED IN ED:  Procedures    DIAGNOSTIC IMPRESSION:         ICD-10-CM ICD-9-CM   1. Schizoaffective disorder, unspecified type  F25.9 295.70         ED Disposition Condition    Transfer to Psych Facility Stable               Medication List        ASK your doctor about these medications      chlorproMAZINE 25 MG tablet  Commonly known as: THORAZINE  Take 1 tablet (25 mg total) by mouth 3 (three) times daily.     divalproex  MG Tb24 24 hr tablet  Commonly known as: DEPAKOTE ER     hydrOXYzine 50 MG tablet  Commonly known as: ATARAX  Take 1 tablet (50 mg total) by mouth 4 (four) times daily as needed for Anxiety.     INVEGA SUSTENNA 117 mg/0.75 mL Syrg injection  Generic drug: paliperidone palmitate     lithium 300 MG CR tablet  Commonly known as: LITHOBID  Take 1 tablet (300 mg total) by mouth every 12 (twelve) hours.     mirtazapine 7.5 MG Tab  Commonly known as: REMERON  Take 1 tablet (7.5 mg total) by mouth nightly.     OXcarbazepine 300 MG Tab  Commonly known as: TRILEPTAL     risperiDONE 2 MG tablet  Commonly known as: RISPERDAL     traZODone 150 MG tablet  Commonly known as: DESYREL  Take 1 tablet (150 mg total) by mouth nightly.                    ED Prescriptions    None       Follow-up Information    None                Matty Toro MD  05/17/25 2121       [1]   Social History  Tobacco Use    Smoking status: Every Day     Current packs/day: 1.00     Average packs/day: 1 pack/day for 23.8 years (23.8 ttl pk-yrs)     Types: Cigarettes     Start date: 7/17/2001    Smokeless tobacco: Never   Substance Use Topics    Alcohol use: Not Currently     Alcohol/week: 4.0 standard drinks of alcohol     Types: 4 Cans of beer per week     Comment: uses alcohol monthly    Drug use: Not Currently     Types: Marijuana   [2]   Patient Active Problem List  Diagnosis    Depression    Non-traumatic rhabdomyolysis    Schizophrenia    Localized swelling of left upper extremity    Chest pain    Hallucination    Renal insufficiency    Suicidal ideation        Matty Toro,  MD  05/18/25 0036

## 2025-05-19 LAB
OHS QRS DURATION: 72 MS
OHS QTC CALCULATION: 427 MS

## 2025-05-28 ENCOUNTER — HOSPITAL ENCOUNTER (EMERGENCY)
Facility: HOSPITAL | Age: 40
Discharge: HOME | End: 2025-05-28
Attending: FAMILY MEDICINE
Payer: COMMERCIAL

## 2025-05-28 VITALS
WEIGHT: 165.19 LBS | BODY MASS INDEX: 23.65 KG/M2 | TEMPERATURE: 98 F | HEIGHT: 70 IN | HEART RATE: 89 BPM | OXYGEN SATURATION: 100 % | RESPIRATION RATE: 18 BRPM | DIASTOLIC BLOOD PRESSURE: 77 MMHG | SYSTOLIC BLOOD PRESSURE: 120 MMHG

## 2025-05-28 DIAGNOSIS — R12 HEARTBURN: Primary | ICD-10-CM

## 2025-05-28 PROCEDURE — 25000003 PHARM REV CODE 250: Performed by: PHYSICIAN ASSISTANT

## 2025-05-28 PROCEDURE — 99283 EMERGENCY DEPT VISIT LOW MDM: CPT

## 2025-05-28 RX ORDER — DIPHENHYDRAMINE HCL 25 MG
25 CAPSULE ORAL
Status: COMPLETED | OUTPATIENT
Start: 2025-05-28 | End: 2025-05-28

## 2025-05-28 RX ADMIN — DIPHENHYDRAMINE HYDROCHLORIDE 25 MG: 25 CAPSULE ORAL at 06:05

## 2025-05-28 RX ADMIN — ALUMINUM HYDROXIDE AND MAGNESIUM HYDROXIDE 30 ML: 200; 200 SUSPENSION ORAL at 06:05

## 2025-06-11 ENCOUNTER — HOSPITAL ENCOUNTER (EMERGENCY)
Facility: HOSPITAL | Age: 40
Discharge: PSYCHIATRIC OR SUBSTANCE USE FACILITY | End: 2025-06-11
Attending: EMERGENCY MEDICINE
Payer: COMMERCIAL

## 2025-06-11 VITALS
HEIGHT: 70 IN | DIASTOLIC BLOOD PRESSURE: 76 MMHG | OXYGEN SATURATION: 99 % | SYSTOLIC BLOOD PRESSURE: 124 MMHG | HEART RATE: 105 BPM | BODY MASS INDEX: 23.64 KG/M2 | RESPIRATION RATE: 22 BRPM | WEIGHT: 165.13 LBS | TEMPERATURE: 98 F

## 2025-06-11 DIAGNOSIS — Z00.8 MEDICAL CLEARANCE FOR PSYCHIATRIC ADMISSION: Primary | ICD-10-CM

## 2025-06-11 LAB
ACCEPTIBLE SP GR UR QL: 1.08 (ref 1–1.03)
ALBUMIN SERPL-MCNC: 3.9 G/DL (ref 3.5–5)
ALBUMIN/GLOB SERPL: 1 RATIO (ref 1.1–2)
ALP SERPL-CCNC: 78 UNIT/L (ref 40–150)
ALT SERPL-CCNC: 22 UNIT/L (ref 0–55)
AMPHET UR QL SCN: NEGATIVE
ANION GAP SERPL CALC-SCNC: 8 MEQ/L
APAP SERPL-MCNC: <3 UG/ML (ref 10–30)
AST SERPL-CCNC: 21 UNIT/L (ref 11–45)
BACTERIA #/AREA URNS AUTO: ABNORMAL /HPF
BARBITURATE SCN PRESENT UR: NEGATIVE
BASOPHILS # BLD AUTO: 0.06 X10(3)/MCL
BASOPHILS NFR BLD AUTO: 0.5 %
BENZODIAZ UR QL SCN: NEGATIVE
BILIRUB SERPL-MCNC: 0.3 MG/DL
BILIRUB UR QL STRIP.AUTO: NEGATIVE
BUN SERPL-MCNC: 7.1 MG/DL (ref 8.9–20.6)
CALCIUM SERPL-MCNC: 9.9 MG/DL (ref 8.4–10.2)
CANNABINOIDS UR QL SCN: POSITIVE
CHLORIDE SERPL-SCNC: 99 MMOL/L (ref 98–107)
CLARITY UR: CLEAR
CO2 SERPL-SCNC: 29 MMOL/L (ref 22–29)
COCAINE UR QL SCN: NEGATIVE
COLOR UR AUTO: YELLOW
CREAT SERPL-MCNC: 0.99 MG/DL (ref 0.72–1.25)
CREAT/UREA NIT SERPL: 7
EOSINOPHIL # BLD AUTO: 0.52 X10(3)/MCL (ref 0–0.9)
EOSINOPHIL NFR BLD AUTO: 4.4 %
ERYTHROCYTE [DISTWIDTH] IN BLOOD BY AUTOMATED COUNT: 16.3 % (ref 11.5–17)
ETHANOL SERPL-MCNC: <10 MG/DL
FENTANYL UR QL SCN: NEGATIVE
GFR SERPLBLD CREATININE-BSD FMLA CKD-EPI: >60 ML/MIN/1.73/M2
GLOBULIN SER-MCNC: 3.9 GM/DL (ref 2.4–3.5)
GLUCOSE SERPL-MCNC: 86 MG/DL (ref 74–100)
GLUCOSE UR QL STRIP: NORMAL
HCT VFR BLD AUTO: 39.9 % (ref 42–52)
HGB BLD-MCNC: 13.6 G/DL (ref 14–18)
HGB UR QL STRIP: NEGATIVE
HYALINE CASTS #/AREA URNS LPF: ABNORMAL /LPF
IMM GRANULOCYTES # BLD AUTO: 0.07 X10(3)/MCL (ref 0–0.04)
IMM GRANULOCYTES NFR BLD AUTO: 0.6 %
KETONES UR QL STRIP: ABNORMAL
LEUKOCYTE ESTERASE UR QL STRIP: NEGATIVE
LYMPHOCYTES # BLD AUTO: 1.84 X10(3)/MCL (ref 0.6–4.6)
LYMPHOCYTES NFR BLD AUTO: 15.4 %
MCH RBC QN AUTO: 28.7 PG (ref 27–31)
MCHC RBC AUTO-ENTMCNC: 34.1 G/DL (ref 33–36)
MCV RBC AUTO: 84.2 FL (ref 80–94)
MDMA UR QL SCN: NEGATIVE
MONOCYTES # BLD AUTO: 1.3 X10(3)/MCL (ref 0.1–1.3)
MONOCYTES NFR BLD AUTO: 10.9 %
NEUTROPHILS # BLD AUTO: 8.16 X10(3)/MCL (ref 2.1–9.2)
NEUTROPHILS NFR BLD AUTO: 68.2 %
NITRITE UR QL STRIP: NEGATIVE
NRBC BLD AUTO-RTO: 0 %
OPIATES UR QL SCN: NEGATIVE
PCP UR QL: NEGATIVE
PH UR STRIP: 5.5 [PH]
PH UR: 8 [PH] (ref 3–11)
PLATELET # BLD AUTO: 211 X10(3)/MCL (ref 130–400)
PMV BLD AUTO: 10.6 FL (ref 7.4–10.4)
POTASSIUM SERPL-SCNC: 4.2 MMOL/L (ref 3.5–5.1)
PROT SERPL-MCNC: 7.8 GM/DL (ref 6.4–8.3)
PROT UR QL STRIP: NEGATIVE
RBC # BLD AUTO: 4.74 X10(6)/MCL (ref 4.7–6.1)
RBC #/AREA URNS AUTO: ABNORMAL /HPF
SALICYLATES SERPL-MCNC: <5 MG/DL (ref 15–30)
SODIUM SERPL-SCNC: 136 MMOL/L (ref 136–145)
SP GR UR STRIP.AUTO: 1.02 (ref 1–1.03)
SQUAMOUS #/AREA URNS LPF: ABNORMAL /HPF
TSH SERPL-ACNC: 1.86 UIU/ML (ref 0.35–4.94)
UROBILINOGEN UR STRIP-ACNC: NORMAL
WBC # BLD AUTO: 11.95 X10(3)/MCL (ref 4.5–11.5)
WBC #/AREA URNS AUTO: ABNORMAL /HPF

## 2025-06-11 PROCEDURE — 96372 THER/PROPH/DIAG INJ SC/IM: CPT | Performed by: EMERGENCY MEDICINE

## 2025-06-11 PROCEDURE — 81001 URINALYSIS AUTO W/SCOPE: CPT | Performed by: EMERGENCY MEDICINE

## 2025-06-11 PROCEDURE — 80307 DRUG TEST PRSMV CHEM ANLYZR: CPT | Performed by: EMERGENCY MEDICINE

## 2025-06-11 PROCEDURE — 82077 ASSAY SPEC XCP UR&BREATH IA: CPT | Performed by: EMERGENCY MEDICINE

## 2025-06-11 PROCEDURE — 99285 EMERGENCY DEPT VISIT HI MDM: CPT | Mod: 25

## 2025-06-11 PROCEDURE — 85025 COMPLETE CBC W/AUTO DIFF WBC: CPT | Performed by: EMERGENCY MEDICINE

## 2025-06-11 PROCEDURE — 80143 DRUG ASSAY ACETAMINOPHEN: CPT | Performed by: EMERGENCY MEDICINE

## 2025-06-11 PROCEDURE — 80053 COMPREHEN METABOLIC PANEL: CPT | Performed by: EMERGENCY MEDICINE

## 2025-06-11 PROCEDURE — 80179 DRUG ASSAY SALICYLATE: CPT | Performed by: EMERGENCY MEDICINE

## 2025-06-11 PROCEDURE — 84443 ASSAY THYROID STIM HORMONE: CPT | Performed by: EMERGENCY MEDICINE

## 2025-06-11 PROCEDURE — 63600175 PHARM REV CODE 636 W HCPCS: Performed by: EMERGENCY MEDICINE

## 2025-06-11 RX ORDER — DIPHENHYDRAMINE HYDROCHLORIDE 50 MG/ML
25 INJECTION, SOLUTION INTRAMUSCULAR; INTRAVENOUS
Status: COMPLETED | OUTPATIENT
Start: 2025-06-11 | End: 2025-06-11

## 2025-06-11 RX ORDER — OLANZAPINE 10 MG/2ML
10 INJECTION, POWDER, FOR SOLUTION INTRAMUSCULAR ONCE AS NEEDED
Status: COMPLETED | OUTPATIENT
Start: 2025-06-11 | End: 2025-06-11

## 2025-06-11 RX ADMIN — DIPHENHYDRAMINE HYDROCHLORIDE 25 MG: 50 INJECTION INTRAMUSCULAR; INTRAVENOUS at 11:06

## 2025-06-11 RX ADMIN — OLANZAPINE 10 MG: 10 INJECTION, POWDER, FOR SOLUTION INTRAMUSCULAR at 11:06

## 2025-06-11 NOTE — ED PROVIDER NOTES
"Encounter Date: 6/11/2025       History     Chief Complaint   Patient presents with    Mental Health Problem     Hallucinations.  Denies SI/HI.  Mother is allegedly attempting to get an OPC at this time per EMS.     Patient states he has a history of schizophrenia taking Seroquel presents emergency department calling EMS asking to come to the emergency department.  He states that his mother has been taking his disability checks.  At 1st he states that he is requesting adult protective Services then he is requesting to go back home stating that has mother will let him back in.  He states that he does not have any suicidal or homicidal ideations but yesterday he can hear someone snoring next to him but he stated no one was there and was concerned briefly about auditory hallucinations.  No recent fevers illnesses.  Patient oriented x3.      Review of patient's allergies indicates:   Allergen Reactions    Haloperidol Swelling     Muscle stiffness  Has muscle spasms      Paroxetine Swelling, Other (See Comments) and Rash     "i don't remember"  "i don't remember"      Pineapple      Face swells up. I have eaten pineapple since then and had no problems.      Ziprasidone Other (See Comments)     Muscle twitching     Past Medical History:   Diagnosis Date    Anxiety     Asthma     Depression     Hallucination     History of psychiatric hospitalization     Hx of psychiatric care     Psychiatric problem     Psychosis     Renal insufficiency 7/17/2024    Schizoaffective disorder     Sleep difficulties     Suicide attempt     Therapy      History reviewed. No pertinent surgical history.  Family History   Problem Relation Name Age of Onset    Coronary artery disease Mother          Stents    Heart attack Mother      Cervical cancer Sister       Social History[1]  Review of Systems   Psychiatric/Behavioral:  Positive for confusion and decreased concentration.        Physical Exam     Initial Vitals [06/11/25 1044]   BP Pulse Resp " Temp SpO2   124/76 105 (!) 22 98.1 °F (36.7 °C) 99 %      MAP       --         Physical Exam    Nursing note and vitals reviewed.  Constitutional: He appears well-developed and well-nourished.   HENT:   Head: Normocephalic and atraumatic.   Eyes: EOM are normal. Pupils are equal, round, and reactive to light.   Neck:   Normal range of motion.  Cardiovascular:  Normal rate and regular rhythm.           Pulmonary/Chest: Breath sounds normal. No respiratory distress. He has no wheezes. He has no rales.   Abdominal: Abdomen is soft. Bowel sounds are normal. He exhibits no distension. There is no abdominal tenderness. There is no rebound.   Musculoskeletal:         General: Normal range of motion.      Cervical back: Normal range of motion.     Neurological: He is alert and oriented to person, place, and time.   Psychiatric:   Exhibits poor insight and judgment         ED Course   Critical Care    Date/Time: 6/11/2025 1:50 PM    Performed by: Lino Cortes MD  Authorized by: Lino Cortes MD  Total critical care time (exclusive of procedural time) : 35 minutes  Comments: Chemical restraint        Labs Reviewed   URINALYSIS, REFLEX TO URINE CULTURE - Abnormal       Result Value    Color, UA Yellow      Appearance, UA Clear      Specific Gravity, UA 1.018      pH, UA 5.5      Protein, UA Negative      Glucose, UA Normal      Ketones, UA Trace (*)     Blood, UA Negative      Bilirubin, UA Negative      Urobilinogen, UA Normal      Nitrites, UA Negative      Leukocyte Esterase, UA Negative      RBC, UA 0-5      WBC, UA 0-5      Bacteria, UA Trace (*)     Squamous Epithelial Cells, UA None Seen      Hyaline Casts, UA None Seen     ACETAMINOPHEN LEVEL - Abnormal    Acetaminophen Level <3.0 (*)    COMPREHENSIVE METABOLIC PANEL - Abnormal    Sodium 136      Potassium 4.2      Chloride 99      CO2 29      Glucose 86      Blood Urea Nitrogen 7.1 (*)     Creatinine 0.99      Calcium 9.9      Protein Total 7.8      Albumin 3.9       Globulin 3.9 (*)     Albumin/Globulin Ratio 1.0 (*)     Bilirubin Total 0.3      ALP 78      ALT 22      AST 21      eGFR >60      Anion Gap 8.0      BUN/Creatinine Ratio 7     DRUG SCREEN, URINE (BEAKER) - Abnormal    Amphetamines, Urine Negative      Barbiturates, Urine Negative      Benzodiazepine, Urine Negative      Cannabinoids, Urine Positive (*)     Cocaine, Urine Negative      Fentanyl, Urine Negative      MDMA, Urine Negative      Opiates, Urine Negative      Phencyclidine, Urine Negative      pH, Urine 8.0      Specific Gravity, Urine Auto 1.08 (*)     Narrative:     Cut off concentrations:    Amphetamines - 1000 ng/ml  Barbiturates - 200 ng/ml  Benzodiazepine - 200 ng/ml  Cannabinoids (THC) - 50 ng/ml  Cocaine - 300 ng/ml  Fentanyl - 1.0 ng/ml  MDMA - 500 ng/ml  Opiates - 300 ng/ml   Phencyclidine (PCP) - 25 ng/ml    Specimen submitted for drug analysis and tested for pH and specific gravity in order to evaluate sample integrity. Suspect tampering if specific gravity is <1.003 and/or pH is not within the range of 4.5 - 8.0  False negatives may result form substances such as bleach added to urine.  False positives may result for the presence of a substance with similar chemical structure to the drug or its metabolite.    This test provides only a PRELIMINARY analytical test result. A more specific alternate chemical method must be used in order to obtain a confirmed analytical result. Gas chromatography/mass spectrometry (GC/MS) is the preferred confirmatory method. Other chemical confirmation methods are available. Clinical consideration and professional judgement should be applied to any drug of abuse test result, particularly when preliminary positive results are used.    Positive results will be confirmed only at the physicians request. Unconfirmed screening results are to be used only for medical purposes (treatment).        SALICYLATE LEVEL - Abnormal    Salicylate Level <5.0 (*)    CBC WITH  DIFFERENTIAL - Abnormal    WBC 11.95 (*)     RBC 4.74      Hgb 13.6 (*)     Hct 39.9 (*)     MCV 84.2      MCH 28.7      MCHC 34.1      RDW 16.3      Platelet 211      MPV 10.6 (*)     Neut % 68.2      Lymph % 15.4      Mono % 10.9      Eos % 4.4      Basophil % 0.5      Imm Grans % 0.6      Neut # 8.16      Lymph # 1.84      Mono # 1.30      Eos # 0.52      Baso # 0.06      Imm Gran # 0.07 (*)     NRBC% 0.0     ALCOHOL,MEDICAL (ETHANOL) - Normal    Ethanol Level <10.0     TSH - Normal    TSH 1.857     CBC W/ AUTO DIFFERENTIAL    Narrative:     The following orders were created for panel order CBC auto differential.  Procedure                               Abnormality         Status                     ---------                               -----------         ------                     CBC with Differential[[id number]]       Abnormal            Final result                 Please view results for these tests on the individual orders.          Imaging Results    None          Medications   OLANZapine injection 10 mg (10 mg Intramuscular Given 6/11/25 1115)   diphenhydrAMINE injection 25 mg (25 mg Intramuscular Given 6/11/25 1115)     Medical Decision Making  Amount and/or Complexity of Data Reviewed  Labs: ordered.    Risk  Prescription drug management.                          Medical Decision Making:   Initial Assessment:   Based on history and physical showing poor insight and judgment will medically screened and pec the patient  Differential Diagnosis:   Schizophrenia, medical noncompliance, suicidal ideations, homicidal ideations, hallucinations, drug induced psychosis  ED Management:  Time 11:09 a.m.  Patient becoming agitated will order olanzapine and Benadryl     Time 1:44 p.m.   Patient resting comfortably after medications provided and medically cleared at this time awaiting psychiatric transport             Clinical Impression:  Final diagnoses:  [Z00.8] Medical clearance for psychiatric admission  (Primary)          ED Disposition Condition    Transfer to Psych Facility Stable          ED Prescriptions    None       Follow-up Information    None                [1]   Social History  Tobacco Use    Smoking status: Every Day     Current packs/day: 1.00     Average packs/day: 1 pack/day for 23.9 years (23.9 ttl pk-yrs)     Types: Cigarettes     Start date: 7/17/2001    Smokeless tobacco: Never   Substance Use Topics    Alcohol use: Not Currently     Alcohol/week: 4.0 standard drinks of alcohol     Types: 4 Cans of beer per week     Comment: uses alcohol monthly    Drug use: Not Currently     Types: Marijuana        Lino Cortes MD  06/11/25 4143

## 2025-08-25 ENCOUNTER — HOSPITAL ENCOUNTER (EMERGENCY)
Facility: HOSPITAL | Age: 40
Discharge: HOME | End: 2025-08-25
Attending: EMERGENCY MEDICINE
Payer: COMMERCIAL

## 2025-08-25 VITALS
HEART RATE: 100 BPM | HEIGHT: 70 IN | RESPIRATION RATE: 18 BRPM | WEIGHT: 150 LBS | DIASTOLIC BLOOD PRESSURE: 87 MMHG | OXYGEN SATURATION: 100 % | BODY MASS INDEX: 21.47 KG/M2 | SYSTOLIC BLOOD PRESSURE: 116 MMHG | TEMPERATURE: 99 F

## 2025-08-25 DIAGNOSIS — F31.9 BIPOLAR AFFECTIVE DISORDER, REMISSION STATUS UNSPECIFIED: ICD-10-CM

## 2025-08-25 DIAGNOSIS — F20.9 SCHIZOPHRENIA, UNSPECIFIED TYPE: ICD-10-CM

## 2025-08-25 DIAGNOSIS — Z76.0 MEDICATION REFILL: Primary | ICD-10-CM

## 2025-08-25 PROCEDURE — 99283 EMERGENCY DEPT VISIT LOW MDM: CPT

## 2025-08-25 RX ORDER — CHLORPROMAZINE HYDROCHLORIDE 25 MG/1
25 TABLET, FILM COATED ORAL 3 TIMES DAILY
Qty: 90 TABLET | Refills: 0 | Status: SHIPPED | OUTPATIENT
Start: 2025-08-25 | End: 2025-09-24

## 2025-08-25 RX ORDER — DIVALPROEX SODIUM 500 MG/1
500 TABLET, FILM COATED, EXTENDED RELEASE ORAL DAILY
Qty: 30 TABLET | Refills: 0 | Status: SHIPPED | OUTPATIENT
Start: 2025-08-25 | End: 2025-09-24

## 2025-08-25 RX ORDER — QUETIAPINE FUMARATE 100 MG/1
100 TABLET, FILM COATED ORAL NIGHTLY
Qty: 30 TABLET | Refills: 0 | Status: SHIPPED | OUTPATIENT
Start: 2025-08-25 | End: 2025-09-24

## 2025-08-26 ENCOUNTER — HOSPITAL ENCOUNTER (EMERGENCY)
Facility: HOSPITAL | Age: 40
Discharge: PSYCHIATRIC OR SUBSTANCE USE FACILITY | End: 2025-08-26
Attending: EMERGENCY MEDICINE
Payer: COMMERCIAL

## 2025-09-04 ENCOUNTER — DOCUMENTATION ONLY (OUTPATIENT)
Dept: CASE MANAGEMENT | Facility: HOSPITAL | Age: 40
End: 2025-09-04
Payer: MEDICAID